# Patient Record
Sex: FEMALE | Race: WHITE | NOT HISPANIC OR LATINO | Employment: UNEMPLOYED | URBAN - METROPOLITAN AREA
[De-identification: names, ages, dates, MRNs, and addresses within clinical notes are randomized per-mention and may not be internally consistent; named-entity substitution may affect disease eponyms.]

---

## 2017-08-25 ENCOUNTER — HOSPITAL ENCOUNTER (EMERGENCY)
Facility: HOSPITAL | Age: 37
Discharge: HOME | End: 2017-08-25
Attending: EMERGENCY MEDICINE
Payer: MEDICAID

## 2017-08-25 VITALS
SYSTOLIC BLOOD PRESSURE: 116 MMHG | HEIGHT: 66 IN | DIASTOLIC BLOOD PRESSURE: 77 MMHG | BODY MASS INDEX: 28.93 KG/M2 | TEMPERATURE: 98 F | OXYGEN SATURATION: 99 % | RESPIRATION RATE: 18 BRPM | WEIGHT: 180 LBS | HEART RATE: 108 BPM

## 2017-08-25 DIAGNOSIS — S01.01XA SCALP LACERATION, INITIAL ENCOUNTER: Primary | ICD-10-CM

## 2017-08-25 DIAGNOSIS — S60.221A CONTUSION OF RIGHT HAND, INITIAL ENCOUNTER: ICD-10-CM

## 2017-08-25 PROCEDURE — 12001 RPR S/N/AX/GEN/TRNK 2.5CM/<: CPT | Mod: ,,, | Performed by: EMERGENCY MEDICINE

## 2017-08-25 PROCEDURE — 12001 RPR S/N/AX/GEN/TRNK 2.5CM/<: CPT

## 2017-08-25 PROCEDURE — 25000003 PHARM REV CODE 250: Performed by: EMERGENCY MEDICINE

## 2017-08-25 PROCEDURE — 99284 EMERGENCY DEPT VISIT MOD MDM: CPT | Mod: 25,,, | Performed by: EMERGENCY MEDICINE

## 2017-08-25 PROCEDURE — 99283 EMERGENCY DEPT VISIT LOW MDM: CPT | Mod: 25

## 2017-08-25 RX ORDER — LIDOCAINE HYDROCHLORIDE AND EPINEPHRINE 10; 10 MG/ML; UG/ML
20 INJECTION, SOLUTION INFILTRATION; PERINEURAL ONCE
Status: COMPLETED | OUTPATIENT
Start: 2017-08-25 | End: 2017-08-25

## 2017-08-25 RX ADMIN — LIDOCAINE HYDROCHLORIDE,EPINEPHRINE BITARTRATE 20 ML: 10; .01 INJECTION, SOLUTION INFILTRATION; PERINEURAL at 03:08
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
## 2018-01-13 ENCOUNTER — IMMUNIZATION (OUTPATIENT)
Dept: URGENT CARE | Facility: CLINIC | Age: 38
End: 2018-01-13
Payer: MEDICAID

## 2018-01-13 VITALS — TEMPERATURE: 98 F

## 2018-01-13 PROCEDURE — 90686 IIV4 VACC NO PRSV 0.5 ML IM: CPT | Mod: SL,S$GLB,, | Performed by: FAMILY MEDICINE

## 2018-01-13 PROCEDURE — 90471 IMMUNIZATION ADMIN: CPT | Mod: S$GLB,VFC,, | Performed by: FAMILY MEDICINE

## 2020-04-28 ENCOUNTER — OFFICE VISIT (OUTPATIENT)
Dept: URGENT CARE | Facility: CLINIC | Age: 40
End: 2020-04-28
Payer: MEDICAID

## 2020-04-28 VITALS
OXYGEN SATURATION: 98 % | TEMPERATURE: 98 F | HEIGHT: 66 IN | SYSTOLIC BLOOD PRESSURE: 106 MMHG | WEIGHT: 180 LBS | HEART RATE: 88 BPM | DIASTOLIC BLOOD PRESSURE: 88 MMHG | BODY MASS INDEX: 28.93 KG/M2

## 2020-04-28 DIAGNOSIS — R43.0 ANOSMIA: ICD-10-CM

## 2020-04-28 DIAGNOSIS — R05.9 COUGH: ICD-10-CM

## 2020-04-28 DIAGNOSIS — Z20.822 SUSPECTED COVID-19 VIRUS INFECTION: Primary | ICD-10-CM

## 2020-04-28 PROCEDURE — U0002 COVID-19 LAB TEST NON-CDC: HCPCS

## 2020-04-28 PROCEDURE — 99213 OFFICE O/P EST LOW 20 MIN: CPT | Mod: S$GLB,,, | Performed by: NURSE PRACTITIONER

## 2020-04-28 PROCEDURE — 99213 PR OFFICE/OUTPT VISIT, EST, LEVL III, 20-29 MIN: ICD-10-PCS | Mod: S$GLB,,, | Performed by: NURSE PRACTITIONER

## 2020-04-28 RX ORDER — PROMETHAZINE HYDROCHLORIDE AND DEXTROMETHORPHAN HYDROBROMIDE 6.25; 15 MG/5ML; MG/5ML
5 SYRUP ORAL NIGHTLY PRN
Qty: 120 ML | Refills: 0 | OUTPATIENT
Start: 2020-04-28 | End: 2020-09-14

## 2020-04-28 NOTE — PATIENT INSTRUCTIONS
Use an antihistamine such as Claritin, Zyrtec or Allegra to dry you out.     Use mucinex (guaifenisin) to break up mucous up to 2400mg/day to loosen any mucous. The mucinex DM pill has a cough suppressant that can be used at night to stop the tickle at the back of your throat.    Use Flonase 1-2 sprays/nostril per day. It is a local acting steroid nasal spray, if you develop a bloody nose, stop using the medication immediately.    May use cough syrup at night. May make drowsy. DO NOT TAKE WITH AMBIEN.     Take tylenol for minor pain. Increase fluid intake. Follow up immediately if symptoms persist or worsen.     Instructions for Patients Suspected COVID-19    Please continue infection control precautions like covering your mouth when coughing, washing hands frequently and minimizing contact with others whenever possible. Current CDC recommendations do not require quarantine if you are feeling OK and haven't had fever in 24 hours. However, we recommend that you stay home while you are awaiting test results, if possible. Consider wearing a mask if you need to go out in public, until result is known.      Instructions for Patients with Confirmed or Suspected COVID-19    If you are awaiting your test result, you will either be called or it will be released to the patient portal.  If you have any questions about your test, please visit [email or web address] or call our COVID-19 information line at [phone].       Stay home and stay away from family members and friends. The CDC says, you can leave home after these three things have happened: 1) You have had no fever for at least 72 hours (that is three full days of no fever without the use of medicine that reduces fevers) 2) AND other symptoms have improved (for example, when your cough or shortness of breath have improved) 3) AND at least 7 days have passed since your symptoms first appeared.   Separate yourself from other people and animals in your  home.   Call ahead before visiting your doctor.   Wear a facemask.   Cover your coughs and sneezes.   Wash your hands often with soap and water; hand  can be used, too.   Avoid sharing personal household items.   Wipe down surfaces used daily.   Monitor your symptoms. Seek prompt medical attention if your illness is worsening (e.g., difficulty breathing).    Before seeking care, call your healthcare provider.   If you have a medical emergency and need to call 911, notify the dispatch personnel that you have, or are being evaluated for COVID-19. If possible, put on a facemask before emergency medical services arrive.        Recommended precautions for household members, intimate partners, and caregivers in a home setting of a patient with symptomatic laboratory-confirmed COVID-19 or a patient under investigation.  Household members, intimate partners, and caregivers in the home setting awaiting tests results have close contact with a person with symptomatic, laboratory-confirmed COVID-19 or a person under investigation. Close contacts should monitor their health; they should call their provider right away if they develop symptoms suggestive of COVID-19 (e.g., fever, cough, shortness of breath).    Close contacts should also follow these recommendations:   Make sure that you understand and can help the patient follow their provider's instructions for medication(s) and care. You should help the patient with basic needs in the home and provide support for getting groceries, prescriptions, and other personal needs.   Monitor the patient's symptoms. If the patient is getting sicker, call his or her healthcare provider and tell them that the patient has laboratory-confirmed COVID-19. If the patient has a medical emergency and you need to call 911, notify the dispatch personnel that the patient has, or is being evaluated for COVID-19.   Household members should stay in another room or be  from  the patient. Household members should use a separate bedroom and bathroom, if available.   Prohibit visitors.   Household members should care for any pets in the home.   Make sure that shared spaces in the home have good air flow, such as by an air conditioner or an opened window, weather permitting.   Perform hand hygiene frequently. Wash your hands often with soap and water for at least 20 seconds or use an alcohol-based hand  (that contains > 60% alcohol) covering all surfaces of your hands and rubbing them together until they feel dry. Soap and water should be used preferentially.   Avoid touching your eyes, nose, and mouth.   The patient should wear a facemask. If the patient is not able to wear a facemask (for example, because it causes trouble breathing), caregivers should wear a mask when they are in the same room as the patient.   Wear a disposable facemask and gloves when you touch or have contact with the patient's blood, stool, or body fluids, such as saliva, sputum, nasal mucus, vomit, urine.  o Throw out disposable facemasks and gloves after using them. Do not reuse.  o When removing personal protective equipment, first remove and dispose of gloves. Then, immediately clean your hands with soap and water or alcohol-based hand . Next, remove and dispose of facemask, and immediately clean your hands again with soap and water or alcohol-based hand .   You should not share dishes, drinking glasses, cups, eating utensils, towels, bedding, or other items with the patient. After the patient uses these items, you should wash them thoroughly (see below Wash laundry thoroughly).   Clean all high-touch surfaces, such as counters, tabletops, doorknobs, bathroom fixtures, toilets, phones, keyboards, tablets, and bedside tables, every day. Also, clean any surfaces that may have blood, stool, or body fluids on them.   Use a household cleaning spray or wipe, according to the  label instructions. Labels contain instructions for safe and effective use of the cleaning product including precautions you should take when applying the product, such as wearing gloves and making sure you have good ventilation during use of the product.   Wash laundry thoroughly.  o Immediately remove and wash clothes or bedding that have blood, stool, or body fluids on them.  o Wear disposable gloves while handling soiled items and keep soiled items away from your body. Clean your hands (with soap and water or an alcohol-based hand ) immediately after removing your gloves.  o Read and follow directions on labels of laundry or clothing items and detergent. In general, using a normal laundry detergent according to washing machine instructions and dry thoroughly using the warmest temperatures recommended on the clothing label.   Place all used disposable gloves, facemasks, and other contaminated items in a lined container before disposing of them with other household waste. Clean your hands (with soap and water or an alcohol-based hand ) immediately after handling these items. Soap and water should be used preferentially if hands are visibly dirty.   Discuss any additional questions with your state or local health department or healthcare provider. Check available hours when contacting your local health department.    For more information see CDC link below.      https://www.cdc.gov/coronavirus/2019-ncov/hcp/guidance-prevent-spread.html#precautions        Sources:  [facility], [place] [facility] and [facility]

## 2020-04-28 NOTE — PROGRESS NOTES
"Subjective:       Patient ID: Miriam Mendez is a 39 y.o. female.    Vitals:  height is 5' 6" (1.676 m) and weight is 81.6 kg (180 lb). Her temperature is 97.5 °F (36.4 °C). Her blood pressure is 106/88 and her pulse is 88. Her oxygen saturation is 98%.     Chief Complaint: URI    URI    This is a new problem. The current episode started 1 to 4 weeks ago. The problem has been gradually worsening. The maximum temperature recorded prior to her arrival was 100.4 - 100.9 F. Associated symptoms include congestion, coughing, diarrhea, headaches and a sore throat. Pertinent negatives include no ear pain, nausea, rash, sinus pain, vomiting or wheezing. She has tried acetaminophen for the symptoms. The treatment provided no relief.       Constitution: Positive for chills, sweating and fever. Negative for fatigue.   HENT: Positive for congestion and sore throat. Negative for ear pain, sinus pain, sinus pressure and voice change.    Neck: Negative for painful lymph nodes.   Eyes: Negative for eye redness.   Respiratory: Positive for cough. Negative for chest tightness, sputum production, bloody sputum, COPD, shortness of breath, stridor, wheezing and asthma.    Gastrointestinal: Positive for diarrhea. Negative for nausea and vomiting.   Musculoskeletal: Positive for muscle ache.   Skin: Negative for rash.   Allergic/Immunologic: Negative for seasonal allergies and asthma.   Neurological: Positive for headaches.   Hematologic/Lymphatic: Negative for swollen lymph nodes.       Objective:      Physical Exam    due to the state of emergency this visit has been done remotely as per [facility] protocol.   Assessment:       1. Suspected Covid-19 Virus Infection    2. Cough    3. Anosmia        Plan:         Suspected Covid-19 Virus Infection  -     COVID-19 Routine Screening    Cough  -     promethazine-dextromethorphan (PROMETHAZINE-DM) 6.25-15 mg/5 mL Syrp; Take 5 mLs by mouth nightly as needed.  Dispense: 120 mL; Refill: " 0    Anosmia      Patient Instructions   Use an antihistamine such as Claritin, Zyrtec or Allegra to dry you out.     Use mucinex (guaifenisin) to break up mucous up to 2400mg/day to loosen any mucous. The mucinex DM pill has a cough suppressant that can be used at night to stop the tickle at the back of your throat.    Use Flonase 1-2 sprays/nostril per day. It is a local acting steroid nasal spray, if you develop a bloody nose, stop using the medication immediately.    May use cough syrup at night. May make drowsy. DO NOT TAKE WITH AMBIEN.     Take tylenol for minor pain. Increase fluid intake. Follow up immediately if symptoms persist or worsen.     Instructions for Patients Suspected COVID-19    Please continue infection control precautions like covering your mouth when coughing, washing hands frequently and minimizing contact with others whenever possible. Current CDC recommendations do not require quarantine if you are feeling OK and haven't had fever in 24 hours. However, we recommend that you stay home while you are awaiting test results, if possible. Consider wearing a mask if you need to go out in public, until result is known.      Instructions for Patients with Confirmed or Suspected COVID-19    If you are awaiting your test result, you will either be called or it will be released to the patient portal.  If you have any questions about your test, please visit [email or web address] or call our COVID-19 information line at [phone].       Stay home and stay away from family members and friends. The CDC says, you can leave home after these three things have happened: 1) You have had no fever for at least 72 hours (that is three full days of no fever without the use of medicine that reduces fevers) 2) AND other symptoms have improved (for example, when your cough or shortness of breath have improved) 3) AND at least 7 days have passed since your symptoms first appeared.   Separate yourself from  other people and animals in your home.   Call ahead before visiting your doctor.   Wear a facemask.   Cover your coughs and sneezes.   Wash your hands often with soap and water; hand  can be used, too.   Avoid sharing personal household items.   Wipe down surfaces used daily.   Monitor your symptoms. Seek prompt medical attention if your illness is worsening (e.g., difficulty breathing).    Before seeking care, call your healthcare provider.   If you have a medical emergency and need to call 911, notify the dispatch personnel that you have, or are being evaluated for COVID-19. If possible, put on a facemask before emergency medical services arrive.        Recommended precautions for household members, intimate partners, and caregivers in a home setting of a patient with symptomatic laboratory-confirmed COVID-19 or a patient under investigation.  Household members, intimate partners, and caregivers in the home setting awaiting tests results have close contact with a person with symptomatic, laboratory-confirmed COVID-19 or a person under investigation. Close contacts should monitor their health; they should call their provider right away if they develop symptoms suggestive of COVID-19 (e.g., fever, cough, shortness of breath).    Close contacts should also follow these recommendations:   Make sure that you understand and can help the patient follow their provider's instructions for medication(s) and care. You should help the patient with basic needs in the home and provide support for getting groceries, prescriptions, and other personal needs.   Monitor the patient's symptoms. If the patient is getting sicker, call his or her healthcare provider and tell them that the patient has laboratory-confirmed COVID-19. If the patient has a medical emergency and you need to call 911, notify the dispatch personnel that the patient has, or is being evaluated for COVID-19.   Household members should stay in  another room or be  from the patient. Household members should use a separate bedroom and bathroom, if available.   Prohibit visitors.   Household members should care for any pets in the home.   Make sure that shared spaces in the home have good air flow, such as by an air conditioner or an opened window, weather permitting.   Perform hand hygiene frequently. Wash your hands often with soap and water for at least 20 seconds or use an alcohol-based hand  (that contains > 60% alcohol) covering all surfaces of your hands and rubbing them together until they feel dry. Soap and water should be used preferentially.   Avoid touching your eyes, nose, and mouth.   The patient should wear a facemask. If the patient is not able to wear a facemask (for example, because it causes trouble breathing), caregivers should wear a mask when they are in the same room as the patient.   Wear a disposable facemask and gloves when you touch or have contact with the patient's blood, stool, or body fluids, such as saliva, sputum, nasal mucus, vomit, urine.  o Throw out disposable facemasks and gloves after using them. Do not reuse.  o When removing personal protective equipment, first remove and dispose of gloves. Then, immediately clean your hands with soap and water or alcohol-based hand . Next, remove and dispose of facemask, and immediately clean your hands again with soap and water or alcohol-based hand .   You should not share dishes, drinking glasses, cups, eating utensils, towels, bedding, or other items with the patient. After the patient uses these items, you should wash them thoroughly (see below Wash laundry thoroughly).   Clean all high-touch surfaces, such as counters, tabletops, doorknobs, bathroom fixtures, toilets, phones, keyboards, tablets, and bedside tables, every day. Also, clean any surfaces that may have blood, stool, or body fluids on them.   Use a household cleaning  spray or wipe, according to the label instructions. Labels contain instructions for safe and effective use of the cleaning product including precautions you should take when applying the product, such as wearing gloves and making sure you have good ventilation during use of the product.   Wash laundry thoroughly.  o Immediately remove and wash clothes or bedding that have blood, stool, or body fluids on them.  o Wear disposable gloves while handling soiled items and keep soiled items away from your body. Clean your hands (with soap and water or an alcohol-based hand ) immediately after removing your gloves.  o Read and follow directions on labels of laundry or clothing items and detergent. In general, using a normal laundry detergent according to washing machine instructions and dry thoroughly using the warmest temperatures recommended on the clothing label.   Place all used disposable gloves, facemasks, and other contaminated items in a lined container before disposing of them with other household waste. Clean your hands (with soap and water or an alcohol-based hand ) immediately after handling these items. Soap and water should be used preferentially if hands are visibly dirty.   Discuss any additional questions with your state or local health department or healthcare provider. Check available hours when contacting your local health department.    For more information see CDC link below.      https://www.cdc.gov/coronavirus/2019-ncov/hcp/guidance-prevent-spread.html#precautions        Sources:  [facility], [place] [facility] and [facility]

## 2020-04-29 ENCOUNTER — TELEPHONE (OUTPATIENT)
Dept: URGENT CARE | Facility: CLINIC | Age: 40
End: 2020-04-29

## 2020-04-29 LAB — SARS-COV-2 RNA RESP QL NAA+PROBE: DETECTED

## 2020-04-29 NOTE — TELEPHONE ENCOUNTER
Called patient and discussed positive result.  States that she was also positive a month ago but got better and then started to feel worse again.  States she is having some mild symptoms.  It is advised that she call her PCP to get an infectious disease referral.  Patient voiced understanding and is in agreement with plan of care.  Also discussed self isolation and quarantine.

## 2020-09-06 ENCOUNTER — OFFICE VISIT (OUTPATIENT)
Dept: URGENT CARE | Facility: CLINIC | Age: 40
End: 2020-09-06
Payer: MEDICAID

## 2020-09-06 VITALS
RESPIRATION RATE: 18 BRPM | BODY MASS INDEX: 28.93 KG/M2 | SYSTOLIC BLOOD PRESSURE: 137 MMHG | TEMPERATURE: 98 F | DIASTOLIC BLOOD PRESSURE: 92 MMHG | WEIGHT: 180 LBS | HEIGHT: 66 IN | OXYGEN SATURATION: 99 % | HEART RATE: 99 BPM

## 2020-09-06 DIAGNOSIS — H92.01 RIGHT EAR PAIN: Primary | ICD-10-CM

## 2020-09-06 PROCEDURE — 99213 PR OFFICE/OUTPT VISIT, EST, LEVL III, 20-29 MIN: ICD-10-PCS | Mod: S$GLB,,, | Performed by: PHYSICIAN ASSISTANT

## 2020-09-06 PROCEDURE — 99213 OFFICE O/P EST LOW 20 MIN: CPT | Mod: S$GLB,,, | Performed by: PHYSICIAN ASSISTANT

## 2020-09-06 RX ORDER — LORAZEPAM 1 MG/1
1 TABLET ORAL
COMMUNITY
Start: 2020-05-01 | End: 2021-10-12 | Stop reason: CLARIF

## 2020-09-06 RX ORDER — OMEPRAZOLE 10 MG/1
10 CAPSULE, DELAYED RELEASE ORAL
COMMUNITY
Start: 2019-05-16 | End: 2021-10-12 | Stop reason: CLARIF

## 2020-09-06 RX ORDER — FLUTICASONE PROPIONATE 50 MCG
1 SPRAY, SUSPENSION (ML) NASAL DAILY
Qty: 18.2 ML | Refills: 0 | Status: SHIPPED | OUTPATIENT
Start: 2020-09-06 | End: 2021-10-12 | Stop reason: CLARIF

## 2020-09-06 NOTE — PATIENT INSTRUCTIONS
PLEASE READ YOUR DISCHARGE INSTRUCTIONS ENTIRELY AS IT CONTAINS IMPORTANT INFORMATION.  - Rest.    - Drink plenty of fluids.    - Tylenol or Ibuprofen as directed as needed for fever/pain.    - If you were prescribed antibiotics, please take them to completion.  - If you are female and on birth control pills - please use additional methods of contraception to prevent pregnancy while on antibiotics and for one cycle after.   - If you were prescribed a narcotic medication or muscle relaxer, do not drive or operate heavy equipment or machinery while taking these medications, as they can cause drowsiness.   - If you smoke, please stop smoking.  -You must understand that you've received an Urgent Care treatment only and that you may be released before all your medical problems are known or treated. You, the patient, will    arrange for follow up care as instructed. Please arrange follow up with your primary medical clinic as soon as possible.   - Follow up with your PCP or specialty clinic as directed in the next 1-2 weeks if not improved or as needed.  You can call [phone] to schedule an appointment with the appropriate provider.    - Please return to Urgent Care or to the Emergency Department if your symptoms worsen.    Patient aware and verbalized understanding.    Earache, No Infection (Adult)  Earaches can happen without an infection. This occurs when air and fluid build up behind the eardrum causing a feeling of fullness and discomfort and reduced hearing. This is called otitis media with effusion (OME) or serous otitis media. It means there is fluid in the middle ear. It is not the same as acute otitis media, which is typically from infection.  OME can happen when you have a cold if congestion blocks the passage that drains the middle ear. This passage is called the eustachian tube. OME may also occur with nasal allergies or after a bacterial middle ear infection.    The pain or discomfort may come and go.  You may hear clicking or popping sounds when you chew or swallow. You may feel that your balance is off. Or you may hear ringing in the ear.  It often takes from several weeks up to 3 months for the fluid to clear on its own. Oral pain relievers and ear drops help if there is pain. Decongestants and antihistamines sometimes help. Antibiotics don't help since there is no infection. Your doctor may prescribe a nasal spray to help reduce swelling in the nose and eustachian tube. This can allow the ear to drain.  If your OME doesn't improve after 3 months, surgery may be used to drain the fluid and insert a small tube in the eardrum to allow continued drainage.  Because the middle ear fluid can become infected, it is important to watch for signs of an ear infection which may develop later. These signs include increased ear pain, fever, or drainage from the ear.  Home care  The following guidelines will help you care for yourself at home:  · You may use over-the-counter medicine as directed to control pain, unless another medicine was prescribed. If you have chronic liver or kidney disease or ever had a stomach ulcer or GI bleeding, talk with your doctor before using these medicines. Aspirin should never be used in anyone under 18 years of age who is ill with a fever. It may cause severe liver damage.  · You may use over-the-counter decongestants such as phenylephrine or pseudoephedrine. But they are not always helpful. Don't use nasal spray decongestants more than 3 days. Longer use can make congestion worse. Prescription nasal sprays from your doctor don't typically have those restrictions.  · Antihistamines may help if you are also having allergy symptoms.  · You may use medicines such as guaifenesin to thin mucus and promote drainage.  Follow-up care  Follow up with your healthcare provider or as advised if you are not feeling better after 3 days.  When to seek medical advice  Call your healthcare provider right away  if any of the following occur:  · Your ear pain gets worse or does not start to improve   · Fever of 100.4°F (38°C) or higher, or as directed by your healthcare provider  · Fluid or blood draining from the ear  · Headache or sinus pain  · Stiff neck  · Unusual drowsiness or confusion  Date Last Reviewed: 10/1/2016  © 0905-9404 [employer]. [address] [place] [address]. All rights reserved. This information is not intended as a substitute for professional medical care. Always follow your healthcare professional's instructions.

## 2020-09-06 NOTE — PROGRESS NOTES
"Subjective:       Patient ID: Miriam Mendez is a 39 y.o. female.    Vitals:  height is 5' 6" (1.676 m) and weight is 81.6 kg (180 lb). Her temporal temperature is 97.8 °F (36.6 °C). Her blood pressure is 137/92 (abnormal) and her pulse is 99. Her respiration is 18 and oxygen saturation is 99%.     Chief Complaint: Otalgia     Ms. Mendez presents for evaluation of right ear pain.  She has had the symptoms x 2 weeks.  She reports more of a pressure than pain.  It is not tender to touch.  She denies any fevers, chills, or drainage from the ear.  She is also having intermittent tinnitus & muffled hearing.  She has tried ibuprofen with some relief.     Otalgia   There is pain in the right ear. This is a new problem. The current episode started 1 to 4 weeks ago (x2 weeks ago ). The problem occurs constantly. The problem has been gradually worsening. There has been no fever. The fever has been present for less than 1 day. The pain is at a severity of 7/10. The pain is moderate. Associated symptoms include hearing loss. Pertinent negatives include no abdominal pain, coughing, diarrhea, ear discharge, headaches, rash, sore throat or vomiting. She has tried NSAIDs for the symptoms. The treatment provided mild relief.       Constitution: Negative for chills, sweating, fatigue and fever.   HENT: Positive for ear pain, tinnitus and hearing loss. Negative for ear discharge, foreign body in ear, congestion and sore throat.    Neck: Negative for painful lymph nodes.   Cardiovascular: Negative for chest trauma, chest pain, leg swelling, palpitations, sob on exertion and passing out.   Eyes: Negative for double vision and blurred vision.   Respiratory: Negative for cough and shortness of breath.    Gastrointestinal: Negative for abdominal pain, nausea, vomiting and diarrhea.   Genitourinary: Negative for dysuria, frequency, urgency and history of kidney stones.   Musculoskeletal: Negative for joint pain, joint swelling, muscle " cramps and muscle ache.   Skin: Negative for color change, pale, rash and bruising.   Allergic/Immunologic: Negative for seasonal allergies.   Neurological: Negative for dizziness, history of vertigo, light-headedness, passing out, facial drooping, speech difficulty, coordination disturbances, loss of balance and headaches.   Hematologic/Lymphatic: Negative for swollen lymph nodes.   Psychiatric/Behavioral: Negative for nervous/anxious, sleep disturbance and depression. The patient is not nervous/anxious.        Objective:      Physical Exam   Constitutional: She is oriented to person, place, and time. She appears well-developed. She is cooperative.  Non-toxic appearance. She does not appear ill. No distress.   HENT:   Head: Normocephalic and atraumatic.   Ears:   Right Ear: Hearing, external ear and ear canal normal. No drainage, swelling or tenderness. Tympanic membrane is bulging. Tympanic membrane is not erythematous. No middle ear effusion.   Left Ear: Hearing, tympanic membrane, external ear and ear canal normal. No drainage, swelling or tenderness. Tympanic membrane is not erythematous and not bulging.  No middle ear effusion.   Nose: Nose normal. No mucosal edema, rhinorrhea or nasal deformity. No epistaxis. Right sinus exhibits no maxillary sinus tenderness and no frontal sinus tenderness. Left sinus exhibits no maxillary sinus tenderness and no frontal sinus tenderness.   Mouth/Throat: Uvula is midline, oropharynx is clear and moist and mucous membranes are normal. No trismus in the jaw. Normal dentition. No uvula swelling. No oropharyngeal exudate, posterior oropharyngeal edema or posterior oropharyngeal erythema.   Eyes: Conjunctivae and lids are normal. No scleral icterus.   Neck: Trachea normal, full passive range of motion without pain and phonation normal. Neck supple. No neck rigidity. No edema and no erythema present.   Cardiovascular: Normal rate, regular rhythm, normal heart sounds and normal  pulses.   Pulmonary/Chest: Effort normal and breath sounds normal. No respiratory distress. She has no decreased breath sounds. She has no rhonchi.   Abdominal: Normal appearance.   Musculoskeletal: Normal range of motion.         General: No deformity.   Neurological: She is alert and oriented to person, place, and time. She exhibits normal muscle tone. Coordination normal.   Skin: Skin is warm, dry, intact, not diaphoretic and not pale. Psychiatric: Her speech is normal and behavior is normal. Judgment and thought content normal.   Nursing note and vitals reviewed.        Assessment:       1. Right ear pain        Plan:         Right ear pain  -     Ambulatory referral/consult to ENT    Other orders  -     fluticasone propionate (FLONASE) 50 mcg/actuation nasal spray; 1 spray (50 mcg total) by Each Nostril route once daily.  Dispense: 18.2 mL; Refill: 0      Patient Instructions   PLEASE READ YOUR DISCHARGE INSTRUCTIONS ENTIRELY AS IT CONTAINS IMPORTANT INFORMATION.  - Rest.    - Drink plenty of fluids.    - Tylenol or Ibuprofen as directed as needed for fever/pain.    - If you were prescribed antibiotics, please take them to completion.  - If you are female and on birth control pills - please use additional methods of contraception to prevent pregnancy while on antibiotics and for one cycle after.   - If you were prescribed a narcotic medication or muscle relaxer, do not drive or operate heavy equipment or machinery while taking these medications, as they can cause drowsiness.   - If you smoke, please stop smoking.  -You must understand that you've received an Urgent Care treatment only and that you may be released before all your medical problems are known or treated. You, the patient, will    arrange for follow up care as instructed. Please arrange follow up with your primary medical clinic as soon as possible.   - Follow up with your PCP or specialty clinic as directed in the next 1-2 weeks if not improved or  as needed.  You can call [phone] to schedule an appointment with the appropriate provider.    - Please return to Urgent Care or to the Emergency Department if your symptoms worsen.    Patient aware and verbalized understanding.    Earache, No Infection (Adult)  Earaches can happen without an infection. This occurs when air and fluid build up behind the eardrum causing a feeling of fullness and discomfort and reduced hearing. This is called otitis media with effusion (OME) or serous otitis media. It means there is fluid in the middle ear. It is not the same as acute otitis media, which is typically from infection.  OME can happen when you have a cold if congestion blocks the passage that drains the middle ear. This passage is called the eustachian tube. OME may also occur with nasal allergies or after a bacterial middle ear infection.    The pain or discomfort may come and go. You may hear clicking or popping sounds when you chew or swallow. You may feel that your balance is off. Or you may hear ringing in the ear.  It often takes from several weeks up to 3 months for the fluid to clear on its own. Oral pain relievers and ear drops help if there is pain. Decongestants and antihistamines sometimes help. Antibiotics don't help since there is no infection. Your doctor may prescribe a nasal spray to help reduce swelling in the nose and eustachian tube. This can allow the ear to drain.  If your OME doesn't improve after 3 months, surgery may be used to drain the fluid and insert a small tube in the eardrum to allow continued drainage.  Because the middle ear fluid can become infected, it is important to watch for signs of an ear infection which may develop later. These signs include increased ear pain, fever, or drainage from the ear.  Home care  The following guidelines will help you care for yourself at home:  · You may use over-the-counter medicine as directed to control pain, unless another medicine was  prescribed. If you have chronic liver or kidney disease or ever had a stomach ulcer or GI bleeding, talk with your doctor before using these medicines. Aspirin should never be used in anyone under 18 years of age who is ill with a fever. It may cause severe liver damage.  · You may use over-the-counter decongestants such as phenylephrine or pseudoephedrine. But they are not always helpful. Don't use nasal spray decongestants more than 3 days. Longer use can make congestion worse. Prescription nasal sprays from your doctor don't typically have those restrictions.  · Antihistamines may help if you are also having allergy symptoms.  · You may use medicines such as guaifenesin to thin mucus and promote drainage.  Follow-up care  Follow up with your healthcare provider or as advised if you are not feeling better after 3 days.  When to seek medical advice  Call your healthcare provider right away if any of the following occur:  · Your ear pain gets worse or does not start to improve   · Fever of 100.4°F (38°C) or higher, or as directed by your healthcare provider  · Fluid or blood draining from the ear  · Headache or sinus pain  · Stiff neck  · Unusual drowsiness or confusion  Date Last Reviewed: 10/1/2016  © 5768-1358 [employer]. [address] [place] [address], [place] [address]. All rights reserved. This information is not intended as a substitute for professional medical care. Always follow your healthcare professional's instructions.

## 2020-09-10 ENCOUNTER — TELEPHONE (OUTPATIENT)
Dept: OTOLARYNGOLOGY | Facility: CLINIC | Age: 40
End: 2020-09-10

## 2020-09-10 NOTE — TELEPHONE ENCOUNTER
Returned patients phone call. Let her know we are not accepting adult medicaid at this time but that she could try main campus and provided her with the phone number to try getting it scheduled due to me not being allowed to schedule it. Patient thanked me for the number and helping her.    ----- Message from Perry Agarwal sent at 9/10/2020  9:37 AM CDT -----  Type:  Needs Medical Advice    Who Called:self  Reason:Has a referral for ENT. Patient has medicaid and wants to make sure you took that. Patient has blockage and can hardly hear   Would the patient rather a call back or a response via [facility]? call  Best Call Back Number: [phone]  Additional Information: none

## 2020-09-14 ENCOUNTER — TELEPHONE (OUTPATIENT)
Dept: OTOLARYNGOLOGY | Facility: CLINIC | Age: 40
End: 2020-09-14

## 2020-09-14 ENCOUNTER — HOSPITAL ENCOUNTER (EMERGENCY)
Facility: HOSPITAL | Age: 40
Discharge: HOME | End: 2020-09-14
Attending: EMERGENCY MEDICINE
Payer: MEDICAID

## 2020-09-14 VITALS
RESPIRATION RATE: 18 BRPM | BODY MASS INDEX: 28.93 KG/M2 | HEIGHT: 66 IN | TEMPERATURE: 99 F | WEIGHT: 180 LBS | HEART RATE: 106 BPM | OXYGEN SATURATION: 99 % | DIASTOLIC BLOOD PRESSURE: 84 MMHG | SYSTOLIC BLOOD PRESSURE: 160 MMHG

## 2020-09-14 DIAGNOSIS — H65.191 ACUTE MUCOID OTITIS MEDIA OF RIGHT EAR: Primary | ICD-10-CM

## 2020-09-14 DIAGNOSIS — H93.11 TINNITUS OF RIGHT EAR: ICD-10-CM

## 2020-09-14 DIAGNOSIS — H92.01 RIGHT EAR PAIN: ICD-10-CM

## 2020-09-14 LAB
B-HCG UR QL: NEGATIVE
CTP QC/QA: YES

## 2020-09-14 PROCEDURE — 99284 EMERGENCY DEPT VISIT MOD MDM: CPT | Mod: 25

## 2020-09-14 PROCEDURE — 63600175 PHARM REV CODE 636 W HCPCS: Performed by: EMERGENCY MEDICINE

## 2020-09-14 PROCEDURE — 25000003 PHARM REV CODE 250: Performed by: EMERGENCY MEDICINE

## 2020-09-14 PROCEDURE — 81025 URINE PREGNANCY TEST: CPT | Performed by: EMERGENCY MEDICINE

## 2020-09-14 RX ORDER — AMOXICILLIN AND CLAVULANATE POTASSIUM 875; 125 MG/1; MG/1
1 TABLET, FILM COATED ORAL
Status: COMPLETED | OUTPATIENT
Start: 2020-09-14 | End: 2020-09-14

## 2020-09-14 RX ORDER — BUTALBITAL, ACETAMINOPHEN AND CAFFEINE 50; 325; 40 MG/1; MG/1; MG/1
1 TABLET ORAL EVERY 4 HOURS PRN
Qty: 30 TABLET | Refills: 0 | Status: SHIPPED | OUTPATIENT
Start: 2020-09-14 | End: 2020-10-14

## 2020-09-14 RX ORDER — DEXAMETHASONE SODIUM PHOSPHATE 4 MG/ML
10 INJECTION, SOLUTION INTRA-ARTICULAR; INTRALESIONAL; INTRAMUSCULAR; INTRAVENOUS; SOFT TISSUE
Status: COMPLETED | OUTPATIENT
Start: 2020-09-14 | End: 2020-09-14

## 2020-09-14 RX ORDER — AMOXICILLIN AND CLAVULANATE POTASSIUM 875; 125 MG/1; MG/1
1 TABLET, FILM COATED ORAL 2 TIMES DAILY
Qty: 14 TABLET | Refills: 0 | OUTPATIENT
Start: 2020-09-14 | End: 2020-09-18

## 2020-09-14 RX ORDER — CETIRIZINE HYDROCHLORIDE 10 MG/1
10 TABLET ORAL DAILY
Qty: 15 TABLET | Refills: 0 | Status: SHIPPED | OUTPATIENT
Start: 2020-09-14 | End: 2021-10-12 | Stop reason: CLARIF

## 2020-09-14 RX ORDER — IBUPROFEN 600 MG/1
600 TABLET ORAL
Status: COMPLETED | OUTPATIENT
Start: 2020-09-14 | End: 2020-09-14

## 2020-09-14 RX ORDER — IBUPROFEN 600 MG/1
600 TABLET ORAL EVERY 6 HOURS PRN
Qty: 20 TABLET | Refills: 0 | Status: SHIPPED | OUTPATIENT
Start: 2020-09-14 | End: 2020-09-18 | Stop reason: ALTCHOICE

## 2020-09-14 RX ORDER — ACETAMINOPHEN 500 MG
1000 TABLET ORAL
Status: COMPLETED | OUTPATIENT
Start: 2020-09-14 | End: 2020-09-14

## 2020-09-14 RX ADMIN — IBUPROFEN 600 MG: 600 TABLET, FILM COATED ORAL at 09:09

## 2020-09-14 RX ADMIN — AMOXICILLIN AND CLAVULANATE POTASSIUM 1 TABLET: 875; 125 TABLET, FILM COATED ORAL at 09:09

## 2020-09-14 RX ADMIN — ACETAMINOPHEN 1000 MG: 500 TABLET ORAL at 09:09

## 2020-09-14 RX ADMIN — DEXAMETHASONE SODIUM PHOSPHATE 10 MG: 4 INJECTION, SOLUTION INTRAMUSCULAR; INTRAVENOUS at 09:09

## 2020-09-14 NOTE — PLAN OF CARE
This patient is current with [facility] and has an ENT appt scheduled for 9/23/2020- 9:30am with Dr. Irina Vasquez [phone].     VICKIE Juarez was able to get patient an earlier appt on 9/17/2020-11:00am.

## 2020-09-14 NOTE — TELEPHONE ENCOUNTER
Patient called asking why she no longer had an appointment and that she was downstairs trying to check in for it. I reiterated our previous conversation about being out of network with her insurance. Patient was adamant that she was given another appointment for today, which I did notice last week on the 10th after our conversation about her insurance. I reached out to the person who scheduled it to inform them that we already spoke to the patient and that she needs to be rescheduled again properly. Employee understood and stated that they would reach back out and cancel appointment and schedule properly. Patient was very irate downstairs today and over the phone with both myself and my leader. Scheduled patient an appointment for Wednesday 9/23 at [facility] for 3:30p which showed available as of today. Not sure if patient will show up for that appointment because she hung up after asked to be placed on hold for a few moments.

## 2020-09-14 NOTE — ED PROVIDER NOTES
Encounter Date: 2020       History     Chief Complaint   Patient presents with    Otalgia     right ear pain x 3 weeks.pt was secheduled to see ent but her apt was cancelled.     This is a 39-year-old female with a past medical history significant for gastric bypass anxiety and attention deficit disorder in the past who presents for evaluation of pain in the right ear and persistent headache which developed as this patient was doing nothing in particular, thereafter that the pain seemed to intensify.  She attempted to obtain an appointment an urgent care they referred her for ENT evaluation however prior to arriving to ENT Clinic was told that her insurance would not be accepted at that clinic which time she became very upset.  During that time she has been taking ibuprofen, she has been using Flonase without any improvement in her symptoms.  She now has persistent ringing in her ears which makes it very very intense.        Review of patient's allergies indicates:   Allergen Reactions    Sulfa (sulfonamide antibiotics)      Other reaction(s): Hives     Past Medical History:   Diagnosis Date    ADD (attention deficit disorder with hyperactivity)     psych eval 3/10    Anxiety     Bronchitis     RAD /bronchitis episodes    Cellulitis     face    Depression     undergoing psychotherapy    Easy bruising 2014    Fibromyalgia     History of ITP 2014    Iron deficiency anemia 2014    Morbid obesity     improved with s/p gastric bypass    Pica 2014    Pseudotumor cerebri syndrome     Thrombocytopenia      Past Surgical History:   Procedure Laterality Date     SECTION      CHOLECYSTECTOMY      GASTRIC BYPASS       Family History   Problem Relation Age of Onset    Breast cancer Mother 57    Hypertension Mother     Cancer Mother 57        breast cancer    Colon cancer Neg Hx     Diabetes Neg Hx     Ovarian cancer Neg Hx     Stroke Neg Hx     Cancer Father     Cancer  Paternal Grandmother         breast    Cancer Paternal Grandfather         kidney     Social History     Tobacco Use    Smoking status: Never Smoker    Smokeless tobacco: Never Used   Substance Use Topics    Alcohol use: Yes    Drug use: No     Review of Systems   Constitutional-no fever no chills  HEENT-no congestion, positive ear pain, no nose bleed, positive sinus pain,  Eyes-no discharge, no itching, no redness, no visual change  Respiratory-no apnea, no chest tightness, no choking, no cough, no shortness of breath, no wheezing  Cardio-no chest pain  GI-no distention, no abdominal pain, no diarrhea, no constipation  Endocrine-no cold intolerance, no heat intolerance  -no difficulty urinating, no dysuria, no flank pain,  MSK-no arthralgias, no joint swelling, no myalgias  Skin-no rashes  Allergy-no environmental allergy  Neurologic-no dizziness, positive headache, no numbness, no seizure  Hematology-no swollen nodes  Behavioral-no confusion, no hallucinations, no nervousness  Physical Exam     Initial Vitals [09/14/20 0826]   BP Pulse Resp Temp SpO2   (!) 160/84 106 18 98.6 °F (37 °C) 99 %      MAP       --         Physical Exam  Constitutional:  Uncomfortable appearing, moderate distress.  Eyes: Conjunctivae normal.  ENT       Head: Normocephalic, atraumatic.       Nose: No congestion.  The right ear demonstrates bulging of the tympanic membrane, a clean external auditory canal, serous fluid on the interior aspect       Mouth/Throat: Mucous membranes are moist.  Hematological/Lymphatic/Immunilogical: No cervical lymphadenopathy.  Cardiovascular: Normal rate, regular rhythm. Normal and symmetric distal pulses.  Respiratory: Normal respiratory effort. Breath sounds are normal.  Gastrointestinal: Soft, nontender.   Musculoskeletal: Normal range of motion in all extremities. No obvious deformities or swelling.  Neurologic: Alert, oriented. Normal speech and language. No gross focal neurologic deficits are  appreciated.  Skin: Skin is warm, dry. No rash noted.  Psychiatric: Mood and affect are normal.   ED Course   Procedures  Labs Reviewed - No data to display       Imaging Results    None          Medical Decision Making:   Initial Assessment:   This is a woman who appears to have serous otitis media.                             Clinical Impression:       ICD-10-CM ICD-9-CM   1. Acute mucoid otitis media of right ear  H65.111 381.02   2. Right ear pain  H92.01 388.70   3. Tinnitus of right ear  H93.11 388.30             ED Disposition Condition    Discharge Stable        ED Prescriptions     Medication Sig Dispense Start Date End Date Auth. Provider    amoxicillin-clavulanate 875-125mg (AUGMENTIN) 875-125 mg per tablet Take 1 tablet by mouth 2 (two) times daily. 14 tablet 9/14/2020  Ganesh Casiano MD    butalbital-acetaminophen-caffeine -40 mg (FIORICET, ESGIC) -40 mg per tablet Take 1 tablet by mouth every 4 (four) hours as needed for Pain. 30 tablet 9/14/2020 10/14/2020 Ganesh Casiano MD    cetirizine (ZYRTEC) 10 MG tablet Take 1 tablet (10 mg total) by mouth once daily. for 15 days 15 tablet 9/14/2020 9/29/2020 Ganesh Casiano MD    ibuprofen (ADVIL,MOTRIN) 600 MG tablet Take 1 tablet (600 mg total) by mouth every 6 (six) hours as needed for Pain. 20 tablet 9/14/2020  Ganesh Casiano MD        Follow-up Information     Follow up With Specialties Details Why Contact Info    Carlo Starr MD Otolaryngology Call today For a follow up visit about today- ear specialist at [facility] [address] [facility] [address]  [phone]      [facility] - Ent Department Otolaryngology Call today For a follow up visit about today [address] [place] [address]  [phone]                                         Ganesh Casiano MD  09/14/20 1092

## 2020-09-14 NOTE — ED TRIAGE NOTES
Pt presents to ED with C/O R ear pain with onset x3 weeks. Pt states she went to  last Sunday and was scheduled to see ENT this am at 0800. Pt states when she went to the appt. She was informed that the appt was canceled due to them not excepting her insurance. pt states she is having difficulty hearing from her L ear. Pt states the pain is 8/10 and denies taking any medication for the pain this am. Pt states she has attempted to use Debrox with no relief.

## 2020-09-14 NOTE — PLAN OF CARE
attempted to reach patient to inform her of the f/u appt. Patient states her PCP is Dr. Vasquez, but patient stated emergency room MD walked into her room and had to disconnect call.    Upon reviewing medical records, patient has a ENT appt made with [facility] on 9/23/20.     notified bedside nurse of appt options patient has.    Jena with patient's EJ PCP Dr. Vasquez 9/17/10-11:00am.    Future Appointments   Date Time Provider Department Center   9/23/2020  3:30 PM Wu Sosa MD [facility] ENT [facility]

## 2020-09-14 NOTE — Clinical Note
"Miiram"Paris Mendez was seen and treated in our emergency department on 9/14/2020.  She may return to work on 09/16/2020.       If you have any questions or concerns, please don't hesitate to call.      Ganesh Casiano MD"

## 2020-09-18 ENCOUNTER — HOSPITAL ENCOUNTER (EMERGENCY)
Facility: HOSPITAL | Age: 40
Discharge: HOME | End: 2020-09-18
Attending: EMERGENCY MEDICINE
Payer: MEDICAID

## 2020-09-18 VITALS
BODY MASS INDEX: 28.93 KG/M2 | HEIGHT: 66 IN | HEART RATE: 72 BPM | DIASTOLIC BLOOD PRESSURE: 78 MMHG | SYSTOLIC BLOOD PRESSURE: 113 MMHG | WEIGHT: 180 LBS | TEMPERATURE: 98 F | OXYGEN SATURATION: 97 % | RESPIRATION RATE: 16 BRPM

## 2020-09-18 DIAGNOSIS — H92.01 RIGHT EAR PAIN: Primary | ICD-10-CM

## 2020-09-18 PROCEDURE — 63600175 PHARM REV CODE 636 W HCPCS: Performed by: PHYSICIAN ASSISTANT

## 2020-09-18 PROCEDURE — 99284 PR EMERGENCY DEPT VISIT,LEVEL IV: ICD-10-PCS | Mod: ,,, | Performed by: PHYSICIAN ASSISTANT

## 2020-09-18 PROCEDURE — 99284 EMERGENCY DEPT VISIT MOD MDM: CPT | Mod: 25

## 2020-09-18 PROCEDURE — 96372 THER/PROPH/DIAG INJ SC/IM: CPT

## 2020-09-18 PROCEDURE — 99284 EMERGENCY DEPT VISIT MOD MDM: CPT | Mod: ,,, | Performed by: PHYSICIAN ASSISTANT

## 2020-09-18 PROCEDURE — 25000003 PHARM REV CODE 250: Performed by: PHYSICIAN ASSISTANT

## 2020-09-18 RX ORDER — KETOROLAC TROMETHAMINE 30 MG/ML
15 INJECTION, SOLUTION INTRAMUSCULAR; INTRAVENOUS
Status: COMPLETED | OUTPATIENT
Start: 2020-09-18 | End: 2020-09-18

## 2020-09-18 RX ORDER — ACETAMINOPHEN 500 MG
1000 TABLET ORAL
Status: COMPLETED | OUTPATIENT
Start: 2020-09-18 | End: 2020-09-18

## 2020-09-18 RX ORDER — PSEUDOEPHEDRINE HCL 120 MG/1
120 TABLET, FILM COATED, EXTENDED RELEASE ORAL
Status: COMPLETED | OUTPATIENT
Start: 2020-09-18 | End: 2020-09-18

## 2020-09-18 RX ORDER — CIPROFLOXACIN 500 MG/1
500 TABLET ORAL 2 TIMES DAILY
Qty: 10 TABLET | Refills: 0 | Status: SHIPPED | OUTPATIENT
Start: 2020-09-18 | End: 2020-09-18 | Stop reason: SDUPTHER

## 2020-09-18 RX ORDER — KETOROLAC TROMETHAMINE 10 MG/1
10 TABLET, FILM COATED ORAL EVERY 6 HOURS PRN
Qty: 20 TABLET | Refills: 0 | Status: SHIPPED | OUTPATIENT
Start: 2020-09-18 | End: 2020-09-18 | Stop reason: SDUPTHER

## 2020-09-18 RX ORDER — PSEUDOEPHEDRINE HCL 120 MG/1
120 TABLET, FILM COATED, EXTENDED RELEASE ORAL 2 TIMES DAILY PRN
Qty: 20 TABLET | Refills: 0 | Status: SHIPPED | OUTPATIENT
Start: 2020-09-18 | End: 2020-09-28

## 2020-09-18 RX ORDER — CIPROFLOXACIN 500 MG/1
500 TABLET ORAL 2 TIMES DAILY
Qty: 6 TABLET | Refills: 0 | Status: SHIPPED | OUTPATIENT
Start: 2020-09-18 | End: 2020-09-18 | Stop reason: SDUPTHER

## 2020-09-18 RX ORDER — KETOROLAC TROMETHAMINE 10 MG/1
10 TABLET, FILM COATED ORAL EVERY 6 HOURS PRN
Qty: 20 TABLET | Refills: 0 | Status: SHIPPED | OUTPATIENT
Start: 2020-09-18 | End: 2020-09-23

## 2020-09-18 RX ORDER — CIPROFLOXACIN 500 MG/1
500 TABLET ORAL 2 TIMES DAILY
Qty: 10 TABLET | Refills: 0 | Status: SHIPPED | OUTPATIENT
Start: 2020-09-18 | End: 2020-09-23

## 2020-09-18 RX ORDER — OXYMETAZOLINE HCL 0.05 %
1 SPRAY, NON-AEROSOL (ML) NASAL
Status: COMPLETED | OUTPATIENT
Start: 2020-09-18 | End: 2020-09-18

## 2020-09-18 RX ORDER — PSEUDOEPHEDRINE HCL 120 MG/1
120 TABLET, FILM COATED, EXTENDED RELEASE ORAL 2 TIMES DAILY PRN
Qty: 20 TABLET | Refills: 0 | Status: SHIPPED | OUTPATIENT
Start: 2020-09-18 | End: 2020-09-18 | Stop reason: SDUPTHER

## 2020-09-18 RX ADMIN — Medication 1 SPRAY: at 05:09

## 2020-09-18 RX ADMIN — KETOROLAC TROMETHAMINE 15 MG: 30 INJECTION, SOLUTION INTRAMUSCULAR at 05:09

## 2020-09-18 RX ADMIN — PSEUDOEPHEDRINE HYDROCHLORIDE 120 MG: 120 TABLET, FILM COATED, EXTENDED RELEASE ORAL at 05:09

## 2020-09-18 RX ADMIN — ACETAMINOPHEN 1000 MG: 500 TABLET ORAL at 05:09

## 2020-09-18 NOTE — ED NOTES
Two patient identifiers have been checked and are correct.      Appearance: Pt awake, alert & oriented to person, place & time. Pt in no acute distress at present time. Pt is clean and well groomed with clothes appropriately fastened. + right side ear pain radiating to right jaw.   Skin: Skin warm, dry & intact. Color consistent with ethnicity. Mucous membranes moist. No breakdown or brusing noted.   Musculoskeletal: Patient moving all extremities well, no obvious swelling or deformities noted.   Respiratory: Respirations spontaneous, even, and non-labored. Visible chest rise noted. Airway is open and patent. No accessory muscle use noted.   Neurologic: Sensation is intact. Speech is clear and appropriate. Eyes open spontaneously, behavior appropriate to situation, follows commands, facial expression symmetrical, bilateral hand grasp equal and even, purposeful motor response noted.  Cardiac: All peripheral pulses present. No Bilateral lower extremity edema. Cap refill is <3 seconds.  Abdomen: Abdomen soft, non-tender to palpation.

## 2020-09-18 NOTE — ED TRIAGE NOTES
"Pt presents to Er via POV w/ family member reporting + increased pain to right ear. Pt states + right sided ear pain x 1 month, seen at UC and ER for similar symptoms. Pt states recently prescribed antibiotics on Monday. Denies drainage from affected ear. + right sided jaw pains and "tenderness" reported. + decreased hearing x 2 weeks to right ear. Denies fever or chills.   "

## 2020-09-18 NOTE — DISCHARGE INSTRUCTIONS
Diagnosis:  Ear pain    I am not sure why your ear pain is so severe.  The swelling in the ear drum appears to have decreased.  I am switching you to a different antibiotic medicine called ciprofloxacin and I am also prescribing Toradol for pain and Sudafed for congestion.  You can also continue to use the Afrin which helps with congestion.    Please go to your scheduled appointment with ENT for follow-up.  If you have new symptoms such as discharge from the ear, severe headache, high fever or any other concerning symptoms please return to the emergency department.

## 2020-09-19 NOTE — ED PROVIDER NOTES
Encounter Date: 2020       History     Chief Complaint   Patient presents with    Otalgia     r ear pain for 3 weeks     39-year-old female with ADD, anxiety, history of ITP presents for severe pain in her right ear for about 2 weeks.  She was seen for the same complaint a few days ago the ED and prescribed Augmentin for otitis media with no improvement of her symptoms.  She has been taking ibuprofen without any significant improvement.  She reports associated right-sided jaw pain, decreased hearing and tinnitus.  She denies fever/chills, sore throat, headache, ear discharge or pain in the contralateral ear.        Review of patient's allergies indicates:   Allergen Reactions    Sulfa (sulfonamide antibiotics)      Other reaction(s): Hives     Past Medical History:   Diagnosis Date    ADD (attention deficit disorder with hyperactivity)     psych eval 3/10    Anxiety     Bronchitis     RAD /bronchitis episodes    Cellulitis     face    Depression     undergoing psychotherapy    Easy bruising 2014    Fibromyalgia     History of ITP 2014    Iron deficiency anemia 2014    Morbid obesity     improved with s/p gastric bypass    Pica 2014    Pseudotumor cerebri syndrome     Thrombocytopenia      Past Surgical History:   Procedure Laterality Date     SECTION      CHOLECYSTECTOMY      GASTRIC BYPASS       Family History   Problem Relation Age of Onset    Breast cancer Mother 57    Hypertension Mother     Cancer Mother 57        breast cancer    Cancer Father     Cancer Paternal Grandmother         breast    Cancer Paternal Grandfather         kidney    Colon cancer Neg Hx     Diabetes Neg Hx     Ovarian cancer Neg Hx     Stroke Neg Hx      Social History     Tobacco Use    Smoking status: Never Smoker    Smokeless tobacco: Never Used   Substance Use Topics    Alcohol use: Yes    Drug use: No     Review of Systems   Constitutional: Negative for fatigue and  fever.   HENT: Positive for congestion, ear pain, hearing loss and tinnitus. Negative for drooling, ear discharge, facial swelling, sinus pressure, sinus pain, sore throat, trouble swallowing and voice change.    Respiratory: Negative for shortness of breath.    Cardiovascular: Negative for chest pain.   Gastrointestinal: Negative for nausea.   Genitourinary: Negative for dysuria.   Musculoskeletal: Negative for back pain and neck pain.   Skin: Negative for rash.   Neurological: Negative for weakness, light-headedness and headaches.   Hematological: Does not bruise/bleed easily.       Physical Exam     Initial Vitals [09/18/20 1630]   BP Pulse Resp Temp SpO2   129/77 87 18 97.5 °F (36.4 °C) 99 %      MAP       --         Physical Exam    Nursing note and vitals reviewed.  Constitutional: She appears well-developed and well-nourished.   HENT:   Head: Normocephalic and atraumatic.   Right Ear: External ear and ear canal normal. No drainage, swelling or tenderness. Tympanic membrane is bulging. Tympanic membrane is not injected, not scarred, not perforated and not erythematous. Tympanic membrane mobility is normal. No middle ear effusion. Decreased hearing is noted.   Left Ear: Hearing, tympanic membrane, external ear and ear canal normal. No drainage, swelling or tenderness.  No middle ear effusion. No decreased hearing is noted.   No jaw tenderness, normal dentition   Eyes: EOM are normal. Pupils are equal, round, and reactive to light.   Neck: Normal range of motion. Neck supple.   Cardiovascular: Normal rate, regular rhythm, normal heart sounds and intact distal pulses. Exam reveals no gallop and no friction rub.    No murmur heard.  Pulmonary/Chest: Breath sounds normal. No respiratory distress. She has no wheezes. She has no rhonchi. She has no rales. She exhibits no tenderness.   Musculoskeletal: Normal range of motion.   Neurological: She is alert and oriented to person, place, and time.   Skin: Skin is warm  and dry.   Psychiatric: She has a normal mood and affect.         ED Course   Procedures  Labs Reviewed - No data to display       Imaging Results    None          Medical Decision Making:   History:   Old Medical Records: I decided to obtain old medical records.  Old Records Summarized: records from another hospital and records from clinic visits.       <> Summary of Records: Patient seen [facility] 09/14/2020 for the same complaint which time she was prescribed Augmentin.  She was scheduled to have a follow-up appointment ENT but there were some confusion due to insurance concerns.  Initial Assessment:   39-year-old female presenting for persistent ear pain despite treatment for otitis media with Augmentin.  Her vitals are normal, she appears uncomfortable with nontoxic.  Her TM is bulging slightly but otherwise there are no obvious of abnormalities on her ear exam.  Differential Diagnosis:   Otitis media  Congestion  Seasonal allergies  No signs of otitis externa  ED Management:  Will give Toradol, Tylenol, Afrin, Sudafed for congestion and discussed with ENT.    Patient reports significant improvement of symptoms after therapy.  I suspect that congestion may be playing a role in her pain.  She is still endorsing irritating tinnitus.  Will switch to Cipro per ENT recommendation and instruct her to follow up in clinic. Stressed the importance of follow-up, strict ED return precautions given.  Patient voiced understanding and is comfortable with discharge.      Other:   I have discussed this case with another health care provider.       <> Summary of the Discussion: I discussed this patient with ENT resident who recommend switching antibiotics to Cipro.                             Clinical Impression:       ICD-10-CM ICD-9-CM   1. Right ear pain  H92.01 388.70                      Disposition:   Disposition: Discharged  Condition: Stable     ED Disposition Condition    Discharge Stable        ED Prescriptions      Medication Sig Dispense Start Date End Date Auth. Provider    ciprofloxacin HCl (CIPRO) 500 MG tablet  (Status: Discontinued) Take 1 tablet (500 mg total) by mouth 2 (two) times daily. for 3 days 6 tablet 9/18/2020 9/18/2020 Devora Carosone-Link, PA-C    pseudoephedrine (SUDAFED 12 HOUR) 120 mg 12 hr tablet  (Status: Discontinued) Take 1 tablet (120 mg total) by mouth 2 (two) times daily as needed for Congestion (ear pain). 20 tablet 9/18/2020 9/18/2020 Devora Carosone-Link, PA-C    ketorolac (TORADOL) 10 mg tablet  (Status: Discontinued) Take 1 tablet (10 mg total) by mouth every 6 (six) hours as needed for Pain. 20 tablet 9/18/2020 9/18/2020 Devora Carosone-Link, PA-C    ciprofloxacin HCl (CIPRO) 500 MG tablet  (Status: Discontinued) Take 1 tablet (500 mg total) by mouth 2 (two) times daily. for 5 days 10 tablet 9/18/2020 9/18/2020 Devora Carosone-Link, PA-C    ciprofloxacin HCl (CIPRO) 500 MG tablet  (Status: Discontinued) Take 1 tablet (500 mg total) by mouth 2 (two) times daily. for 5 days 10 tablet 9/18/2020 9/18/2020 Devora Carosone-Link, PA-C    ketorolac (TORADOL) 10 mg tablet  (Status: Discontinued) Take 1 tablet (10 mg total) by mouth every 6 (six) hours as needed for Pain. 20 tablet 9/18/2020 9/18/2020 Devora Carosone-Link, PA-C    pseudoephedrine (SUDAFED 12 HOUR) 120 mg 12 hr tablet Take 1 tablet (120 mg total) by mouth 2 (two) times daily as needed for Congestion (ear pain). 20 tablet 9/18/2020 9/28/2020 Devora Carosone-Link, PA-C    ciprofloxacin HCl (CIPRO) 500 MG tablet Take 1 tablet (500 mg total) by mouth 2 (two) times daily. for 5 days 10 tablet 9/18/2020 9/23/2020 Devora Carosone-Link, PA-C    ketorolac (TORADOL) 10 mg tablet Take 1 tablet (10 mg total) by mouth every 6 (six) hours as needed for Pain. 20 tablet 9/18/2020 9/23/2020 Devora Simon-TESSIE Daniels        Follow-up Information     Follow up With Specialties Details Why Contact Info    [facility] Schedule an appointment as  soon as possible for a visit in 1 week  [address] [facility] [address]  [phone]    [facility] Emergency Medicine Go to  If symptoms worsen [address] [facility] [address]  [phone]                                       Devora Fragoso PA-C  09/19/20 0131

## 2020-09-23 ENCOUNTER — OFFICE VISIT (OUTPATIENT)
Dept: OTOLARYNGOLOGY | Facility: CLINIC | Age: 40
End: 2020-09-23
Payer: MEDICAID

## 2020-09-23 ENCOUNTER — CLINICAL SUPPORT (OUTPATIENT)
Dept: AUDIOLOGY | Facility: CLINIC | Age: 40
End: 2020-09-23
Payer: MEDICAID

## 2020-09-23 VITALS — HEART RATE: 79 BPM | DIASTOLIC BLOOD PRESSURE: 76 MMHG | SYSTOLIC BLOOD PRESSURE: 113 MMHG

## 2020-09-23 DIAGNOSIS — H93.11 TINNITUS, RIGHT EAR: Primary | ICD-10-CM

## 2020-09-23 DIAGNOSIS — M26.609 TEMPOROMANDIBULAR JOINT DYSFUNCTION: Primary | ICD-10-CM

## 2020-09-23 PROCEDURE — 99211 OFF/OP EST MAY X REQ PHY/QHP: CPT | Mod: PBBFAC | Performed by: AUDIOLOGIST

## 2020-09-23 PROCEDURE — 99213 OFFICE O/P EST LOW 20 MIN: CPT | Mod: PBBFAC,27 | Performed by: OTOLARYNGOLOGY

## 2020-09-23 PROCEDURE — 92567 TYMPANOMETRY: CPT | Mod: PBBFAC | Performed by: AUDIOLOGIST

## 2020-09-23 PROCEDURE — 99205 PR OFFICE/OUTPT VISIT, NEW, LEVL V, 60-74 MIN: ICD-10-PCS | Mod: S$PBB,,, | Performed by: OTOLARYNGOLOGY

## 2020-09-23 PROCEDURE — 99999 PR PBB SHADOW E&M-EST. PATIENT-LVL I: CPT | Mod: PBBFAC,,, | Performed by: AUDIOLOGIST

## 2020-09-23 PROCEDURE — 99999 PR PBB SHADOW E&M-EST. PATIENT-LVL III: ICD-10-PCS | Mod: PBBFAC,,, | Performed by: OTOLARYNGOLOGY

## 2020-09-23 PROCEDURE — 92565 STENGER TEST PURE TONE: CPT | Mod: PBBFAC | Performed by: AUDIOLOGIST

## 2020-09-23 PROCEDURE — 99205 OFFICE O/P NEW HI 60 MIN: CPT | Mod: S$PBB,,, | Performed by: OTOLARYNGOLOGY

## 2020-09-23 PROCEDURE — 99999 PR PBB SHADOW E&M-EST. PATIENT-LVL I: ICD-10-PCS | Mod: PBBFAC,,, | Performed by: AUDIOLOGIST

## 2020-09-23 PROCEDURE — 99999 PR PBB SHADOW E&M-EST. PATIENT-LVL III: CPT | Mod: PBBFAC,,, | Performed by: OTOLARYNGOLOGY

## 2020-09-23 PROCEDURE — 92557 COMPREHENSIVE HEARING TEST: CPT | Mod: PBBFAC | Performed by: AUDIOLOGIST

## 2020-09-23 PROCEDURE — 92587 PR EVOKED AUDITORY TEST,LIMITED: ICD-10-PCS | Mod: 26,S$PBB,, | Performed by: AUDIOLOGIST

## 2020-09-23 RX ORDER — KETOROLAC TROMETHAMINE 10 MG/1
10 TABLET, FILM COATED ORAL EVERY 6 HOURS
Qty: 30 TABLET | Refills: 1 | Status: SHIPPED | OUTPATIENT
Start: 2020-09-23 | End: 2021-10-12 | Stop reason: CLARIF

## 2020-09-23 NOTE — PROGRESS NOTES
Miriam Mendez was seen today in the clinic for an audiologic evaluation.  Patients main complaint was right ear pain with muffled hearing and loud tinnitus of the right ear.  Mrs. Mendez reported she was in the ED and no one could help her resolve the issue.  She also reported she has dizziness that began with the ear pain approximately 4 weeks ago.    Tympanometry revealed Type A in the right ear and Type A in the left ear.  Audiogram results revealed a moderate hearing loss in the right ear and normal hearing sensitivity in the left ear.  Speech reception thresholds were noted at 80 dB masked in the right ear and 5 dB in the left ear.  Speech discrimination scores were unobtainable in the right ear and 100% in the left ear.    Clint Test was negative.    DPOAE's were present at 750-8000 Hz in both ears.    Results are consistent with normal hearing in both ears.  Pure tone thresholds of the right ear do not agree with DPOAE's.  Speech thresholds in the right ear do not agree with pure tone testing.      Recommendations:  1. Otologic evaluation for ear pain and dizziness  2. Audiogram as needed  3. Noise protection when in noise

## 2020-09-23 NOTE — LETTER
September 23, 2020      Davina Pelaez PA-C  [address]  [facility] [address]           [facility]  [address]  [facility] [address]  Phone: [phone]  Fax: [phone]          Patient: Miriam Mendez   MR Number: [id number]   YOB: 1980   Date of Visit: 9/23/2020       Dear Davina Pelaez:    Thank you for referring Miriam Mendez to me for evaluation. Attached you will find relevant portions of my assessment and plan of care.    If you have questions, please do not hesitate to call me. I look forward to following Miriam Mendez along with you.    Sincerely,    Wu Sosa MD    Enclosure  CC:  No Recipients    If you would like to receive this communication electronically, please contact [email or web address] or [phone] to request more information on [employer] access.    For providers and/or their staff who would like to refer a patient to [facility], please contact us through our one-stop-shop provider referral line, [facility], at [phone].    If you feel you have received this communication in error or would no longer like to receive these types of communications, please e-mail [email or web address]

## 2020-09-23 NOTE — PROGRESS NOTES
Subjective:       Patient ID: Miriam Mendez is a 39 y.o. female.    Chief Complaint: R ear pain/ severe    HPI: Pt with severe shooting/ stabbing R ear pain for 1 mo.    Mult ER visits with nl exams.    Recent CT of head neg.    C/O Tinn AD.    Audio today with sig exaggeration of HL. / DPOE's nl AU    Past Medical History: Patient has a past medical history of ADD (attention deficit disorder with hyperactivity), Anxiety, Bronchitis, Cellulitis, Depression, Easy bruising (2014), Fibromyalgia, History of ITP (2014), Iron deficiency anemia (2014), Morbid obesity, Pica (2014), Pseudotumor cerebri syndrome, and Thrombocytopenia.    Past Surgical History: Patient has a past surgical history that includes  section; Cholecystectomy; and Gastric bypass.    Social History: Patient reports that she has never smoked. She has never used smokeless tobacco. She reports current alcohol use. She reports that she does not use drugs.    Family History: family history includes Breast cancer (age of onset: 57) in her mother; Cancer in her father, paternal grandfather, and paternal grandmother; Cancer (age of onset: 57) in her mother; Hypertension in her mother.    Medications:   Current Outpatient Medications   Medication Sig    cetirizine (ZYRTEC) 10 MG tablet Take 1 tablet (10 mg total) by mouth once daily. for 15 days    ciprofloxacin HCl (CIPRO) 500 MG tablet Take 1 tablet (500 mg total) by mouth 2 (two) times daily. for 5 days    dextroamphetamine-amphetamine (ADDERALL XR) 25 MG 24 hr capsule Take 25 mg by mouth 2 (two) times daily.    escitalopram oxalate (LEXAPRO) 20 MG tablet Take 20 mg by mouth once daily.    fluticasone propionate (FLONASE) 50 mcg/actuation nasal spray 1 spray (50 mcg total) by Each Nostril route once daily.    ketorolac (TORADOL) 10 mg tablet Take 1 tablet (10 mg total) by mouth every 6 (six) hours as needed for Pain.    LORazepam (ATIVAN) 1 MG tablet Take 1 mg by  mouth.    omeprazole (PRILOSEC) 10 MG capsule Take 10 mg by mouth.    pseudoephedrine (SUDAFED 12 HOUR) 120 mg 12 hr tablet Take 1 tablet (120 mg total) by mouth 2 (two) times daily as needed for Congestion (ear pain).    zolpidem (AMBIEN) 10 mg Tab Take 5 mg by mouth nightly as needed.    butalbital-acetaminophen-caffeine -40 mg (FIORICET, ESGIC) -40 mg per tablet Take 1 tablet by mouth every 4 (four) hours as needed for Pain.    ketorolac (TORADOL) 10 mg tablet Take 1 tablet (10 mg total) by mouth every 6 (six) hours.    valacyclovir (VALTREX) 500 MG tablet Take 1 tablet (500 mg total) by mouth 3 (three) times daily.    vortioxetine (TRINTELLIX) 10 mg Tab Take 10 mg by mouth.     No current facility-administered medications for this visit.        Allergies: Patient is allergic to sulfa (sulfonamide antibiotics).    Review of Systems   Constitutional: Negative for activity change, appetite change, chills, diaphoresis, fatigue, fever and unexpected weight change.   HENT: Positive for ear pain and tinnitus. Negative for nasal congestion, ear discharge, facial swelling, hearing loss, nosebleeds, postnasal drip, rhinorrhea, sinus pressure/congestion, sneezing, sore throat, trouble swallowing and voice change.    Eyes: Negative for photophobia, pain, discharge, redness and visual disturbance.   Respiratory: Negative for cough, chest tightness, shortness of breath and wheezing.    Cardiovascular: Negative for chest pain and palpitations.   Gastrointestinal: Negative for abdominal pain, constipation, diarrhea and nausea.   Genitourinary: Negative for dysuria and frequency.   Musculoskeletal: Negative for arthralgias, back pain, gait problem, joint swelling, myalgias, neck pain and neck stiffness.   Integumentary:  Negative for color change, pallor and rash.   Neurological: Negative for dizziness, tremors, seizures, syncope, facial asymmetry, speech difficulty, weakness, light-headedness, numbness and  headaches.   Hematological: Negative for adenopathy. Does not bruise/bleed easily.   Psychiatric/Behavioral: Negative for agitation, confusion, decreased concentration, dysphoric mood and sleep disturbance. The patient is not nervous/anxious and is not hyperactive.          Objective:      Physical Exam  Vitals signs and nursing note reviewed.   Constitutional:       General: She is not in acute distress.     Appearance: Normal appearance. She is well-developed. She is not ill-appearing, toxic-appearing or diaphoretic.   HENT:      Head: Normocephalic and atraumatic. Not macrocephalic and not microcephalic. No raccoon eyes, Barnett's sign, abrasion, contusion, right periorbital erythema, left periorbital erythema or laceration. Hair is normal.        Comments:     R TMJ post lig +++ tender.         Right Ear: Ear canal normal. No decreased hearing noted. No laceration, drainage, swelling or tenderness. No middle ear effusion. No foreign body. No mastoid tenderness. No hemotympanum. Tympanic membrane is not injected, scarred, perforated, erythematous, retracted or bulging. Tympanic membrane has normal mobility.      Left Ear: Ear canal normal. No decreased hearing noted. No laceration, drainage, swelling or tenderness.  No middle ear effusion. No foreign body. No mastoid tenderness. No hemotympanum. Tympanic membrane is not injected, scarred, perforated, erythematous, retracted or bulging. Tympanic membrane has normal mobility.      Ears:        Nose: No nasal deformity, septal deviation, laceration, mucosal edema or rhinorrhea.      Right Sinus: No maxillary sinus tenderness.      Left Sinus: No maxillary sinus tenderness.   Eyes:      General: Lids are normal.      Conjunctiva/sclera: Conjunctivae normal.      Pupils: Pupils are equal, round, and reactive to light.   Neck:      Musculoskeletal: Normal range of motion and neck supple. Normal range of motion. No edema, erythema, neck rigidity or muscular tenderness.       Thyroid: No thyroid mass or thyromegaly.      Vascular: No JVD.      Trachea: No tracheal tenderness or tracheal deviation.   Cardiovascular:      Rate and Rhythm: Normal rate and regular rhythm.   Pulmonary:      Effort: Pulmonary effort is normal. No tachypnea, bradypnea, accessory muscle usage or respiratory distress.      Breath sounds: No stridor.   Abdominal:      Palpations: Abdomen is soft.   Musculoskeletal: Normal range of motion.   Lymphadenopathy:      Head:      Right side of head: No submental, submandibular, tonsillar, preauricular or posterior auricular adenopathy.      Left side of head: No submental, submandibular, tonsillar, preauricular or posterior auricular adenopathy.      Cervical: No cervical adenopathy.      Right cervical: No superficial, deep or posterior cervical adenopathy.     Left cervical: No superficial, deep or posterior cervical adenopathy.   Skin:     General: Skin is warm and dry.      Coloration: Skin is not pale.      Findings: No abrasion, bruising, burn, ecchymosis, erythema, laceration, lesion or rash.   Neurological:      Mental Status: She is alert and oriented to person, place, and time.      Cranial Nerves: No cranial nerve deficit.      Sensory: No sensory deficit.      Motor: No tremor, atrophy, abnormal muscle tone or seizure activity.   Psychiatric:         Speech: She is communicative. Speech is not rapid and pressured or slurred.         Behavior: Behavior normal. Behavior is cooperative.         Thought Content: Thought content normal.         Judgment: Judgment normal.                 Assessment:       1. Temporomandibular joint dysfunction / Costen's syndrome       Plan:           Diagnoses and all orders for this visit:    Temporomandibular joint dysfunction    Other orders  -     ketorolac (TORADOL) 10 mg tablet; Take 1 tablet (10 mg total) by mouth every 6 (six) hours.        TMJ regimen with soft diet, NSAID's, Heating pad over joint for 20 minutes tid  for 7-10 days. If no better consider re evaluation for use of muscle relaxants and or referral to Oral Surgery specialist.

## 2021-04-15 ENCOUNTER — PATIENT MESSAGE (OUTPATIENT)
Dept: RESEARCH | Facility: HOSPITAL | Age: 41
End: 2021-04-15

## 2021-05-04 ENCOUNTER — CLINICAL SUPPORT (OUTPATIENT)
Dept: URGENT CARE | Facility: CLINIC | Age: 41
End: 2021-05-04
Payer: MEDICAID

## 2021-05-04 DIAGNOSIS — Z01.812 ENCOUNTER FOR SCREENING LABORATORY TESTING FOR COVID-19 VIRUS IN ASYMPTOMATIC PATIENT: Primary | ICD-10-CM

## 2021-05-04 DIAGNOSIS — Z11.52 ENCOUNTER FOR SCREENING LABORATORY TESTING FOR COVID-19 VIRUS IN ASYMPTOMATIC PATIENT: Primary | ICD-10-CM

## 2021-05-04 LAB
CTP QC/QA: YES
SARS-COV-2 RDRP RESP QL NAA+PROBE: NEGATIVE

## 2021-05-04 PROCEDURE — U0002 COVID-19 LAB TEST NON-CDC: HCPCS | Mod: QW,S$GLB,, | Performed by: NURSE PRACTITIONER

## 2021-05-04 PROCEDURE — U0002: ICD-10-PCS | Mod: QW,S$GLB,, | Performed by: NURSE PRACTITIONER

## 2021-05-26 ENCOUNTER — OFFICE VISIT (OUTPATIENT)
Dept: URGENT CARE | Facility: CLINIC | Age: 41
End: 2021-05-26
Payer: MEDICAID

## 2021-05-26 VITALS
HEIGHT: 66 IN | TEMPERATURE: 98 F | BODY MASS INDEX: 28.93 KG/M2 | DIASTOLIC BLOOD PRESSURE: 75 MMHG | OXYGEN SATURATION: 96 % | WEIGHT: 180 LBS | HEART RATE: 92 BPM | RESPIRATION RATE: 16 BRPM | SYSTOLIC BLOOD PRESSURE: 106 MMHG

## 2021-05-26 DIAGNOSIS — S99.912A LEFT ANKLE INJURY, INITIAL ENCOUNTER: Primary | ICD-10-CM

## 2021-05-26 PROCEDURE — 99214 PR OFFICE/OUTPT VISIT, EST, LEVL IV, 30-39 MIN: ICD-10-PCS | Mod: S$GLB,,, | Performed by: NURSE PRACTITIONER

## 2021-05-26 PROCEDURE — 73610 X-RAY EXAM OF ANKLE: CPT | Mod: FY,LT,S$GLB, | Performed by: RADIOLOGY

## 2021-05-26 PROCEDURE — 99214 OFFICE O/P EST MOD 30 MIN: CPT | Mod: S$GLB,,, | Performed by: NURSE PRACTITIONER

## 2021-05-26 PROCEDURE — 73610 XR ANKLE COMPLETE 3 VIEW LEFT: ICD-10-PCS | Mod: FY,LT,S$GLB, | Performed by: RADIOLOGY

## 2021-05-26 RX ORDER — HYDROCODONE BITARTRATE AND ACETAMINOPHEN 5; 325 MG/1; MG/1
1 TABLET ORAL EVERY 6 HOURS PRN
Qty: 15 TABLET | Refills: 0 | Status: SHIPPED | OUTPATIENT
Start: 2021-05-26 | End: 2021-10-12 | Stop reason: CLARIF

## 2021-05-27 ENCOUNTER — TELEPHONE (OUTPATIENT)
Dept: URGENT CARE | Facility: CLINIC | Age: 41
End: 2021-05-27

## 2021-06-01 ENCOUNTER — TELEPHONE (OUTPATIENT)
Dept: ORTHOPEDICS | Facility: CLINIC | Age: 41
End: 2021-06-01

## 2021-06-03 ENCOUNTER — TELEPHONE (OUTPATIENT)
Dept: URGENT CARE | Facility: CLINIC | Age: 41
End: 2021-06-03

## 2021-07-22 NOTE — TELEPHONE ENCOUNTER
----- Message from Mckayla Lazo sent at 9/14/2020 10:16 AM CDT -----  Pt is calling to be seen early then 9/23 for her rite ear and would like for the nurse to give her a call back      yes

## 2021-10-12 ENCOUNTER — HOSPITAL ENCOUNTER (EMERGENCY)
Facility: HOSPITAL | Age: 41
Discharge: HOME | End: 2021-10-12
Attending: EMERGENCY MEDICINE
Payer: MEDICAID

## 2021-10-12 VITALS
WEIGHT: 180 LBS | SYSTOLIC BLOOD PRESSURE: 113 MMHG | HEART RATE: 78 BPM | HEIGHT: 66 IN | OXYGEN SATURATION: 98 % | DIASTOLIC BLOOD PRESSURE: 69 MMHG | RESPIRATION RATE: 18 BRPM | TEMPERATURE: 98 F | BODY MASS INDEX: 28.93 KG/M2

## 2021-10-12 DIAGNOSIS — R05.9 COUGH: ICD-10-CM

## 2021-10-12 DIAGNOSIS — B34.9 VIRAL SYNDROME: Primary | ICD-10-CM

## 2021-10-12 LAB
B-HCG UR QL: NEGATIVE
CTP QC/QA: YES
POC MOLECULAR INFLUENZA A AGN: NEGATIVE
POC MOLECULAR INFLUENZA B AGN: NEGATIVE
SARS-COV-2 RDRP RESP QL NAA+PROBE: NEGATIVE

## 2021-10-12 PROCEDURE — 81025 URINE PREGNANCY TEST: CPT | Performed by: NURSE PRACTITIONER

## 2021-10-12 PROCEDURE — 63600175 PHARM REV CODE 636 W HCPCS: Performed by: NURSE PRACTITIONER

## 2021-10-12 PROCEDURE — 96372 THER/PROPH/DIAG INJ SC/IM: CPT

## 2021-10-12 PROCEDURE — U0002 COVID-19 LAB TEST NON-CDC: HCPCS | Performed by: NURSE PRACTITIONER

## 2021-10-12 PROCEDURE — 99284 EMERGENCY DEPT VISIT MOD MDM: CPT | Mod: 25

## 2021-10-12 PROCEDURE — 87502 INFLUENZA DNA AMP PROBE: CPT

## 2021-10-12 RX ORDER — FLUOXETINE HYDROCHLORIDE 40 MG/1
40 CAPSULE ORAL NIGHTLY
COMMUNITY
Start: 2021-09-10 | End: 2023-05-16 | Stop reason: SDUPTHER

## 2021-10-12 RX ORDER — CETIRIZINE HYDROCHLORIDE 10 MG/1
10 TABLET ORAL DAILY
Qty: 30 TABLET | Refills: 0 | Status: SHIPPED | OUTPATIENT
Start: 2021-10-12 | End: 2022-02-04

## 2021-10-12 RX ORDER — DEXAMETHASONE SODIUM PHOSPHATE 4 MG/ML
12 INJECTION, SOLUTION INTRA-ARTICULAR; INTRALESIONAL; INTRAMUSCULAR; INTRAVENOUS; SOFT TISSUE
Status: COMPLETED | OUTPATIENT
Start: 2021-10-12 | End: 2021-10-12

## 2021-10-12 RX ORDER — TRAZODONE HYDROCHLORIDE 150 MG/1
200 TABLET ORAL NIGHTLY
COMMUNITY
End: 2022-02-04

## 2021-10-12 RX ORDER — FLUTICASONE PROPIONATE 50 MCG
1 SPRAY, SUSPENSION (ML) NASAL 2 TIMES DAILY PRN
Qty: 16 G | Refills: 0 | Status: SHIPPED | OUTPATIENT
Start: 2021-10-12 | End: 2022-02-04 | Stop reason: ALTCHOICE

## 2021-10-12 RX ORDER — BENZONATATE 100 MG/1
100 CAPSULE ORAL 3 TIMES DAILY PRN
Qty: 20 CAPSULE | Refills: 0 | Status: SHIPPED | OUTPATIENT
Start: 2021-10-12 | End: 2022-02-04

## 2021-10-12 RX ORDER — BUSPIRONE HYDROCHLORIDE 10 MG/1
10 TABLET ORAL 3 TIMES DAILY
COMMUNITY
End: 2022-02-04

## 2021-10-12 RX ADMIN — DEXAMETHASONE SODIUM PHOSPHATE 12 MG: 4 INJECTION INTRA-ARTICULAR; INTRALESIONAL; INTRAMUSCULAR; INTRAVENOUS; SOFT TISSUE at 01:10

## 2022-02-04 ENCOUNTER — HOSPITAL ENCOUNTER (OUTPATIENT)
Facility: HOSPITAL | Age: 42
Discharge: HOME | End: 2022-02-07
Attending: EMERGENCY MEDICINE | Admitting: EMERGENCY MEDICINE
Payer: MEDICAID

## 2022-02-04 DIAGNOSIS — R07.9 CHEST PAIN: ICD-10-CM

## 2022-02-04 DIAGNOSIS — D33.3 CPA (CEREBELLOPONTINE ANGLE) TUMOR: ICD-10-CM

## 2022-02-04 DIAGNOSIS — D33.3 VESTIBULAR SCHWANNOMA: ICD-10-CM

## 2022-02-04 DIAGNOSIS — D69.6 THROMBOCYTOPENIA: ICD-10-CM

## 2022-02-04 DIAGNOSIS — L03.211 FACIAL CELLULITIS: Primary | ICD-10-CM

## 2022-02-04 PROBLEM — N87.9 CERVICAL ATYPIA: Status: ACTIVE | Noted: 2022-02-04

## 2022-02-04 LAB
ANION GAP SERPL CALC-SCNC: 8 MMOL/L (ref 8–16)
BASOPHILS # BLD AUTO: 0.13 K/UL (ref 0–0.2)
BASOPHILS NFR BLD: 1.6 % (ref 0–1.9)
BUN SERPL-MCNC: 7 MG/DL (ref 6–20)
BUN SERPL-MCNC: 7 MG/DL (ref 6–30)
CALCIUM SERPL-MCNC: 9.6 MG/DL (ref 8.7–10.5)
CHLORIDE SERPL-SCNC: 100 MMOL/L (ref 95–110)
CHLORIDE SERPL-SCNC: 100 MMOL/L (ref 95–110)
CO2 SERPL-SCNC: 27 MMOL/L (ref 23–29)
CREAT SERPL-MCNC: 0.7 MG/DL (ref 0.5–1.4)
CREAT SERPL-MCNC: 0.7 MG/DL (ref 0.5–1.4)
CTP QC/QA: YES
DIFFERENTIAL METHOD: ABNORMAL
EOSINOPHIL # BLD AUTO: 0.1 K/UL (ref 0–0.5)
EOSINOPHIL NFR BLD: 1.1 % (ref 0–8)
ERYTHROCYTE [DISTWIDTH] IN BLOOD BY AUTOMATED COUNT: 16.5 % (ref 11.5–14.5)
EST. GFR  (AFRICAN AMERICAN): >60 ML/MIN/1.73 M^2
EST. GFR  (NON AFRICAN AMERICAN): >60 ML/MIN/1.73 M^2
GLUCOSE SERPL-MCNC: 79 MG/DL (ref 70–110)
GLUCOSE SERPL-MCNC: 82 MG/DL (ref 70–110)
HCT VFR BLD AUTO: 36.2 % (ref 37–48.5)
HCT VFR BLD CALC: 36 %PCV (ref 36–54)
HGB BLD-MCNC: 10.9 G/DL (ref 12–16)
IMM GRANULOCYTES # BLD AUTO: 0.03 K/UL (ref 0–0.04)
IMM GRANULOCYTES NFR BLD AUTO: 0.4 % (ref 0–0.5)
LYMPHOCYTES # BLD AUTO: 1.6 K/UL (ref 1–4.8)
LYMPHOCYTES NFR BLD: 19.6 % (ref 18–48)
MCH RBC QN AUTO: 24.7 PG (ref 27–31)
MCHC RBC AUTO-ENTMCNC: 30.1 G/DL (ref 32–36)
MCV RBC AUTO: 82 FL (ref 82–98)
MONOCYTES # BLD AUTO: 0.9 K/UL (ref 0.3–1)
MONOCYTES NFR BLD: 10.6 % (ref 4–15)
NEUTROPHILS # BLD AUTO: 5.3 K/UL (ref 1.8–7.7)
NEUTROPHILS NFR BLD: 66.7 % (ref 38–73)
NRBC BLD-RTO: 0 /100 WBC
PLATELET # BLD AUTO: 103 K/UL (ref 150–450)
PLATELET BLD QL SMEAR: ABNORMAL
PMV BLD AUTO: ABNORMAL FL (ref 9.2–12.9)
POC IONIZED CALCIUM: 1.18 MMOL/L (ref 1.06–1.42)
POC TCO2 (MEASURED): 29 MMOL/L (ref 23–29)
POCT GLUCOSE: 98 MG/DL (ref 70–110)
POTASSIUM BLD-SCNC: 4.4 MMOL/L (ref 3.5–5.1)
POTASSIUM SERPL-SCNC: 4.4 MMOL/L (ref 3.5–5.1)
RBC # BLD AUTO: 4.41 M/UL (ref 4–5.4)
SAMPLE: NORMAL
SARS-COV-2 RDRP RESP QL NAA+PROBE: NEGATIVE
SODIUM BLD-SCNC: 137 MMOL/L (ref 136–145)
SODIUM SERPL-SCNC: 135 MMOL/L (ref 136–145)
WBC # BLD AUTO: 7.99 K/UL (ref 3.9–12.7)

## 2022-02-04 PROCEDURE — 96361 HYDRATE IV INFUSION ADD-ON: CPT

## 2022-02-04 PROCEDURE — 99220 PR INITIAL OBSERVATION CARE,LEVL III: ICD-10-PCS | Mod: ,,, | Performed by: HOSPITALIST

## 2022-02-04 PROCEDURE — 63600175 PHARM REV CODE 636 W HCPCS: Performed by: EMERGENCY MEDICINE

## 2022-02-04 PROCEDURE — 80047 BASIC METABLC PNL IONIZED CA: CPT

## 2022-02-04 PROCEDURE — 96365 THER/PROPH/DIAG IV INF INIT: CPT

## 2022-02-04 PROCEDURE — 25500020 PHARM REV CODE 255: Performed by: EMERGENCY MEDICINE

## 2022-02-04 PROCEDURE — 99285 PR EMERGENCY DEPT VISIT,LEVEL V: ICD-10-PCS | Mod: CS,,, | Performed by: EMERGENCY MEDICINE

## 2022-02-04 PROCEDURE — U0002 COVID-19 LAB TEST NON-CDC: HCPCS | Performed by: EMERGENCY MEDICINE

## 2022-02-04 PROCEDURE — 25000003 PHARM REV CODE 250: Performed by: EMERGENCY MEDICINE

## 2022-02-04 PROCEDURE — 25000003 PHARM REV CODE 250: Performed by: HOSPITALIST

## 2022-02-04 PROCEDURE — 96366 THER/PROPH/DIAG IV INF ADDON: CPT

## 2022-02-04 PROCEDURE — 80048 BASIC METABOLIC PNL TOTAL CA: CPT | Mod: 59 | Performed by: EMERGENCY MEDICINE

## 2022-02-04 PROCEDURE — 99285 EMERGENCY DEPT VISIT HI MDM: CPT | Mod: 25

## 2022-02-04 PROCEDURE — 85025 COMPLETE CBC W/AUTO DIFF WBC: CPT | Performed by: EMERGENCY MEDICINE

## 2022-02-04 PROCEDURE — 99285 EMERGENCY DEPT VISIT HI MDM: CPT | Mod: CS,,, | Performed by: EMERGENCY MEDICINE

## 2022-02-04 PROCEDURE — 63600175 PHARM REV CODE 636 W HCPCS: Performed by: HOSPITALIST

## 2022-02-04 PROCEDURE — G0378 HOSPITAL OBSERVATION PER HR: HCPCS

## 2022-02-04 PROCEDURE — 99220 PR INITIAL OBSERVATION CARE,LEVL III: CPT | Mod: ,,, | Performed by: HOSPITALIST

## 2022-02-04 RX ORDER — ZOLPIDEM TARTRATE 5 MG/1
10 TABLET ORAL NIGHTLY PRN
Status: DISCONTINUED | OUTPATIENT
Start: 2022-02-04 | End: 2022-02-07 | Stop reason: HOSPADM

## 2022-02-04 RX ORDER — CLINDAMYCIN HYDROCHLORIDE 150 MG/1
150 CAPSULE ORAL EVERY 6 HOURS
Status: ON HOLD | COMMUNITY
Start: 2022-02-02 | End: 2022-02-07 | Stop reason: HOSPADM

## 2022-02-04 RX ORDER — SODIUM CHLORIDE 0.9 % (FLUSH) 0.9 %
10 SYRINGE (ML) INJECTION EVERY 6 HOURS PRN
Status: DISCONTINUED | OUTPATIENT
Start: 2022-02-04 | End: 2022-02-07 | Stop reason: HOSPADM

## 2022-02-04 RX ORDER — DIPHENHYDRAMINE HCL 25 MG
25 CAPSULE ORAL EVERY 6 HOURS PRN
Status: DISCONTINUED | OUTPATIENT
Start: 2022-02-04 | End: 2022-02-07 | Stop reason: HOSPADM

## 2022-02-04 RX ORDER — PANTOPRAZOLE SODIUM 40 MG/1
40 TABLET, DELAYED RELEASE ORAL
Status: DISCONTINUED | OUTPATIENT
Start: 2022-02-04 | End: 2022-02-07 | Stop reason: HOSPADM

## 2022-02-04 RX ORDER — MUPIROCIN 20 MG/G
1 OINTMENT TOPICAL 3 TIMES DAILY
COMMUNITY
Start: 2022-02-03 | End: 2022-04-18

## 2022-02-04 RX ORDER — ENOXAPARIN SODIUM 100 MG/ML
40 INJECTION SUBCUTANEOUS EVERY 24 HOURS
Status: DISCONTINUED | OUTPATIENT
Start: 2022-02-04 | End: 2022-02-05

## 2022-02-04 RX ORDER — MUPIROCIN 20 MG/G
1 OINTMENT TOPICAL 3 TIMES DAILY
Status: DISCONTINUED | OUTPATIENT
Start: 2022-02-04 | End: 2022-02-07 | Stop reason: HOSPADM

## 2022-02-04 RX ORDER — AMOXICILLIN AND CLAVULANATE POTASSIUM 875; 125 MG/1; MG/1
1 TABLET, FILM COATED ORAL 2 TIMES DAILY
Status: ON HOLD | COMMUNITY
Start: 2022-02-03 | End: 2022-02-07 | Stop reason: SDUPTHER

## 2022-02-04 RX ORDER — CIPROFLOXACIN 250 MG/1
250 TABLET, FILM COATED ORAL 2 TIMES DAILY
Status: ON HOLD | COMMUNITY
Start: 2022-02-02 | End: 2022-02-07 | Stop reason: HOSPADM

## 2022-02-04 RX ORDER — ZOLPIDEM TARTRATE 10 MG/1
10 TABLET ORAL NIGHTLY
COMMUNITY
Start: 2022-01-24 | End: 2023-06-07 | Stop reason: DRUGHIGH

## 2022-02-04 RX ORDER — FLUOXETINE HYDROCHLORIDE 20 MG/1
40 CAPSULE ORAL NIGHTLY
Status: DISCONTINUED | OUTPATIENT
Start: 2022-02-04 | End: 2022-02-07 | Stop reason: HOSPADM

## 2022-02-04 RX ORDER — DEXTROAMPHETAMINE SACCHARATE, AMPHETAMINE ASPARTATE, DEXTROAMPHETAMINE SULFATE AND AMPHETAMINE SULFATE 7.5; 7.5; 7.5; 7.5 MG/1; MG/1; MG/1; MG/1
TABLET ORAL
COMMUNITY
Start: 2022-01-24 | End: 2023-04-11

## 2022-02-04 RX ORDER — PREDNISONE 10 MG/1
40 TABLET ORAL DAILY
Status: ON HOLD | COMMUNITY
Start: 2022-02-03 | End: 2022-02-07 | Stop reason: HOSPADM

## 2022-02-04 RX ORDER — PHENOL/SODIUM PHENOLATE
AEROSOL, SPRAY (ML) MUCOUS MEMBRANE 2 TIMES DAILY
COMMUNITY
End: 2022-07-19

## 2022-02-04 RX ORDER — POLYETHYLENE GLYCOL 3350 17 G/17G
17 POWDER, FOR SOLUTION ORAL 2 TIMES DAILY PRN
Status: DISCONTINUED | OUTPATIENT
Start: 2022-02-04 | End: 2022-02-07 | Stop reason: HOSPADM

## 2022-02-04 RX ORDER — CLONAZEPAM 0.5 MG/1
1 TABLET ORAL 2 TIMES DAILY PRN
Status: DISCONTINUED | OUTPATIENT
Start: 2022-02-04 | End: 2022-02-07 | Stop reason: HOSPADM

## 2022-02-04 RX ORDER — CLONAZEPAM 1 MG/1
1 TABLET ORAL 2 TIMES DAILY PRN
COMMUNITY
Start: 2022-01-24 | End: 2023-05-16 | Stop reason: SDUPTHER

## 2022-02-04 RX ORDER — AMOXICILLIN 250 MG
1 CAPSULE ORAL DAILY PRN
Status: DISCONTINUED | OUTPATIENT
Start: 2022-02-04 | End: 2022-02-07 | Stop reason: HOSPADM

## 2022-02-04 RX ORDER — ONDANSETRON 2 MG/ML
4 INJECTION INTRAMUSCULAR; INTRAVENOUS EVERY 8 HOURS PRN
Status: DISCONTINUED | OUTPATIENT
Start: 2022-02-04 | End: 2022-02-07 | Stop reason: HOSPADM

## 2022-02-04 RX ORDER — ACETAMINOPHEN 325 MG/1
650 TABLET ORAL EVERY 4 HOURS PRN
Status: DISCONTINUED | OUTPATIENT
Start: 2022-02-04 | End: 2022-02-07 | Stop reason: HOSPADM

## 2022-02-04 RX ADMIN — IOHEXOL 75 ML: 350 INJECTION, SOLUTION INTRAVENOUS at 02:02

## 2022-02-04 RX ADMIN — CEFTRIAXONE 1 G: 1 INJECTION, POWDER, FOR SOLUTION INTRAMUSCULAR; INTRAVENOUS at 10:02

## 2022-02-04 RX ADMIN — ZOLPIDEM TARTRATE 10 MG: 5 TABLET ORAL at 10:02

## 2022-02-04 RX ADMIN — VANCOMYCIN HYDROCHLORIDE 125 MG: KIT at 10:02

## 2022-02-04 RX ADMIN — FLUOXETINE 40 MG: 20 CAPSULE ORAL at 10:02

## 2022-02-04 RX ADMIN — VANCOMYCIN HYDROCHLORIDE 1750 MG: 10 INJECTION, POWDER, LYOPHILIZED, FOR SOLUTION INTRAVENOUS at 02:02

## 2022-02-04 RX ADMIN — PANTOPRAZOLE SODIUM 40 MG: 40 TABLET, DELAYED RELEASE ORAL at 07:02

## 2022-02-04 RX ADMIN — MUPIROCIN 1 G: 20 OINTMENT TOPICAL at 10:02

## 2022-02-04 NOTE — ED NOTES
Patient identifiers verified and correct for Miriam Mendez  LOC: The patient is awake, alert and aware of environment with an appropriate affect, the patient is oriented x 3 and speaking appropriately.   APPEARANCE: Patient appears comfortable and in no acute distress, patient is clean and well groomed.  SKIN: The skin is warm and dry, color consistent with ethnicity, patient has normal skin turgor and moist mucus membranes, pt has abscess to right facial cheek.  RESPIRATORY: Airway is open and patent, respirations are spontaneous, patient has a normal effort and rate, no accessory muscle use noted, pt placed on continuous pulse ox with O2 sats noted at 97% on room air.  CARDIAC: Pt placed on cardiac monitor. Patient has a normal rate and regular rhythm, no edema noted, capillary refill < 3 seconds.   GASTRO: Soft and non tender to palpation, no distention noted, normoactive bowel sounds present in all four quadrants. Pt states bowel movements have been regular.  : Pt denies any pain or frequency with urination.  NEURO: Pt opens eyes spontaneously, behavior appropriate to situation, follows commands, facial expression symmetrical, bilateral hand grasp equal and even, purposeful motor response noted, normal sensation in all extremities when touched with a finger.

## 2022-02-04 NOTE — MEDICAL/APP STUDENT
History     Chief Complaint   Patient presents with    Abscess     Right cheek abscess     This is a 41 year old female presenting with a 2inch facial abscess with purulent discharge and surrounding erythema to the right cheek. It started as a small pimple 5 days ago and has progressively worsened. She was started on clindamycin and completed 6 doses. She was then given amoxicillin and prednisone, which she has not started. She reports periorbital swelling to the right eye that appeared yesterday. No N/V/D. She denies fever, chills and sweating. No chest pain or SOB. She is allergic to sulfa drugs: reaction of hives. She had a similar episode several years ago that required IV antibiotics.     The history is provided by the patient.       Past Medical History:   Diagnosis Date    ADD (attention deficit disorder with hyperactivity)     psych eval 3/10    Anxiety     Bronchitis     RAD /bronchitis episodes    Cellulitis     face    Depression     undergoing psychotherapy    Easy bruising 2014    Fibromyalgia     History of ITP 2014    Iron deficiency anemia 2014    Morbid obesity     improved with s/p gastric bypass    Pica 2014    Pseudotumor cerebri syndrome     Thrombocytopenia        Past Surgical History:   Procedure Laterality Date     SECTION      CHOLECYSTECTOMY      GASTRIC BYPASS         Family History   Problem Relation Age of Onset    Breast cancer Mother 57    Hypertension Mother     Cancer Mother 57        breast cancer    Cancer Father     Cancer Paternal Grandmother         breast    Cancer Paternal Grandfather         kidney    Colon cancer Neg Hx     Diabetes Neg Hx     Ovarian cancer Neg Hx     Stroke Neg Hx        Social History     Tobacco Use    Smoking status: Never Smoker    Smokeless tobacco: Never Used   Substance Use Topics    Alcohol use: Yes    Drug use: No       Review of Systems   Constitutional: Negative for chills, fatigue  "and fever.   HENT: Positive for facial swelling (right periorbital). Negative for sinus pressure.    Eyes: Negative for pain.   Respiratory: Negative for cough and shortness of breath.    Cardiovascular: Negative for chest pain.   Gastrointestinal: Negative for nausea and vomiting.   Neurological: Negative for headaches.       Physical Exam   BP (!) 117/56 (BP Location: Right arm, Patient Position: Sitting)   Pulse 83   Temp 98.5 °F (36.9 °C) (Oral)   Resp 18   Ht 5' 6" (1.676 m)   Wt 81.6 kg (180 lb)   SpO2 100%   BMI 29.05 kg/m²     Physical Exam    Constitutional: Vital signs are normal. She appears well-developed and well-nourished.  Non-toxic appearance. She does not have a sickly appearance. She does not appear ill.   HENT:   Head: Head is with right periorbital erythema.       Eyes: EOM are normal. Pupils are equal, round, and reactive to light.   Neck:   Normal range of motion.  Cardiovascular: Normal rate, regular rhythm and normal heart sounds.   Pulmonary/Chest: Breath sounds normal.   Musculoskeletal:      Cervical back: Normal range of motion.     Neurological: She is alert and oriented to person, place, and time.   Psychiatric: She has a normal mood and affect. Thought content normal.         ED Course         "

## 2022-02-04 NOTE — ED NOTES
I-STAT Chem-8+ Results:   Value Reference Range   Sodium 137 136-145 mmol/L   Potassium  4.4 3.5-5.1 mmol/L   Chloride 100  mmol/L   Ionized Calcium 1.18 1.06-1.42 mmol/L   CO2 (measured) 29 23-29 mmol/L   Glucose 82  mg/dL   BUN 7 6-30 mg/dL   Creatinine 0.7 0.5-1.4 mg/dL   Hematocrit 36 36-54%

## 2022-02-04 NOTE — ED PROVIDER NOTES
Encounter Date: 2022       History     Chief Complaint   Patient presents with    Abscess     Right cheek abscess     HPI     This is a 41-year-old woman with a history of MRSA infection, who presents with right-sided facial pain and swelling after picking at a pimple, and has been diagnosed with an abscess by an outside dermatologist, was initially placed on clindamycin but she had progressive symptoms, had an ultrasound as an outpatient which was notable for a phlegmon, and she was switched to amoxicillin and steroids, which she has not yet started.  She is having increased drainage from the site and surrounding erythema and induration.  She denies fevers or chills.  She has had a similar facial infection before that required IV antibiotics many years ago.  She gets hives from sulfa drugs.  Review of patient's allergies indicates:   Allergen Reactions    Sulfa (sulfonamide antibiotics)      Other reaction(s): Hives     Past Medical History:   Diagnosis Date    ADD (attention deficit disorder with hyperactivity)     psych eval 3/10    Anxiety     Bronchitis     RAD /bronchitis episodes    Cellulitis     face    Depression     undergoing psychotherapy    Easy bruising 2014    Fibromyalgia     History of ITP 2014    Iron deficiency anemia 2014    Morbid obesity     improved with s/p gastric bypass    Pica 2014    Pseudotumor cerebri syndrome     Thrombocytopenia      Past Surgical History:   Procedure Laterality Date     SECTION      CHOLECYSTECTOMY      GASTRIC BYPASS       Family History   Problem Relation Age of Onset    Breast cancer Mother 57    Hypertension Mother     Cancer Mother 57        breast cancer    Cancer Father     Cancer Paternal Grandmother         breast    Cancer Paternal Grandfather         kidney    Colon cancer Neg Hx     Diabetes Neg Hx     Ovarian cancer Neg Hx     Stroke Neg Hx      Social History     Tobacco Use    Smoking  status: Never Smoker    Smokeless tobacco: Never Used   Substance Use Topics    Alcohol use: Yes    Drug use: No     Review of Systems   Constitutional: Negative for chills, diaphoresis, fatigue and fever.   HENT: Positive for facial swelling. Negative for congestion, rhinorrhea and sore throat.    Eyes: Negative for visual disturbance.   Respiratory: Negative for cough, chest tightness and shortness of breath.    Cardiovascular: Negative for chest pain.   Gastrointestinal: Negative for abdominal pain, blood in stool, constipation, diarrhea and vomiting.   Genitourinary: Negative for dysuria, hematuria and urgency.   Musculoskeletal: Negative for back pain.   Skin: Negative for rash.   Neurological: Negative for seizures and syncope.   Hematological: Does not bruise/bleed easily.   Psychiatric/Behavioral: Negative for agitation and hallucinations.       Physical Exam     Initial Vitals   BP Pulse Resp Temp SpO2   02/04/22 1130 02/04/22 1130 02/04/22 1130 02/04/22 1134 02/04/22 1130   (!) 117/56 83 18 98.5 °F (36.9 °C) 100 %      MAP       --                Physical Exam  Gen: AxOx4, NAD, appears stated age, well appearing  Eye: R inferior periorbital edema, R upper cheek erythema and induration, with a central area of purulent drainage, corresponding with reported area of I&D done this week, EOMI, no scleral icterus, no periorbital edema or ecchymosis  Head: Normocephalic, atraumatic, no lesions, scalp grossly normal  ENT: neck supple, no stridor, no masses, no abnormal phonation or drooling  CVS: warm and well perfused, cap refill <2 seconds, no extremity pallor  PULM: normal work of breathing, no stridor, equal chest rise, no peripheral cyanosis  ABD: scaphoid, nondistended  Ext: no rash, no deformities, moving all joints with normal ROM  Neuro: WALKER, gait intact, face grossly symmetric  Psych: well groomed, mood and affect congruent, makes good eye contact, cooperative    ED Course   Procedures  Labs Reviewed    CBC W/ AUTO DIFFERENTIAL - Abnormal; Notable for the following components:       Result Value    Hemoglobin 10.9 (*)     Hematocrit 36.2 (*)     MCH 24.7 (*)     MCHC 30.1 (*)     RDW 16.5 (*)     Platelets 103 (*)     Platelet Estimate Decreased (*)     All other components within normal limits   BASIC METABOLIC PANEL - Abnormal; Notable for the following components:    Sodium 135 (*)     All other components within normal limits   SARS-COV-2 RDRP GENE   ISTAT PROCEDURE   ISTAT CHEM8          Imaging Results           CT Maxillofacial With Contrast (Final result)  Result time 02/04/22 14:42:36    Final result by Phillip Bello DO (02/04/22 14:42:36)                 Impression:      Induration swelling right periorbital pre septal soft tissues concerning for facial and preseptal cellulitis with regional reactive lymphadenopathy. No intraconal or postseptal inflammatory change. No well-delineated fluid collection to suggest abscess.    Incidental enhancing intracranial lesion right cerebellar pontine angle extending into and widening the right IAC concerning for vestibular schwannoma clinical correlation and follow-up MRI brain with and without contrast with IAC protocol recommended if not clinically known.    This report was flagged in Epic as abnormal.    Electronically signed by resident: Tk Franks MD  Date:    02/04/2022  Time:    14:07    Electronically signed by: Phillip Bello DO  Date:    02/04/2022  Time:    14:42             Narrative:    EXAMINATION:  CT MAXILLOFACIAL WITH CONTRAST    CLINICAL HISTORY:  Maxillary/facial abscess;    TECHNIQUE:  Low dose axial images, sagittal and coronal reformations were obtained through the face.  Contrast was not administered.    COMPARISON:  CT max face 04/09/2015    FINDINGS:  subcutaneous soft tissue thickening with ill-defined diffuse soft tissue fat stranding along the right pre-maxillary and pre-zygomatic soft tissues.  Process extends to the lateral right  pre orbital soft tissues as well as superiorly to the superficial right temporal fossa.  No intraconal or postseptal inflammatory change.  This is nonspecific and may represent inflammatory/infectious process.    No discrete foreign body or appreciable focal fluid collection to suggest abscess. Numerous prominent bilateral right greater than left upper cervical, sub mandibular, and intraparotid lymph nodes, likely reactive. For example right submandibular lymph node measuring 1.4 cm in long axis right-sided probable intraparotid lymph node measuring 1.0 cm in short axis (axial series 2, images 63 and 135 respectively.)    No evidence for acute fracture maxillofacial bones.  Remote fracture left lamina papyracea with clearing of previous left ethmoid sinus and left maxillary antral opacities.. No evidence for acute fracture or dislocation of the mandible.  The orbits are otherwise within normal limits. Paranasal sinuses and mastoid air cells are clear.    There is partial visualization of a focal lesion in the right cerebellar pontine angle cistern with widening of the right IAC most compatible with vestibular schwannoma though limited by partial inclusion and CT technique further evaluation with dedicated MRI with IAC protocol if patient compatible this measures approximately 1.4 x 1.8 cm in AP by TV dimensions.                                 Medications   vancomycin 1.75 g in 5 % dextrose 500 mL IVPB (1,750 mg Intravenous New Bag 2/4/22 1431)   iohexoL (OMNIPAQUE 350) injection 75 mL (75 mLs Intravenous Given 2/4/22 1405)     Medical Decision Making:   History:   Old Medical Records: I decided to obtain old medical records.  Old Records Summarized: records from clinic visits.  Initial Assessment:   This is a 41-year-old woman who presents with right-sided facial cellulitis versus abscess that is not improving despite oral clindamycin and I and D by an outside provider.  I am concerned about recurrent abscess  versus deeper space infection, so we will obtain a CT scan with contrast to further evaluate.  She is allergic to sulfa, and has had known history of MRSA, so we will give her a dose of vancomycin here given that she failed clinda.  Clinical Tests:   Lab Tests: Ordered and Reviewed  Radiological Study: Reviewed and Ordered  ED Management:  Labs reassuring.  CT scan by my independent interpretation demonstrates facial cellulitis but no obvious drainable collection.  Her CT scan incidentally shows findings concerning for a vestibular schwannoma, and we have notified her of this and the need to follow-up with neurology.  There is no CT evidence of orbital cellulitis.  Plan for observation in the hospital for continued IV vancomycin.                      Clinical Impression:   Final diagnoses:  [L03.211] Facial cellulitis (Primary)          ED Disposition Condition    Observation               Davina Anderson MD  02/04/22 [id number]

## 2022-02-05 PROBLEM — D69.6 THROMBOCYTOPENIA: Status: ACTIVE | Noted: 2022-02-05

## 2022-02-05 LAB
ANION GAP SERPL CALC-SCNC: 7 MMOL/L (ref 8–16)
ANISOCYTOSIS BLD QL SMEAR: SLIGHT
BASOPHILS # BLD AUTO: 0.07 K/UL (ref 0–0.2)
BASOPHILS NFR BLD: 0.8 % (ref 0–1.9)
BUN SERPL-MCNC: 10 MG/DL (ref 6–20)
CALCIUM SERPL-MCNC: 8.5 MG/DL (ref 8.7–10.5)
CHLORIDE SERPL-SCNC: 102 MMOL/L (ref 95–110)
CO2 SERPL-SCNC: 25 MMOL/L (ref 23–29)
CREAT SERPL-MCNC: 0.8 MG/DL (ref 0.5–1.4)
DIFFERENTIAL METHOD: ABNORMAL
EOSINOPHIL # BLD AUTO: 0.1 K/UL (ref 0–0.5)
EOSINOPHIL NFR BLD: 0.9 % (ref 0–8)
ERYTHROCYTE [DISTWIDTH] IN BLOOD BY AUTOMATED COUNT: 16.4 % (ref 11.5–14.5)
EST. GFR  (AFRICAN AMERICAN): >60 ML/MIN/1.73 M^2
EST. GFR  (NON AFRICAN AMERICAN): >60 ML/MIN/1.73 M^2
GIANT PLATELETS BLD QL SMEAR: PRESENT
GLUCOSE SERPL-MCNC: 87 MG/DL (ref 70–110)
HCT VFR BLD AUTO: 30.7 % (ref 37–48.5)
HGB BLD-MCNC: 9.5 G/DL (ref 12–16)
HYPOCHROMIA BLD QL SMEAR: ABNORMAL
IMM GRANULOCYTES # BLD AUTO: 0.03 K/UL (ref 0–0.04)
IMM GRANULOCYTES NFR BLD AUTO: 0.3 % (ref 0–0.5)
LYMPHOCYTES # BLD AUTO: 1.8 K/UL (ref 1–4.8)
LYMPHOCYTES NFR BLD: 19.5 % (ref 18–48)
MAGNESIUM SERPL-MCNC: 1.9 MG/DL (ref 1.6–2.6)
MCH RBC QN AUTO: 25 PG (ref 27–31)
MCHC RBC AUTO-ENTMCNC: 30.9 G/DL (ref 32–36)
MCV RBC AUTO: 81 FL (ref 82–98)
MONOCYTES # BLD AUTO: 0.7 K/UL (ref 0.3–1)
MONOCYTES NFR BLD: 8.1 % (ref 4–15)
NEUTROPHILS # BLD AUTO: 6.4 K/UL (ref 1.8–7.7)
NEUTROPHILS NFR BLD: 70.4 % (ref 38–73)
NRBC BLD-RTO: 0 /100 WBC
PLATELET # BLD AUTO: 66 K/UL (ref 150–450)
PLATELET BLD QL SMEAR: ABNORMAL
PMV BLD AUTO: ABNORMAL FL (ref 9.2–12.9)
POTASSIUM SERPL-SCNC: 3.8 MMOL/L (ref 3.5–5.1)
RBC # BLD AUTO: 3.8 M/UL (ref 4–5.4)
SODIUM SERPL-SCNC: 134 MMOL/L (ref 136–145)
WBC # BLD AUTO: 9.12 K/UL (ref 3.9–12.7)

## 2022-02-05 PROCEDURE — 96366 THER/PROPH/DIAG IV INF ADDON: CPT

## 2022-02-05 PROCEDURE — 63600175 PHARM REV CODE 636 W HCPCS: Performed by: HOSPITALIST

## 2022-02-05 PROCEDURE — G0378 HOSPITAL OBSERVATION PER HR: HCPCS

## 2022-02-05 PROCEDURE — 85025 COMPLETE CBC W/AUTO DIFF WBC: CPT | Performed by: HOSPITALIST

## 2022-02-05 PROCEDURE — A9585 GADOBUTROL INJECTION: HCPCS | Performed by: HOSPITALIST

## 2022-02-05 PROCEDURE — 80048 BASIC METABOLIC PNL TOTAL CA: CPT | Performed by: HOSPITALIST

## 2022-02-05 PROCEDURE — 25000003 PHARM REV CODE 250: Performed by: INTERNAL MEDICINE

## 2022-02-05 PROCEDURE — 99226 PR SUBSEQUENT OBSERVATION CARE,LEVEL III: ICD-10-PCS | Mod: ,,, | Performed by: HOSPITALIST

## 2022-02-05 PROCEDURE — 99226 PR SUBSEQUENT OBSERVATION CARE,LEVEL III: CPT | Mod: ,,, | Performed by: HOSPITALIST

## 2022-02-05 PROCEDURE — 96361 HYDRATE IV INFUSION ADD-ON: CPT

## 2022-02-05 PROCEDURE — 36415 COLL VENOUS BLD VENIPUNCTURE: CPT | Performed by: HOSPITALIST

## 2022-02-05 PROCEDURE — 25000003 PHARM REV CODE 250: Performed by: HOSPITALIST

## 2022-02-05 PROCEDURE — 83735 ASSAY OF MAGNESIUM: CPT | Performed by: HOSPITALIST

## 2022-02-05 PROCEDURE — 25500020 PHARM REV CODE 255: Performed by: HOSPITALIST

## 2022-02-05 RX ORDER — GADOBUTROL 604.72 MG/ML
9 INJECTION INTRAVENOUS
Status: COMPLETED | OUTPATIENT
Start: 2022-02-05 | End: 2022-02-05

## 2022-02-05 RX ORDER — IBUPROFEN 400 MG/1
400 TABLET ORAL EVERY 6 HOURS PRN
Status: DISCONTINUED | OUTPATIENT
Start: 2022-02-05 | End: 2022-02-05

## 2022-02-05 RX ADMIN — HYPROMELLOSE 2910 1 DROP: 5 SOLUTION OPHTHALMIC at 09:02

## 2022-02-05 RX ADMIN — MUPIROCIN 1 G: 20 OINTMENT TOPICAL at 05:02

## 2022-02-05 RX ADMIN — PANTOPRAZOLE SODIUM 40 MG: 40 TABLET, DELAYED RELEASE ORAL at 05:02

## 2022-02-05 RX ADMIN — ZOLPIDEM TARTRATE 10 MG: 5 TABLET ORAL at 09:02

## 2022-02-05 RX ADMIN — PANTOPRAZOLE SODIUM 40 MG: 40 TABLET, DELAYED RELEASE ORAL at 06:02

## 2022-02-05 RX ADMIN — SODIUM CHLORIDE, SODIUM LACTATE, POTASSIUM CHLORIDE, AND CALCIUM CHLORIDE 1000 ML: .6; .31; .03; .02 INJECTION, SOLUTION INTRAVENOUS at 12:02

## 2022-02-05 RX ADMIN — ACETAMINOPHEN 650 MG: 325 TABLET ORAL at 01:02

## 2022-02-05 RX ADMIN — Medication 1 CAPSULE: at 08:02

## 2022-02-05 RX ADMIN — MUPIROCIN 1 G: 20 OINTMENT TOPICAL at 08:02

## 2022-02-05 RX ADMIN — VANCOMYCIN HYDROCHLORIDE 1500 MG: 1.5 INJECTION, POWDER, LYOPHILIZED, FOR SOLUTION INTRAVENOUS at 06:02

## 2022-02-05 RX ADMIN — VANCOMYCIN HYDROCHLORIDE 125 MG: KIT at 09:02

## 2022-02-05 RX ADMIN — CEFTRIAXONE 1 G: 1 INJECTION, POWDER, FOR SOLUTION INTRAMUSCULAR; INTRAVENOUS at 09:02

## 2022-02-05 RX ADMIN — ACETAMINOPHEN 650 MG: 325 TABLET ORAL at 04:02

## 2022-02-05 RX ADMIN — FLUOXETINE 40 MG: 20 CAPSULE ORAL at 09:02

## 2022-02-05 RX ADMIN — VANCOMYCIN HYDROCHLORIDE 1500 MG: 1.5 INJECTION, POWDER, LYOPHILIZED, FOR SOLUTION INTRAVENOUS at 05:02

## 2022-02-05 RX ADMIN — MUPIROCIN 1 G: 20 OINTMENT TOPICAL at 09:02

## 2022-02-05 RX ADMIN — DIPHENHYDRAMINE HYDROCHLORIDE 25 MG: 25 CAPSULE ORAL at 12:02

## 2022-02-05 RX ADMIN — HYPROMELLOSE 2910 1 DROP: 5 SOLUTION OPHTHALMIC at 05:02

## 2022-02-05 RX ADMIN — GADOBUTROL 9 ML: 604.72 INJECTION INTRAVENOUS at 10:02

## 2022-02-05 NOTE — SIGNIFICANT EVENT
Mrs. Mendez noted increased swelling around her right eye in the eyelid.  Persistent tenderness over her right cheek bone.  The swelling makes it difficult to see, but when her eye is opened enough, she notes no alteration in her right eye vision.  She did note itching around her eye with the swelling.  Oral Benadryl was ordered as needed for the itching.  I spoke with Ophthalmology.  Will continue current care with antibiotics for now.

## 2022-02-05 NOTE — PROGRESS NOTES
"[facility] [facility]  Progress Note    Patient Name: Miriam Mendez  MRN: [id number]  Patient Class: OP- Observation   Admission Date: 2/4/2022  Length of Stay: 0 days  Attending Physician: Frank Gotti MD  Primary Care Provider: Julianne Lopez MD        Subjective:     Principal Problem:Facial cellulitis    Interval History:   Seen today    Overnight some complaints of facial itching, feeling some increased periorbital swelling.  Ophthalmology consulted, has completed dilated exam for patient.     Patient sleeping, arousable, reports feeling "out of it"  She does feel facial swelling is improved somewhat, still has tenderness over the pustular area on her check,  On exam still some tenderness at her temples, but less induration of her soft tissues    Her PLT count is down to 66,  Spoke with on-call hematology,  recs to give 4 days pulse dexamethasone if PLT fall <50.  Discussed with patient.     MRI completed today    Review of Systems   Constitutional: Positive for chills.   HENT: Positive for facial swelling and tinnitus. Negative for ear discharge and hearing loss.    Respiratory: Negative for shortness of breath.    Cardiovascular: Negative for chest pain and leg swelling.   Gastrointestinal: Negative for nausea and vomiting.   Genitourinary: Negative for dysuria.   Musculoskeletal: Negative for back pain.   Neurological: Positive for numbness (Facial). Negative for headaches.   Psychiatric/Behavioral: Negative for confusion.     Objective:     Vital Signs (Most Recent):  Temp: 97.1 °F (36.2 °C) (02/05/22 1139)  Pulse: 79 (02/05/22 1139)  Resp: 18 (02/05/22 1139)  BP: (!) 113/54 (02/05/22 1139)  SpO2: 95 % (02/05/22 1139) Vital Signs (24h Range):  Temp:  [97.1 °F (36.2 °C)-98.3 °F (36.8 °C)] 97.1 °F (36.2 °C)  Pulse:  [60-79] 79  Resp:  [18] 18  SpO2:  [95 %-97 %] 95 %  BP: (100-113)/(50-62) 113/54   Intake/[place]ake:  This Shift:  No intake/output data " recorded.    Net I/O past 24h:     Intake/Output Summary (Last 24 hours) at 2/5/2022 1313  Last data filed at 2/4/2022 1638  Gross per 24 hour   Intake 500 ml   Output --   Net 500 ml         Weight: 81.6 kg (180 lb)  Body mass index is 29.05 kg/m².  Physical Exam  Vitals and nursing note reviewed.   Constitutional:       General: She is not in acute distress.     Appearance: Normal appearance. She is not ill-appearing or toxic-appearing.   HENT:      Head:        Comments: Facial swelling, induration, erythema in partially confluent area over the right side of her face, tenderness present over site of incision drainage tenderness over the right temple, inferior aspect right eyelid soft tissue swelling appears decreased.   Overall soft tissue compartments of face softer to palpation/less indurated  Eyes:      General: No scleral icterus.     Extraocular Movements: Extraocular movements intact.      Conjunctiva/sclera: Conjunctivae normal.      Pupils: Pupils are equal, round, and reactive to light.   Cardiovascular:      Rate and Rhythm: Normal rate and regular rhythm.      Pulses: Normal pulses.   Pulmonary:      Effort: Pulmonary effort is normal. No respiratory distress.      Breath sounds: Normal breath sounds. No wheezing.   Abdominal:      General: Bowel sounds are normal.      Palpations: Abdomen is soft.   Musculoskeletal:      Right lower leg: No edema.      Left lower leg: No edema.   Skin:     General: Skin is warm and dry.   Neurological:      Mental Status: She is alert and oriented to person, place, and time.           Significant Labs:   All pertinent labs within the past 24 hours have been reviewed.  CBC:   Recent Labs   Lab 02/04/22  1304 02/04/22  1308 02/05/22  0232   WBC 7.99  --  9.12   HGB 10.9*  --  9.5*   HCT 36.2* 36 30.7*   *  --  66*     CMP:   Recent Labs   Lab 02/04/22  1304 02/05/22  0232   * 134*   K 4.4 3.8    102   CO2 27 25   GLU 79 87   BUN 7 10   CREATININE 0.7  0.8   CALCIUM 9.6 8.5*   ANIONGAP 8 7*   EGFRNONAA >60.0 >60.0       Significant Imaging: I have reviewed all pertinent imaging results/findings within the past 24 hours.    MRI IAC/TEMPORAL BONES W W/O CONTRAST     CLINICAL HISTORY:  Right sided CPA lesions/enhancing mass on CT scan;     TECHNIQUE:  Sagittal T1, axial T2, axial flair, axial diffusion and gradient imaging of the whole brain without contrast. Additional thin section imaging of the IACs was performed using coronal T1, axial 3-D spoiled gradient, without contrast. Subsequently axial T1 postcontrast imaging of the whole brain and 3D sagittal MP rage postcontrast imaging of the whole brain were performed with axial and coronal thin section T1 postcontrast fat sat imaging through the IAC's. Nine ml of  Gadavist contrast was injected intravenously     COMPARISON:  CT maxillofacial bone 2 for 22     FINDINGS:  MRI brain: Brain parenchyma normal in contour.  There are few punctate foci of T2 FLAIR signal hyperintensities in the frontal subcortical white matter which are nonspecific and of doubtful clinical significance.  The ventricles are normal in size without hydrocephalus.  There is no midline shift or mass effect.  No restricted diffusion to suggest acute or recent infarction.  No abnormal parenchymal susceptibility to suggest parenchymal hemorrhage.     IACs: Enhancing lesion in the right cerebellar pontine angle cistern extending into the right IAC this corresponds to lesion seen on CT, this measures 1.2 x 1.6 x 1.3 cm in AP by TV by CC dimensions respectively.  Please note intracanalicular component extends ventrally.  There is a thin focus of dural enhancement along the margin of the right petrous ridge underlying this region extending to the porous acusticus with configuration suggestive for meningioma with schwannoma felt less likely but not excluded.     Unremarkable imaging left IAC.     Partial inclusion induration and heterogeneous signal with  enhancement within the right facial soft tissues extending to the right periorbital preseptal region in light of history concerning for facial cellulitis with inflammatory/infectious process underlying lesion felt less likely but not excluded.  There is few punctate areas of signal void with small hypodense track which may be related to recent ovulation clinical correlation advised.  Partially the image right prominent periparotid and parotid probable lymph nodes which may be reactive.     This report was flagged in Epic as abnormal.     Impression:     Mri Iac:     Enhancing lesion right cerebellar pontine angle cistern extending to the IAC.  Corresponds to lesion seen on CT.  Please note there is a thin associated dural tail along the petrous clival ridge with configuration most suggestive for meningioma with schwannoma felt less likely.     Partially visualized induration and edema signal abnormality within the right facial soft tissues with ill-defined enhancement corresponds to abnormality seen on CT which may be active inflammatory/infectious process with underlying neoplasm not excluded.  There is punctate questionable hypointense signal track along the margin of this which may represent recent manipulation clinical correlation advised.     MRI brain: Otherwise unremarkable MRI brain as detailed above specifically without evidence for acute infarction or hydrocephalus.     .        Electronically signed by: Phillip Bello DO  Date:                                            02/05/2022    MEDICATIONS  Scheduled Meds:   artificial tears  1 drop Right Eye TID    cefTRIAXone (ROCEPHIN) IVPB  1 g Intravenous Q24H    FLUoxetine  40 mg Oral Nightly    Lactobacillus rhamnosus GG  1 capsule Oral Daily    mupirocin  1 g Topical (Top) TID    pantoprazole  40 mg Oral BID AC    vancomycin (VANCOCIN) IVPB  1,500 mg Intravenous Q12H    vancomycin  125 mg Oral Daily                             Continuous  Infusions:  PRN Meds:.acetaminophen, clonazePAM, diphenhydrAMINE, ondansetron, polyethylene glycol, senna-docusate 8.6-50 mg, sodium chloride 0.9%, zolpidem        Assessment/Plan:     Active Hospital Problems    Diagnosis  POA    *Facial cellulitis [L03.211]  Yes     Priority: 1 - High    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     Overview/Hospital Course:     40 y/o WF hx of Morbid Obesity s/p Gastric Bypass (2009) complicated with ITP & C.diff infection x1, Major Anxiety d/o, ADHD, MRSA infection x 1 (2009) who presents with complaints of right facial pain 7 swelling, admitted for concern for facial cellulitis.      Facial Cellulitis   -continue IV vancomycin and ceftriaxone for now  -seen by ophthalmology today, recs for erythromycin PRN at this time. Discussed with pt she prefers to defer ocular ointment at this time.   -scheduled artificial tears.        CPA angle tumor/lesion  -incidental intracranial enhancing lesions seen on CT scan, concern for possible vestibular schwannoma.  MRI completed with IAC protocol.   Per radiology read appears more meningioma vs schwannoma, but pt does have some chronic right facial/vestibular symptoms. Separate from current facial cellulitis.   -Outpatient Referral to ENT/otologist and/or neurosurgery, will discuss with on-call services prior to discharge to coordinate care.     Hx of ITP_Thrombocytopenia  -1 prior bout of ITP in 2009  -since admission PLT count is downtrending and was lower than her baseline to begin with. Spoke  with on-call hematology and if PLT ct <50 - start 4 days Pulse Dexamethasone 40mg daily.   -Discontinued Lovenox for DVT ppx at this time due to risk of further thrombocytopenia     Acute cystitis  -the beginning of the week she was prescribed ciprofloxacin for symptoms cystitis  -current ceftriaxone will provide adequate empiric coverage for patient  -Currently denies active symptoms.      H/o of C.difficile infection   -occurred x1 at time of her gastric bypass  -she reports not being on IV or broad spectrum abx since this date.   -Discussed with on-call pharmacy, night of admission - pt gettign 125mg Oral vanc daily as prophylaxis agent.      H/o of Gastric Bypass   -reports having gastric ulcer, ?anastamotic ulcer  -was taking Omeprazole on TID dosing basis at home, exact dose unnwknown - continue Protonix 40mg PO BID as substitute     Generalized anxiety d/o   -continue patient's chronic fluoxetine  -contioinue PRN clonazepam  -continue prn zolpidem     ADHD  -uses adderall daily on BID basis      Code Status: Full Code  VTE Risk Mitigation (From admission, onward)         Ordered     IP VTE HIGH RISK PATIENT  Once         02/04/22 2239     Place sequential compression device  Until discontinued         02/04/22 2239              Diet:   Dietary Orders (From admission, onward)     Start     Ordered    02/04/22 1603  Diet Adult Regular (IDDSI Level 7)  Diet effective now         02/04/22 1602                High Risk Conditions:Patient has a condition that poses threat to life and bodily function: facial cellulitis   Patient is currently on drug therapy requiring intensive monitoring for toxicity: Vancomycin  Discharge Planning   JACKELINE:      Code Status: Full Code   Is the patient medically ready for discharge?:     Reason for patient still in hospital (select all that apply): Patient trending condition, Laboratory test, Treatment and Consult recommendations         osiel Gotti  M.D.  Attending [facility] Medicine Dept.  Pager: [phone]  [facility]  [id number]

## 2022-02-05 NOTE — H&P
[facility]  Emergency Medicine  History & Physical      Patient Name: Miriam Mendez  MRN: [id number]  Admission Date: 2/4/2022  Attending Physician: Frank Gotti MD  Primary Care Provider: Julianne Lopez MD         Patient information was obtained from patient, past medical records and ER records.     Subjective:     Principal Problem:Facial cellulitis    Chief Complaint:   Chief Complaint   Patient presents with    Abscess     Right cheek abscess        HPI:   40 y/o WF hx of Morbid Obesity s/p Gastric Bypass (2009) complicated with ITP & C.diff infection x1, Major Anxiety d/o, ADHD, MRSA infection x 1 (2009) who presents with complaints of right facial pain 7 swelling, admitted for concern for facial cellulitis.     She states that on Sunday she developed nodular swelling in the right cheek that progressed to other areas of her face, on Wednesday she saw her primary care physician and was prescribed oral clindamycin, and ultrasound in the office was done and she was told there was no fluid collection or abscess.  She saw her dermatologist on Thursday a pre planned appointment and at that time dermatologist assessed her and performed an I and D, no wound odor culture sent patient reports that there was copious purulence drainage expressed.  And the dermatologist had prescribed her Augmentin and prednisone 40 mg for 4 days.  She had not started the Augmentin or prednisone had been taking the clindamycin and today symptoms not significantly improved, she continues to note drainage from the site of the I and D, the swelling at the inferior aspect of her eye developed on Thursday.  -she notes some chills but denies any visual changes, she has never had this before.    She reports having COVID at the end of December and noticing right-sided facial numbness at that time, she has also had a history of right-sided tinnitus and right-sided hearing changes, has seen ENT as an outpatient.  Her  maxillofacial CT has incidental finding of right cerebellar pontine angle lesion/tumor based on radiology report concern for possible vestibular schwannoma.     Past Medical History:   Diagnosis Date    ADD (attention deficit disorder with hyperactivity)     psych eval 3/10    Anxiety     Bronchitis     RAD /bronchitis episodes    Cellulitis     face    Depression     undergoing psychotherapy    Easy bruising 2014    Fibromyalgia     History of ITP 2014    Iron deficiency anemia 2014    Morbid obesity     improved with s/p gastric bypass    Pica 2014    Pseudotumor cerebri syndrome     Thrombocytopenia        Past Surgical History:   Procedure Laterality Date     SECTION      CHOLECYSTECTOMY      GASTRIC BYPASS         Review of patient's allergies indicates:   Allergen Reactions    Sulfa (sulfonamide antibiotics)      Other reaction(s): Hives       No current facility-administered medications on file prior to encounter.     Current Outpatient Medications on File Prior to Encounter   Medication Sig    ciprofloxacin HCl (CIPRO) 250 MG tablet Take 250 mg by mouth 2 (two) times a day.    clonazePAM (KLONOPIN) 1 MG tablet Take 1 mg by mouth 2 (two) times daily as needed for Anxiety.    dextroamphetamine-amphetamine 30 mg Tab 1 tablet po in the morning and 1/2 tab in the afternoon    FLUoxetine 40 MG capsule Take 40 mg by mouth nightly.    mupirocin (BACTROBAN) 2 % ointment Apply 1 g topically 3 (three) times daily.    omeprazole 20 mg TbEC Take by mouth 2 (two) times a day.    zolpidem (AMBIEN) 10 mg Tab Take 10 mg by mouth nightly as needed.    amoxicillin-clavulanate 875-125mg (AUGMENTIN) 875-125 mg per tablet Take 1 tablet by mouth 2 (two) times daily.    clindamycin (CLEOCIN) 150 MG capsule Take 150 mg by mouth every 6 (six) hours.    predniSONE (DELTASONE) 10 MG tablet Take 40 mg by mouth once daily.    [DISCONTINUED] benzonatate (TESSALON) 100 MG capsule Take  1 capsule (100 mg total) by mouth 3 (three) times daily as needed for Cough.    [DISCONTINUED] busPIRone (BUSPAR) 10 MG tablet Take 10 mg by mouth 3 (three) times daily.    [DISCONTINUED] cetirizine (ZYRTEC) 10 MG tablet Take 1 tablet (10 mg total) by mouth once daily.    [DISCONTINUED] fluticasone propionate (FLONASE) 50 mcg/actuation nasal spray INHALE 1 spray (50 mcg total) into Each Nostril  2 (two) times daily as needed for Rhinitis or Allergies.    [DISCONTINUED] NUVARING 0.12-0.015 mg/24 hr vaginal ring PLACE 1 RING VAGINALLY EVERY 28 DAYS    [DISCONTINUED] traZODone (DESYREL) 150 MG tablet Take 200 mg by mouth every evening.     Family History     Problem Relation (Age of Onset)    Breast cancer Mother (57)    Cancer Mother (57), Father, Paternal Grandmother, Paternal Grandfather    Hypertension Mother        Tobacco Use    Smoking status: Never Smoker    Smokeless tobacco: Never Used   Substance and Sexual Activity    Alcohol use: Yes    Drug use: No    Sexual activity: Yes     Partners: Male     Birth control/protection: None     Review of Systems   Constitutional: Positive for chills.   HENT: Positive for facial swelling and tinnitus. Negative for ear discharge and hearing loss.    Respiratory: Negative for shortness of breath.    Cardiovascular: Negative for chest pain and leg swelling.   Gastrointestinal: Negative for nausea and vomiting.   Genitourinary: Negative for dysuria.   Musculoskeletal: Negative for back pain.   Neurological: Positive for numbness (Facial). Negative for headaches.   Psychiatric/Behavioral: Negative for confusion.     Objective:     Vital Signs (Most Recent):  Temp: 98.5 °F (36.9 °C) (02/04/22 1134)  Pulse: 83 (02/04/22 1130)  Resp: 18 (02/04/22 1130)  BP: (!) 117/56 (02/04/22 1130)  SpO2: 100 % (02/04/22 1130) Vital Signs (24h Range):  Temp:  [98.5 °F (36.9 °C)] 98.5 °F (36.9 °C)  Pulse:  [83] 83  Resp:  [18] 18  SpO2:  [100 %] 100 %  BP: (117)/(56) 117/56     Weight:  81.6 kg (180 lb)  Body mass index is 29.05 kg/m².    Physical Exam  Constitutional:       General: She is not in acute distress.     Appearance: Normal appearance. She is not ill-appearing or toxic-appearing.   HENT:      Head:        Comments: Facial swelling, induration, erythema in partially confluent area over the right side of her face, tenderness present over site of incision drainage with some sero purulence drainage, tenderness over the right temple, inferior aspect right eyelid soft tissue swelling without induration  Eyes:      General: No scleral icterus.     Extraocular Movements: Extraocular movements intact.      Conjunctiva/sclera: Conjunctivae normal.      Pupils: Pupils are equal, round, and reactive to light.   Cardiovascular:      Rate and Rhythm: Normal rate and regular rhythm.      Pulses: Normal pulses.   Pulmonary:      Effort: Pulmonary effort is normal. No respiratory distress.      Breath sounds: Normal breath sounds. No wheezing.   Abdominal:      General: Bowel sounds are normal.      Palpations: Abdomen is soft.   Musculoskeletal:      Right lower leg: No edema.      Left lower leg: No edema.   Skin:     General: Skin is warm and dry.   Neurological:      Mental Status: She is alert and oriented to person, place, and time.           CRANIAL NERVES     CN III, IV, VI   Pupils are equal, round, and reactive to light.      Significant Labs:   All pertinent labs within the past 24 hours have been reviewed.  CBC:   Recent Labs   Lab 02/04/22  1304 02/04/22  1308   WBC 7.99  --    HGB 10.9*  --    HCT 36.2* 36   *  --      CMP:   Recent Labs   Lab 02/04/22  1304   *   K 4.4      CO2 27   GLU 79   BUN 7   CREATININE 0.7   CALCIUM 9.6   ANIONGAP 8   EGFRNONAA >60.0     Cardiac Markers: No results for input(s): CKMB, MYOGLOBIN, BNP, TROPISTAT in the last 48 hours.  Lactic Acid: No results for input(s): LACTATE in the last 48 hours.  Urine Studies: No results for input(s):  COLORU, APPEARANCEUA, PHUR, SPECGRAV, PROTEINUA, GLUCUA, KETONESU, BILIRUBINUA, OCCULTUA, NITRITE, UROBILINOGEN, LEUKOCYTESUR, RBCUA, WBCUA, BACTERIA, SQUAMEPITHEL, HYALINECASTS in the last 48 hours.    Invalid input(s): HARDEEP    Significant Imaging: I have reviewed all pertinent imaging results/findings within the past 24 hours.    CT MAXILLOFACIAL WITH CONTRAST     CLINICAL HISTORY:  Maxillary/facial abscess;     TECHNIQUE:  Low dose axial images, sagittal and coronal reformations were obtained through the face.  Contrast was not administered.     COMPARISON:  CT max face 04/09/2015     FINDINGS:  subcutaneous soft tissue thickening with ill-defined diffuse soft tissue fat stranding along the right pre-maxillary and pre-zygomatic soft tissues.  Process extends to the lateral right pre orbital soft tissues as well as superiorly to the superficial right temporal fossa.  No intraconal or postseptal inflammatory change.  This is nonspecific and may represent inflammatory/infectious process.     No discrete foreign body or appreciable focal fluid collection to suggest abscess. Numerous prominent bilateral right greater than left upper cervical, sub mandibular, and intraparotid lymph nodes, likely reactive. For example right submandibular lymph node measuring 1.4 cm in long axis right-sided probable intraparotid lymph node measuring 1.0 cm in short axis (axial series 2, images 63 and 135 respectively.)     No evidence for acute fracture maxillofacial bones.  Remote fracture left lamina papyracea with clearing of previous left ethmoid sinus and left maxillary antral opacities.. No evidence for acute fracture or dislocation of the mandible.  The orbits are otherwise within normal limits. Paranasal sinuses and mastoid air cells are clear.     There is partial visualization of a focal lesion in the right cerebellar pontine angle cistern with widening of the right IAC most compatible with vestibular schwannoma though  limited by partial inclusion and CT technique further evaluation with dedicated MRI with IAC protocol if patient compatible this measures approximately 1.4 x 1.8 cm in AP by TV dimensions.     Impression:     Induration swelling right periorbital pre septal soft tissues concerning for facial and preseptal cellulitis with regional reactive lymphadenopathy. No intraconal or postseptal inflammatory change. No well-delineated fluid collection to suggest abscess.     Incidental enhancing intracranial lesion right cerebellar pontine angle extending into and widening the right IAC concerning for vestibular schwannoma clinical correlation and follow-up MRI brain with and without contrast with IAC protocol recommended if not clinically known.     This report was flagged in Epic as abnormal.     Electronically signed by resident: Tk Franks MD  Date:                                            02/04/2022  Time:                                           14:07     Electronically signed by: Phillip Bello DO  Date:                                            02/04/2022  Time:                                           14:42    Assessment/Plan:     42 y/o WF hx of Morbid Obesity s/p Gastric Bypass (2009) complicated with ITP & C.diff infection x1, Major Anxiety d/o, ADHD, MRSA infection x 1 (2009) who presents with complaints of right facial pain 7 swelling, admitted for concern for facial cellulitis.     Facial Cellulitis   -nodulo-cystic lesion that progressed to more confluent facial cellulitis in last week, s/p I&D in clinic by dermatologist, pt reporting chills, she has no SIRS criteria on presentation to ED.    -not improving with outpatient oral antibiotics and status post I and D by dermatologist on February 3rd.  -status post 1 dose IV vancomycin in the emergency room, continue IV vancomycin at this time.  Add IV ceftriaxone Q 24 hours    CPA angle tumor/lesion  -incidental intracranial enhancing lesions seen on CT  scan, concern for possible vestibular schwannoma  -she gives history subacute to chronic right-sided tinnitus, no Mme facial sensation and hearing abnormalities, had previously seen outpatient ENT.  Unclear if she has had CNS imaging in the the recent year  MRI with and without contrast with IAC protocol was recommended by Radiology    Acute cystitis  -the beginning of the week she was prescribed ciprofloxacin for symptoms cystitis  -current ceftriaxone will provide adequate empiric coverage for patient    H/o of C.difficile infection   -occurred x1 at time of her gastric bypass  -she reports not being on IV or broad spectrum abx since this date.   _Start oral vancomycin PO prophylaxis for patient.     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

## 2022-02-05 NOTE — PROGRESS NOTES
Pharmacokinetic Initial Assessment & Plan: IV Vancomycin      IV Vancomycin 1750 mg x 1 given in the ED @ 1431 on 02/04.  Continue Vancomycin 1500 mg every 12 hours.  Draw a Vancomycin trough 30 mins prior to the 4 th dose on 02/06 @ 0530.   Desired empiric serum trough concentration is 10 to 20 mcg/mL    Pharmacy will continue to follow and monitor vancomycin.    [id number] with any questions regarding this assessment.     Thank you for the consult,   Cameron Paz       Patient brief summary:  Miriam Mendez is a 41 y.o. female initiated on antimicrobial therapy with IV Vancomycin for treatment of suspected Facial Cellulitis     Drug Allergies:   Review of patient's allergies indicates:   Allergen Reactions    Sulfa (sulfonamide antibiotics)      Other reaction(s): Hives       Actual Body Weight:   81.6 kg    Renal Function:   Estimated Creatinine Clearance: 111.6 mL/min (based on SCr of 0.7 mg/dL).,     CBC (last 72 hours):  Recent Labs   Lab Result Units 02/04/22  1304   WBC K/uL 7.99   Hemoglobin g/dL 10.9*   Hematocrit % 36.2*   Platelets K/uL 103*   Gran % % 66.7   Lymph % % 19.6   Mono % % 10.6   Eosinophil % % 1.1   Basophil % % 1.6   Differential Method  Automated       Metabolic Panel (last 72 hours):  Recent Labs   Lab Result Units 02/04/22  1304   Sodium mmol/L 135*   Potassium mmol/L 4.4   Chloride mmol/L 100   CO2 mmol/L 27   Glucose mg/dL 79   BUN mg/dL 7   Creatinine mg/dL 0.7       Drug levels (last 3 results):  No results for input(s): VANCOMYCINRA, VANCOMYCINPE, VANCOMYCINTR in the last 72 hours.    Microbiologic Results:  Microbiology Results (last 7 days)     ** No results found for the last 168 hours. **

## 2022-02-05 NOTE — CONSULTS
Consultation Report  Ophthalmology Service    Date: 2022    Chief complaint/Reason for Consult: eyelid swelling     History of Present Illness: Miriam Mendez is a 41 y.o. female who is admitted with facial cellulitis, CT showing eye component is preseptal. Denies vision changes, but does not some increased swelling around eye.    POcularHx: wears glasses/contacts    Current eye gtts: none      PMHx:  has a past medical history of ADD (attention deficit disorder with hyperactivity), Anxiety, Bronchitis, Cellulitis, Depression, Easy bruising (2014), Fibromyalgia, History of ITP (2014), Iron deficiency anemia (2014), Morbid obesity, Pica (2014), Pseudotumor cerebri syndrome, and Thrombocytopenia.     PSurgHx:  has a past surgical history that includes  section; Cholecystectomy; and Gastric bypass.     Home Medications:   Prior to Admission medications    Medication Sig Start Date End Date Taking? Authorizing Provider   ciprofloxacin HCl (CIPRO) 250 MG tablet Take 250 mg by mouth 2 (two) times a day. 22  Yes Historical Provider   clonazePAM (KLONOPIN) 1 MG tablet Take 1 mg by mouth 2 (two) times daily as needed for Anxiety. 22  Yes Historical Provider   dextroamphetamine-amphetamine 30 mg Tab 1 tablet po in the morning and 1/2 tab in the afternoon 22  Yes Historical Provider   FLUoxetine 40 MG capsule Take 40 mg by mouth nightly. 9/10/21  Yes Historical Provider   mupirocin (BACTROBAN) 2 % ointment Apply 1 g topically 3 (three) times daily. 2/3/22  Yes Historical Provider   omeprazole 20 mg TbEC Take by mouth 2 (two) times a day.   Yes Historical Provider   zolpidem (AMBIEN) 10 mg Tab Take 10 mg by mouth nightly as needed. 22  Yes Historical Provider   amoxicillin-clavulanate 875-125mg (AUGMENTIN) 875-125 mg per tablet Take 1 tablet by mouth 2 (two) times daily. 2/3/22   Historical Provider   clindamycin (CLEOCIN) 150 MG capsule Take 150 mg by mouth every 6 (six)  hours. 2/2/22 2/9/22  Historical Provider   predniSONE (DELTASONE) 10 MG tablet Take 40 mg by mouth once daily. 2/3/22   Historical Provider   NUVARING 0.12-0.015 mg/24 hr vaginal ring PLACE 1 RING VAGINALLY EVERY 28 DAYS 5/4/13 11/22/13  Kellen Jones MD        Medications this encounter:    cefTRIAXone (ROCEPHIN) IVPB  1 g Intravenous Q24H    enoxaparin  40 mg Subcutaneous Daily    FLUoxetine  40 mg Oral Nightly    Lactobacillus rhamnosus GG  1 capsule Oral Daily    mupirocin  1 g Topical (Top) TID    pantoprazole  40 mg Oral BID AC    vancomycin (VANCOCIN) IVPB  1,500 mg Intravenous Q12H    vancomycin  125 mg Oral Daily       Allergies: is allergic to sulfa (sulfonamide antibiotics).     Social:  reports that she has never smoked. She has never used smokeless tobacco. She reports current alcohol use. She reports that she does not use drugs.     Family Hx: No family history of glaucoma, macular degeneration, or blindness. family history includes Breast cancer (age of onset: 57) in her mother; Cancer in her father, paternal grandfather, and paternal grandmother; Cancer (age of onset: 57) in her mother; Hypertension in her mother.     ROS: Negative x 10 except for complaints as described in HPI; negative for fever, chills, weight loss, nausea, vomiting, diarrhea, shortness of breath, nasal discharge, cough, abdominal pain, dyspnea, difficulty moving arms and legs, confusion, dysuria, palpitations, or chest pain     Ocular examination/Dilated fundus examination:  Base Eye Exam     Visual Acuity (Snellen - Linear)       Right Left    Dist sc 20/30 20/30          Tonometry (Applanation, 9:37 AM)       Right Left    Pressure 14 16          Pupils       Dark Light Shape React APD    Right 4 2 Round Brisk None    Left 4 2 Round Brisk None          Visual Fields       Right Left     Full Full          Extraocular Movement       Right Left     Full, Ortho Full, Ortho            Slit Lamp and Fundus Exam      External Exam       Right Left    External mild periorbital edema Normal          Slit Lamp Exam       Right Left    Lids/Lashes minimal lid edema Normal    Conjunctiva/Sclera White and quiet White and quiet    Cornea Clear Clear    Anterior Chamber appears deep appears deep    Iris Round and reactive Round and reactive    Lens Clear Clear    Vitreous Normal Normal          Fundus Exam       Right Left    Disc Normal Normal    C/D Ratio 0.2 0.2    Macula Normal Normal    Vessels Normal Normal    Periphery Normal Normal                Assessment/Plan:   1. Preseptal Cellulitis, Right  - VA good, IOP wnl, EOMI, no APD  - anterior exam with mild periorbital swelling  - posterior exam benign    Recs  - agree with broad spec abx  - artificial tears/erythromycin ointment PRN  - ophthalmology signing off, please call with any questions/concerns    Vance Alberts MD  PGY2, Ophthalmology Resident  02/05/2022  9:38 AM

## 2022-02-06 LAB
ANION GAP SERPL CALC-SCNC: 6 MMOL/L (ref 8–16)
BASOPHILS # BLD AUTO: 0.11 K/UL (ref 0–0.2)
BASOPHILS NFR BLD: 1.3 % (ref 0–1.9)
BUN SERPL-MCNC: 9 MG/DL (ref 6–20)
CALCIUM SERPL-MCNC: 8.7 MG/DL (ref 8.7–10.5)
CHLORIDE SERPL-SCNC: 101 MMOL/L (ref 95–110)
CO2 SERPL-SCNC: 27 MMOL/L (ref 23–29)
CREAT SERPL-MCNC: 0.7 MG/DL (ref 0.5–1.4)
DIFFERENTIAL METHOD: ABNORMAL
EOSINOPHIL # BLD AUTO: 0.1 K/UL (ref 0–0.5)
EOSINOPHIL NFR BLD: 1.3 % (ref 0–8)
ERYTHROCYTE [DISTWIDTH] IN BLOOD BY AUTOMATED COUNT: 16.7 % (ref 11.5–14.5)
EST. GFR  (AFRICAN AMERICAN): >60 ML/MIN/1.73 M^2
EST. GFR  (NON AFRICAN AMERICAN): >60 ML/MIN/1.73 M^2
GLUCOSE SERPL-MCNC: 89 MG/DL (ref 70–110)
HCT VFR BLD AUTO: 33 % (ref 37–48.5)
HGB BLD-MCNC: 10 G/DL (ref 12–16)
IMM GRANULOCYTES # BLD AUTO: 0.02 K/UL (ref 0–0.04)
IMM GRANULOCYTES NFR BLD AUTO: 0.2 % (ref 0–0.5)
LYMPHOCYTES # BLD AUTO: 2.2 K/UL (ref 1–4.8)
LYMPHOCYTES NFR BLD: 26.1 % (ref 18–48)
MAGNESIUM SERPL-MCNC: 2.1 MG/DL (ref 1.6–2.6)
MCH RBC QN AUTO: 24.9 PG (ref 27–31)
MCHC RBC AUTO-ENTMCNC: 30.3 G/DL (ref 32–36)
MCV RBC AUTO: 82 FL (ref 82–98)
MONOCYTES # BLD AUTO: 0.6 K/UL (ref 0.3–1)
MONOCYTES NFR BLD: 6.8 % (ref 4–15)
NEUTROPHILS # BLD AUTO: 5.4 K/UL (ref 1.8–7.7)
NEUTROPHILS NFR BLD: 64.3 % (ref 38–73)
NRBC BLD-RTO: 0 /100 WBC
PLATELET # BLD AUTO: 80 K/UL (ref 150–450)
PMV BLD AUTO: ABNORMAL FL (ref 9.2–12.9)
POTASSIUM SERPL-SCNC: 4.1 MMOL/L (ref 3.5–5.1)
RBC # BLD AUTO: 4.02 M/UL (ref 4–5.4)
SODIUM SERPL-SCNC: 134 MMOL/L (ref 136–145)
VANCOMYCIN TROUGH SERPL-MCNC: 14.5 UG/ML (ref 10–22)
WBC # BLD AUTO: 8.47 K/UL (ref 3.9–12.7)

## 2022-02-06 PROCEDURE — 63600175 PHARM REV CODE 636 W HCPCS: Performed by: HOSPITALIST

## 2022-02-06 PROCEDURE — 96366 THER/PROPH/DIAG IV INF ADDON: CPT

## 2022-02-06 PROCEDURE — 94761 N-INVAS EAR/PLS OXIMETRY MLT: CPT

## 2022-02-06 PROCEDURE — 99226 PR SUBSEQUENT OBSERVATION CARE,LEVEL III: CPT | Mod: ,,, | Performed by: HOSPITALIST

## 2022-02-06 PROCEDURE — 96367 TX/PROPH/DG ADDL SEQ IV INF: CPT

## 2022-02-06 PROCEDURE — 25000003 PHARM REV CODE 250: Performed by: HOSPITALIST

## 2022-02-06 PROCEDURE — 85025 COMPLETE CBC W/AUTO DIFF WBC: CPT | Performed by: HOSPITALIST

## 2022-02-06 PROCEDURE — 36415 COLL VENOUS BLD VENIPUNCTURE: CPT | Performed by: HOSPITALIST

## 2022-02-06 PROCEDURE — 80202 ASSAY OF VANCOMYCIN: CPT | Performed by: HOSPITALIST

## 2022-02-06 PROCEDURE — 99900035 HC TECH TIME PER 15 MIN (STAT)

## 2022-02-06 PROCEDURE — 96365 THER/PROPH/DIAG IV INF INIT: CPT | Mod: 59

## 2022-02-06 PROCEDURE — 99226 PR SUBSEQUENT OBSERVATION CARE,LEVEL III: ICD-10-PCS | Mod: ,,, | Performed by: HOSPITALIST

## 2022-02-06 PROCEDURE — G0378 HOSPITAL OBSERVATION PER HR: HCPCS

## 2022-02-06 PROCEDURE — 83735 ASSAY OF MAGNESIUM: CPT | Performed by: HOSPITALIST

## 2022-02-06 PROCEDURE — 80048 BASIC METABOLIC PNL TOTAL CA: CPT | Performed by: HOSPITALIST

## 2022-02-06 RX ORDER — IBUPROFEN 400 MG/1
400 TABLET ORAL EVERY 6 HOURS PRN
Status: DISCONTINUED | OUTPATIENT
Start: 2022-02-06 | End: 2022-02-07 | Stop reason: HOSPADM

## 2022-02-06 RX ADMIN — PANTOPRAZOLE SODIUM 40 MG: 40 TABLET, DELAYED RELEASE ORAL at 06:02

## 2022-02-06 RX ADMIN — PANTOPRAZOLE SODIUM 40 MG: 40 TABLET, DELAYED RELEASE ORAL at 04:02

## 2022-02-06 RX ADMIN — MUPIROCIN 1 G: 20 OINTMENT TOPICAL at 08:02

## 2022-02-06 RX ADMIN — CEFTRIAXONE 1 G: 1 INJECTION, POWDER, FOR SOLUTION INTRAMUSCULAR; INTRAVENOUS at 08:02

## 2022-02-06 RX ADMIN — HYPROMELLOSE 2910 1 DROP: 5 SOLUTION OPHTHALMIC at 04:02

## 2022-02-06 RX ADMIN — ACETAMINOPHEN 650 MG: 325 TABLET ORAL at 05:02

## 2022-02-06 RX ADMIN — Medication 1 CAPSULE: at 08:02

## 2022-02-06 RX ADMIN — IBUPROFEN 400 MG: 400 TABLET, FILM COATED ORAL at 10:02

## 2022-02-06 RX ADMIN — FLUOXETINE 40 MG: 20 CAPSULE ORAL at 08:02

## 2022-02-06 RX ADMIN — VANCOMYCIN HYDROCHLORIDE 1500 MG: 1.5 INJECTION, POWDER, LYOPHILIZED, FOR SOLUTION INTRAVENOUS at 06:02

## 2022-02-06 RX ADMIN — ACETAMINOPHEN 650 MG: 325 TABLET ORAL at 12:02

## 2022-02-06 RX ADMIN — VANCOMYCIN HYDROCHLORIDE 125 MG: KIT at 08:02

## 2022-02-06 RX ADMIN — ZOLPIDEM TARTRATE 10 MG: 5 TABLET ORAL at 10:02

## 2022-02-06 RX ADMIN — MUPIROCIN 1 G: 20 OINTMENT TOPICAL at 04:02

## 2022-02-06 RX ADMIN — IBUPROFEN 400 MG: 400 TABLET, FILM COATED ORAL at 04:02

## 2022-02-06 RX ADMIN — HYPROMELLOSE 2910 1 DROP: 5 SOLUTION OPHTHALMIC at 08:02

## 2022-02-06 RX ADMIN — VANCOMYCIN HYDROCHLORIDE 1500 MG: 1.5 INJECTION, POWDER, LYOPHILIZED, FOR SOLUTION INTRAVENOUS at 05:02

## 2022-02-06 NOTE — PROGRESS NOTES
Pharmacokinetic Assessment Follow Up: IV Vancomycin    Vancomycin serum concentration assessment(s):    The trough level (11-hr) resulted in 14.5, was drawn correctly and can be used to guide therapy at this time. The measurement is within the desired definitive target range of 10 to 15 mcg/mL.      Vancomycin Regimen Plan:    Continue regimen to Vancomycin 1500 mg IV every 12 hours with next serum trough concentration measured at 1730 prior to 4th dose on 2/7    Drug levels (last 3 results):  Recent Labs   Lab Result Units 02/06/22  0636   Vancomycin-Trough ug/mL 14.5       Pharmacy will continue to follow and monitor vancomycin.    Please contact pharmacy at extension [phone] for questions regarding this assessment.    Thank you for the consult,   Jemal Barcenas       Patient brief summary:  Miriam Mendez is a 41 y.o. female initiated on antimicrobial therapy with IV Vancomycin for treatment of skin & soft tissue infection    The patient's current regimen is 1500mg IV every 12 hours.     Drug Allergies:   Review of patient's allergies indicates:   Allergen Reactions    Sulfa (sulfonamide antibiotics)      Other reaction(s): Hives       Actual Body Weight:   81.6kg    Renal Function:   Estimated Creatinine Clearance: 111.6 mL/min (based on SCr of 0.7 mg/dL).,     Dialysis Method (if applicable):  N/A    CBC (last 72 hours):  Recent Labs   Lab Result Units 02/04/22  1304 02/05/22  0232   WBC K/uL 7.99 9.12   Hemoglobin g/dL 10.9* 9.5*   Hematocrit % 36.2* 30.7*   Platelets K/uL 103* 66*   Gran % % 66.7 70.4   Lymph % % 19.6 19.5   Mono % % 10.6 8.1   Eosinophil % % 1.1 0.9   Basophil % % 1.6 0.8   Differential Method  Automated Automated       Metabolic Panel (last 72 hours):  Recent Labs   Lab Result Units 02/04/22  1304 02/05/22  0232 02/06/22  0636   Sodium mmol/L 135* 134* 134*   Potassium mmol/L 4.4 3.8 4.1   Chloride mmol/L 100 102 101   CO2 mmol/L 27 25 27   Glucose mg/dL 79 87 89   BUN mg/dL 7 10 9    Creatinine mg/dL 0.7 0.8 0.7   Magnesium mg/dL  --  1.9 2.1       Vancomycin Administrations:  vancomycin given in the last 96 hours                   vancomycin 1.5 g in dextrose 5 % 250 mL IVPB (ready to mix) (mg) 1,500 mg New Bag 02/06/22 0640     1,500 mg New Bag 02/05/22 1755     1,500 mg New Bag  0637    vancomycin 125 mg/5 mL oral solution 125 mg (mg) 125 mg Given 02/05/22 2124     125 mg Given 02/04/22 2245    vancomycin 1.75 g in 5 % dextrose 500 mL IVPB (mg) 1,750 mg New Bag 02/04/22 1431                Microbiologic Results:  Microbiology Results (last 7 days)     ** No results found for the last 168 hours. **

## 2022-02-06 NOTE — PLAN OF CARE
Pt AOX4. C/o intermittent pain to right cheek, medicated with Prn tylenol and ibuprofen. Dressing to right cheek changed X2. No acute distress or complains reported throughout shift. Safety precautions in place.  Problem: Infection  Goal: Absence of Infection Signs and Symptoms  Outcome: Ongoing, Progressing     Problem: Pain Acute  Goal: Acceptable Pain Control and Functional Ability  Outcome: Ongoing, Progressing

## 2022-02-06 NOTE — PLAN OF CARE
Problem: Pain Acute  Goal: Acceptable Pain Control and Functional Ability  Outcome: Ongoing, Progressing        Problem: Infection  Goal: Absence of Infection Signs and Symptoms  Outcome: Ongoing, Progressing

## 2022-02-06 NOTE — PLAN OF CARE
Care provided to patient as per POC and as charted.  No acute events overnight.   Problem: Adult Inpatient Plan of Care  Goal: Plan of Care Review  Outcome: Ongoing, Progressing     Problem: Infection  Goal: Absence of Infection Signs and Symptoms  Outcome: Ongoing, Progressing     Problem: Pain Acute  Goal: Acceptable Pain Control and Functional Ability  Outcome: Ongoing, Progressing

## 2022-02-07 ENCOUNTER — TELEPHONE (OUTPATIENT)
Dept: OTOLARYNGOLOGY | Facility: CLINIC | Age: 42
End: 2022-02-07
Payer: MEDICAID

## 2022-02-07 VITALS
HEART RATE: 64 BPM | WEIGHT: 181 LBS | DIASTOLIC BLOOD PRESSURE: 56 MMHG | BODY MASS INDEX: 29.09 KG/M2 | HEIGHT: 66 IN | TEMPERATURE: 98 F | OXYGEN SATURATION: 97 % | RESPIRATION RATE: 18 BRPM | SYSTOLIC BLOOD PRESSURE: 99 MMHG

## 2022-02-07 LAB
ANION GAP SERPL CALC-SCNC: 9 MMOL/L (ref 8–16)
BASOPHILS # BLD AUTO: 0.09 K/UL (ref 0–0.2)
BASOPHILS NFR BLD: 1 % (ref 0–1.9)
BUN SERPL-MCNC: 7 MG/DL (ref 6–20)
CALCIUM SERPL-MCNC: 8.1 MG/DL (ref 8.7–10.5)
CHLORIDE SERPL-SCNC: 103 MMOL/L (ref 95–110)
CO2 SERPL-SCNC: 24 MMOL/L (ref 23–29)
CREAT SERPL-MCNC: 0.7 MG/DL (ref 0.5–1.4)
DIFFERENTIAL METHOD: ABNORMAL
EOSINOPHIL # BLD AUTO: 0.2 K/UL (ref 0–0.5)
EOSINOPHIL NFR BLD: 1.7 % (ref 0–8)
ERYTHROCYTE [DISTWIDTH] IN BLOOD BY AUTOMATED COUNT: 16.7 % (ref 11.5–14.5)
EST. GFR  (AFRICAN AMERICAN): >60 ML/MIN/1.73 M^2
EST. GFR  (NON AFRICAN AMERICAN): >60 ML/MIN/1.73 M^2
GLUCOSE SERPL-MCNC: 93 MG/DL (ref 70–110)
HCT VFR BLD AUTO: 31.2 % (ref 37–48.5)
HGB BLD-MCNC: 9.3 G/DL (ref 12–16)
IMM GRANULOCYTES # BLD AUTO: 0.03 K/UL (ref 0–0.04)
IMM GRANULOCYTES NFR BLD AUTO: 0.3 % (ref 0–0.5)
LYMPHOCYTES # BLD AUTO: 2.3 K/UL (ref 1–4.8)
LYMPHOCYTES NFR BLD: 26.4 % (ref 18–48)
MAGNESIUM SERPL-MCNC: 2 MG/DL (ref 1.6–2.6)
MCH RBC QN AUTO: 24.6 PG (ref 27–31)
MCHC RBC AUTO-ENTMCNC: 29.8 G/DL (ref 32–36)
MCV RBC AUTO: 83 FL (ref 82–98)
MONOCYTES # BLD AUTO: 0.6 K/UL (ref 0.3–1)
MONOCYTES NFR BLD: 6.6 % (ref 4–15)
NEUTROPHILS # BLD AUTO: 5.5 K/UL (ref 1.8–7.7)
NEUTROPHILS NFR BLD: 64 % (ref 38–73)
NRBC BLD-RTO: 0 /100 WBC
PLATELET # BLD AUTO: 87 K/UL (ref 150–450)
PMV BLD AUTO: ABNORMAL FL (ref 9.2–12.9)
POTASSIUM SERPL-SCNC: 3.4 MMOL/L (ref 3.5–5.1)
RBC # BLD AUTO: 3.78 M/UL (ref 4–5.4)
SODIUM SERPL-SCNC: 136 MMOL/L (ref 136–145)
WBC # BLD AUTO: 8.61 K/UL (ref 3.9–12.7)

## 2022-02-07 PROCEDURE — 90686 IIV4 VACC NO PRSV 0.5 ML IM: CPT | Performed by: HOSPITALIST

## 2022-02-07 PROCEDURE — 90471 IMMUNIZATION ADMIN: CPT | Performed by: HOSPITALIST

## 2022-02-07 PROCEDURE — 63600175 PHARM REV CODE 636 W HCPCS: Performed by: HOSPITALIST

## 2022-02-07 PROCEDURE — 85025 COMPLETE CBC W/AUTO DIFF WBC: CPT | Performed by: HOSPITALIST

## 2022-02-07 PROCEDURE — 36415 COLL VENOUS BLD VENIPUNCTURE: CPT | Performed by: HOSPITALIST

## 2022-02-07 PROCEDURE — 96366 THER/PROPH/DIAG IV INF ADDON: CPT

## 2022-02-07 PROCEDURE — 99225 PR SUBSEQUENT OBSERVATION CARE,LEVEL II: ICD-10-PCS | Mod: ,,, | Performed by: HOSPITALIST

## 2022-02-07 PROCEDURE — 80048 BASIC METABOLIC PNL TOTAL CA: CPT | Performed by: HOSPITALIST

## 2022-02-07 PROCEDURE — G0378 HOSPITAL OBSERVATION PER HR: HCPCS

## 2022-02-07 PROCEDURE — 83735 ASSAY OF MAGNESIUM: CPT | Performed by: HOSPITALIST

## 2022-02-07 PROCEDURE — 99225 PR SUBSEQUENT OBSERVATION CARE,LEVEL II: CPT | Mod: ,,, | Performed by: HOSPITALIST

## 2022-02-07 PROCEDURE — 25000003 PHARM REV CODE 250: Performed by: HOSPITALIST

## 2022-02-07 RX ORDER — AMOXICILLIN AND CLAVULANATE POTASSIUM 875; 125 MG/1; MG/1
1 TABLET, FILM COATED ORAL 2 TIMES DAILY
Qty: 12 TABLET | Refills: 0 | Status: SHIPPED | OUTPATIENT
Start: 2022-02-07 | End: 2022-02-13

## 2022-02-07 RX ORDER — POTASSIUM CHLORIDE 750 MG/1
30 CAPSULE, EXTENDED RELEASE ORAL ONCE
Status: COMPLETED | OUTPATIENT
Start: 2022-02-07 | End: 2022-02-07

## 2022-02-07 RX ORDER — DOXYCYCLINE HYCLATE 100 MG
100 TABLET ORAL 2 TIMES DAILY
Qty: 12 TABLET | Refills: 0 | Status: SHIPPED | OUTPATIENT
Start: 2022-02-07 | End: 2022-02-13

## 2022-02-07 RX ADMIN — HYPROMELLOSE 2910 1 DROP: 5 SOLUTION OPHTHALMIC at 08:02

## 2022-02-07 RX ADMIN — PANTOPRAZOLE SODIUM 40 MG: 40 TABLET, DELAYED RELEASE ORAL at 06:02

## 2022-02-07 RX ADMIN — POTASSIUM CHLORIDE 30 MEQ: 10 CAPSULE, COATED, EXTENDED RELEASE ORAL at 12:02

## 2022-02-07 RX ADMIN — MUPIROCIN 1 G: 20 OINTMENT TOPICAL at 08:02

## 2022-02-07 RX ADMIN — IBUPROFEN 400 MG: 400 TABLET, FILM COATED ORAL at 06:02

## 2022-02-07 RX ADMIN — INFLUENZA VIRUS VACCINE 0.5 ML: 15; 15; 15; 15 SUSPENSION INTRAMUSCULAR at 01:02

## 2022-02-07 RX ADMIN — VANCOMYCIN HYDROCHLORIDE 1500 MG: 1.5 INJECTION, POWDER, LYOPHILIZED, FOR SOLUTION INTRAVENOUS at 06:02

## 2022-02-07 RX ADMIN — Medication 1 CAPSULE: at 08:02

## 2022-02-07 NOTE — NURSING
Pt's right cheek noted to have a skin tear above incision site. Per pt, skin tear was caused by band-aid she brought from home and placed over incision after application of mupirocin ointment this morning.

## 2022-02-07 NOTE — PROGRESS NOTES
Eric Harley - Telemetry [facility] ([address] Medicine  Progress Note    Patient Name: Miriam Mendez  MRN: [id number]  Patient Class: OP- Observation   Admission Date: 2/4/2022  Length of Stay: 0 days  Attending Physician: Frank Gotti MD  Primary Care Provider: Julianne Lopez MD        Subjective:     Principal Problem:Facial cellulitis    Interval History:   Seen today    MUCH improvement in her facial swelling and erythema, she still notes some tenderness over the right temple.  Using acetaminophen and will add ibuprofen to alternate as pain medications.     Facial compartments much softer, still wearing gauze bandage over site of I&D    Her PLT level is stable @ 80 today     Likely plan discharge on Monday.     Review of Systems   Constitutional: Positive for chills.   HENT: Positive for facial swelling and tinnitus. Negative for ear discharge and hearing loss.    Respiratory: Negative for shortness of breath.    Cardiovascular: Negative for chest pain and leg swelling.   Gastrointestinal: Negative for nausea and vomiting.   Genitourinary: Negative for dysuria.   Musculoskeletal: Negative for back pain.   Neurological: Positive for numbness (Facial). Negative for headaches.   Psychiatric/Behavioral: Negative for confusion.     Objective:     Vital Signs (Most Recent):  Temp: 97.3 °F (36.3 °C) (02/06/22 1542)  Pulse: 62 (02/06/22 1542)  Resp: 18 (02/06/22 1542)  BP: 116/69 (02/06/22 1542)  SpO2: 97 % (02/06/22 1542) Vital Signs (24h Range):  Temp:  [97.2 °F (36.2 °C)-98.8 °F (37.1 °C)] 97.3 °F (36.3 °C)  Pulse:  [60-88] 62  Resp:  [18-24] 18  SpO2:  [94 %-97 %] 97 %  BP: ()/(55-69) 116/69   Intake/Outake:  This Shift:  No intake/output data recorded.    Net I/O past 24h:   No intake or output data in the 24 hours ending 02/06/22 1913      Weight: 81.6 kg (180 lb)  Body mass index is 29.05 kg/m².  Physical Exam  Vitals and nursing note reviewed.   Constitutional:       General: She is not in  acute distress.     Appearance: Normal appearance. She is not ill-appearing or toxic-appearing.   HENT:      Head:        Comments: Facial swelling/erythema and induration are much improved, all swelling in periorbital region is resolved.   Eyes:      General: No scleral icterus.     Extraocular Movements: Extraocular movements intact.      Conjunctiva/sclera: Conjunctivae normal.      Pupils: Pupils are equal, round, and reactive to light.   Cardiovascular:      Rate and Rhythm: Normal rate and regular rhythm.      Pulses: Normal pulses.   Pulmonary:      Effort: Pulmonary effort is normal. No respiratory distress.      Breath sounds: Normal breath sounds. No wheezing.   Abdominal:      General: Bowel sounds are normal.      Palpations: Abdomen is soft.   Musculoskeletal:      Right lower leg: No edema.      Left lower leg: No edema.   Skin:     General: Skin is warm and dry.   Neurological:      Mental Status: She is alert and oriented to person, place, and time.           Significant Labs:   All pertinent labs within the past 24 hours have been reviewed.  CBC:   Recent Labs   Lab 02/05/22  0232 02/06/22  0937   WBC 9.12 8.47   HGB 9.5* 10.0*   HCT 30.7* 33.0*   PLT 66* 80*     CMP:   Recent Labs   Lab 02/05/22  0232 02/06/22  0636   * 134*   K 3.8 4.1    101   CO2 25 27   GLU 87 89   BUN 10 9   CREATININE 0.8 0.7   CALCIUM 8.5* 8.7   ANIONGAP 7* 6*   EGFRNONAA >60.0 >60.0       Significant Imaging: I have reviewed all pertinent imaging results/findings within the past 24 hours.    MRI IAC/TEMPORAL BONES W W/O CONTRAST     CLINICAL HISTORY:  Right sided CPA lesions/enhancing mass on CT scan;     TECHNIQUE:  Sagittal T1, axial T2, axial flair, axial diffusion and gradient imaging of the whole brain without contrast. Additional thin section imaging of the IACs was performed using coronal T1, axial 3-D spoiled gradient, without contrast. Subsequently axial T1 postcontrast imaging of the whole brain and 3D  sagittal MP rage postcontrast imaging of the whole brain were performed with axial and coronal thin section T1 postcontrast fat sat imaging through the IAC's. Nine ml of  Gadavist contrast was injected intravenously     COMPARISON:  CT maxillofacial bone 2 for 22     FINDINGS:  MRI brain: Brain parenchyma normal in contour.  There are few punctate foci of T2 FLAIR signal hyperintensities in the frontal subcortical white matter which are nonspecific and of doubtful clinical significance.  The ventricles are normal in size without hydrocephalus.  There is no midline shift or mass effect.  No restricted diffusion to suggest acute or recent infarction.  No abnormal parenchymal susceptibility to suggest parenchymal hemorrhage.     IACs: Enhancing lesion in the right cerebellar pontine angle cistern extending into the right IAC this corresponds to lesion seen on CT, this measures 1.2 x 1.6 x 1.3 cm in AP by TV by CC dimensions respectively.  Please note intracanalicular component extends ventrally.  There is a thin focus of dural enhancement along the margin of the right petrous ridge underlying this region extending to the porous acusticus with configuration suggestive for meningioma with schwannoma felt less likely but not excluded.     Unremarkable imaging left IAC.     Partial inclusion induration and heterogeneous signal with enhancement within the right facial soft tissues extending to the right periorbital preseptal region in light of history concerning for facial cellulitis with inflammatory/infectious process underlying lesion felt less likely but not excluded.  There is few punctate areas of signal void with small hypodense track which may be related to recent ovulation clinical correlation advised.  Partially the image right prominent periparotid and parotid probable lymph nodes which may be reactive.     This report was flagged in Epic as abnormal.     Impression:     Mri Iac:     Enhancing lesion right  cerebellar pontine angle cistern extending to the IAC.  Corresponds to lesion seen on CT.  Please note there is a thin associated dural tail along the petrous clival ridge with configuration most suggestive for meningioma with schwannoma felt less likely.     Partially visualized induration and edema signal abnormality within the right facial soft tissues with ill-defined enhancement corresponds to abnormality seen on CT which may be active inflammatory/infectious process with underlying neoplasm not excluded.  There is punctate questionable hypointense signal track along the margin of this which may represent recent manipulation clinical correlation advised.     MRI brain: Otherwise unremarkable MRI brain as detailed above specifically without evidence for acute infarction or hydrocephalus.     .        Electronically signed by: Phillip Bello,   Date:                                            02/05/2022    MEDICATIONS  Scheduled Meds:   artificial tears  1 drop Right Eye TID    cefTRIAXone (ROCEPHIN) IVPB  1 g Intravenous Q24H    FLUoxetine  40 mg Oral Nightly    Lactobacillus rhamnosus GG  1 capsule Oral Daily    mupirocin  1 g Topical (Top) TID    pantoprazole  40 mg Oral BID AC    vancomycin (VANCOCIN) IVPB  1,500 mg Intravenous Q12H    vancomycin  125 mg Oral Daily                             Continuous Infusions:  PRN Meds:.acetaminophen, clonazePAM, diphenhydrAMINE, ibuprofen, ondansetron, polyethylene glycol, senna-docusate 8.6-50 mg, sodium chloride 0.9%, zolpidem        Assessment/Plan:     Active Hospital Problems    Diagnosis  POA    *Facial cellulitis [L03.211]  Yes     Priority: 1 - High    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     Overview/Hospital Course:     40 y/o WF hx of Morbid Obesity s/p Gastric Bypass (2009) complicated with ITP & C.diff infection x1, Major Anxiety d/o, ADHD, MRSA infection x 1 (2009) who presents with complaints of right facial pain 7 swelling, admitted for concern for facial cellulitis.      Facial Cellulitis   -continue IV vancomycin and ceftriaxone for now  -seen by ophthalmology today, recs for erythromycin PRN at this time. Discussed with pt she prefers to defer ocular ointment at this time.   -scheduled artificial tears.        CPA angle tumor/lesion  -incidental intracranial enhancing lesions seen on CT scan, concern for possible vestibular schwannoma.  MRI completed with IAC protocol.   Per radiology read appears more meningioma vs schwannoma, but pt does have some chronic right facial/vestibular symptoms. Separate from current facial cellulitis.   -Outpatient Referral to ENT/otologist and/or neurosurgery, will discuss with on-call services prior to discharge to coordinate care.    >spoke to on-call ENT they recommend ambulatory referral to ENT Dr. Carlo Starr & NSurg  Dr. Liban Rizo.   Also if Dr. Ervin Starks @ [facility] if patient is unable to get into [facility]  clinics re: medicaid insurance compatibility.   Will make the internal referrals.     Hx of ITP_Thrombocytopenia  -1 prior bout of ITP in 2009  -since admission PLT count is downtrending and was lower than her baseline to begin with. Spoke with on-call hematology and if PLT ct <50 - start 4 days Pulse Dexamethasone 40mg daily.   -Discontinued Lovenox for DVT ppx at this time due to risk of further thrombocytopenia       Acute cystitis  -the beginning of the week she was prescribed ciprofloxacin for symptoms  cystitis  -current ceftriaxone will provide adequate empiric coverage for patient  -Currently denies active symptoms.      H/o of C.difficile infection   -occurred x1 at time of her gastric bypass  -she reports not being on IV or broad spectrum abx since this date.   -Discussed with on-call pharmacy, night of admission - pt gettign 125mg Oral vanc daily as prophylaxis agent.      H/o of Gastric Bypass   -reports having gastric ulcer, ?anastamotic ulcer  -was taking Omeprazole on TID dosing basis at home, exact dose unnwknown - continue Protonix 40mg PO BID as substitute     Generalized anxiety d/o   -continue patient's chronic fluoxetine  -contioinue PRN clonazepam  -continue prn zolpidem     ADHD  -uses adderall daily on BID basis      Code Status: Full Code  VTE Risk Mitigation (From admission, onward)         Ordered     IP VTE HIGH RISK PATIENT  Once         02/04/22 2239     Place sequential compression device  Until discontinued         02/04/22 2239              Diet:   Dietary Orders (From admission, onward)     Start     Ordered    02/04/22 1603  Diet Adult Regular (IDDSI Level 7)  Diet effective now         02/04/22 1602                High Risk Conditions:Patient has a condition that poses threat to life and bodily function: facial cellulitis   Patient is currently on drug therapy requiring intensive monitoring for toxicity: Vancomycin  Discharge Planning   JACKELINE: 2/7/2022     Code Status: Full Code   Is the patient medically ready for discharge?: No    Reason for patient still in hospital (select all that apply): Patient trending condition, Laboratory test, Treatment and Consult recommendations         osiel Gotti M.D.  Attending Physician  [facility] Medicine Dept.  Pager: [phone]  [facility] [id number]

## 2022-02-07 NOTE — TELEPHONE ENCOUNTER
"LM and call back number for patient to schedule with  from referral.        ----- Message from Majo Trejo sent at 2/7/2022  9:57 AM CST -----  Regarding: Appointment  "Type:  Patient Call Back    Who Called: Margo    What is the reqeust in detail:Pt requesting call back to schedule an appointment. Pt has an referral. Please advise    Can the clinic reply by [facility]? no    Best Call Back Number:[phone]      Additional Information:Appt. For audiogram none for doctor. Please advise              "

## 2022-02-07 NOTE — PLAN OF CARE
Care provided to patient as per POC and as charted.  No acute events overnight.   Problem: Adult Inpatient Plan of Care  Goal: Plan of Care Review  Outcome: Ongoing, Progressing  Goal: Patient-Specific Goal (Individualized)  Outcome: Ongoing, Progressing  Goal: Absence of Hospital-Acquired Illness or Injury  Outcome: Ongoing, Progressing  Goal: Optimal Comfort and Wellbeing  Outcome: Ongoing, Progressing  Goal: Readiness for Transition of Care  Outcome: Ongoing, Progressing     Problem: Infection  Goal: Absence of Infection Signs and Symptoms  Outcome: Ongoing, Progressing     Problem: Pain Acute  Goal: Acceptable Pain Control and Functional Ability  Outcome: Ongoing, Progressing

## 2022-02-07 NOTE — DISCHARGE INSTRUCTIONS
Facial cellulitis - please completed the prescribed oral antibiotic course     Brain lesion - your imaging studies had an incidental(unexpected) finding on the Right cerebellopontine angle concerning for a possible Schwannoma vs a Meningioma.  These conditions would be managed as an outpatient by ENT and/or Neurosurgery doctors in combination.  I have referred you to the [facility] specialist who deal with these conditions.     I have also included contact information for a [facility] ENT doctor who manages this, if following up in our system is difficult due to Medicaid Insurance.      For your History of ITP - your platelet levels have decreased some, but not to a level that would warrant starting any medical therapy (steroids) Recommend when you follow up with your PCP and have surveillance Blood work to monitor (CBC-complete blood count)    I have made a referral to [facility] in [place], this clinica accepts medicaid patients, I do not know the wait time for this clinic,  In the interim I would recommend you follow up with your established PCP until  You can make this transition.

## 2022-02-07 NOTE — NURSING
Pt being discharged home in stable condition. Medications delivered to bedside by outpatient pharmacy. AVS packet given to pt. Discharged instructions reviewed with pt,. Pt demonstrated understanding using the teach back technique. Pt awaiting ride home at this time.

## 2022-02-07 NOTE — PLAN OF CARE
Eric Harley - Telemetry Stepdown ([address])  Initial Discharge Assessment       Primary Care Provider: Julianne Lopez MD    Admission Diagnosis: Vestibular schwannoma [D33.3]  Facial cellulitis [L03.211]    Admission Date: 2/4/2022  Expected Discharge Date: 2/7/2022         Payor: MEDICAID / Plan: [facility] ([place] MEDICAID) / Product Type: Managed Medicaid /     Extended Emergency Contact Information  Primary Emergency Contact: blanca martin  Mobile Phone: [phone]  Relation: Father   needed? No    Discharge Plan A: (P) Home  Discharge Plan B: (P) Home      [id number] - [place], [place] - [address]  [address]  [facility] [address]  Phone: [phone] Fax: [phone]    [facility] #[id number] - [place] - [address]  [address] [facility] [address] [facility] [address]  [address] [place] [address]  Phone: [phone] Fax: [phone]               SW completed Discharge Planning Assessment with patient via bedside. Discharge planning booklet given to patient/family and whiteboard updated with JACKELINE and phone #. All questions answered.    Patient reported that she will have transportation upon discharge.     Patient reported that prior to hospitalization she was independent with her ADL's. Patient reported that she is not on dialysis and she does not go to a Coumadin clinic.    Patient reported that she would like to have an [facility] PCP.        Janeth Lombardi LMSW  [facility]  Ext. [phone]

## 2022-02-08 NOTE — PLAN OF CARE
Eric Harley - Telemetry Stepdown ([facility])  Discharge Final Note    Primary Care Provider: Julianne Lopez MD    Expected Discharge Date: 2/7/2022    Patient discharged to home via personal transportation.     Patient's bedside nurse and patient notified of the above.      Final Discharge Note (most recent)       Final Note - 02/08/22 0908          Final Note    Assessment Type Final Discharge Note (P)      Anticipated Discharge Disposition Home or Self Care (P)         Post-Acute Status    Post-Acute Authorization Other (P)      Other Status No Post-Acute Service Needs (P)                      Important Message from Medicare             Contact Info       Ervin Starks MD   Specialty: Otolaryngology    [address] [facility] [address]   Phone: [phone]       Next Steps: Follow up    Instructions: This is an ENT doctor at [facility] who also manages Schwannomas, if you are unable to f/u in [facility] system would contact clinic for an appointment.     [facility]   Specialty: Otolaryngology    [address] [facility] [address]   Phone: [phone]       Next Steps: Follow up    Instructions: ENT Nurse will give you a call to schedule appointment, if you do not hear from someone in 48 hours call the number listed.    Ervin Starks MD   Specialty: Otolaryngology    [address] [facility] [address]   Phone: [phone]       Next Steps: Follow up            Future Appointments   Date Time Provider Department Center   2/9/2022  9:30 AM Pascual Goncalves PA-C [facility]   2/21/2022 10:00 AM Gerda Smith PA-C [facility] NEUROS8 [facility]   6/1/2022 10:20 AM Ras Maharaj MD [facility] NEURO [place]       SW scheduled post-discharge follow-up appointment and information added to AVS.     Janeth Lombardi LMSW  [facility]  Ext. [phone]

## 2022-02-13 NOTE — DISCHARGE SUMMARY
Eric Harley - Telemetry [facility] ([place] [id number])  [facility] Medicine  Discharge Summary      Patient Name: Miriam Mendez  MRN: [id number]  Admission Date: 2/4/2022  Hospital Length of Stay: 0 days  Discharge Date and Time: 2/7/2022  2:30 PM  Attending Physician: No att. providers found Frank Gotti MD  Discharging Provider: Frank Gotti MD  Primary Care Provider: Julianne Lopez MD        HPI: 42 y/o WF hx of Morbid Obesity s/p Gastric Bypass (2009) complicated with ITP & C.diff infection x1, Major Anxiety d/o, ADHD, MRSA infection x 1 (2009) who presents with complaints of right facial pain 7 swelling, admitted for concern for facial cellulitis.      She states that on Sunday she developed nodular swelling in the right cheek that progressed to other areas of her face, on Wednesday she saw her primary care physician and was prescribed oral clindamycin, and ultrasound in the office was done and she was told there was no fluid collection or abscess.  She saw her dermatologist on Thursday a pre planned appointment and at that time dermatologist assessed her and performed an I and D, no wound odor culture sent patient reports that there was copious purulence drainage expressed.  And the dermatologist had prescribed her Augmentin and prednisone 40 mg for 4 days.  She had not started the Augmentin or prednisone had been taking the clindamycin and today symptoms not significantly improved, she continues to note drainage from the site of the I and D, the swelling at the inferior aspect of her eye developed on Thursday.  -she notes some chills but denies any visual changes, she has never had this before.     She reports having COVID at the end of December and noticing right-sided facial numbness at that time, she has also had a history of right-sided tinnitus and right-sided hearing changes, has seen ENT as an outpatient.  Her maxillofacial CT has incidental finding of right cerebellar pontine angle lesion/tumor based  on radiology report concern for possible vestibular schwannoma.     * No surgery found *      Hospital Course:   40 y/o WF hx of Morbid Obesity s/p Gastric Bypass (2009) complicated with ITP & C.diff infection x1, Major Anxiety d/o, ADHD, MRSA infection x 1 (2009) who presents with complaints of right facial pain 7 swelling, admitted for concern for facial cellulitis.      Facial Cellulitis   -She was treated with IV Vancomycin and Ceftriaxone during her inpatient course.  CT Maxillofacial showed no residual abscess or fluid collection.   -She was seen by Ophthalmology x 1 after she had increased periorbital swelling on HD 2. recs for erythromycin PRN at this time. Discussed with pt she prefers to defer ocular ointment at this time.   -scheduled artificial tears.   -She had improvement with near complete resolution of her facial swelling by the time of discharge, she will transition to Oral Antibiotics to finish treatment course.         CPA angle tumor/lesion  -incidental intracranial enhancing lesions seen on CT scan, concern for possible vestibular schwannoma.  MRI completed with IAC protocol.   Per radiology read appears more meningioma vs schwannoma, but pt does have some chronic right facial/vestibular symptoms. Separate from current facial cellulitis.   -Outpatient Referral to ENT/otologist and/or neurosurgery, will discuss with on-call services prior to discharge to coordinate care.               >spoke to on-call ENT they recommend ambulatory referral to ENT Dr. Carlo Starr & NSurg         Dr. Liban Rizo.   Also if Dr. Ervin Starks @ [facility] if patient is unable to get into [facility]    clinics re: medicaid insurance compatibility.   Will make the internal referrals.      Hx of ITP_Thrombocytopenia  -1 prior bout of ITP in 2009  -since admission PLT count is downtrending and was lower than her baseline to begin with. Spoke with on-call hematology and if PLT ct <50 - start 4 days Pulse Dexamethasone 40mg  daily.   -Discontinued Lovenox for DVT ppx at this time due to risk of further thrombocytopenia  -Her Platelet count remained >50 for the duration of hospital stay - no use of pulse dexamethasone was needed.         Acute cystitis  -the beginning of the week she was prescribed ciprofloxacin for symptoms cystitis  -current ceftriaxone will provide adequate empiric coverage for patient  -Currently denies active symptoms.      H/o of C.difficile infection   -occurred x1 at time of her gastric bypass  -she reports not being on IV or broad spectrum abx since this date.   -Discussed with on-call pharmacy, night of admission - pt gettign 125mg Oral vanc daily as prophylaxis agent.      H/o of Gastric Bypass   -reports having gastric ulcer, ?anastamotic ulcer  -was taking Omeprazole on TID dosing basis at home, exact dose unnwknown - continue Protonix 40mg PO BID as substitute     Generalized anxiety d/o   -continue patient's chronic fluoxetine  -contioinue PRN clonazepam  -continue prn zolpidem     ADHD  -uses adderall daily on BID basis         Seen/examined prior to discharge  Physical Exam  Vitals and nursing note reviewed.   Constitutional:       General: She is not in acute distress.     Appearance: Normal appearance. She is not ill-appearing or toxic-appearing.   HENT:      Head:        Comments: Facial swelling/erythema and induration are much improved, all swelling in periorbital region is resolved.   Eyes:      General: No scleral icterus.     Extraocular Movements: Extraocular movements intact.      Conjunctiva/sclera: Conjunctivae normal.      Pupils: Pupils are equal, round, and reactive to light.   Cardiovascular:      Rate and Rhythm: Normal rate and regular rhythm.      Pulses: Normal pulses.   Pulmonary:      Effort: Pulmonary effort is normal. No respiratory distress.      Breath sounds: Normal breath sounds. No wheezing.   Abdominal:      General: Bowel sounds are normal.      Palpations: Abdomen is soft.    Musculoskeletal:      Right lower leg: No edema.      Left lower leg: No edema.   Skin:     General: Skin is warm and dry.   Neurological:      Mental Status: She is alert and oriented to person, place, and time.       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    Specialty: Otolaryngology  Why: This is an ENT doctor at [facility] who also manages [facility], if you are unable to f[facility]u in [facility] would contact clinic for an appointment.   Contact information:  [address] [place] [address] [phone]             [facility] ENT.    Specialty: Otolaryngology  Why: ENT Nurse will give you a call to schedule appointment, if you do not hear from someone in 48 hours call the number listed.  Contact information:  [address] [facility] [address]  [phone]           Ervin Starks MD .    Specialty: Otolaryngology  Contact information:  [address] [facility] [address] [phone]                       Patient Instructions:      Ambulatory referral/consult to Neurology   Standing Status: Future   Referral  Priority: Routine Referral Type: Consultation   Referral Reason: Specialty Services Required   Requested Specialty: Neurology   Number of Visits Requested: 1     Ambulatory referral/consult to [facility]   Standing Status: Future   Referral Priority: Routine Referral Type: Consultation   Referral Reason: Specialty Services Required   Requested Specialty: Family Medicine     Ambulatory referral/consult to ENT   Standing Status: Future   Referral Priority: Routine Referral Type: Consultation   Referral Reason: Specialty Services Required   Referred to Provider: GERALDINE TREVINO Requested Specialty: Otolaryngology     Ambulatory referral/consult to Neurosurgery   Standing Status: Future   Referral Priority: Routine Referral Type: Consultation   Referral Reason: Specialty Services Required   Referred to Provider: JEAN PAUL LANG Requested Specialty: Neurosurgery     Ambulatory referral/consult to ENT   Standing Status: Future   Referral Priority: Urgent Referral Type: Consultation   Referral Reason: Specialty Services Required   Referred to Provider: MONALISA MORAN Requested Specialty: Otolaryngology   Number of Visits Requested: 1     Diet Adult Regular     Notify your health care provider if you experience any of the following:  temperature >100.4     Notify your health care provider if you experience any of the following:  persistent nausea and vomiting or diarrhea     Notify your health care provider if you experience any of the following:  severe uncontrolled pain     Notify your health care provider if you experience any of the following:  difficulty breathing or increased cough     Notify your health care provider if you experience any of the following:  severe persistent headache     Notify your health care provider if you experience any of the following:  persistent dizziness, light-headedness, or visual disturbances     Notify your health care provider if you experience any of the following:  increased  confusion or weakness     Activity as tolerated     Medications:  Reconciled Home Medications:      Medication List      START taking these medications    artificial tears 0.5 % ophthalmic solution  Commonly known as: ISOPTO TEARS  Place 1 drop into the right eye 3 (three) times daily. for 7 days     doxycycline 100 MG tablet  Commonly known as: VIBRA-TABS  Take 1 tablet (100 mg total) by mouth 2 (two) times daily. for 6 days     Lactobacillus rhamnosus GG 10 billion cell capsule  Commonly known as: CULTURELLE  Take 1 capsule by mouth once daily. for 6 days        CONTINUE taking these medications    amoxicillin-clavulanate 875-125mg 875-125 mg per tablet  Commonly known as: AUGMENTIN  Take 1 tablet by mouth 2 (two) times daily. for 6 days     clonazePAM 1 MG tablet  Commonly known as: KlonoPIN  Take 1 mg by mouth 2 (two) times daily as needed for Anxiety.     dextroamphetamine-amphetamine 30 mg Tab  1 tablet po in the morning and 1/2 tab in the afternoon     FLUoxetine 40 MG capsule  Take 40 mg by mouth nightly.     mupirocin 2 % ointment  Commonly known as: BACTROBAN  Apply 1 g topically 3 (three) times daily.     omeprazole 20 mg Tbec  Take by mouth 2 (two) times a day.     zolpidem 10 mg Tab  Commonly known as: AMBIEN  Take 10 mg by mouth nightly as needed.        STOP taking these medications    ciprofloxacin HCl 250 MG tablet  Commonly known as: CIPRO     clindamycin 150 MG capsule  Commonly known as: CLEOCIN     predniSONE 10 MG tablet  Commonly known as: DELTASONE            Significant Diagnostic Studies:       CT MAXILLOFACIAL WITH CONTRAST     CLINICAL HISTORY:  Maxillary/facial abscess;     TECHNIQUE:  Low dose axial images, sagittal and coronal reformations were obtained through the face.  Contrast was not administered.     COMPARISON:  CT max face 04/09/2015     FINDINGS:  subcutaneous soft tissue thickening with ill-defined diffuse soft tissue fat stranding along the right pre-maxillary and  pre-zygomatic soft tissues.  Process extends to the lateral right pre orbital soft tissues as well as superiorly to the superficial right temporal fossa.  No intraconal or postseptal inflammatory change.  This is nonspecific and may represent inflammatory/infectious process.     No discrete foreign body or appreciable focal fluid collection to suggest abscess. Numerous prominent bilateral right greater than left upper cervical, sub mandibular, and intraparotid lymph nodes, likely reactive. For example right submandibular lymph node measuring 1.4 cm in long axis right-sided probable intraparotid lymph node measuring 1.0 cm in short axis (axial series 2, images 63 and 135 respectively.)     No evidence for acute fracture maxillofacial bones.  Remote fracture left lamina papyracea with clearing of previous left ethmoid sinus and left maxillary antral opacities.. No evidence for acute fracture or dislocation of the mandible.  The orbits are otherwise within normal limits. Paranasal sinuses and mastoid air cells are clear.     There is partial visualization of a focal lesion in the right cerebellar pontine angle cistern with widening of the right IAC most compatible with vestibular schwannoma though limited by partial inclusion and CT technique further evaluation with dedicated MRI with IAC protocol if patient compatible this measures approximately 1.4 x 1.8 cm in AP by TV dimensions.     Impression:     Induration swelling right periorbital pre septal soft tissues concerning for facial and preseptal cellulitis with regional reactive lymphadenopathy. No intraconal or postseptal inflammatory change. No well-delineated fluid collection to suggest abscess.     Incidental enhancing intracranial lesion right cerebellar pontine angle extending into and widening the right IAC concerning for vestibular schwannoma clinical correlation and follow-up MRI brain with and without contrast with IAC protocol recommended if not  clinically known.     This report was flagged in Epic as abnormal.     Electronically signed by resident: Tk Franks MD  Date:                                            02/04/2022  Time:                                           14:07     Electronically signed by: Phillip Bello DO  Date:                                            02/04/2022  Time:                                           14:42    MRI IAC/TEMPORAL BONES W W/O CONTRAST     CLINICAL HISTORY:  Right sided CPA lesions/enhancing mass on CT scan;     TECHNIQUE:  Sagittal T1, axial T2, axial flair, axial diffusion and gradient imaging of the whole brain without contrast. Additional thin section imaging of the IACs was performed using coronal T1, axial 3-D spoiled gradient, without contrast. Subsequently axial T1 postcontrast imaging of the whole brain and 3D sagittal MP rage postcontrast imaging of the whole brain were performed with axial and coronal thin section T1 postcontrast fat sat imaging through the IAC's. Nine ml of  Gadavist contrast was injected intravenously     COMPARISON:  CT maxillofacial bone 2 for 22     FINDINGS:  MRI brain: Brain parenchyma normal in contour.  There are few punctate foci of T2 FLAIR signal hyperintensities in the frontal subcortical white matter which are nonspecific and of doubtful clinical significance.  The ventricles are normal in size without hydrocephalus.  There is no midline shift or mass effect.  No restricted diffusion to suggest acute or recent infarction.  No abnormal parenchymal susceptibility to suggest parenchymal hemorrhage.     IACs: Enhancing lesion in the right cerebellar pontine angle cistern extending into the right IAC this corresponds to lesion seen on CT, this measures 1.2 x 1.6 x 1.3 cm in AP by TV by CC dimensions respectively.  Please note intracanalicular component extends ventrally.  There is a thin focus of dural enhancement along the margin of the right petrous ridge underlying this  region extending to the porous acusticus with configuration suggestive for meningioma with schwannoma felt less likely but not excluded.     Unremarkable imaging left IAC.     Partial inclusion induration and heterogeneous signal with enhancement within the right facial soft tissues extending to the right periorbital preseptal region in light of history concerning for facial cellulitis with inflammatory/infectious process underlying lesion felt less likely but not excluded.  There is few punctate areas of signal void with small hypodense track which may be related to recent ovulation clinical correlation advised.  Partially the image right prominent periparotid and parotid probable lymph nodes which may be reactive.     This report was flagged in Epic as abnormal.     Impression:     Mri Iac:     Enhancing lesion right cerebellar pontine angle cistern extending to the IAC.  Corresponds to lesion seen on CT.  Please note there is a thin associated dural tail along the petrous clival ridge with configuration most suggestive for meningioma with schwannoma felt less likely.     Partially visualized induration and edema signal abnormality within the right facial soft tissues with ill-defined enhancement corresponds to abnormality seen on CT which may be active inflammatory/infectious process with underlying neoplasm not excluded.  There is punctate questionable hypointense signal track along the margin of this which may represent recent manipulation clinical correlation advised.     MRI brain: Otherwise unremarkable MRI brain as detailed above specifically without evidence for acute infarction or hydrocephalus.     .   Results for WILL SORIANO (MRN [id number]) as of 2/13/2022 15:22   Ref. Range 2/5/2022 02:32 2/6/2022 06:36 2/7/2022 05:42   Sodium Latest Ref Range: 136 - 145 mmol/L 134 (L) 134 (L) 136   Potassium Latest Ref Range: 3.5 - 5.1 mmol/L 3.8 4.1 3.4 (L)   Chloride Latest Ref Range: 95 - 110 mmol/L 102 101 103    CO2 Latest Ref Range: 23 - 29 mmol/L 25 27 24   Anion Gap Latest Ref Range: 8 - 16 mmol/L 7 (L) 6 (L) 9   BUN Latest Ref Range: 6 - 20 mg/dL 10 9 7   Creatinine Latest Ref Range: 0.5 - 1.4 mg/dL 0.8 0.7 0.7   eGFR if non African American Latest Ref Range: >60 mL/min/1.73 m^2 >60.0 >60.0 >60.0   eGFR if African American Latest Ref Range: >60 mL/min/1.73 m^2 >60.0 >60.0 >60.0   Glucose Latest Ref Range: 70 - 110 mg/dL 87 89 93   Calcium Latest Ref Range: 8.7 - 10.5 mg/dL 8.5 (L) 8.7 8.1 (L)   Magnesium Latest Ref Range: 1.6 - 2.6 mg/dL 1.9 2.1 2.0   Vancomycin-Trough Latest Ref Range: 10.0 - 22.0 ug/mL  14.5      Results for WILL SORIANO (MRN [id number]) as of 2/13/2022 15:22   Ref. Range 2/4/2022 13:04 2/5/2022 02:32 2/6/2022 09:37 2/7/2022 05:42   WBC Latest Ref Range: 3.90 - 12.70 K/uL 7.99 9.12 8.47 8.61   RBC Latest Ref Range: 4.00 - 5.40 M/uL 4.41 3.80 (L) 4.02 3.78 (L)   Hemoglobin Latest Ref Range: 12.0 - 16.0 g/dL 10.9 (L) 9.5 (L) 10.0 (L) 9.3 (L)   Hematocrit Latest Ref Range: 37.0 - 48.5 % 36.2 (L) 30.7 (L) 33.0 (L) 31.2 (L)   MCV Latest Ref Range: 82 - 98 fL 82 81 (L) 82 83   MCH Latest Ref Range: 27.0 - 31.0 pg 24.7 (L) 25.0 (L) 24.9 (L) 24.6 (L)   MCHC Latest Ref Range: 32.0 - 36.0 g/dL 30.1 (L) 30.9 (L) 30.3 (L) 29.8 (L)   RDW Latest Ref Range: 11.5 - 14.5 % 16.5 (H) 16.4 (H) 16.7 (H) 16.7 (H)   Platelets Latest Ref Range: 150 - 450 K/uL 103 (L) 66 (L) 80 (L) 87 (L)   MPV Latest Ref Range: 9.2 - 12.9 fL SEE COMMENT SEE COMMENT SEE COMMENT SEE COMMENT   Platelet Estimate Unknown Decreased (A) Decreased (A)     Gran % Latest Ref Range: 38.0 - 73.0 % 66.7 70.4 64.3 64.0   Lymph % Latest Ref Range: 18.0 - 48.0 % 19.6 19.5 26.1 26.4   Mono % Latest Ref Range: 4.0 - 15.0 % 10.6 8.1 6.8 6.6   Eosinophil % Latest Ref Range: 0.0 - 8.0 % 1.1 0.9 1.3 1.7   Basophil % Latest Ref Range: 0.0 - 1.9 % 1.6 0.8 1.3 1.0   Immature Granulocytes Latest Ref Range: 0.0 - 0.5 % 0.4 0.3 0.2 0.3   Gran # (ANC) Latest Ref  Range: 1.8 - 7.7 K/uL 5.3 6.4 5.4 5.5   Lymph # Latest Ref Range: 1.0 - 4.8 K/uL 1.6 1.8 2.2 2.3   Mono # Latest Ref Range: 0.3 - 1.0 K/uL 0.9 0.7 0.6 0.6   Eos # Latest Ref Range: 0.0 - 0.5 K/uL 0.1 0.1 0.1 0.2   Baso # Latest Ref Range: 0.00 - 0.20 K/uL 0.13 0.07 0.11 0.09   Immature Grans (Abs) Latest Ref Range: 0.00 - 0.04 K/uL 0.03 0.03 0.02 0.03   nRBC Latest Ref Range: 0 /100 WBC 0 0 0 0   Differential Method Unknown Automated Automated Automated Automated   Aniso Unknown  Slight     Hypo Unknown  Occasional         Pending Diagnostic Studies:     None        Indwelling Lines/Drains at time of discharge:   Lines/Drains/Airways     None                 Time spent on the discharge of patient: 35 minutes         Frank Gotti MD  Department of Hospital Medicine  [facility] - Telemetry Stepdown ([place]-7)

## 2022-02-16 ENCOUNTER — TELEPHONE (OUTPATIENT)
Dept: NEUROSURGERY | Facility: CLINIC | Age: 42
End: 2022-02-16
Payer: MEDICAID

## 2022-02-16 ENCOUNTER — PATIENT MESSAGE (OUTPATIENT)
Dept: RESEARCH | Facility: HOSPITAL | Age: 42
End: 2022-02-16
Payer: MEDICAID

## 2022-02-16 NOTE — TELEPHONE ENCOUNTER
LM for pt, needs to be scheduled with Dr. Rizo, asked to call back to schedule. Offered 2/22 and 2/23.

## 2022-02-17 ENCOUNTER — TELEPHONE (OUTPATIENT)
Dept: NEUROSURGERY | Facility: CLINIC | Age: 42
End: 2022-02-17
Payer: MEDICAID

## 2022-02-21 ENCOUNTER — TELEPHONE (OUTPATIENT)
Dept: NEUROSURGERY | Facility: CLINIC | Age: 42
End: 2022-02-21
Payer: MEDICAID

## 2022-02-21 ENCOUNTER — OFFICE VISIT (OUTPATIENT)
Dept: NEUROSURGERY | Facility: CLINIC | Age: 42
End: 2022-02-21
Payer: MEDICAID

## 2022-02-21 VITALS
SYSTOLIC BLOOD PRESSURE: 124 MMHG | OXYGEN SATURATION: 100 % | DIASTOLIC BLOOD PRESSURE: 87 MMHG | HEART RATE: 78 BPM | TEMPERATURE: 98 F | BODY MASS INDEX: 28.93 KG/M2 | WEIGHT: 180 LBS | HEIGHT: 66 IN

## 2022-02-21 DIAGNOSIS — D33.3 ACOUSTIC NEUROMA: Primary | ICD-10-CM

## 2022-02-21 DIAGNOSIS — D33.3 CPA (CEREBELLOPONTINE ANGLE) TUMOR: ICD-10-CM

## 2022-02-21 PROCEDURE — 99204 OFFICE O/P NEW MOD 45 MIN: CPT | Mod: S$PBB,,, | Performed by: PHYSICIAN ASSISTANT

## 2022-02-21 PROCEDURE — 99204 PR OFFICE/OUTPT VISIT, NEW, LEVL IV, 45-59 MIN: ICD-10-PCS | Mod: S$PBB,,, | Performed by: PHYSICIAN ASSISTANT

## 2022-02-21 PROCEDURE — 99999 PR PBB SHADOW E&M-EST. PATIENT-LVL III: CPT | Mod: PBBFAC,,, | Performed by: PHYSICIAN ASSISTANT

## 2022-02-21 PROCEDURE — 3074F SYST BP LT 130 MM HG: CPT | Mod: CPTII,,, | Performed by: PHYSICIAN ASSISTANT

## 2022-02-21 PROCEDURE — 99999 PR PBB SHADOW E&M-EST. PATIENT-LVL III: ICD-10-PCS | Mod: PBBFAC,,, | Performed by: PHYSICIAN ASSISTANT

## 2022-02-21 PROCEDURE — 3079F PR MOST RECENT DIASTOLIC BLOOD PRESSURE 80-89 MM HG: ICD-10-PCS | Mod: CPTII,,, | Performed by: PHYSICIAN ASSISTANT

## 2022-02-21 PROCEDURE — 99213 OFFICE O/P EST LOW 20 MIN: CPT | Mod: PBBFAC | Performed by: PHYSICIAN ASSISTANT

## 2022-02-21 PROCEDURE — 3008F PR BODY MASS INDEX (BMI) DOCUMENTED: ICD-10-PCS | Mod: CPTII,,, | Performed by: PHYSICIAN ASSISTANT

## 2022-02-21 PROCEDURE — 3079F DIAST BP 80-89 MM HG: CPT | Mod: CPTII,,, | Performed by: PHYSICIAN ASSISTANT

## 2022-02-21 PROCEDURE — 3008F BODY MASS INDEX DOCD: CPT | Mod: CPTII,,, | Performed by: PHYSICIAN ASSISTANT

## 2022-02-21 PROCEDURE — 3074F PR MOST RECENT SYSTOLIC BLOOD PRESSURE < 130 MM HG: ICD-10-PCS | Mod: CPTII,,, | Performed by: PHYSICIAN ASSISTANT

## 2022-02-21 RX ORDER — MECLIZINE HCL 12.5 MG 12.5 MG/1
12.5 TABLET ORAL EVERY 8 HOURS PRN
Qty: 42 TABLET | Refills: 0 | Status: SHIPPED | OUTPATIENT
Start: 2022-02-21 | End: 2022-05-13

## 2022-02-21 NOTE — TELEPHONE ENCOUNTER
ROBERT pt, appt scheduled with Dr. Rizo 2/22/22. Pt happy with date and time and did not have any further questions at this time.     ----- Message from Gerda Smith PA-C sent at 2/21/2022 12:16 PM CST -----  Regarding: cerebellar pontine angle lesion surgical discussion      Ms. Mendez was seen in clinic today- can you have her follow up in clinic with Dr. Rizo when he is next available for surgical discussion? She is aware of the plan    Thank you  Gerda

## 2022-02-22 ENCOUNTER — OFFICE VISIT (OUTPATIENT)
Dept: NEUROSURGERY | Facility: CLINIC | Age: 42
End: 2022-02-22
Payer: MEDICAID

## 2022-02-22 VITALS
SYSTOLIC BLOOD PRESSURE: 119 MMHG | HEART RATE: 86 BPM | WEIGHT: 190.06 LBS | HEIGHT: 66 IN | DIASTOLIC BLOOD PRESSURE: 78 MMHG | BODY MASS INDEX: 30.54 KG/M2

## 2022-02-22 DIAGNOSIS — D33.3 UNILATERAL VESTIBULAR SCHWANNOMA: Primary | ICD-10-CM

## 2022-02-22 PROCEDURE — 3074F SYST BP LT 130 MM HG: CPT | Mod: CPTII,,, | Performed by: NEUROLOGICAL SURGERY

## 2022-02-22 PROCEDURE — 3074F PR MOST RECENT SYSTOLIC BLOOD PRESSURE < 130 MM HG: ICD-10-PCS | Mod: CPTII,,, | Performed by: NEUROLOGICAL SURGERY

## 2022-02-22 PROCEDURE — 1159F MED LIST DOCD IN RCRD: CPT | Mod: CPTII,,, | Performed by: NEUROLOGICAL SURGERY

## 2022-02-22 PROCEDURE — 99999 PR PBB SHADOW E&M-EST. PATIENT-LVL III: CPT | Mod: PBBFAC,,, | Performed by: NEUROLOGICAL SURGERY

## 2022-02-22 PROCEDURE — 99215 OFFICE O/P EST HI 40 MIN: CPT | Mod: S$PBB,,, | Performed by: NEUROLOGICAL SURGERY

## 2022-02-22 PROCEDURE — 3078F PR MOST RECENT DIASTOLIC BLOOD PRESSURE < 80 MM HG: ICD-10-PCS | Mod: CPTII,,, | Performed by: NEUROLOGICAL SURGERY

## 2022-02-22 PROCEDURE — 3008F PR BODY MASS INDEX (BMI) DOCUMENTED: ICD-10-PCS | Mod: CPTII,,, | Performed by: NEUROLOGICAL SURGERY

## 2022-02-22 PROCEDURE — 1160F RVW MEDS BY RX/DR IN RCRD: CPT | Mod: CPTII,,, | Performed by: NEUROLOGICAL SURGERY

## 2022-02-22 PROCEDURE — 1160F PR REVIEW ALL MEDS BY PRESCRIBER/CLIN PHARMACIST DOCUMENTED: ICD-10-PCS | Mod: CPTII,,, | Performed by: NEUROLOGICAL SURGERY

## 2022-02-22 PROCEDURE — 3008F BODY MASS INDEX DOCD: CPT | Mod: CPTII,,, | Performed by: NEUROLOGICAL SURGERY

## 2022-02-22 PROCEDURE — 99213 OFFICE O/P EST LOW 20 MIN: CPT | Mod: PBBFAC | Performed by: NEUROLOGICAL SURGERY

## 2022-02-22 PROCEDURE — 99215 PR OFFICE/OUTPT VISIT, EST, LEVL V, 40-54 MIN: ICD-10-PCS | Mod: S$PBB,,, | Performed by: NEUROLOGICAL SURGERY

## 2022-02-22 PROCEDURE — 3078F DIAST BP <80 MM HG: CPT | Mod: CPTII,,, | Performed by: NEUROLOGICAL SURGERY

## 2022-02-22 PROCEDURE — 99999 PR PBB SHADOW E&M-EST. PATIENT-LVL III: ICD-10-PCS | Mod: PBBFAC,,, | Performed by: NEUROLOGICAL SURGERY

## 2022-02-22 PROCEDURE — 1159F PR MEDICATION LIST DOCUMENTED IN MEDICAL RECORD: ICD-10-PCS | Mod: CPTII,,, | Performed by: NEUROLOGICAL SURGERY

## 2022-02-24 NOTE — PROGRESS NOTES
CHIEF COMPLAINT:  Right vestibular schwannoma    HPI:  Miriam Mendez is a 41 y.o.  female with below PMH, who is referred to me by PA for evaluation of right vestibular schwannoma.  Recent MRI revealed CPA mass.  She had been c/o imbalance, clumsiness, R face numbness/tingling, decreased right hearing, and tinnitus since 2020.  She was seen by ENT but no imaging was obtained.  Tinnitus is progressively getting louder and difficult to ignore.   Also has numbness on inside of mouth and decreased taste on R side of tongue.  She uses cell phone on L and has R eye twitching and itching.      [facility] was [facility] and [facility] both Drs Shey and Raudel, who recommended surgery.  An audiogram revealed substantial hearing loss on the right.  She usually gets her care at [facility] but is conflicted about where to be treated.    Review of patient's allergies indicates:   Allergen Reactions    Sulfa (sulfonamide antibiotics)      Other reaction(s): Hives       Past Medical History:   Diagnosis Date    ADD (attention deficit disorder with hyperactivity)     psych eval 3/10    Anxiety     Bronchitis     RAD /bronchitis episodes    Cellulitis     face    Depression     undergoing psychotherapy    Easy bruising 2014    Fibromyalgia     History of ITP 2014    Iron deficiency anemia 2014    Morbid obesity     improved with s/p gastric bypass    Pica 2014    Pseudotumor cerebri syndrome     Thrombocytopenia      Past Surgical History:   Procedure Laterality Date     SECTION      CHOLECYSTECTOMY      GASTRIC BYPASS       Family History   Problem Relation Age of Onset    Breast cancer Mother 57    Hypertension Mother     Cancer Mother 57        breast cancer    Cancer Father     Cancer Paternal Grandmother         breast    Cancer Paternal Grandfather         kidney    Colon cancer Neg Hx     Diabetes Neg Hx     Ovarian cancer Neg Hx     Stroke Neg Hx       Social History     Tobacco Use    Smoking status: Never Smoker    Smokeless tobacco: Never Used   Substance Use Topics    Alcohol use: Yes    Drug use: No        Review of Systems   Constitutional: Negative.    HENT: Positive for hearing loss and tinnitus. Negative for congestion, ear discharge, ear pain, nosebleeds and sinus pain.    Eyes: Negative.    Respiratory: Negative.    Cardiovascular: Negative for chest pain, palpitations, claudication and leg swelling.   Gastrointestinal: Negative for abdominal pain, blood in stool, constipation, diarrhea, melena and vomiting.   Genitourinary: Negative for flank pain, frequency and urgency.   Musculoskeletal: Negative for falls.   Skin: Negative.    Neurological: Positive for tingling and sensory change. Negative for dizziness, tremors, speech change, focal weakness, seizures, loss of consciousness, weakness and headaches.   Endo/Heme/Allergies: Does not bruise/bleed easily.   Psychiatric/Behavioral: Negative.        OBJECTIVE:   Vital Signs:  Pulse: 86 (02/22/22 1441)  BP: 119/78 (02/22/22 1441)    Physical Exam:  Constitutional: Patient sitting comfortably in chair. Appears well developed and well nourished.  Skin: Exposed areas are intact without abnormal markings, rashes or other lesions.  HEENT: Normocephalic. Normal conjunctivae.  Cardiovascular: Normal rate and regular rhythm.  Respiratory: Chest wall rises and falls symmetrically, without signs of respiratory distress.  Abdomen: Soft and non-tender.  Extremities: Warm and without edema. Calves supple, non-tender.  Psych/Behavior: Normal affect.    Neurological:    Mental status: Alert and oriented. Conversational and appropriate.       Cranial Nerves: VFF to confrontation. PERRL. EOMI without nystagmus. Facial STLT normal and symmetric. Strong, symmetric muscles of mastication. Facial strength full and symmetric. Hearing equal bilaterally to finger rub. Palate and uvula rise and fall normally in midline.  Shoulder shrug 5/5 strength. Tongue midline.     Motor:    Upper:  Deltoids Triceps Biceps WE WF     R 5/5 5/5 5/5 5/5 5/5 5/5    L 5/5 5/5 5/5 5/5 5/5 5/5      Lower:  HF KE KF DF PF EHL    R 5/5 5/5 5/5 5/5 5/5 5/5    L 5/5 5/5 5/5 5/5 5/5 5/5     Sensory: Intact sensation to light touch in all extremities. Romberg negative.    Reflexes:          DTR: 2+ symmetrically throughout.     Bustillo's: Negative.     Babinski's: Negative.     Clonus: Negative.    Cerebellar: Finger-to-nose and rapid alternating movements normal.     Gait stable    Diagnostic Results:  All imaging was independently reviewed by me.    MRI IAC, dated 2/5/22:  1. Right CPA mass (1.2 x 1.6 cm)  2. No hydro    ASSESSMENT/PLAN:     Problem List Items Addressed This Visit    None     Visit Diagnoses     Unilateral vestibular schwannoma    -  Primary    Relevant Orders    Ambulatory referral/consult to ENT        Right vestibular schwannoma which accounts for patient's constellation of sxs.  We discussed options for treatment which include observation, surgery, and gamma knife radio surgery.  I explained that given her multitude of symptoms I would likely recommend surgical resection via a translabyrinthine approach with my neurootology colleague.  Radiation is an acceptable treatment and has been shown to acheive approximately 80% control rate however there is a chance that this does not help her symptoms.  I explained that the main obstacle of surgery is to resect the mass while preserving facial nerve function and although there are hearing sparing approaches like retrosigmoid, I shared my doubts that hearing could be saved. She is not sure wthere she wants to treatment through [facility] or [facility].    - referral Dr Starr (ENT)  - patient will contact clinic with decision as to whether she wants treatment here or at [facility]    The patient understands and agrees with the plan of care. All questions were answered.    Time spent on this encounter:  60 minutes. This includes face-to-face time and non-face to face time preparing to see the patient (eg, review of tests), obtaining and/or reviewing separately obtained history, documenting clinical information in the electronic or other health record, independently interpreting results and communicating results to the patient/family/caregiver, or care coordinator.          .

## 2022-03-04 ENCOUNTER — HOSPITAL ENCOUNTER (EMERGENCY)
Facility: HOSPITAL | Age: 42
Discharge: HOME | End: 2022-03-04
Attending: EMERGENCY MEDICINE
Payer: MEDICAID

## 2022-03-04 ENCOUNTER — OFFICE VISIT (OUTPATIENT)
Dept: OTOLARYNGOLOGY | Facility: CLINIC | Age: 42
End: 2022-03-04
Payer: MEDICAID

## 2022-03-04 ENCOUNTER — TELEPHONE (OUTPATIENT)
Dept: OTOLARYNGOLOGY | Facility: CLINIC | Age: 42
End: 2022-03-04

## 2022-03-04 ENCOUNTER — CLINICAL SUPPORT (OUTPATIENT)
Dept: AUDIOLOGY | Facility: CLINIC | Age: 42
End: 2022-03-04
Payer: MEDICAID

## 2022-03-04 VITALS
SYSTOLIC BLOOD PRESSURE: 103 MMHG | DIASTOLIC BLOOD PRESSURE: 58 MMHG | BODY MASS INDEX: 28.93 KG/M2 | TEMPERATURE: 98 F | WEIGHT: 180 LBS | HEART RATE: 94 BPM | RESPIRATION RATE: 18 BRPM | OXYGEN SATURATION: 100 % | HEIGHT: 66 IN

## 2022-03-04 VITALS — WEIGHT: 180 LBS | BODY MASS INDEX: 29.05 KG/M2

## 2022-03-04 DIAGNOSIS — R51.9 RIGHT FACIAL PAIN: Primary | ICD-10-CM

## 2022-03-04 DIAGNOSIS — H90.3 ASYMMETRICAL SENSORINEURAL HEARING LOSS: Primary | ICD-10-CM

## 2022-03-04 DIAGNOSIS — D33.3 UNILATERAL VESTIBULAR SCHWANNOMA: Primary | ICD-10-CM

## 2022-03-04 DIAGNOSIS — R42 DIZZINESS: ICD-10-CM

## 2022-03-04 DIAGNOSIS — D36.10 SCHWANNOMA: ICD-10-CM

## 2022-03-04 DIAGNOSIS — Z01.818 PRE-OP TESTING: ICD-10-CM

## 2022-03-04 DIAGNOSIS — H90.41 SENSORINEURAL HEARING LOSS (SNHL) OF RIGHT EAR WITH UNRESTRICTED HEARING OF LEFT EAR: ICD-10-CM

## 2022-03-04 LAB
ALBUMIN SERPL BCP-MCNC: 3.8 G/DL (ref 3.5–5.2)
ALP SERPL-CCNC: 102 U/L (ref 55–135)
ALT SERPL W/O P-5'-P-CCNC: 11 U/L (ref 10–44)
ANION GAP SERPL CALC-SCNC: 13 MMOL/L (ref 8–16)
AST SERPL-CCNC: 17 U/L (ref 10–40)
BASOPHILS # BLD AUTO: 0.1 K/UL (ref 0–0.2)
BASOPHILS NFR BLD: 2.3 % (ref 0–1.9)
BILIRUB SERPL-MCNC: 0.4 MG/DL (ref 0.1–1)
BUN SERPL-MCNC: 4 MG/DL (ref 6–30)
BUN SERPL-MCNC: 5 MG/DL (ref 6–20)
CALCIUM SERPL-MCNC: 9.2 MG/DL (ref 8.7–10.5)
CHLORIDE SERPL-SCNC: 104 MMOL/L (ref 95–110)
CHLORIDE SERPL-SCNC: 106 MMOL/L (ref 95–110)
CO2 SERPL-SCNC: 20 MMOL/L (ref 23–29)
CREAT SERPL-MCNC: 0.7 MG/DL (ref 0.5–1.4)
CREAT SERPL-MCNC: 0.7 MG/DL (ref 0.5–1.4)
CTP QC/QA: YES
DIFFERENTIAL METHOD: ABNORMAL
EOSINOPHIL # BLD AUTO: 0.1 K/UL (ref 0–0.5)
EOSINOPHIL NFR BLD: 1.6 % (ref 0–8)
ERYTHROCYTE [DISTWIDTH] IN BLOOD BY AUTOMATED COUNT: 16.5 % (ref 11.5–14.5)
EST. GFR  (AFRICAN AMERICAN): >60 ML/MIN/1.73 M^2
EST. GFR  (NON AFRICAN AMERICAN): >60 ML/MIN/1.73 M^2
GLUCOSE SERPL-MCNC: 95 MG/DL (ref 70–110)
GLUCOSE SERPL-MCNC: 99 MG/DL (ref 70–110)
HCT VFR BLD AUTO: 36.2 % (ref 37–48.5)
HCT VFR BLD CALC: 36 %PCV (ref 36–54)
HGB BLD-MCNC: 10.8 G/DL (ref 12–16)
IMM GRANULOCYTES # BLD AUTO: 0.01 K/UL (ref 0–0.04)
IMM GRANULOCYTES NFR BLD AUTO: 0.2 % (ref 0–0.5)
LYMPHOCYTES # BLD AUTO: 1.6 K/UL (ref 1–4.8)
LYMPHOCYTES NFR BLD: 37.2 % (ref 18–48)
MCH RBC QN AUTO: 24.4 PG (ref 27–31)
MCHC RBC AUTO-ENTMCNC: 29.8 G/DL (ref 32–36)
MCV RBC AUTO: 82 FL (ref 82–98)
MONOCYTES # BLD AUTO: 0.4 K/UL (ref 0.3–1)
MONOCYTES NFR BLD: 9.1 % (ref 4–15)
NEUTROPHILS # BLD AUTO: 2.1 K/UL (ref 1.8–7.7)
NEUTROPHILS NFR BLD: 49.6 % (ref 38–73)
NRBC BLD-RTO: 0 /100 WBC
PLATELET # BLD AUTO: 115 K/UL (ref 150–450)
PMV BLD AUTO: ABNORMAL FL (ref 9.2–12.9)
POC IONIZED CALCIUM: 1.05 MMOL/L (ref 1.06–1.42)
POC TCO2 (MEASURED): 21 MMOL/L (ref 23–29)
POTASSIUM BLD-SCNC: 3.6 MMOL/L (ref 3.5–5.1)
POTASSIUM SERPL-SCNC: 3.7 MMOL/L (ref 3.5–5.1)
PROT SERPL-MCNC: 7.8 G/DL (ref 6–8.4)
RBC # BLD AUTO: 4.42 M/UL (ref 4–5.4)
SAMPLE: ABNORMAL
SARS-COV-2 RDRP RESP QL NAA+PROBE: NEGATIVE
SODIUM BLD-SCNC: 141 MMOL/L (ref 136–145)
SODIUM SERPL-SCNC: 137 MMOL/L (ref 136–145)
WBC # BLD AUTO: 4.3 K/UL (ref 3.9–12.7)

## 2022-03-04 PROCEDURE — 93005 ELECTROCARDIOGRAM TRACING: CPT

## 2022-03-04 PROCEDURE — 96375 TX/PRO/DX INJ NEW DRUG ADDON: CPT

## 2022-03-04 PROCEDURE — U0002 COVID-19 LAB TEST NON-CDC: HCPCS | Performed by: EMERGENCY MEDICINE

## 2022-03-04 PROCEDURE — 3008F PR BODY MASS INDEX (BMI) DOCUMENTED: ICD-10-PCS | Mod: CPTII,,, | Performed by: OTOLARYNGOLOGY

## 2022-03-04 PROCEDURE — 93010 ELECTROCARDIOGRAM REPORT: CPT | Mod: ,,, | Performed by: INTERNAL MEDICINE

## 2022-03-04 PROCEDURE — 99215 PR OFFICE/OUTPT VISIT, EST, LEVL V, 40-54 MIN: ICD-10-PCS | Mod: S$PBB,,, | Performed by: OTOLARYNGOLOGY

## 2022-03-04 PROCEDURE — 96366 THER/PROPH/DIAG IV INF ADDON: CPT

## 2022-03-04 PROCEDURE — 82330 ASSAY OF CALCIUM: CPT

## 2022-03-04 PROCEDURE — 86803 HEPATITIS C AB TEST: CPT | Performed by: EMERGENCY MEDICINE

## 2022-03-04 PROCEDURE — 99284 EMERGENCY DEPT VISIT MOD MDM: CPT | Mod: CS,,, | Performed by: EMERGENCY MEDICINE

## 2022-03-04 PROCEDURE — 92567 TYMPANOMETRY: CPT | Mod: 52,PBBFAC | Performed by: AUDIOLOGIST

## 2022-03-04 PROCEDURE — 3008F BODY MASS INDEX DOCD: CPT | Mod: CPTII,,, | Performed by: OTOLARYNGOLOGY

## 2022-03-04 PROCEDURE — 99284 EMERGENCY DEPT VISIT MOD MDM: CPT | Mod: 25,27

## 2022-03-04 PROCEDURE — 80053 COMPREHEN METABOLIC PANEL: CPT | Performed by: EMERGENCY MEDICINE

## 2022-03-04 PROCEDURE — 99999 PR PBB SHADOW E&M-EST. PATIENT-LVL III: CPT | Mod: PBBFAC,,, | Performed by: OTOLARYNGOLOGY

## 2022-03-04 PROCEDURE — 93010 EKG 12-LEAD: ICD-10-PCS | Mod: ,,, | Performed by: INTERNAL MEDICINE

## 2022-03-04 PROCEDURE — 85025 COMPLETE CBC W/AUTO DIFF WBC: CPT | Performed by: EMERGENCY MEDICINE

## 2022-03-04 PROCEDURE — 99215 OFFICE O/P EST HI 40 MIN: CPT | Mod: S$PBB,,, | Performed by: OTOLARYNGOLOGY

## 2022-03-04 PROCEDURE — 87389 HIV-1 AG W/HIV-1&-2 AB AG IA: CPT | Performed by: EMERGENCY MEDICINE

## 2022-03-04 PROCEDURE — 99999 PR PBB SHADOW E&M-EST. PATIENT-LVL III: ICD-10-PCS | Mod: PBBFAC,,, | Performed by: OTOLARYNGOLOGY

## 2022-03-04 PROCEDURE — 63600175 PHARM REV CODE 636 W HCPCS: Performed by: EMERGENCY MEDICINE

## 2022-03-04 PROCEDURE — 96365 THER/PROPH/DIAG IV INF INIT: CPT

## 2022-03-04 PROCEDURE — 99284 PR EMERGENCY DEPT VISIT,LEVEL IV: ICD-10-PCS | Mod: CS,,, | Performed by: EMERGENCY MEDICINE

## 2022-03-04 PROCEDURE — 92557 COMPREHENSIVE HEARING TEST: CPT | Mod: 52,PBBFAC | Performed by: AUDIOLOGIST

## 2022-03-04 PROCEDURE — 99213 OFFICE O/P EST LOW 20 MIN: CPT | Mod: PBBFAC | Performed by: OTOLARYNGOLOGY

## 2022-03-04 PROCEDURE — 25000003 PHARM REV CODE 250: Performed by: EMERGENCY MEDICINE

## 2022-03-04 PROCEDURE — 80047 BASIC METABLC PNL IONIZED CA: CPT

## 2022-03-04 RX ORDER — MAGNESIUM SULFATE HEPTAHYDRATE 40 MG/ML
2 INJECTION, SOLUTION INTRAVENOUS
Status: COMPLETED | OUTPATIENT
Start: 2022-03-04 | End: 2022-03-04

## 2022-03-04 RX ORDER — DROPERIDOL 2.5 MG/ML
1.25 INJECTION, SOLUTION INTRAMUSCULAR; INTRAVENOUS ONCE
Status: COMPLETED | OUTPATIENT
Start: 2022-03-04 | End: 2022-03-04

## 2022-03-04 RX ORDER — METOCLOPRAMIDE HYDROCHLORIDE 5 MG/ML
10 INJECTION INTRAMUSCULAR; INTRAVENOUS
Status: COMPLETED | OUTPATIENT
Start: 2022-03-04 | End: 2022-03-04

## 2022-03-04 RX ORDER — ACETAMINOPHEN 500 MG
1000 TABLET ORAL
Status: COMPLETED | OUTPATIENT
Start: 2022-03-04 | End: 2022-03-04

## 2022-03-04 RX ORDER — METOCLOPRAMIDE 10 MG/1
10 TABLET ORAL EVERY 6 HOURS PRN
Qty: 30 TABLET | Refills: 0 | Status: SHIPPED | OUTPATIENT
Start: 2022-03-04 | End: 2022-03-14

## 2022-03-04 RX ADMIN — METOCLOPRAMIDE 10 MG: 5 INJECTION, SOLUTION INTRAMUSCULAR; INTRAVENOUS at 03:03

## 2022-03-04 RX ADMIN — DROPERIDOL 1.25 MG: 2.5 INJECTION, SOLUTION INTRAMUSCULAR; INTRAVENOUS at 01:03

## 2022-03-04 RX ADMIN — ACETAMINOPHEN 1000 MG: 500 TABLET ORAL at 03:03

## 2022-03-04 RX ADMIN — MAGNESIUM SULFATE IN WATER 2 G: 40 INJECTION, SOLUTION INTRAVENOUS at 04:03

## 2022-03-04 NOTE — ED NOTES
I-STAT Chem-8+ Results:   Value Reference Range   Sodium 141 136-145 mmol/L   Potassium  3.6 3.5-5.1 mmol/L   Chloride 106  mmol/L   Ionized Calcium 1.05 1.06-1.42 mmol/L   CO2 (measured) 21 23-29 mmol/L   Glucose 99  mg/dL   BUN 4 6-30 mg/dL   Creatinine 0.7 0.5-1.4 mg/dL   Hematocrit 36 36-54%

## 2022-03-04 NOTE — ED NOTES
Pt is more alert now. MD at bedside. Pt states she took a clonazepam recently for anxiety and it always makes her very sleepy. Pt reports right facial pain and pain to right occipital head.

## 2022-03-04 NOTE — DISCHARGE INSTRUCTIONS
If you find that the reglan (metoclopramide) makes you feel jittery, take a benadryl and see if it helps.

## 2022-03-04 NOTE — PROGRESS NOTES
Otology/Neurotology History and Physical     CC: Dizzy/Vestibular Schwannoma    HPI:  Miriam Mendez is a 41 y.o. female who was referred to me by Dr. Liban Rizo in consultation for newly discovered vestibular schwannoma.    The patient reports a multi-year history of dizziness and right-sided hearing loss. She states that over the past few weeks, she has had progressive dizziness. Describes the dizziness as constantly feeling unsteady on her feet. She states that the hearing loss and tinnitus have become worse as well. No pain or drainage from the ear. She does describe headaches, and describes them as over the right hemicranium, originating in the right posterior neck.      Past Medical History  She has a past medical history of ADD (attention deficit disorder with hyperactivity), Anxiety, Bronchitis, Cellulitis, Depression, Easy bruising, Fibromyalgia, History of ITP, Iron deficiency anemia, Morbid obesity, Pica, Pseudotumor cerebri syndrome, and Thrombocytopenia.    Past Surgical History  She has a past surgical history that includes  section; Cholecystectomy; and Gastric bypass.    Family History  Her family history includes Breast cancer (age of onset: 57) in her mother; Cancer in her father, paternal grandfather, and paternal grandmother; Cancer (age of onset: 57) in her mother; Hypertension in her mother.    Social History  She reports that she has never smoked. She has never used smokeless tobacco. She reports current alcohol use. She reports that she does not use drugs.    Allergies  She is allergic to sulfa (sulfonamide antibiotics).    Medications  She has a current medication list which includes the following prescription(s): clonazepam, dextroamphetamine-amphetamine, fluoxetine, meclizine, mupirocin, omeprazole, zolpidem, and [DISCONTINUED] nuvaring.    Review of Systems       Objective:     Wt 81.6 kg (180 lb)   LMP 2022   BMI 29.05 kg/m²    Physical Exam  Constitutional: Well  appearing/communicating. Voice clear. NAD.  Eyes: EOM I Bilaterally  Head/Face: Normocephalic.  House Brackmann I Bilaterally.  Right Ear: External Auditory Canal WNL,TM w/o masses/lesions/perforations.  Auricle WNL.  Left Ear: External Auditory Canal WNL,TM w/o masses/lesions/perforations. Auricle WNL.  Nose: No gross nasal septal deviation. Inferior Turbinates 2+ bilaterally. No septal perforation. No masses/lesions. External nasal skin without masses/lesions.  Oral Cavity: Gingiva/lips WNL. Oral Tongue mobile. Hard Palate WNL.   Oropharynx: Tonsillar fossa/pharyngeal wall without lesions. Posterior oropharynx WNL.  Soft palate without masses. Midline uvula.   Neck/Lymphatic: No LAD I-VI bilaterally.  No thyromegaly.  No masses noted on exam.  Neuro/Psychiatric: AOx3.  Flat affect.   Respiratory: Normal respiratory effort, no stridor/stertor, no retractions noted.      Procedure    None        Data Reviewed    MRI 2/4/22 -  Mri Iac:     Enhancing lesion right cerebellar pontine angle cistern extending to the IAC.  Corresponds to lesion seen on CT.  Please note there is a thin associated dural tail along the petrous clival ridge with configuration most suggestive for meningioma with schwannoma felt less likely.     Partially visualized induration and edema signal abnormality within the right facial soft tissues with ill-defined enhancement corresponds to abnormality seen on CT which may be active inflammatory/infectious process with underlying neoplasm not excluded.  There is punctate questionable hypointense signal track along the margin of this which may represent recent manipulation clinical correlation advised.     MRI brain: Otherwise unremarkable MRI brain as detailed above specifically without evidence for acute infarction or hydrocephalus.       Assessment:     1. Unilateral vestibular schwannoma         Long discussion had with patient concerning the normal treatment algorithm for these tumors. Discussed  options including surgery, observation, and radiation. Patient most interested in pursuing surgical resection. Discussed most commonly used approaches, including translab and retrosig, given her already unserviceable hearing, would favor translab approach. Described risks of surgery which include worsened hearing, facial paralysis, continued dizziness, bleeding, CSF leak, stroke, and death. She was given the opportunity to ask questions and all were answered to her apparent satisfaction.    Plan:   -- Referral to Vestibular Rehab  -- Meclizine as needed  -- Will work on coordinating a date with Dr. Rizo

## 2022-03-04 NOTE — PROVIDER PROGRESS NOTES - EMERGENCY DEPT.
Encounter Date: 3/4/2022    ED Physician Progress Notes             I assumed care of this patient at change of shift from Dr. Aguero. Briefly, this is a 41 y.o. female who has a known R sided schwannoma here with ongoing pain and intermittent dizziness, no falls. Was seen by NSG and ENT this week, surgery is planned. No new neuro deficits.     Plan for pain control and close reassessment.    On reassessment, she is feeling a lot better and would like to go home after a migraine cocktail. She has a ride and will call them. Plan for follow up this week with NSG.     Workup notable for:     Labs Reviewed   CBC W/ AUTO DIFFERENTIAL - Abnormal; Notable for the following components:       Result Value    Hemoglobin 10.8 (*)     Hematocrit 36.2 (*)     MCH 24.4 (*)     MCHC 29.8 (*)     RDW 16.5 (*)     Platelets 115 (*)     Basophil % 2.3 (*)     All other components within normal limits   COMPREHENSIVE METABOLIC PANEL - Abnormal; Notable for the following components:    CO2 20 (*)     BUN 5 (*)     All other components within normal limits   ISTAT PROCEDURE - Abnormal; Notable for the following components:    POC BUN 4 (*)     POC TCO2 (MEASURED) 21 (*)     POC Ionized Calcium 1.05 (*)     All other components within normal limits   HIV 1 / 2 ANTIBODY   HEPATITIS C ANTIBODY   SARS-COV-2 RDRP GENE    Narrative:     This test utilizes isothermal nucleic acid amplification   technology to detect the SARS-CoV-2 RdRp nucleic acid segment.   The analytical sensitivity (limit of detection) is 125 genome   equivalents/mL.   A POSITIVE result implies infection with the SARS-CoV-2 virus;   the patient is presumed to be contagious.     A NEGATIVE result means that SARS-CoV-2 nucleic acids are not   present above the limit of detection. A NEGATIVE result should be   treated as presumptive. It does not rule out the possibility of   COVID-19 and should not be the sole basis for treatment decisions.   If COVID-19 is strongly suspected  based on clinical and exposure   history, re-testing using an alternate molecular assay should be   considered.   This test is only for use under the Food and Drug   Administration s Emergency Use Authorization (EUA).   Commercial kits are provided by [employer].   Performance characteristics of the EUA have been independently   verified by [facility] Department of   Pathology and Laboratory Medicine.   _________________________________________________________________   The authorized Fact Sheet for Healthcare Providers and the authorized Fact   Sheet for Patients of the ID NOW COVID-19 are available on the FDA   website:     https://www.fda.gov/media/287634/download  https://www.fda.gov/media/208730/download             No orders to display       Medications   droperidoL injection 1.25 mg (1.25 mg Intravenous Given 3/4/22 1353)   metoclopramide HCl injection 10 mg (10 mg Intravenous Given 3/4/22 1544)   magnesium sulfate 2g in water 50mL IVPB (premix) (0 g Intravenous Stopped 3/4/22 1751)   acetaminophen tablet 1,000 mg (1,000 mg Oral Given 3/4/22 1543)         Final diagnoses:  [R42] Dizziness  [R51.9] Right facial pain (Primary)  [D36.10] Schwannoma

## 2022-03-04 NOTE — PROGRESS NOTES
Miriam Mendez was seen today for a hearing evaluation. Miriam has a hx of a R ear acoustic neuroma. Today Miriam reported significant dizziness and kept falling asleep during testing. She had to be awoken and re-instructed several times.     Pure tone audiometry revealed severe-profound HL for the right ear  Speech Reception Threshold (SRT) and Pure Tone Average (PTA) are in Fair agreement for the right ear. Indicating good test/re-test reliability   CNT speech discrimination for the right ear  Tympanometry revealed A for the right ear     Recommendations:  1. Otologic Evaluation  2. Repeat audiogram as needed  3. Hearing Protection  4. Bi-CROS consult

## 2022-03-04 NOTE — ED TRIAGE NOTES
"Miriam Mendez, a 41 y.o. female presents to the ED w/ complaint of body aches. Pt states "My whole body hurts." Pt is lethargic and poor historian. Denies chest pain/SOB. Denies n/v/d. Reports dizziness for a year from brain tumor.    Triage note:  Chief Complaint   Patient presents with    Facial Pain     Facial pain and dizziness for year from brain tumor, just seen in neuro surg clinic, sent eval Unilateral vestibular schwannoma +1 more    Dx     Review of patient's allergies indicates:   Allergen Reactions    Sulfa (sulfonamide antibiotics)      Other reaction(s): Hives     Past Medical History:   Diagnosis Date    ADD (attention deficit disorder with hyperactivity)     psych eval 3/10    Anxiety     Bronchitis     RAD /bronchitis episodes    Cellulitis     face    Depression     undergoing psychotherapy    Easy bruising 6/23/2014    Fibromyalgia     History of ITP 6/23/2014    Iron deficiency anemia 6/23/2014    Morbid obesity     improved with s/p gastric bypass    Pica 6/23/2014    Pseudotumor cerebri syndrome     Thrombocytopenia      Patient identifiers verified and correct for Miriam Mendez    LOC: The patient is awake, lethargic. The patient is AOX4 and speaking appropriately.   APPEARANCE: No acute distress noted.   HEENT: WDL, PERRLA  PSYCHOSOCIAL: Patient is calm and cooperative. Denies SI/HI.  SKIN: The skin is warm, dry, color consistent with ethnicity. No breakdown or brusing visible.  RESPIRATORY: Airway is open and patent. Bilateral chest rise and fall. Respiratory rate even and unlabored.  No accessory muscle use noted.  CARDIAC: Patient has a normal rate and rhythm. No complaints of chest pain.  ABDOMEN/GI: Soft, non tender. No distention noted. Denies n/v/d.   URINARY:  Voids independently without difficulty. No complaints of frequency, urgency, burning, or blood in urine.   NEUROLOGIC: Eyes open spontaneously. Speech clear.  Able to follow commands, demonstrating ability " to actively and appropriately communicate within context of current conversation. Symmetrical facial muscles. Movement is purposeful. Denies dizziness/lightheadedness.  MUSCULOSKELETAL: No obvious deformities noted. Full ROM in all extremities. Reports body aches.  PERIPHERAL VASCULAR: Cap refill <3 secs bilaterally. No peripheral edema noted. Denies numbness and tingling in extremities.

## 2022-03-04 NOTE — ED PROVIDER NOTES
"Encounter Date: 3/4/2022       History     Chief Complaint   Patient presents with    Facial Pain     Facial pain and dizziness for year from brain tumor, just seen in neuro surg clinic, sent eval Unilateral vestibular schwannoma +1 more    Dx     HPI   41 y.o. female who has a known R sided schwannoma here with ongoing "years" pain and intermittent dizziness. No voice changes/speech impediment.  Despite persistent and ongoing tinnitus, no and dizziness, no reported falls.  She has seen multiple neurosurgeons an ENT in the last several weeks and is scheduled for surgical intervention in the a, a month.  Despite ongoing pain, she denies any numbness to face or facial muscle deficits.  No fever chills, recent illness or other complaints reported.       Review of patient's allergies indicates:   Allergen Reactions    Sulfa (sulfonamide antibiotics)      Other reaction(s): Hives     Past Medical History:   Diagnosis Date    ADD (attention deficit disorder with hyperactivity)     psych eval 3/10    Anxiety     Bronchitis     RAD /bronchitis episodes    Cellulitis     face    Depression     undergoing psychotherapy    Easy bruising 2014    Fibromyalgia     History of ITP 2014    Iron deficiency anemia 2014    Morbid obesity     improved with s/p gastric bypass    Pica 2014    Pseudotumor cerebri syndrome     Thrombocytopenia      Past Surgical History:   Procedure Laterality Date     SECTION      CHOLECYSTECTOMY      GASTRIC BYPASS       Family History   Problem Relation Age of Onset    Breast cancer Mother 57    Hypertension Mother     Cancer Mother 57        breast cancer    Cancer Father     Cancer Paternal Grandmother         breast    Cancer Paternal Grandfather         kidney    Colon cancer Neg Hx     Diabetes Neg Hx     Ovarian cancer Neg Hx     Stroke Neg Hx      Social History     Tobacco Use    Smoking status: Never Smoker    Smokeless tobacco: Never " "Used   Substance Use Topics    Alcohol use: Yes    Drug use: No     Review of Systems  CONST: No fever, chills, weight change, or fatigue.  HEENT: + RIGHT Facial & Temporal pain/headache, but no blurry vision/change in vision, sore throat, eye pain, otorrhea, rhinorrhea, tooth pain, swelling, or voice changes.  NECK: No pain, masses, trauma, or redness.  HEART: No pain, palpitations, or diaphoresis.  LUNG: No SOB, cough, orthopnea, FRANK or other complaints.  ABDOMEN: No pain, nausea, vomiting, diarrhea, constipation, or flank pain.  : No discharge, dysuria, lesions, rashes, masses, sores.  EXTREMITIES: FROM with No swelling, redness, injuries/trauma, lesions, sores, weakness, numbness, or tingling.  NEURO: + "old R-facial numbness"; No dizziness, weakness, fatigue, tremors, headache, change in vision or disturbances of balance or coordination.  SKIN: No lesions, rashes, trauma or other complaints.    Physical Exam     Initial Vitals [03/04/22 1136]   BP Pulse Resp Temp SpO2   118/75 70 18 97.6 °F (36.4 °C) 100 %      MAP       --         Physical Exam  Constitutional: Well appearing; speaking full sentences with no deficits. Voice is clear with no muffled voice or stridulous noises.  On stretcher somnolent but arousable to voice (she reports taken benzodiazepines after leaving ENT clinic and prior to ED evaluation secondary to pain).  Eyes:  Anicteric and atraumatic; pupils equally round and reactive to light; EOM intact Bilaterally  Head/Face: Normocephalic and atraumatic calvarium.  Right Ear: External Auditory Canal WNL,TM w/o masses/lesions/perforations.  Auricle WNL.  Left Ear: External Auditory Canal WNL,TM w/o masses/lesions/perforations. Auricle WNL.  Nose: No gross nasal septal deviation. Inferior Turbinates 2+ bilaterally. No septal perforation. No masses/lesions. External nasal skin without masses/lesions.  Oral Cavity: Gingiva/lips WNL. Oral Tongue mobile. Hard Palate WNL.   Oropharynx: Tonsillar " fossa/pharyngeal wall without lesions. Posterior oropharynx WNL.  Soft palate without masses. Midline uvula.   Neck/Lymphatic: No LAD I-VI bilaterally.  No thyromegaly.  No masses noted on exam.  HEART: Regular rate and rhythm, no M/G/T.  LUNGS: No Tachypnea, No Retractions, and CTA B/L with no W/R/R.  ABDOMEN: +BS, Soft, ND, NTTP. No rigidity. No guarding. NEG Lal's, Rovsing's, or McBurney's point tenderness.  EXTREMITIES: FROM. Strength 5/5. Symmetrical Sensorium and with no deficits. Soft Comparments.  SKIN: Warm, Dry, No Skin Tears or Rashes.  VASCULAR: 2+ pulses Prox/Dist & Symmetrical with No delay.      ED Course   Procedures  Labs Reviewed   HIV 1 / 2 ANTIBODY   HEPATITIS C ANTIBODY   CBC W/ AUTO DIFFERENTIAL   COMPREHENSIVE METABOLIC PANEL   SARS-COV-2 RDRP GENE   ISTAT CHEM8          Imaging Results    None          Medications   droperidoL injection 1.25 mg (has no administration in time range)     Medical Decision Making:   History:   Old Medical Records: I decided to obtain old medical records.  Initial Assessment:   Afebrile, atraumatic, hemodynamically stable neurovascularly intact female presents with signs symptoms of acute on chronic versus gradually deteriorating pain to face.  Will provide analgesia and monitor while in the emergency department as she has been quite somnolent since ED arrival, likely secondary and attributed to new intake of benzodiazepine.  No acute focal deficit warranting emergent neuro/neuro surgical evaluation/intervention at this time.                        Clinical Impression:   Final diagnoses:  [R42] Dizziness                 Rivas Aguero MD  03/20/22 9711

## 2022-03-09 LAB
HCV AB SERPL QL IA: NEGATIVE
HIV 1+2 AB+HIV1 P24 AG SERPL QL IA: NEGATIVE

## 2022-04-03 ENCOUNTER — HOSPITAL ENCOUNTER (EMERGENCY)
Facility: HOSPITAL | Age: 42
Discharge: HOME | End: 2022-04-04
Attending: EMERGENCY MEDICINE
Payer: MEDICAID

## 2022-04-03 DIAGNOSIS — I95.1 ORTHOSTATIC HYPOTENSION: ICD-10-CM

## 2022-04-03 DIAGNOSIS — R55 SYNCOPE: ICD-10-CM

## 2022-04-03 DIAGNOSIS — S09.90XA INJURY OF HEAD, INITIAL ENCOUNTER: Primary | ICD-10-CM

## 2022-04-03 DIAGNOSIS — S01.81XA FACIAL LACERATION, INITIAL ENCOUNTER: ICD-10-CM

## 2022-04-03 PROCEDURE — 12051 PR INTERMED WOUND REPAIR FACE/EAR/EYELID/NOSE/LIP/MUC MEBR, 2.5CM OR LESS: ICD-10-PCS | Mod: ,,, | Performed by: EMERGENCY MEDICINE

## 2022-04-03 PROCEDURE — 99285 EMERGENCY DEPT VISIT HI MDM: CPT | Mod: 25

## 2022-04-03 PROCEDURE — 99284 PR EMERGENCY DEPT VISIT,LEVEL IV: ICD-10-PCS | Mod: 25,,, | Performed by: EMERGENCY MEDICINE

## 2022-04-03 PROCEDURE — 82962 GLUCOSE BLOOD TEST: CPT | Mod: 59

## 2022-04-03 PROCEDURE — 12051 INTMD RPR FACE/MM 2.5 CM/<: CPT | Mod: ,,, | Performed by: EMERGENCY MEDICINE

## 2022-04-03 PROCEDURE — 99284 EMERGENCY DEPT VISIT MOD MDM: CPT | Mod: 25,,, | Performed by: EMERGENCY MEDICINE

## 2022-04-04 VITALS
RESPIRATION RATE: 20 BRPM | SYSTOLIC BLOOD PRESSURE: 113 MMHG | OXYGEN SATURATION: 97 % | HEART RATE: 67 BPM | TEMPERATURE: 98 F | DIASTOLIC BLOOD PRESSURE: 71 MMHG

## 2022-04-04 LAB
ALBUMIN SERPL BCP-MCNC: 2.9 G/DL (ref 3.5–5.2)
ALP SERPL-CCNC: 65 U/L (ref 55–135)
ALT SERPL W/O P-5'-P-CCNC: 10 U/L (ref 10–44)
AMPHET+METHAMPHET UR QL: NEGATIVE
ANION GAP SERPL CALC-SCNC: 8 MMOL/L (ref 8–16)
AST SERPL-CCNC: 17 U/L (ref 10–40)
B-HCG UR QL: NEGATIVE
BACTERIA #/AREA URNS AUTO: ABNORMAL /HPF
BARBITURATES UR QL SCN>200 NG/ML: NEGATIVE
BASOPHILS # BLD AUTO: 0.06 K/UL (ref 0–0.2)
BASOPHILS NFR BLD: 0.5 % (ref 0–1.9)
BENZODIAZ UR QL SCN>200 NG/ML: NEGATIVE
BILIRUB SERPL-MCNC: 0.3 MG/DL (ref 0.1–1)
BILIRUB UR QL STRIP: NEGATIVE
BUN SERPL-MCNC: 10 MG/DL (ref 6–20)
BZE UR QL SCN: NEGATIVE
CALCIUM SERPL-MCNC: 8 MG/DL (ref 8.7–10.5)
CANNABINOIDS UR QL SCN: NEGATIVE
CHLORIDE SERPL-SCNC: 104 MMOL/L (ref 95–110)
CLARITY UR REFRACT.AUTO: CLEAR
CO2 SERPL-SCNC: 24 MMOL/L (ref 23–29)
COLOR UR AUTO: ABNORMAL
CREAT SERPL-MCNC: 0.8 MG/DL (ref 0.5–1.4)
CREAT UR-MCNC: 23 MG/DL (ref 15–325)
CTP QC/QA: YES
DIFFERENTIAL METHOD: ABNORMAL
EOSINOPHIL # BLD AUTO: 0 K/UL (ref 0–0.5)
EOSINOPHIL NFR BLD: 0.4 % (ref 0–8)
ERYTHROCYTE [DISTWIDTH] IN BLOOD BY AUTOMATED COUNT: 15.3 % (ref 11.5–14.5)
EST. GFR  (AFRICAN AMERICAN): >60 ML/MIN/1.73 M^2
EST. GFR  (NON AFRICAN AMERICAN): >60 ML/MIN/1.73 M^2
ETHANOL SERPL-MCNC: 10 MG/DL
GLUCOSE SERPL-MCNC: 73 MG/DL (ref 70–110)
GLUCOSE UR QL STRIP: NEGATIVE
HCT VFR BLD AUTO: 31.5 % (ref 37–48.5)
HGB BLD-MCNC: 9.6 G/DL (ref 12–16)
HGB UR QL STRIP: NEGATIVE
IMM GRANULOCYTES # BLD AUTO: 0.03 K/UL (ref 0–0.04)
IMM GRANULOCYTES NFR BLD AUTO: 0.3 % (ref 0–0.5)
KETONES UR QL STRIP: NEGATIVE
LACTATE SERPL-SCNC: 0.8 MMOL/L (ref 0.5–2.2)
LACTATE SERPL-SCNC: 2.5 MMOL/L (ref 0.5–2.2)
LEUKOCYTE ESTERASE UR QL STRIP: ABNORMAL
LYMPHOCYTES # BLD AUTO: 1.2 K/UL (ref 1–4.8)
LYMPHOCYTES NFR BLD: 10.3 % (ref 18–48)
MCH RBC QN AUTO: 24.6 PG (ref 27–31)
MCHC RBC AUTO-ENTMCNC: 30.5 G/DL (ref 32–36)
MCV RBC AUTO: 81 FL (ref 82–98)
METHADONE UR QL SCN>300 NG/ML: NEGATIVE
MICROSCOPIC COMMENT: ABNORMAL
MONOCYTES # BLD AUTO: 0.8 K/UL (ref 0.3–1)
MONOCYTES NFR BLD: 6.8 % (ref 4–15)
NEUTROPHILS # BLD AUTO: 9.1 K/UL (ref 1.8–7.7)
NEUTROPHILS NFR BLD: 81.7 % (ref 38–73)
NITRITE UR QL STRIP: NEGATIVE
NRBC BLD-RTO: 0 /100 WBC
OPIATES UR QL SCN: NEGATIVE
PCP UR QL SCN>25 NG/ML: NEGATIVE
PH UR STRIP: 7 [PH] (ref 5–8)
PLATELET # BLD AUTO: 124 K/UL (ref 150–450)
PMV BLD AUTO: 13.5 FL (ref 9.2–12.9)
POCT GLUCOSE: 74 MG/DL (ref 70–110)
POTASSIUM SERPL-SCNC: 3.5 MMOL/L (ref 3.5–5.1)
PROT SERPL-MCNC: 6.2 G/DL (ref 6–8.4)
PROT UR QL STRIP: NEGATIVE
RBC # BLD AUTO: 3.91 M/UL (ref 4–5.4)
RBC #/AREA URNS AUTO: 1 /HPF (ref 0–4)
SODIUM SERPL-SCNC: 136 MMOL/L (ref 136–145)
SP GR UR STRIP: 1 (ref 1–1.03)
SQUAMOUS #/AREA URNS AUTO: 1 /HPF
TOXICOLOGY INFORMATION: NORMAL
TROPONIN I SERPL DL<=0.01 NG/ML-MCNC: <0.006 NG/ML (ref 0–0.03)
URN SPEC COLLECT METH UR: ABNORMAL
WBC # BLD AUTO: 11.16 K/UL (ref 3.9–12.7)
WBC #/AREA URNS AUTO: 7 /HPF (ref 0–5)

## 2022-04-04 PROCEDURE — 83605 ASSAY OF LACTIC ACID: CPT | Mod: 91 | Performed by: EMERGENCY MEDICINE

## 2022-04-04 PROCEDURE — 83605 ASSAY OF LACTIC ACID: CPT | Performed by: STUDENT IN AN ORGANIZED HEALTH CARE EDUCATION/TRAINING PROGRAM

## 2022-04-04 PROCEDURE — 81001 URINALYSIS AUTO W/SCOPE: CPT | Mod: 59 | Performed by: STUDENT IN AN ORGANIZED HEALTH CARE EDUCATION/TRAINING PROGRAM

## 2022-04-04 PROCEDURE — 96361 HYDRATE IV INFUSION ADD-ON: CPT | Mod: 59

## 2022-04-04 PROCEDURE — 25000003 PHARM REV CODE 250: Performed by: STUDENT IN AN ORGANIZED HEALTH CARE EDUCATION/TRAINING PROGRAM

## 2022-04-04 PROCEDURE — 93005 ELECTROCARDIOGRAM TRACING: CPT | Mod: 59

## 2022-04-04 PROCEDURE — 96360 HYDRATION IV INFUSION INIT: CPT

## 2022-04-04 PROCEDURE — 84484 ASSAY OF TROPONIN QUANT: CPT | Performed by: STUDENT IN AN ORGANIZED HEALTH CARE EDUCATION/TRAINING PROGRAM

## 2022-04-04 PROCEDURE — 80307 DRUG TEST PRSMV CHEM ANLYZR: CPT | Performed by: STUDENT IN AN ORGANIZED HEALTH CARE EDUCATION/TRAINING PROGRAM

## 2022-04-04 PROCEDURE — 63600175 PHARM REV CODE 636 W HCPCS: Performed by: STUDENT IN AN ORGANIZED HEALTH CARE EDUCATION/TRAINING PROGRAM

## 2022-04-04 PROCEDURE — 12011 RPR F/E/E/N/L/M 2.5 CM/<: CPT

## 2022-04-04 PROCEDURE — 93010 ELECTROCARDIOGRAM REPORT: CPT | Mod: ,,, | Performed by: INTERNAL MEDICINE

## 2022-04-04 PROCEDURE — 80053 COMPREHEN METABOLIC PANEL: CPT | Performed by: STUDENT IN AN ORGANIZED HEALTH CARE EDUCATION/TRAINING PROGRAM

## 2022-04-04 PROCEDURE — 82077 ASSAY SPEC XCP UR&BREATH IA: CPT | Performed by: STUDENT IN AN ORGANIZED HEALTH CARE EDUCATION/TRAINING PROGRAM

## 2022-04-04 PROCEDURE — 25000003 PHARM REV CODE 250: Performed by: EMERGENCY MEDICINE

## 2022-04-04 PROCEDURE — 85025 COMPLETE CBC W/AUTO DIFF WBC: CPT | Performed by: STUDENT IN AN ORGANIZED HEALTH CARE EDUCATION/TRAINING PROGRAM

## 2022-04-04 PROCEDURE — 93010 EKG 12-LEAD: ICD-10-PCS | Mod: ,,, | Performed by: INTERNAL MEDICINE

## 2022-04-04 PROCEDURE — 81025 URINE PREGNANCY TEST: CPT | Performed by: EMERGENCY MEDICINE

## 2022-04-04 RX ORDER — ACETAMINOPHEN 500 MG
1000 TABLET ORAL ONCE
Status: COMPLETED | OUTPATIENT
Start: 2022-04-04 | End: 2022-04-04

## 2022-04-04 RX ORDER — LIDOCAINE HYDROCHLORIDE 10 MG/ML
10 INJECTION INFILTRATION; PERINEURAL ONCE
Status: DISCONTINUED | OUTPATIENT
Start: 2022-04-04 | End: 2022-04-04 | Stop reason: HOSPADM

## 2022-04-04 RX ORDER — LIDOCAINE HYDROCHLORIDE 10 MG/ML
10 INJECTION INFILTRATION; PERINEURAL ONCE
Status: DISCONTINUED | OUTPATIENT
Start: 2022-04-04 | End: 2022-04-04

## 2022-04-04 RX ADMIN — SODIUM CHLORIDE 1000 ML: 0.9 INJECTION, SOLUTION INTRAVENOUS at 03:04

## 2022-04-04 RX ADMIN — ACETAMINOPHEN 1000 MG: 500 TABLET ORAL at 12:04

## 2022-04-04 RX ADMIN — SODIUM CHLORIDE, SODIUM LACTATE, POTASSIUM CHLORIDE, AND CALCIUM CHLORIDE 1000 ML: .6; .31; .03; .02 INJECTION, SOLUTION INTRAVENOUS at 12:04

## 2022-04-04 NOTE — ED NOTES
Pt ambulated to bathroom.  Pt complained of dizziness during ambulation and RN noted she had an unsteady gait.  RN assisted pt with ambulating until pt was back in bed.

## 2022-04-04 NOTE — ED PROVIDER NOTES
Encounter Date: 4/3/2022       History     Chief Complaint   Patient presents with    Fall     Pt arrives via EMS c/o laceration to left eye after taking ambien, drinking alcohol and falling. Pt orthostatic per EMS.     Patient is 40 yo F with pmh CPA tumor, insomnia, general anxiety, benzo use who is presenting after a fall. She states that she got light headed while trying to get ice and hit her head. She has had problems with low blood pressure in the past when standing. She was taking meclizine for dizziness from ENT but stopped after she felt the medications were not working. Her BP by EMS was 70/30 which improved to 88/50 after 500 ml bolus en route. She endorses drinking 2 beers and taking her normal zolpidem. She takes benzoziazepines for anxiety and took one this morning.  Unknown last tetanus shot.     The history is provided by the patient and medical records. No  was used.     Review of patient's allergies indicates:   Allergen Reactions    Sulfa (sulfonamide antibiotics)      Other reaction(s): Hives     Past Medical History:   Diagnosis Date    ADD (attention deficit disorder with hyperactivity)     psych eval 3/10    Anxiety     Bronchitis     RAD /bronchitis episodes    Cellulitis     face    Depression     undergoing psychotherapy    Easy bruising 2014    Fibromyalgia     History of ITP 2014    Iron deficiency anemia 2014    Morbid obesity     improved with s/p gastric bypass    Pica 2014    Pseudotumor cerebri syndrome     Thrombocytopenia      Past Surgical History:   Procedure Laterality Date     SECTION      CHOLECYSTECTOMY      GASTRIC BYPASS       Family History   Problem Relation Age of Onset    Breast cancer Mother 57    Hypertension Mother     Cancer Mother 57        breast cancer    Cancer Father     Cancer Paternal Grandmother         breast    Cancer Paternal Grandfather         kidney    Colon cancer Neg Hx      Diabetes Neg Hx     Ovarian cancer Neg Hx     Stroke Neg Hx      Social History     Tobacco Use    Smoking status: Never Smoker    Smokeless tobacco: Never Used   Substance Use Topics    Alcohol use: Yes    Drug use: No     Review of Systems   Constitutional: Negative for chills and fever.   HENT: Positive for hearing loss (chronic ). Negative for congestion and sore throat.    Eyes: Negative for visual disturbance.   Respiratory: Negative for chest tightness and shortness of breath.    Cardiovascular: Negative for chest pain and palpitations.   Gastrointestinal: Negative for abdominal distention, abdominal pain, constipation, diarrhea, nausea and vomiting.   Musculoskeletal: Negative for back pain.   Skin: Positive for rash and wound.   Neurological: Positive for dizziness, syncope, light-headedness and headaches. Negative for weakness.   Hematological: Does not bruise/bleed easily.       Physical Exam     Initial Vitals [04/03/22 2349]   BP Pulse Resp Temp SpO2   (!) 88/62 90 (!) 22 98.1 °F (36.7 °C) 96 %      MAP       --         Physical Exam    Nursing note and vitals reviewed.  Constitutional: She appears well-developed and well-nourished. She is not diaphoretic. No distress.   GCS 15. Hypotensive with MAP >65   HENT:   2 cm laceration lateral to the left orbit   Eyes: No scleral icterus.   Neck: No JVD present.   Cardiovascular: Normal rate, regular rhythm, normal heart sounds and intact distal pulses.   Pulmonary/Chest: Breath sounds normal. No respiratory distress. She exhibits no tenderness.   Abdominal: Abdomen is soft. Bowel sounds are normal.   Musculoskeletal:         General: No tenderness or edema.     Neurological: She is alert and oriented to person, place, and time. A cranial nerve deficit (right sided hearing loss, right sided facial sensory loss - these findings are chronic and unchanged, related to her tumor ) is present. GCS score is 15. GCS eye subscore is 4. GCS verbal subscore is 5.  GCS motor subscore is 6.   Skin: Skin is warm and dry. Capillary refill takes less than 2 seconds. There is pallor.   Psychiatric: She has a normal mood and affect. Thought content normal.         ED Course   Lac Repair    Date/Time: 4/4/2022 1:36 AM  Performed by: Brendan Kwon MD  Authorized by: Jose Lange MD     Consent:     Consent obtained:  Verbal    Consent given by:  Patient    Risks, benefits, and alternatives were discussed: yes      Risks discussed:  Poor cosmetic result and poor wound healing    Alternatives discussed:  No treatment  Universal protocol:     Patient identity confirmed:  Verbally with patient  Anesthesia:     Anesthesia method:  Local infiltration    Local anesthetic:  Lidocaine 1% w/o epi  Laceration details:     Location: lateral orbital rim.    Length (cm):  2  Pre-procedure details:     Preparation:  Patient was prepped and draped in usual sterile fashion  Exploration:     Imaging obtained comment:  CT  Treatment:     Area cleansed with:  Saline    Amount of cleaning:  Standard    Irrigation solution:  Sterile saline    Debridement:  None  Skin repair:     Repair method:  Sutures    Suture size:  5-0    Suture material:  Fast-absorbing gut    Suture technique:  Simple interrupted    Number of sutures:  4  Approximation:     Approximation:  Close  Repair type:     Repair type:  Intermediate  Post-procedure details:     Dressing:  Adhesive bandage    Procedure completion:  Tolerated well, no immediate complications      Labs Reviewed   CBC W/ AUTO DIFFERENTIAL - Abnormal; Notable for the following components:       Result Value    RBC 3.91 (*)     Hemoglobin 9.6 (*)     Hematocrit 31.5 (*)     MCV 81 (*)     MCH 24.6 (*)     MCHC 30.5 (*)     RDW 15.3 (*)     Platelets 124 (*)     MPV 13.5 (*)     Gran # (ANC) 9.1 (*)     Gran % 81.7 (*)     Lymph % 10.3 (*)     All other components within normal limits   COMPREHENSIVE METABOLIC PANEL - Abnormal; Notable for the following  components:    Calcium 8.0 (*)     Albumin 2.9 (*)     All other components within normal limits   URINALYSIS, REFLEX TO URINE CULTURE - Abnormal; Notable for the following components:    Specific Gravity, UA 1.000 (*)     Leukocytes, UA 1+ (*)     All other components within normal limits    Narrative:     Specimen Source->Urine   ALCOHOL,MEDICAL (ETHANOL) - Abnormal; Notable for the following components:    Alcohol, Serum 10 (*)     All other components within normal limits   LACTIC ACID, PLASMA - Abnormal; Notable for the following components:    Lactate (Lactic Acid) 2.5 (*)     All other components within normal limits   URINALYSIS MICROSCOPIC - Abnormal; Notable for the following components:    WBC, UA 7 (*)     All other components within normal limits    Narrative:     Specimen Source->Urine   DRUG SCREEN PANEL, URINE EMERGENCY    Narrative:     Specimen Source->Urine   TROPONIN I   LACTIC ACID, PLASMA   POCT URINE PREGNANCY   POCT GLUCOSE   POCT GLUCOSE MONITORING CONTINUOUS     EKG Readings: (Independently Interpreted)   Initial Reading: No STEMI. Rhythm: Sinus Arrhythmia. Heart Rate: 90. Ectopy: No Ectopy. Conduction: Normal. Clinical Impression: Normal Sinus Rhythm       Imaging Results          CT Head Without Contrast (Final result)  Result time 04/04/22 03:31:28    Final result by Deric Ortiz MD (04/04/22 03:31:28)                 Impression:      1. No CT evidence of acute intracranial pathology.  Further evaluation and follow-up as clinically warranted.  2. Mild soft tissue swelling and subcutaneous air in the left periorbital soft tissues.  3. No CT evidence of acute fracture or traumatic malalignment of the cervical spine.    Electronically signed by resident: Sky Choi  Date:    04/04/2022  Time:    02:26    Electronically signed by: Deric Ortiz MD  Date:    04/04/2022  Time:    03:31             Narrative:    EXAMINATION:  CT HEAD WITHOUT CONTRAST; CT CERVICAL SPINE WITHOUT  CONTRAST    CLINICAL HISTORY:  Facial trauma, blunt;; Neck trauma, intoxicated or obtunded (Age >= 16y);    TECHNIQUE:  Low dose axial CT images of the head and cervical spine obtained without the use of intravenous contrast.  Axial, sagittal and coronal reconstructions were performed.    COMPARISON:  MRI IAC temporal bones 02/05/2022    CT head 04/09/2015    CT cervical spine 12/27/2014    FINDINGS:  CT HEAD:    Ventricles stable in size without evidence of hydrocephalus.    The brain parenchyma appears within unchanged.  Known right cerebellar pontine angle lesion is not well visualized on this noncontrast exam.  No new parenchymal mass, hemorrhage, edema or major vascular distribution infarct.    No extra-axial blood or fluid collections.    No acute displaced calvarial fracture.  There is a remote deformity of the left lamina papyracea.  There is mild soft tissue induration within the left periorbital soft tissues with a few punctate foci of subcutaneous emphysema which may relate to reported laceration.  Mastoid air cells and paranasal sinuses are essentially clear.    CT CERVICAL SPINE:    Alignment: Normal.    Vertebrae: No fracture. No lytic or blastic lesion.    Discs: Normal height.    C1-2: Dens is intact.  Pre-dens space is maintained.    Skull base and craniocervical junction: Normal.    Degenerative findings: No significant neural foraminal narrowing or spinal canal stenosis at any level.    Paraspinal muscles & soft tissues: Unremarkable.    Additional findings: Lung apices clear. No cervical lymphadenopathy.                               CT CERVICAL SPINE WITHOUT CONTRAST (Final result)  Result time 04/04/22 03:31:28    Final result by Deric Ortiz MD (04/04/22 03:31:28)                 Impression:      1. No CT evidence of acute intracranial pathology.  Further evaluation and follow-up as clinically warranted.  2. Mild soft tissue swelling and subcutaneous air in the left periorbital soft  tissues.  3. No CT evidence of acute fracture or traumatic malalignment of the cervical spine.    Electronically signed by resident: Sky Choi  Date:    04/04/2022  Time:    02:26    Electronically signed by: Deric Ortiz MD  Date:    04/04/2022  Time:    03:31             Narrative:    EXAMINATION:  CT HEAD WITHOUT CONTRAST; CT CERVICAL SPINE WITHOUT CONTRAST    CLINICAL HISTORY:  Facial trauma, blunt;; Neck trauma, intoxicated or obtunded (Age >= 16y);    TECHNIQUE:  Low dose axial CT images of the head and cervical spine obtained without the use of intravenous contrast.  Axial, sagittal and coronal reconstructions were performed.    COMPARISON:  MRI IAC temporal bones 02/05/2022    CT head 04/09/2015    CT cervical spine 12/27/2014    FINDINGS:  CT HEAD:    Ventricles stable in size without evidence of hydrocephalus.    The brain parenchyma appears within unchanged.  Known right cerebellar pontine angle lesion is not well visualized on this noncontrast exam.  No new parenchymal mass, hemorrhage, edema or major vascular distribution infarct.    No extra-axial blood or fluid collections.    No acute displaced calvarial fracture.  There is a remote deformity of the left lamina papyracea.  There is mild soft tissue induration within the left periorbital soft tissues with a few punctate foci of subcutaneous emphysema which may relate to reported laceration.  Mastoid air cells and paranasal sinuses are essentially clear.    CT CERVICAL SPINE:    Alignment: Normal.    Vertebrae: No fracture. No lytic or blastic lesion.    Discs: Normal height.    C1-2: Dens is intact.  Pre-dens space is maintained.    Skull base and craniocervical junction: Normal.    Degenerative findings: No significant neural foraminal narrowing or spinal canal stenosis at any level.    Paraspinal muscles & soft tissues: Unremarkable.    Additional findings: Lung apices clear. No cervical lymphadenopathy.                                  Medications   Tdap (BOOSTRIX) vaccine injection 0.5 mL (has no administration in time range)   LIDOcaine HCL 10 mg/ml (1%) injection 10 mL (has no administration in time range)   lactated ringers bolus 1,000 mL (0 mLs Intravenous Stopped 4/4/22 6128)   acetaminophen tablet 1,000 mg (1,000 mg Oral Given 4/4/22 8433)   sodium chloride 0.9% bolus 1,000 mL (0 mLs Intravenous Stopped 4/4/22 3882)     Medical Decision Making:   History:   Old Medical Records: I decided to obtain old medical records.  Initial Assessment:   Patient is nondistressed and mentating well. BP are soft without tachycardia. She has a laceration on the left orbit which is not currently bleeding. She does not appear to be in pain. There are no signs or symptoms of new neurological deficit.  Differential Diagnosis:   Including, but not limited to:  Mechanical fall, polypharmacy, orthostatic hypotension, cardiogenic syncope, seizure, closed head injury  Independently Interpreted Test(s):   I have ordered and independently interpreted EKG Reading(s) - see prior notes  Clinical Tests:   Lab Tests: Ordered and Reviewed  Radiological Study: Ordered and Reviewed  Medical Tests: Ordered and Reviewed  ED Management:  Patient's laceration was cleaned and sutured in 2 layers and the overlying area had wound adhesive applied.       APC / Resident Notes:   1:43 AM Patient to CT following Lac repair with 4 sutures       Attending Attestation:   Physician Attestation Statement for Resident:  As the supervising MD   Physician Attestation Statement: I have personally seen and examined this patient.   I agree with the above history. -: 41-year-old female presenting after an episode of loss of consciousness.  Arrives by EMS.  Endorses taking Ambien, drinking alcohol, feeling lightheaded while walking to get ice and passed out     As the supervising MD I agree with the above PE.   -: ABCs intact, GCS 15  2 cm laceration to left face  Moves all extremities equally  No  C/T/L spinal or paraspinal TTP  No chest wall or abdominal TTP  No extremity deformity    As the supervising MD I agree with the above treatment, course, plan, and disposition.   -: EKG sinus arrhythmia, no evidence of ischemia.  Hypotensive on arrival, received additional IV fluids.  Lactate downtrending.  Doubt infectious pathology, no leukocytosis, no fever, no infectious symptoms.  No traumatic findings on advanced imaging.  Tetanus updated.  Laceration repaired, we discussed scar medication.  Advised to expect a scar, but can use sunscreen and minimize sun exposure.   Patient informed that sutures should dissolve, if she still feels them in 5 days, she was instructed to follow-up for removal.  Orthostatics on repeat exam negative inpatient ambulated to the restroom.  Low risk Haitian syncope score.  Favor dehydration, hypovolemia as etiology.  Advised hydration at home, caution with changing positions to prevent orthostatic hypotension.  Advised close PCP follow-up.  All questions answered prior to discharge.  Patient understands and agrees with the plan.  I was personally present during the entire procedure.  I have reviewed and agree with the residents interpretation of the following: lab data, x-rays and EKG.  I have reviewed the following: old records at this facility.                ED Course as of 04/04/22 0602   Sun Apr 03, 2022   2356 BP(!): 88/62 [AB]   Mon Apr 04, 2022   0422 Hemoglobin(!): 9.6  Anemia, most recent 10.8 [AB]   0422 WBC: 11.16  No leukocytosis  [AB]      ED Course User Index  [AB] Jose Lange MD             Clinical Impression:   Final diagnoses:  [R55] Syncope  [S09.90XA] Injury of head, initial encounter (Primary)  [S01.81XA] Facial laceration, initial encounter  [I95.1] Orthostatic hypotension          ED Disposition Condition    Discharge Stable        ED Prescriptions     None        Follow-up Information     Follow up With Specialties Details Why Contact Info Additional  Information    [facility] Emergency Medicine  As needed, If symptoms worsen [address]  [facility] [address] [phone]     [facility] Internal Medicine In 1 week  [address]  [facility] [address] [phone] [facility] Please park in surface lot and check in at central registration desk           Brendan Kwon MD  Resident  04/04/22 0139         Brendan Kwon MD  Resident  04/04/22 0146       Jose Lange MD  04/04/22 0602

## 2022-04-04 NOTE — ED NOTES
Pt ambulated to the bathroom and back with no assistance. Pt did say she had some dizziness but it is the same amount of dizziness she has at baseline.

## 2022-04-04 NOTE — ED TRIAGE NOTES
Miriam Mendez, a 41 y.o. female presents to the ED w/ complaint of fall with laceration to the left face.  Pt was at home and had taken her Ambien for sleep and had drank a beer earlier that night.  Pt became dizzy and fell and hit her head.  Pt says she lost consciousness but is unsure if she lost consciousness before or after falling.      Triage note:  Chief Complaint   Patient presents with    Fall     Pt arrives via EMS c/o laceration to left eye after taking ambien, drinking alcohol and falling. Pt orthostatic per EMS.     Review of patient's allergies indicates:   Allergen Reactions    Sulfa (sulfonamide antibiotics)      Other reaction(s): Hives     Past Medical History:   Diagnosis Date    ADD (attention deficit disorder with hyperactivity)     psych eval 3/10    Anxiety     Bronchitis     RAD /bronchitis episodes    Cellulitis     face    Depression     undergoing psychotherapy    Easy bruising 6/23/2014    Fibromyalgia     History of ITP 6/23/2014    Iron deficiency anemia 6/23/2014    Morbid obesity     improved with s/p gastric bypass    Pica 6/23/2014    Pseudotumor cerebri syndrome     Thrombocytopenia

## 2022-04-11 ENCOUNTER — TELEPHONE (OUTPATIENT)
Dept: NEUROSURGERY | Facility: CLINIC | Age: 42
End: 2022-04-11
Payer: MEDICAID

## 2022-04-11 NOTE — TELEPHONE ENCOUNTER
LM for pt, explained that Dr. Rizo and Dr. Starr would like to reschedule pt's surgery to 5/23/22 if she is available at this time. Dr. Rizo would like to see pt in office before surgery, I offered 5/10 and 5/17. Asked pt to call back to confirm surgery and schedule appt with Dr. Rizo.     ----- Message from Liban Rizo, DO sent at 4/11/2022 12:40 PM CDT -----  Can you check with her and see if 5/23 would work to reschedule her surgery?  If it does, sets get her back into clinic ([facility] or [facility]) before then to go through surgery.  I also sent a message to Dr Fonseca to get her preop'd.    Thanks  Brad

## 2022-04-12 ENCOUNTER — TELEPHONE (OUTPATIENT)
Dept: PREADMISSION TESTING | Facility: HOSPITAL | Age: 42
End: 2022-04-12
Payer: MEDICAID

## 2022-04-12 NOTE — TELEPHONE ENCOUNTER
----- Message from Shilpa Sinha LPN sent at 4/11/2022  2:26 PM CDT -----    ----- Message -----  From: Cary Fonseca MD  Sent: 4/11/2022   2:21 PM CDT  To: Liban Rizo DO, Marian HUDSON Staff    Sure   Thank you for involving me   Glad to see her and help   Team, please schedule with me     ----- Message -----  From: Liban Rizo DO  Sent: 4/8/2022   2:58 PM CDT  To: Cary Fonseca MD, #    Can we get this patient in to see you for clearance?  We are rescheduling her acoustic neuroma surgery for next month.  She has h/o gastric bypass anemia, orthostatic hypotension, and substance use.    Thank you  Abe

## 2022-04-13 ENCOUNTER — TELEPHONE (OUTPATIENT)
Dept: PREADMISSION TESTING | Facility: HOSPITAL | Age: 42
End: 2022-04-13
Payer: MEDICAID

## 2022-04-13 NOTE — TELEPHONE ENCOUNTER
----- Message from Valerie Mejia RN sent at 4/13/2022  1:31 PM CDT -----  Needs POC, cannot fit in on 4/14

## 2022-04-18 ENCOUNTER — LAB VISIT (OUTPATIENT)
Dept: LAB | Facility: HOSPITAL | Age: 42
End: 2022-04-18
Attending: HOSPITALIST
Payer: MEDICAID

## 2022-04-18 ENCOUNTER — OFFICE VISIT (OUTPATIENT)
Dept: INTERNAL MEDICINE | Facility: CLINIC | Age: 42
End: 2022-04-18
Payer: MEDICAID

## 2022-04-18 VITALS
HEART RATE: 79 BPM | BODY MASS INDEX: 30.24 KG/M2 | TEMPERATURE: 98 F | OXYGEN SATURATION: 99 % | WEIGHT: 181.5 LBS | HEIGHT: 65 IN | DIASTOLIC BLOOD PRESSURE: 78 MMHG | SYSTOLIC BLOOD PRESSURE: 125 MMHG

## 2022-04-18 DIAGNOSIS — R60.9 EDEMA, UNSPECIFIED TYPE: ICD-10-CM

## 2022-04-18 DIAGNOSIS — Z86.14 HISTORY OF MRSA INFECTION: ICD-10-CM

## 2022-04-18 DIAGNOSIS — I95.9 HYPOTENSION, UNSPECIFIED HYPOTENSION TYPE: ICD-10-CM

## 2022-04-18 DIAGNOSIS — M79.7 FIBROMYALGIA: ICD-10-CM

## 2022-04-18 DIAGNOSIS — Z98.84 HISTORY OF GASTRIC BYPASS: ICD-10-CM

## 2022-04-18 DIAGNOSIS — N92.1 MENORRHAGIA WITH IRREGULAR CYCLE: ICD-10-CM

## 2022-04-18 DIAGNOSIS — F90.9 ATTENTION DEFICIT HYPERACTIVITY DISORDER (ADHD), UNSPECIFIED ADHD TYPE: ICD-10-CM

## 2022-04-18 DIAGNOSIS — D50.9 IRON DEFICIENCY ANEMIA, UNSPECIFIED IRON DEFICIENCY ANEMIA TYPE: ICD-10-CM

## 2022-04-18 DIAGNOSIS — F50.89 PICA: ICD-10-CM

## 2022-04-18 DIAGNOSIS — F41.1 GENERALIZED ANXIETY DISORDER: Chronic | ICD-10-CM

## 2022-04-18 DIAGNOSIS — Z01.818 PREOP EXAMINATION: Primary | ICD-10-CM

## 2022-04-18 DIAGNOSIS — N87.9 CERVICAL ATYPIA: ICD-10-CM

## 2022-04-18 DIAGNOSIS — D69.6 THROMBOCYTOPENIA: ICD-10-CM

## 2022-04-18 DIAGNOSIS — F10.10 ALCOHOL ABUSE: ICD-10-CM

## 2022-04-18 DIAGNOSIS — Z86.19 H/O CLOSTRIDIUM DIFFICILE INFECTION: ICD-10-CM

## 2022-04-18 DIAGNOSIS — R23.3 EASY BRUISING: ICD-10-CM

## 2022-04-18 DIAGNOSIS — Z86.16 HISTORY OF COVID-19: ICD-10-CM

## 2022-04-18 PROBLEM — L03.211 FACIAL CELLULITIS: Status: RESOLVED | Noted: 2022-02-04 | Resolved: 2022-04-18

## 2022-04-18 LAB
FERRITIN SERPL-MCNC: 13 NG/ML (ref 20–300)
INR PPP: 1 (ref 0.8–1.2)
PROTHROMBIN TIME: 10 SEC (ref 9–12.5)

## 2022-04-18 PROCEDURE — 1160F RVW MEDS BY RX/DR IN RCRD: CPT | Mod: CPTII,,, | Performed by: HOSPITALIST

## 2022-04-18 PROCEDURE — 3074F PR MOST RECENT SYSTOLIC BLOOD PRESSURE < 130 MM HG: ICD-10-PCS | Mod: CPTII,,, | Performed by: HOSPITALIST

## 2022-04-18 PROCEDURE — 99213 OFFICE O/P EST LOW 20 MIN: CPT | Mod: PBBFAC | Performed by: HOSPITALIST

## 2022-04-18 PROCEDURE — 3008F PR BODY MASS INDEX (BMI) DOCUMENTED: ICD-10-PCS | Mod: CPTII,,, | Performed by: HOSPITALIST

## 2022-04-18 PROCEDURE — 99214 PR OFFICE/OUTPT VISIT, EST, LEVL IV, 30-39 MIN: ICD-10-PCS | Mod: S$PBB,,, | Performed by: HOSPITALIST

## 2022-04-18 PROCEDURE — 99214 OFFICE O/P EST MOD 30 MIN: CPT | Mod: S$PBB,,, | Performed by: HOSPITALIST

## 2022-04-18 PROCEDURE — 1160F PR REVIEW ALL MEDS BY PRESCRIBER/CLIN PHARMACIST DOCUMENTED: ICD-10-PCS | Mod: CPTII,,, | Performed by: HOSPITALIST

## 2022-04-18 PROCEDURE — 99999 PR PBB SHADOW E&M-EST. PATIENT-LVL III: ICD-10-PCS | Mod: PBBFAC,,, | Performed by: HOSPITALIST

## 2022-04-18 PROCEDURE — 1159F MED LIST DOCD IN RCRD: CPT | Mod: CPTII,,, | Performed by: HOSPITALIST

## 2022-04-18 PROCEDURE — 3008F BODY MASS INDEX DOCD: CPT | Mod: CPTII,,, | Performed by: HOSPITALIST

## 2022-04-18 PROCEDURE — 3074F SYST BP LT 130 MM HG: CPT | Mod: CPTII,,, | Performed by: HOSPITALIST

## 2022-04-18 PROCEDURE — 3078F PR MOST RECENT DIASTOLIC BLOOD PRESSURE < 80 MM HG: ICD-10-PCS | Mod: CPTII,,, | Performed by: HOSPITALIST

## 2022-04-18 PROCEDURE — 3078F DIAST BP <80 MM HG: CPT | Mod: CPTII,,, | Performed by: HOSPITALIST

## 2022-04-18 PROCEDURE — 99999 PR PBB SHADOW E&M-EST. PATIENT-LVL III: CPT | Mod: PBBFAC,,, | Performed by: HOSPITALIST

## 2022-04-18 PROCEDURE — 82728 ASSAY OF FERRITIN: CPT | Performed by: HOSPITALIST

## 2022-04-18 PROCEDURE — 85610 PROTHROMBIN TIME: CPT | Performed by: HOSPITALIST

## 2022-04-18 PROCEDURE — 36415 COLL VENOUS BLD VENIPUNCTURE: CPT | Performed by: HOSPITALIST

## 2022-04-18 PROCEDURE — 1159F PR MEDICATION LIST DOCUMENTED IN MEDICAL RECORD: ICD-10-PCS | Mod: CPTII,,, | Performed by: HOSPITALIST

## 2022-04-18 RX ORDER — ACETAMINOPHEN 500 MG
500 TABLET ORAL DAILY PRN
COMMUNITY
End: 2023-04-11

## 2022-04-18 RX ORDER — MUPIROCIN 20 MG/G
OINTMENT TOPICAL 2 TIMES DAILY
Qty: 22 G | Refills: 0 | Status: SHIPPED | OUTPATIENT
Start: 2022-05-21 | End: 2022-05-26

## 2022-04-18 RX ORDER — IBUPROFEN 200 MG
800 TABLET ORAL DAILY PRN
COMMUNITY
End: 2023-04-11
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
## 2022-04-18 NOTE — ASSESSMENT & PLAN NOTE
Had alcohol excess ( 3-4 times a week , 8-9 beers each time ) with passing of her mother about 4 years ago , lost her brother   Did alcohol until 1 year ago     Current alcohol use   4 times a month( Once a week ) - 4 beers each time    Suggested alcohol reduction     Alcohol related problems    Not known to have liver ( Fatty liver , hepatitis, Cirrhosis )   No seizures  No with drawl  No gout   Not known to have heart problems     No suggestion of  hepatic decompensation        No history  of cirrhosis of liver or suggestions of Liver  decompensation   No Jaundice , dark urine , pale stool     Most recent LFT- N Transaminases     Has low PLT count likely from alcohol

## 2022-04-18 NOTE — ASSESSMENT & PLAN NOTE
Long standing   Has ITP   Had Oncology evaluation in the past   No recent problem   Required Prednisone for low PLT count for bariatric surgery ( About 64 )  in 2009   Check CBC off Alcohol may 2 nd 2022

## 2022-04-18 NOTE — ASSESSMENT & PLAN NOTE
> 10 Years ago   Based on chart review / Patient has a reported history of Staph infection   In an attempt to prevent Surgical site infection , with the up coming surgery , suggest the following      Previous MRSA infection -     Intra nasal Bactroban for 5 days ( 2 days pre op and 3  days post op )   IV vancomycin ( If no allergy / Intolerance to Vancomycin ) for surgeries that require systemic antibiotic to prevent surgical site infections      Generic ointment or generic cream (30gram tube) - place a drop on a q tip , insert into to very tip of the nose only , then press on the nose gently to distribute.

## 2022-04-18 NOTE — ASSESSMENT & PLAN NOTE
Had gastric bypass in 2009 for weight reduction   Cannot tolerate Oral Iron   Got IV Iron twice , last was 3 years ago   Most recent Hb-9.6- April 2022   Check Ferritin   She is supposed to take B12 , One a day   1 week Pre op hold for Multivitamin suggested    -  INR Normal   Ferritin low   Benitez refer to Hematology for consideration of IV Iron due to intolerance to oral Iron

## 2022-04-18 NOTE — ASSESSMENT & PLAN NOTE
Doing fairly well   I suggest monitoring the sodium as SIADH from Prozac  use and hypersecretion of ADH associated with surgery can reduce sodium in the perioperative period

## 2022-04-18 NOTE — ASSESSMENT & PLAN NOTE
Doing good   Care suggested with NSAiD use     GERD   Does not sound Cardiac   Tips to control reflux discussed     GERD-  I suggest continuation of the Proton pump inhibitor in the perioperative period . I suggest aspiration precautions    Contributors     - Alcohol use   - NSAID use     Lost about 150 # over 1 year   Currently BMI 30.20     Weight related conditions     Known to have     Acid reflux     Not troubled with / Not known to have     HTN  Type 2 Diabetes   Hyperlipidemia   Sleep apnea      Fatty liver       Encouraged weight loss

## 2022-04-18 NOTE — HPI
History of present illness- I had the pleasure of meeting this pleasant 41 y.o. lady in the pre op clinic prior to her elective  Neuro   surgery. The patient is new to me .   Offered to have family on the phone during the consultation process   Called father and had him on the     I have obtained the history by speaking to the patient and by reviewing the electronic health records.    Events leading up to surgery / History of presenting illness -    Unilateral vestibular schwannoma    Has Rt sided facial numbness   Rt ear Tinnitus   Rt ear hearing problem   Has balance problems   Started Sept 2020    As per chart   Diagnosed vestibular scwhannoma.  Plan  for  surgery     She has been troubled with severe  Rt sided headache  pain for 3-4 months   Has numbness of the Tongue and can only feel the taste on the Left side   Has numbness on the Rt sided upper and lower lips .   Pain  decreases with resting.  Ibuprofen 1 week pre op hold suggested      Relevant health conditions of significance for the perioperative period/ History of presenting illness -    Subjectively describes health as good     Health conditions of significance for the perioperative period      - Anemia  - Alcohol use   - Gastric bypass    - Low platelet count   - Low BP  - Anxiety   - Acid reflux     Lives with 2 children ( 18 Y daughter , 14 year son )  Father lives in [place]   She is    Father can help   Lost her brother to Leukemia about 1.5 years ago    Not known to have heart disease , Diabetes Mellitus, Lung disease

## 2022-04-18 NOTE — OUTPATIENT SUBJECTIVE & OBJECTIVE
"Outpatient Subjective & Objective     Chief complaint-Preoperative evaluation, Perioperative Medical management, complication reduction plan     Active cardiac conditions- none    Revised cardiac risk index predictors- none    Functional capacity -Examples of physical activity , walks 2-3 times a week,  house work and can take 1 flight of stairs----- She can undertake all the above activities without  chest pain,chest tightness, Shortness of breath  making her exercise tolerance more, than 4 Mets.       Review of Systems   Constitutional: Negative for chills and fever.            Weight loss- intentionally     HENT:        STOPBANG score  0/ 8           Eyes:        No new visual changes   Respiratory:        1 week -allergies    Cough with green  phlegm   Lightening up      No Hemoptysis   Cardiovascular:        As noted   Gastrointestinal:        No overt GI/urinary  blood losses  Bowel movements- Regular  Not pregnant per her    Endocrine:        Prednisone use > 20 mg daily for 3 weeks- none   Genitourinary: Negative for dysuria.        No urinary hesitancy    Musculoskeletal:        Fibromyalgia    Skin: Negative for rash.   Neurological:        No unilateral weakness   Hematological:        Current use of Anticoagulants  None    Psychiatric/Behavioral:        Anxiety - Controlled   Care with Klonopin use suggested   No SI/HI   No vascular stenting           No anesthesia, bleeding, cardiac problems ponv  with previous surgeries/procedures.  Medications and Allergies reviewed in epic.   Father - CAD- age of onset 67 Y    FH- No anesthesia,bleeding / venous thrombosis ,problems  in family     Physical Exam  Blood pressure 125/78, pulse 79, temperature 97.7 °F (36.5 °C), height 5' 5" (1.651 m), weight 82.3 kg (181 lb 8 oz), SpO2 99 %.    .lrre    I offered a gown and the presence of a chaperone during physical examination   She was comfortable to proceed with the exam without the the presence of a chaperone "     Examined with female chaperone     Physical Exam  Constitutional- Vitals - Body mass index is 30.2 kg/m².,   Vitals:    04/18/22 0955   BP: 125/78   Pulse: 79   Temp: 97.7 °F (36.5 °C)     General appearance-Conscious,Coherent  Eyes- No conjunctival icterus,pupils  round  and reactive to light   ENT-Oral cavity- moist  , Hearing grossly normal   Neck- No thyromegaly ,Trachea -central, No jugular venous distension,   No Carotid Bruit   Cardiovascular -Heart Sounds- Normal  and  no murmur   , No gallop rhythm   Respiratory - Normal Respiratory Effort, Normal breath sounds,  Crepitationsrt base ,  no wheeze  and  no forced expiratory wheeze    Peripheral pitting pedal edema-- mild, no calf pain   Gastrointestinal -Soft abdomen, No palpable masses, Non Tender,Liver,Spleen not palpable. No-- free fluid and shifting dullness  Musculoskeletal- No finger Clubbing. Strength grossly normal   Lymphatic-No Palpable cervical, axillary,Inguinal lymphadenopathy   Psychiatric - normal effect,Orientation  Rt Dorsalis pedis pulses-palpable    Lt Dorsalis pedis pulses- palpable   Rt Posterior tibial pulses -palpable   Left posterior tibial pulses -palpable   Miscellaneous -  no asterixis,  no dupuytren's contracture,  no renal bruit and  Tattoo  Investigations  Lab and Imaging have been reviewed in epic.    Review of Medicine tests    EKG- I had independently reviewed the EKG from--4/4/2022  It was reported to be showing     Normal sinus rhythm with sinus arrhythmia   Normal ECG   When compared with ECG of 04-MAR-2022 14:06,   No significant change was found     Review of clinical lab tests:  Lab Results   Component Value Date    CREATININE 0.8 04/04/2022    HGB 9.6 (L) 04/04/2022     (L) 04/04/2022           Review of old records- Was done and information gathered regards to events leading to surgery and health conditions of significance in the perioperative period.    Outpatient Subjective & Objective

## 2022-05-10 ENCOUNTER — TELEPHONE (OUTPATIENT)
Dept: HEMATOLOGY/ONCOLOGY | Facility: CLINIC | Age: 42
End: 2022-05-10
Payer: MEDICAID

## 2022-05-12 ENCOUNTER — TELEPHONE (OUTPATIENT)
Dept: OTOLARYNGOLOGY | Facility: CLINIC | Age: 42
End: 2022-05-12
Payer: MEDICAID

## 2022-05-12 NOTE — TELEPHONE ENCOUNTER
Spoke with pt to cancel surgery. Pt says she had the procedure done elsewhere on 4/28 and wanted to cancel surgery along with post op appointment and future neurology appointment. Surgery and appointments cancelled.

## 2022-05-13 ENCOUNTER — OFFICE VISIT (OUTPATIENT)
Dept: HEMATOLOGY/ONCOLOGY | Facility: CLINIC | Age: 42
End: 2022-05-13
Payer: MEDICAID

## 2022-05-13 ENCOUNTER — TELEPHONE (OUTPATIENT)
Dept: OBSTETRICS AND GYNECOLOGY | Facility: CLINIC | Age: 42
End: 2022-05-13

## 2022-05-13 DIAGNOSIS — D69.6 THROMBOCYTOPENIA: ICD-10-CM

## 2022-05-13 DIAGNOSIS — D50.9 IRON DEFICIENCY ANEMIA, UNSPECIFIED IRON DEFICIENCY ANEMIA TYPE: Primary | ICD-10-CM

## 2022-05-13 PROCEDURE — 99204 PR OFFICE/OUTPT VISIT, NEW, LEVL IV, 45-59 MIN: ICD-10-PCS | Mod: 95,,, | Performed by: INTERNAL MEDICINE

## 2022-05-13 PROCEDURE — 1159F PR MEDICATION LIST DOCUMENTED IN MEDICAL RECORD: ICD-10-PCS | Mod: CPTII,95,, | Performed by: INTERNAL MEDICINE

## 2022-05-13 PROCEDURE — 1159F MED LIST DOCD IN RCRD: CPT | Mod: CPTII,95,, | Performed by: INTERNAL MEDICINE

## 2022-05-13 PROCEDURE — 1160F RVW MEDS BY RX/DR IN RCRD: CPT | Mod: CPTII,95,, | Performed by: INTERNAL MEDICINE

## 2022-05-13 PROCEDURE — 1160F PR REVIEW ALL MEDS BY PRESCRIBER/CLIN PHARMACIST DOCUMENTED: ICD-10-PCS | Mod: CPTII,95,, | Performed by: INTERNAL MEDICINE

## 2022-05-13 PROCEDURE — 99204 OFFICE O/P NEW MOD 45 MIN: CPT | Mod: 95,,, | Performed by: INTERNAL MEDICINE

## 2022-05-13 RX ORDER — SODIUM CHLORIDE 0.9 % (FLUSH) 0.9 %
10 SYRINGE (ML) INJECTION
Status: CANCELLED | OUTPATIENT
Start: 2022-08-18

## 2022-05-13 RX ORDER — HEPARIN 100 UNIT/ML
500 SYRINGE INTRAVENOUS
Status: CANCELLED | OUTPATIENT
Start: 2022-05-20

## 2022-05-13 RX ORDER — SODIUM CHLORIDE 0.9 % (FLUSH) 0.9 %
10 SYRINGE (ML) INJECTION
Status: CANCELLED | OUTPATIENT
Start: 2022-07-14

## 2022-05-13 RX ORDER — SODIUM CHLORIDE 0.9 % (FLUSH) 0.9 %
10 SYRINGE (ML) INJECTION
Status: CANCELLED | OUTPATIENT
Start: 2022-06-09

## 2022-05-13 RX ORDER — SODIUM CHLORIDE 0.9 % (FLUSH) 0.9 %
10 SYRINGE (ML) INJECTION
Status: CANCELLED | OUTPATIENT
Start: 2022-08-04

## 2022-05-13 RX ORDER — HEPARIN 100 UNIT/ML
500 SYRINGE INTRAVENOUS
Status: CANCELLED | OUTPATIENT
Start: 2022-06-09

## 2022-05-13 RX ORDER — HEPARIN 100 UNIT/ML
500 SYRINGE INTRAVENOUS
Status: CANCELLED | OUTPATIENT
Start: 2022-08-25

## 2022-05-13 RX ORDER — SODIUM CHLORIDE 0.9 % (FLUSH) 0.9 %
10 SYRINGE (ML) INJECTION
Status: CANCELLED | OUTPATIENT
Start: 2022-06-30

## 2022-05-13 RX ORDER — HEPARIN 100 UNIT/ML
500 SYRINGE INTRAVENOUS
Status: CANCELLED | OUTPATIENT
Start: 2022-08-04

## 2022-05-13 RX ORDER — HEPARIN 100 UNIT/ML
500 SYRINGE INTRAVENOUS
Status: CANCELLED | OUTPATIENT
Start: 2022-08-18

## 2022-05-13 RX ORDER — SODIUM CHLORIDE 0.9 % (FLUSH) 0.9 %
10 SYRINGE (ML) INJECTION
Status: CANCELLED | OUTPATIENT
Start: 2022-06-02

## 2022-05-13 RX ORDER — SODIUM CHLORIDE 0.9 % (FLUSH) 0.9 %
10 SYRINGE (ML) INJECTION
Status: CANCELLED | OUTPATIENT
Start: 2022-08-25

## 2022-05-13 RX ORDER — HEPARIN 100 UNIT/ML
500 SYRINGE INTRAVENOUS
Status: CANCELLED | OUTPATIENT
Start: 2022-06-02

## 2022-05-13 RX ORDER — HEPARIN 100 UNIT/ML
500 SYRINGE INTRAVENOUS
Status: CANCELLED | OUTPATIENT
Start: 2022-06-30

## 2022-05-13 RX ORDER — SODIUM CHLORIDE 0.9 % (FLUSH) 0.9 %
10 SYRINGE (ML) INJECTION
Status: CANCELLED | OUTPATIENT
Start: 2022-05-20

## 2022-05-13 RX ORDER — HEPARIN 100 UNIT/ML
500 SYRINGE INTRAVENOUS
Status: CANCELLED | OUTPATIENT
Start: 2022-07-14

## 2022-05-13 NOTE — TELEPHONE ENCOUNTER
"----- Message from Ewa Ford MA sent at 5/13/2022 11:47 AM CDT -----    ----- Message -----  From: Majo Trejo  Sent: 5/12/2022   2:44 PM CDT  To: [facility] Clinical Staff    "Type:  Patient Call Back    Who Called:WILL SORIANO [[id number]]    What is the reqeust in detail:Pt requesting call back to reschedule appointment missed on 5/11. Please advise    Can the clinic reply by [facility]? no    Best Call Back Number:[phone]      Additional Information:              "

## 2022-05-13 NOTE — Clinical Note
Please seek authorization for iron sucrose plan. Once approved, please schedule 8 weekly infusions. Please schedule follow-up in benign heme clinic 1 month after completing all iron infusions. Please schedule labs (CBC, CMP, Iron/TIBc, ferritin, retic) at time of return visit.
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
## 2022-05-19 ENCOUNTER — CLINICAL SUPPORT (OUTPATIENT)
Dept: REHABILITATION | Facility: HOSPITAL | Age: 42
End: 2022-05-19
Payer: MEDICAID

## 2022-05-19 DIAGNOSIS — R47.1 DYSARTHRIA: ICD-10-CM

## 2022-05-19 DIAGNOSIS — R26.89 IMPAIRMENT OF BALANCE: ICD-10-CM

## 2022-05-19 DIAGNOSIS — R42 DIZZINESS AND GIDDINESS: ICD-10-CM

## 2022-05-19 PROCEDURE — 92522 EVALUATE SPEECH PRODUCTION: CPT | Mod: PO | Performed by: SPEECH-LANGUAGE PATHOLOGIST

## 2022-05-19 PROCEDURE — 97162 PT EVAL MOD COMPLEX 30 MIN: CPT | Mod: PO

## 2022-05-19 NOTE — PROGRESS NOTES
Please see initial evaluation in plan of care.    ROULA Horvath, CCC-SLP  Speech Language Pathologist   5/19/2022

## 2022-05-19 NOTE — PLAN OF CARE
[facility]  Speech Therapy Evaluation -Neurological Rehabilitation    Date: 5/19/2022     Name: Miriam Mendez   MRN: [id number]    Therapy Diagnosis:   Encounter Diagnosis   Name Primary?   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   Evaluation of Speech Sound Production  33     Precautions: Standard and Fall  Subjective   Date of Onset: surgery for a R acoustic occured early May 2022  History of Current Condition:   Patient reports that she had a vestibular schwannoma removed which caused a facial droop and dysarthric speech.  Prior to surgery, reported no sensation on right side but her right sided facial muscles were functional. Following surgery, patient endorses opposite; states that her sensation is back but she is unable to achieve good range of motion on her right side.  Endorses that she can't hear out of her right earar. Discharged from [facility] last Friday, was receiving skiled speech therapy services with speech-language pathologist, Ms. Joe Junior. Reports that one of her providers mistakenly identified her right sided weakness as Renville Palsy; endorses multiple providers have told her she does not have Ren[place] Palsy and clinician unable to find any supporting documentation for this diagnosis.   Past Medical History: Miriam Mendez  has a past medical history of ADD (attention deficit disorder with hyperactivity), Anxiety, Bronchitis, Cellulitis, Depression, Easy bruising (6/23/2014), Fibromyalgia, History of ITP (6/23/2014), Iron  "deficiency anemia (2014), Morbid obesity, Pica (2014), Pseudotumor cerebri syndrome, and Thrombocytopenia.  Miriam Mendez  has a past surgical history that includes Gastric bypass ();  section (); and Cholecystectomy ().  Medical Hx and Allergies: Miriam has a current medication list which includes the following prescription(s): acetaminophen, clonazepam, dextroamphetamine-amphetamine, fluoxetine, ibuprofen, [START ON 2022] mupirocin, omeprazole, zolpidem, and [DISCONTINUED] nuvaring.   Review of patient's allergies indicates:   Allergen Reactions    Sulfa (sulfonamide antibiotics)      Other reaction(s): Hives   Imaging: CT 22:"Impression:  1. No CT evidence of acute intracranial pathology.  Further evaluation and follow-up as clinically warranted.  2. Mild soft tissue swelling and subcutaneous air in the left periorbital soft tissues.  3. No CT evidence of acute fracture or traumatic malalignment of the cervical spine."  Prior Therapy:  Physical therapy/ speech therapy / occupational therapy - [facility] rehabilitation   Social History:  Patient lives with her aunt.   Prior Level of Function: No dysarthria prior to surgery.   Current Level of Function: Moderate dysarthria.   Pain: 8/10  Pain Location / Description: right eye due to droop  Nutrition:  The International Dysphagia Diet Standardisation Initiative level 0; The International Dysphagia Diet Standardisation Initiative level 7   Patient's Therapy Goals: Improve speech clarity and facial function   Objective   Formal Assessment:    Motor Speech/Fluency/Voice: Pt's motor speech, fluency, and voice were informally assessed. OME performed within Cranial Nerve Assessment detailed below. Motor speech skills were assessed and were not functional for daily communication with a variety of communication partners (i.e. Family, friends, medical personnel).   Cranial Nerve Examination  Cranial Nerve 5: " Trigeminal Nerve  Motor Jaw Posture at rest: Open (patient manually closed mouth at rest with her hands)  Mandible Elevation/Depression: WFL  Mandible lateralization: Unable to lateralize right  Abnormal movement: absent Interpretation: right sided impairment    Sensory Forehead: WFL  Cheek: WFL  Jaw: WFL  Facial Pain: Pain Scale: 8/10, Pain located: skin around eye  Interpretation:intaact bilaterally      Cranial Nerve 7: Facial Nerve  Motor Facial Symmetry: eye droop, lip corner droop-right and asymmetry  Wrinkle Forehead: Deviation-right side  Close eyes tightly: Deviation-right side  Labial Protrusion: Droop to right  Labial Retraction: Deviation-right side  Buccal Strength with Labial Seal: Reduced  Abnormal movement: absent Interpretation: right sided impairment    Sensory Formal testing not completed. Pt denied any changes in taste      Cranial Nerves IX and X: Glossopharyngeal and Vagus Nerves  Motor Palatal Symmetry (Rest): lower on right arch  Palatal Symmetry (Movement): Deviation of uvula to right  Cough: Perceptually strong  Voice: Clear  Resonance: Normal  Abnormal movement: absent Interpretation: right sided impairment      Cranial Nerve XII: Hypoglossal Nerve  Motor Tongue at rest: WNL  Lingual Protrusion: Deviation right  Lingual Protrusion against Resistance: Weakness  Lingual Lateralization: Decreased ROM  Abnormal movement: absent Interpretation: right sided impairment      Other information:   Volitional Swallow: Able to palpate laryngeal rise   Mucosal Quality: No abnormal findings   Secretion Management: drooling on right side when forgets to close lips    Dentition: Good condition for speech and mastication    Respiration / Phonation:   # of reps/ 5s Norms/ # reps per second Maximum Phon. Time (ah)   P^ 12  5.0-7.1 2.4  Trial 1: 13   T^ 31  4.8-7.1 6.2  Trial 2: 9    K^ 30  4.4-7.4 6     P^T^K^ 9  3.6-7.5 1.8  Av             Norm (F 10-26, M 13-34.6)   Sequential motion rates were  below typical normative values      Phonation Oral Reading Conversation   Stimulus Sustained ah:    Singing up scale:    Counting 1 to highest # on one breath: Single sentences  History and Conversation   Quality clear clear clear   Duration  11 seconds    N/a   Loudness  WFL WFL WFL   Steadiness WFL WFL WFL     Overall Speech Intelligibility: fair     Speech Intelligibility:   The [place] Dysarthria Assessment-2nd Edition was administered to Miriam  to assess her motor speech skills. This test assesses the patient's reflexes, respiration, lips, palate, laryngeal function, tongue, and overall intelligibility. Results are described in the following tables:    Reflexes Respiration Lips Palate Laryngeal Tongue Intelligibility   Normal for Age  x        Mild Abnormality x   x x     Abnormality obvious but can perform task with reasonable approximation      x x   Some production of task poor in quality, unable to sustain, or extremely labored   x       Unable to undertake task/ movement/ sound            Description: Patient presents with moderate dysarthria characterized by impaired right sided oral-motor movement, imprecise consonants, and reduced stress.    Awareness / Strategy Use:   Pt demonstrates  adequate awareness of motor speech impairment. Patient is able to use strategies to improve intelligibility however additional training is warranted. Strategies discussed included:   · Speak Slowly / Pacing: Try to slow your rate of speech when talking while keeping the same intonation or sing-song quality that is natural to your voice.   · Overarticulation: Over-say all of the sounds in your words.  · Speak Loudly: make sure to project your voice so that others can hear you.  · Deep Breath: Make sure to use deep breathing to support your speech. If you do not have enough breath support, it is difficult to over-articulate, speak loudly, or speak slowly.   · Open Your Mouth: Make sure to open your mouth widely while  speaking.     Impact of Motor Speech Impairment on Functioning:   - negative impact on general tasks and demands, interpersonal interactions, community interactions, and quality of life.   Safety Risks: Patient with decreased speech intelligibility, negatively impacting her ability to effectively and efficiently explain an emergency situation to emergency operators via phone or in person    Language Skills:   ? Auditory Comprehension: comprehension of complex directions and conversation was WFL.  No concerns reported or observed; WFL for the purpose of assessment and conversation.  ? Verbal Expression: Patient presents with fluent speech though mild word retrieval difficulty noted as evidenced by slowed, halting speech at times.   ? Reading Comprehension: Did not formally assess; WFL for the purpose of assessment.    ? Written Expression: Did not assess. No concerns reported or observed.        Cognition: No concerns reported or observed. Pt and alert and cooperative throughout evaluation, was oriented x 4 independently. Pt did not require cues to attend to evaluation tasks throughout session. Pt demonstrated adequate topic maintenance and reasoning skills throughout evaluation. Patient endorses premorbid ADHD however this has not been exacerbated following surgery. Cognitive-linguistic skills WFL for the purpose of assessment and conversation.      Swallowing: Patient denied dysphagia.     Hearing / Vision:  No concerns reported or observed regarding patient's hearing acuity or visual acuity.       Treatment   Total Treatment Time Separate from Evaluation: not applicable   no treatment performed secondary to time to complete evaluation.    Education: Plan of Care, role of SLP in care, motor speech strategies, course of medical disease affect on therapy diagnosis , scheduling/ cancellation policy and insurance limitations / visit limit  were discussed with patient. Patient expressed understanding.    Home Program:  to be determined   Assessment     Miriam presents to [facility] status post medical diagnosis of LTBI (latent tuberculosis infection) and Neoplasm of unspecified behavior of brain.  Demonstrates impairments including limitations as described in the problem list. Patient presents with moderate dysarthria characterized by impaired right sided oral-motor movement, imprecise consonants, and reduced stress. Mild word retrieval difficulty noted as evidenced by slowed, halting speech at times. Positive prognostic factors include patient motivation and family support. Negative prognostic factors include complex medical history. No barriers to therapy identified. Patient will benefit from skilled, outpatient neurological rehabilitation speech therapy.    Rehab Potential: good  Patient's spiritual, cultural, and educational needs considered and patient agreeable to plan of care and goals.    Short Term Goals (4 weeks):   1.Patient will rate her speech while reading as at least a 2 on a 3 point scale ( 1 = same as before beginning therapy, 2 =better but not best, 3 =best possible outcome) on  8 /10 trials.   2. Patient  will differentiate between her speech and error-free speech by giving specific examples of differences on  8 /10 trials.   3. Patient will use motor speech strategies and answer questions in conversation with a self-rating of at least 2 on a 3 point scale ( 1 = same as before beginning therapy, 2 =better but not best, 3 =best possible outcome) on  8 /10 trials.   4. Patient will complete 50-75 straw sips against resistance with high viscosity bolus (I.e., pudding) given minimum cues to improve overall lingual/labial strength/ range of motion.  5. Patient will complete semantic feature analysis (SFA) chart when given an object/verb with 90% acc given min cues to improve word finding abilities   6. Patient will complete word finding tasks with semantic relationships (I.e. Similarities and  differences, synonyms, antonyms, semantic category) with 90% acc independently to improve word finding.     Long Term Goals (6 weeks):   1. She  will develop functional and intelligible speech and utilize compensatory strategies through the use of adequate labial and lingual function, increased articulatory precision and speech prosody.  2. She  will develop functional cognitive-linguistic based skills and utilize compensatory strategies to communicate wants and needs effectively to different conversational partners, maintain safety, and participate socially in functional living environment.        Plan     Plan of Care Certification Period: 5/19/2022 to 7/1/22    Recommended Treatment Plan:  Patient will participate in the [facility] rehabilitation program for speech therapy 2 times per week to address her Communication and Motor Speech deficits, to educate patient and their family, and to participate in a home exercise program.    Other Recommendations: none at this time    Therapist's Name:   ROULA Horvath, CCC-SLP  Speech Language Pathologist   5/19/2022     I CERTIFY THE NEED FOR THESE SERVICES FURNISHED UNDER THIS PLAN OF TREATMENT AND WHILE UNDER MY CARE    Physician Name: _______________________________    Physician Signature: ____________________________

## 2022-05-19 NOTE — PLAN OF CARE
"[facility]  Physical Therapy Neurological Rehabilitation Initial Evaluation    Name: Miriam MILLS [facility] Number: [id number]    Therapy Diagnosis:   Encounter Diagnoses   Name Primary?    Impairment of balance     Dizziness and giddiness      Physician: Macario Mccallum,*    Physician Orders: PT Eval and Treat   Medical Diagnosis from Referral: Z22.7 (ICD-10-CM) - LTBI (latent tuberculosis infection) D49.6 (ICD-10-CM) - Neoplasm of unspecified behavior of brain   Evaluation Date: 5/19/2022  Insurance Authorization Period: 05/17/2022 - 05/17/2023   Plan of Care Expiration: 07/19/2022   Visit # / Visits authorized: 01/ 01 pending additional auth    Time In: 1300  Time Out: 1345  Total Billable Time: 45 minutes    Precautions: Standard and Fall    Subjective   Date of onset: 3 weeks ago - Surgery to remove acoustic neuroma   History of current condition - Miriam reports: dizziness and imbalance. She also has R facial droop and reports an "itching" sensation of R frontalis region as well as double vision.    Triggers: Movement    Description of symptoms:  Swaying, circular motion - like on a boat   Duration of symptoms: Constant    Visual changes: Double vision in R eye   Hearing Changes (hearing loss or tinnitus): Loss of hearing on the Right. "white noise" tinnitus   Currently taking Meclizine: No      Pts goals: "Get back to normal. Improve mobility"     Systems screening  Cardiovascular indicators:  None   Is patient currently taking medication for stated indicators:   Neurological:  None    Medical History:   Past Medical History:   Diagnosis Date    ADD (attention deficit disorder with hyperactivity)     psych eval 3/10    Anxiety     Bronchitis     RAD /bronchitis episodes    Cellulitis     face    Depression     undergoing psychotherapy    Easy bruising 6/23/2014    Fibromyalgia     History of ITP 6/23/2014    Iron deficiency anemia 6/23/2014    Morbid obesity     " "improved with s/p gastric bypass    Pica 2014    Pseudotumor cerebri syndrome     Thrombocytopenia        Surgical History:   Miriam Mendez  has a past surgical history that includes Gastric bypass ();  section (); and Cholecystectomy ().    Medications:   Miriam has a current medication list which includes the following prescription(s): acetaminophen, clonazepam, dextroamphetamine-amphetamine, fluoxetine, ibuprofen, [START ON 2022] mupirocin, omeprazole, zolpidem, and [DISCONTINUED] nuvaring.    Allergies:   Review of patient's allergies indicates:   Allergen Reactions    Sulfa (sulfonamide antibiotics)      Other reaction(s): Hives        Imaging: See EMR     Prior Therapy: Inpatient rehab x 2 weeks  Social History: Lives with family   Falls: None   DME: RW since surgery    Home Environment: [place]   Exercise Routine / History: Home exercises and walks  Family Present at time of Eval: None   Occupation: Disabled  Prior Level of Function: Independent with ADLs, functional mobility, and recreational activities  Current Level of Function: Independent with ADLs, functional mobility, and recreational activities    Pain: Only head pain when performing home exercises, resolving     Objective   Outcome Measure:   Dizziness Handicap Inventory: 68 - Severe    SYSTEMS SCREEN    - Follows commands: 100% of time   - Speech: dysarthric    Mental status: normal mood, behavior, speech, dress, motor activity, and thought processes  Appearance: Casually dressed  Behavior:  calm and cooperative  Attention Span and Concentration:  Normal    Posture Alignment: WFL     Sensation: Reports an "itching" sensation in region of R frontalis muscle. Reports otherwise normal sensation         UPPER EXTREMITY--AROM/PROM  (R) UE: WFLs  (L) UE: WFLs         RANGE OF MOTION--LOWER EXTREMITIES  (R) LE Hip: normal   Knee: normal   Ankle: normal    (L) LE: Hip: normal   Knee: normal   Ankle: normal    Strength: " manual muscle test grades below   Upper Extremity Strength: DNT  Lower Extremity Strength: DNT     ROM:   CERVICAL SPINE  Flexion: WFL   Extension: WFL, effortful   L side bend: WFL, pain  R side bend: WFL, pain  L rotation: WFL, effortful   R rotation: WFL, effortful    Are concurrent symptoms present with any of these movements: As noted above     Modified VAS (Vertebral Artery Screen), in sitting (rotation, then extension):  R: Negative  L: Negative    VESTIBULAR EXAMINATION    Oculomotor Screen in room light (fixation present):   Ocular ROM: WNL    Tracking/Smooth Pursuits: Intact  Saccades: Intact  Convergence: 39 cm   Spontaneous Nystagmus: Absent   Gaze Holding Nystagmus: Absent     Slow VOR Screen: Impaired, correctives saccades noted  VOR Cancellation: DNT  Head Thrust Test: Positive Loss of gaze to the R    Oculomotor Screen with fixation suppressed (Infrared goggles):  Spontaneous Nystagmus: Present: mild L beating observed, but difficult to fully assess due to frequent blinking   Gaze Holding Nystagmus: Present: mild L beating observed, but difficult to fully assess due to frequent blinking    Head Shaking Nystagmus: Unable to assess due to excessive blinking     Dynamic Visual Acuity: DNT    POSITIONAL CANAL TESTING: Did not test    Functional Gait Assessment:   1. Gait on level surface =  2   (3) Normal: less than 5.5 sec, no A.D., no imbalance, normal gait pattern, deviates< 6in   (2) Mild impairment: 7-5.6 sec, uses A.D., mild gait deviations, or deviates 6-10 in   (1) Moderate impairment: > 7 sec, slow speed, imbalance, deviates 10-15 in.   (0) Severe impairment: needs assist, deviates >15 in, reach/touch wall  2. Change in Gait Speed = 2   (3) Normal: smooth change w/o loss of balance or gait deviation, deviates < 6 in, significant difference between speeds   (2) Mild impairment: changes speed, but demonstrates mild gait deviations, deviates 6-10 in, OR no deviations but unable to significantly  speed, OR uses A.D.   (1) Moderate impairment: minor changes to speed, OR changes speed w/ significant deviations, deviates 10-15 in, OR  Changes speed , but loses balance & recovers   (0) Severe impairment: cannot change speed, deviates >15 in, or loses balance & needs assist  3. Gait with horizontal head turns  = 2   (3) Normal: no change in gait, deviates <6 in   (2) Mild impairment: slight change in speed, deviates 6-10 in, OR uses A.D.   (1) Moderate impairment: moderate change in speed, deviates 10-15 in   (0) Severe impairment: severe disruption of gait, deviates >15in  4. Gait with vertical head turns = 2   (3) Normal: no change in gait, deviates <6 in   (2) Mild impairment: slight change in speed, deviates 6-10 in OR uses A.D.   (1) Moderate impairment: moderate change in speed, deviates 10-15 in   (0) Severe impairment: severe disruption of gait, deviates >15 in  5. Gait with pivot turns = 1   (3) Normal: performs safely in 3 sec, no LOB   (2) Mild impairment: performs in >3 sec & no LOB, OR turns safely & requires several steps to regain LOB   (1) Moderate impairment: turns slow, OR requires several small steps for balance following turn & stop   (0) Severe impairment: cannot turn safely, needs assist  6. Step over obstacle = 2   (3) Normal: steps over 2 stacked boxes w/o change in speed or LOB   (2) Mild impairment: able to step over 1 box w/o change in speed or LOB   (1) Moderate impairment: steps over 1 box but must slow down, may require VC   (0) Severe impairment: cannot perform w/o assist  7. Gait with Narrow CANDICE = 0   (3) Normal: 10 steps no staggering   (2) Mild impairment: 7-9 steps   (1) Moderate impairment: 4-7 steps   (0) Severe impairment: < 4 steps or cannot perform w/o assist  8. Gait with eyes closed = 1   (3) Normal: < 7 sec, no A.D., no LOB, normal gait pattern, deviates <6 in   (2) Mild impairment: 7.1-9 sec, mild gait deviations, deviates 6-10 in   (1) Moderate impairment: > 9 sec,  abnormal pattern, LOB, deviates 10-15 in   (0) Severe impairment: cannot perform w/o assist, LOB, deviates >15in  9. Ambulating Backwards = 2   (3) Normal: no A.D., no LOB, normal gait pattern, deviates <6in   (2) Mild impairment: uses A.D., slower speed, mild gait deviations, deviates 6-10 in   (1) Moderate impairment: slow speed, abnormal gait pattern, LOB, deviates 10-15 in   (0) Severe impairment: severe gait deviations or LOB, deviates >15in  10. Steps = 2   (3) Normal: alternating feet, no rail   (2) Mild Impairment: alternating feet, uses rail   (1) Moderate impairment: step-to, uses rail   (0) Severe impairment: cannot perform safely    Score 16/30     Score:   <22/30 fall risk   <20/30 fall risk in older adults   <18/30 fall risk in Parkinsons        Postural control: MCTSIB: Evaluation 05/19/2022 (Average of 3 trials)   1. Eyes Open/feet together/Firm: 30 seconds   2. Eyes Closed/feet together/Firm:  30 seconds   3. Eyes Open/feet together/Foam: 28 seconds   4. Eyes Closed/feet together/Foam: 4 seconds   TOTAL 92/120      Evaluation 05/19/2022   Single Limb Stance R LE DNT   Single Limb Stance L LE DNT      Evaluation 05/19/2022   Timed Up and Go DNT   Self Selected Walking Speed 0.77 m/sec (6m/7.8s)   Fast Walking Speed DNT       Gait Assessment:  - AD used: RW or none  - Assistance: SBA  - Distance: Household and community    GAIT DEVIATIONS:  Miriam displays the following deviations with ambulation: no marked gait deviations    CMS Impairment/Limitation/Restriction for FOTO Balance Survey    Therapist reviewed FOTO scores for Miriam Mendez on 5/19/2022.   FOTO documents entered into [employer] - see Media section.    Limitation Score: 47%  Category: Mobility       TREATMENT   No treatment provided today. All time spent on evaluation.     Home Exercises and Patient Education Provided    Education provided: Examination findings, POC, scheduling, goals of therapy    Written Home Exercises Provided: No. To  be established at initial follow up session.     Assessment   Miriam is a 41 y.o. female referred to outpatient Physical Therapy with a medical diagnosis of Z22.7 (ICD-10-CM) - LTBI (latent tuberculosis infection) D49.6 (ICD-10-CM) - Neoplasm of unspecified behavior of brain. Patient presents with a chief complaint of dizziness and imbalance. She also presents with R facial drop following the surgery and reports double vision. Patient was screened for impairments of oculomotor function, gaze stability, and balance. With oculomotor screen in room light, she exhibited a severely impaired convergence point at 39 cm and was observable uncomfortable due to inability to blink her R eye. Her recent issues with double vision is likely attributable to her severe convergence insufficiency. Her Head Thrust test was positive for a corrective saccade with R head rotation, as expected, indicating R vestibular hypofunction. Oculomotor screen when visual fixation suppressed was difficult to assess due to excessive blinking and eye shutting; however, mild L beating nystagmus was noted, as expected, indicating R hypofunction. She was significantly challenged with static balance and dynamic balance testing. Her Functional Gait Assessment score of 16/30 indicates that she is at an elevated risk for falls. Since the procedure, .Miriam has been using a Rolling Walker for safe household and community ambulation; she was independent with functional mobility prior. Today's evaluation of Mriiam is consistent with R vestibular hypofunction following resection of R vestibular schwannoma. She is an appropriate for vestibular evaluation to address stated impairments and return patient to prior level of function.     Pt prognosis is Good.   Pt will benefit from skilled outpatient Physical Therapy to address the deficits stated above and in the chart below, provide pt/family education, and to maximize pt's level of independence.     Plan of care  discussed with patient: Yes   Pt's spiritual, cultural and educational needs considered and patient is agreeable to the plan of care and goals as stated below:     Anticipated Barriers for therapy: co-morbidities    Medical Necessity is demonstrated by the following  History  Co-morbidities and personal factors that may impact the plan of care Co-morbidities: (per EMR)  ADD, depression, fibromyalgia    Personal Factors:   no deficits     high   Examination  Body Structures and Functions, activity limitations and participation restrictions that may impact the plan of care Body Regions:   head    Body Systems:    balance  vestibular function    Participation Restrictions:   Household and community mobility    Activity limitations:   Learning and applying knowledge  no deficits    General Tasks and Commands  no deficits    Communication  no deficits    Mobility  walking    Self care  no deficits    Domestic Life  no deficits    Interactions/Relationships  no deficits    Life Areas  no deficits    Community and Social Life  no deficits         high   Clinical Presentation evolving clinical presentation with changing clinical characteristics moderate   Decision Making/ Complexity Score: moderate     Goals:  Short term goals (STG): 4 weeks  Long Term Goals (LTG), 8 weeks.   Pt agrees to goals set. Eval date: 2022     Status   LTG: Patient will be independent with HEP emphasizing gaze stability and motion tolerance.  Education required Ongoing   STG: Patient will exhibit improved Dizziness Handicap Inventory to 50 indicating decreased self-perceived disability related to dizziness.  LT  68 - severe  Ongoing   LTG: Patient will exhibit </= 2 line loss with Dynamic Visual Acuity testing, indicating improved gaze stabilization To be assessed Ongoing   STG: Patient will exhibit improved MCTSIB score to 100/120 indicating improved static postural balance.   LT/120 92/120 Ongoing   STG: Patient will exhibit  improved Functional Gait Assessment score to >/= 20 /30 indicating improved dynamic balance for increased safety ambulatory tasks  LT Ongoing       Plan   Plan of care Certification: 2022 to 2022.    Outpatient Physical Therapy 2 times weekly for 8 weeks to include the following interventions: Neuromuscular Re-ed, Patient Education, Self Care, Therapeutic Activities and Therapeutic Exercise.     Rissa Willis, PT

## 2022-05-19 NOTE — PATIENT INSTRUCTIONS
"Motor Speech Strategies:  Speak Slowly / Pacing: Try to slow your rate of speech when talking while keeping the same intonation or sing-song quality that is natural to your voice.   Overarticulation: Over-say all of the sounds in your words.  Speak Loudly: make sure to project your voice so that others can hear you.  Deep Breath: Make sure to use deep breathing to support your speech. If you do not have enough breath support, it is difficult to over-articulate, speak loudly, or speak slowly.   Open Your Mouth: Make sure to open your mouth widely while speaking.       Dysarthria    What is dysarthria?    Dysarthria is a motor speech disorder. It results from impaired movement of the muscles used for speech production, including the lips, tongue, vocal folds, and/or diaphragm. The type and severity of dysarthria depend on which area of the nervous system is affected.    What are some signs or symptoms of dysarthria?    A person with dysarthria may demonstrate the following speech characteristics:    "Slurred," "choppy," or "mumbled" speech that may be difficult to understand  Slow rate of speech  Rapid rate of speech with a "mumbling" quality  Limited tongue, lip, and jaw movement  Abnormal pitch and rhythm when speaking  Changes in voice quality, such as hoarse or breathy voice or speech that sounds "nasal" or "stuffy"    What causes dysarthria?    Dysarthria is caused by damage to the brain. This may occur at birth, as in cerebral palsy or muscular dystrophy, or may occur later in life due to one of many different conditions that involve the nervous system, including    stroke,  brain injury,  tumors,  Parkinson's disease,  Dilma Gehrig's disease/amyotrophic lateral sclerosis (ALS),  Damon's disease,  multiple sclerosis.  How common is dysarthria?    There are no known data about the incidence of dysarthria in the general population, because of the broad variety of possible causes.    What are the types of " dysarthria?    Find an explanation and definitions of the many types of dysarthria online at [employer] on the Web Series.    How is dysarthria diagnosed?    A speech-language pathologist (SLP) can evaluate a person with speech difficulties and determine the nature and severity of the problem. The SLP will look at movement of the lips, tongue, and face, as well as breath support for speech and voice quality. The assessment will also include an examination of speech production in a variety of contexts.    What treatment is available for people with dysarthria?    Treatment depends on the cause, type, and severity of the symptoms. An SLP works with the individual to improve communication abilities. Some possible goals of treatment include:    Slowing the rate of speech  Improving the breath support so the person can speak more loudly  Strengthening muscles  Increasing tongue and lip movement  Improving speech sound production so that speech is more clear  Teaching caregivers, family members, and teachers strategies to better communicate with the person with dysarthria  In severe cases, learning to use alternative means of communication (e.g., simple gestures, alphabet boards, or electronic or computer-based equipment)  How effective are speech-language pathology treatments for dysarthria?    [employer] produced a treatment efficacy summary on dysarthria [PDF] that describes evidence about how well treatment works. This summary is useful not only to individuals with dysarthria and their caregivers but also to insurance companies considering payment for much needed services for dysarthria.    What can I do to communicate better with a person with dysarthria?    It is important for both the person with dysarthria and the people he or she communicates with to work together to improve interactions. Here are some tips for both speaker and listener.    Tips for the Person With Dysarthria    Introduce your topic with a single  word or short phrase before beginning to speak in more complete sentences.  Check with the listeners to make sure that they understand you.  Speak slowly and loudly and pause frequently.  Try to limit conversations when you feel tired--when your speech will be harder to understand.  If you become frustrated, try to use other methods, such as pointing or gesturing, to get your message across or take a rest and try again later.  Children may need additional help to remember to use these strategies.    Tips for the Listener    Reduce distractions and background noise.  Pay attention to the speaker.  Watch the person as he or she talks.  Let the speaker know when you have difficulty understanding him or her.  Repeat only the part of the message that you understood so that the speaker does not have to repeat the entire message.  If you still don't understand the message, ask yes/no questions or have the speaker write his or her message to you.    What other organizations have information on dysarthria and services for people with dysarthria?    This list is not exhaustive and inclusion does not imply endorsement of the organization or content of the Web site by ANN [employer].    [facility] ohtbgn-ml-xdglft services access numbers  Speech Communication Assistance by [facility], [employer].      Reference: Dysarthria. [employer], ANN MARIE, [email or web address] [email or web address]/public/speech/disorders/dysarthria/.

## 2022-05-25 ENCOUNTER — CLINICAL SUPPORT (OUTPATIENT)
Dept: REHABILITATION | Facility: HOSPITAL | Age: 42
End: 2022-05-25
Payer: MEDICAID

## 2022-05-25 DIAGNOSIS — R47.1 DYSARTHRIA: Primary | ICD-10-CM

## 2022-05-25 PROCEDURE — 92507 TX SP LANG VOICE COMM INDIV: CPT | Mod: PO

## 2022-05-25 NOTE — PROGRESS NOTES
[facility]  Speech Therapy Treatment Note- Neurological Rehabilitation  Date: 5/25/2022     Name: Miriam Mendez   MRN: [id number]   Therapy Diagnosis:   Encounter Diagnosis   Name Primary?    Dysarthria Yes   Physician: Macario Mccallum,*  Physician Orders: [id number] - AMB REFERRAL/CONSULT TO SPEECH THERAPY  Medical Diagnosis:   Z22.7 (ICD-10-CM) - LTBI (latent tuberculosis infection)   D49.6 (ICD-10-CM) - Neoplasm of unspecified behavior of brain        Visit #/ Visits Authorized: 1/ 20  Date of Evaluation:  5/19/2022   Insurance Authorization Period: 5/17/22-12/31/22  Plan of Care Expiration Date:    7/1/2022  Extended Plan of Care:  n/a   Progress Note: 6/19/22 Visits Cancelled: 0  Visits No Show: 0    Time In:  1315  Time Out:  1400  Total Billable Time: 45 minutes     Precautions: Standard and Fall  Subjective:   Patient reports: that she needs a straw to drink.   She was compliant to home exercise program.   Response to previous treatment: positive   Pain Scale:  0/10 on a Visual Analog Scale currently.   Pain Location: n/a  Objective:         UNTIMED  Procedure Min.   Speech- Language- Voice Therapy  45        Total Timed Units: 0  Total Untimed Units: 1  Charges Billed/Number of units: 1    Short Term Goals: (4 weeks) Current Progress:   1.Patient will rate her speech while reading as at least a 2 on a 3 point scale ( 1 = same as before beginning therapy, 2 =better but not best, 3 =best possible outcome) on  8 /10 trials.    Progressing/ Not Met 5/25/2022   Rated 2/3 when in conversation and during speech tasks.   Reviewed SLAP- Slow, Loud, Articulate, Pause   2. Patient  will differentiate between her speech and error-free speech by giving specific examples of differences on  8 /10 trials.     Progressing/ Not Met 5/25/2022   Patient stated that her speech is not at baseline.        3. Patient will use motor speech strategies and answer questions in conversation with a self-rating of  at least 2 on a 3 point scale ( 1 = same as before beginning therapy, 2 =better but not best, 3 =best possible outcome) on  8 /10 trials.    Progressing/ Not Met 5/25/2022   Rated 2/3    4. Patient will complete 50-75 straw sips against resistance with high viscosity bolus (I.e., pudding) given minimum cues to improve overall lingual/labial strength/ range of motion.    Progressing/ Not Met 5/25/2022   Using applesauce - 75 x with some closure on right side but she had to turn her upper right lip inward to make seal.   5. Patient will complete semantic feature analysis (SFA) chart when given an object/verb with 90% acc given min cues to improve word finding abilities    Progressing/ Not Met 5/25/2022   Not formally addressed      6. Patient will complete word finding tasks with semantic relationships (I.e. Similarities and differences, synonyms, antonyms, semantic category) with 90% acc independently to improve word finding    Progressing/  5/25/2022   Similarities- 100% acc; Differences - 100% acc  Opposites - 100% acc    Met x 1          Patient Education/Response:   Educated patient on motor speech strategies and word finding strategies. She verbalized understanding.     Home program established: Patient instructed to continue prior program  Exercises were reviewed and Miriam was able to demonstrate them prior to the end of the session.  Miriam demonstrated good  understanding of the education provided.     See Electronic Medical Record under Patient Instructions for exercises provided throughout therapy.  Assessment:   Miriam is progressing well towards her goals. Speech was intelligible most of the time. Increased lip seal during the straw exercise.  Current goals remain appropriate. Goals to be updated as necessary.     Patient prognosis is Good. Patient will continue to benefit from skilled outpatient speech and language therapy to address the deficits listed in the problem list on initial evaluation, provide  patient/family education and to maximize patient's level of independence in the home and community environment.   Medical necessity is demonstrated by the following IMPAIRMENTS:  Decreased speech intelligibility results in decreased efficiency communicating medical and social wants and needs in the case of emergency.  Barriers to Therapy: none  Patient's spiritual, cultural and educational needs considered and patient agreeable to plan of care and goals.  Plan:   Continue Plan of Care with focus on increasing speech intelligibility and word finding.     ROULA Rodriguez, CCC-SLP, CBIS   5/25/2022

## 2022-06-01 ENCOUNTER — INFUSION (OUTPATIENT)
Dept: INFUSION THERAPY | Facility: HOSPITAL | Age: 42
End: 2022-06-01
Payer: MEDICAID

## 2022-06-01 VITALS
SYSTOLIC BLOOD PRESSURE: 120 MMHG | BODY MASS INDEX: 30.49 KG/M2 | TEMPERATURE: 98 F | OXYGEN SATURATION: 99 % | RESPIRATION RATE: 18 BRPM | WEIGHT: 183 LBS | HEART RATE: 65 BPM | DIASTOLIC BLOOD PRESSURE: 70 MMHG | HEIGHT: 65 IN

## 2022-06-01 DIAGNOSIS — D50.8 OTHER IRON DEFICIENCY ANEMIA: Primary | ICD-10-CM

## 2022-06-01 PROCEDURE — 96374 THER/PROPH/DIAG INJ IV PUSH: CPT

## 2022-06-01 PROCEDURE — 63600175 PHARM REV CODE 636 W HCPCS: Performed by: INTERNAL MEDICINE

## 2022-06-01 PROCEDURE — 25000003 PHARM REV CODE 250: Performed by: INTERNAL MEDICINE

## 2022-06-01 RX ORDER — HEPARIN 100 UNIT/ML
500 SYRINGE INTRAVENOUS
Status: DISCONTINUED | OUTPATIENT
Start: 2022-06-01 | End: 2022-06-01 | Stop reason: HOSPADM

## 2022-06-01 RX ORDER — SODIUM CHLORIDE 0.9 % (FLUSH) 0.9 %
10 SYRINGE (ML) INJECTION
Status: DISCONTINUED | OUTPATIENT
Start: 2022-06-01 | End: 2022-06-01 | Stop reason: HOSPADM

## 2022-06-01 RX ADMIN — SODIUM CHLORIDE: 0.9 INJECTION, SOLUTION INTRAVENOUS at 04:06

## 2022-06-01 RX ADMIN — IRON SUCROSE 200 MG: 20 INJECTION, SOLUTION INTRAVENOUS at 04:06

## 2022-06-01 NOTE — PLAN OF CARE
Pt arrived AAOx3, VSS, pt tolerated venofer IVP and was observed for 30 minutes without S/S of reaction or sensitivity. Pt discharged in stable ambulatory condition.

## 2022-06-02 ENCOUNTER — DOCUMENTATION ONLY (OUTPATIENT)
Dept: REHABILITATION | Facility: HOSPITAL | Age: 42
End: 2022-06-02
Payer: MEDICAID

## 2022-06-02 DIAGNOSIS — R47.1 DYSARTHRIA: Primary | ICD-10-CM

## 2022-06-02 NOTE — PROGRESS NOTES
No Show Note/Documentation    Patient: Miriam Mendez  Date of Session: 6/2/2022  Diagnosis:   1. Dysarthria       MRN: [id number]    Miriam Mendez did not attend {herscheduled therapy appointment today. She did not call to cancel nor reschedule. Next appointment is scheduled for 6/7/22 and will follow up with patient at that time. No charges have been posted today.     Cancel: 0  No show: 1    ELA Pearson, CCC-SLP, CBIS  Speech-Language Pathologist    6/2/2022

## 2022-06-02 NOTE — PROGRESS NOTES
PT/PTA met face to face to discuss pt's treatment plan and progress towards established goals.  Continue with current PT POC with focus on head movement.  Patient will be seen by physical therapist at least every sixth treatment or 30 days, whichever occurs first.    Elizabeth Elias, PTA  06/02/2022

## 2022-06-06 NOTE — PROGRESS NOTES
Physical Therapy Daily Treatment Note     Name: Miriam MILLS [facility] Number: [id number]    Therapy Diagnosis:   Encounter Diagnoses   Name Primary?   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 She also reports continued dizziness when turning head and well as general imbalance.   HEP established this date.   Response to previous treatment: No adverse effects reported   Functional change: Ongoing     Pain: 5/10  Location:   At surgical site - base of skull    Objective     No objective measurement taken this date.     Treatment     Miriam participated in neuromuscular re-education activities to improve: Balance, Gaze stabilization, and Motion Tolerance for 23 minutes. The following activities were included:    1 x 100 feet, Ambulation around neuro  - horizontal head turns  1 x 100 feet, Ambulation around neuro  - vertical head turns    3 x 30s, VORx1 - horizontal  3 x 30s, VORx1 - vertical    Miriam received the following manual therapy techniques: Soft tissue Mobilization were applied to the surgical site for 8 minutes, including:    Cross-friction and STM of tissue surrounding the scar      Home Exercises Provided and Patient Education Provided     Education provided:   - HEP, POC, Proper technique  with therapeutic interventions, Benefits/purpose of today's therapeutic interventions    Written Home Exercises Provided: yes.  Exercises were reviewed and Miriam was able to demonstrate them prior to the end of the session.  Miriam demonstrated good  understanding of the education provided.     See EMR under Patient Instructions for exercises provided 6/3/2022.    Assessment   Miriam tolerated today's session fairly well. She reported increase in symptoms with ambulation motion tolerance, requiring seated rest breaks between laps. Initiated VORx1 for gaze stability training; she demonstrated good initial speed. Patient was educated that symptom provocation is expected with vestibular rehab, but informed that symptoms should remain in the mild-moderate range to avoid excessive exacerbation. PT to continue per established POC, emphasizing balance training and motion tolerance training.    Miriam Is progressing well towards her goals.   Pt prognosis is Good.     Pt will continue to benefit from skilled outpatient physical therapy to address the deficits listed in the problem list box on initial evaluation, provide pt/family education and to maximize pt's level of independence in the home and community environment.     Pt's spiritual, cultural and educational needs considered and pt agreeable to plan of care and goals.     Anticipated barriers to physical therapy: co-morbidities     Goals:     Short term goals (STG): 4 weeks  Long Term Goals (LTG), 8 weeks.   Pt agrees to goals set. Eval date: 2022       Status   LTG: Patient will be independent with HEP emphasizing gaze stability and motion tolerance.  Education required Ongoing   STG: Patient will exhibit improved Dizziness Handicap Inventory to 50 indicating decreased self-perceived disability related to dizziness.  LT  68 - severe  Ongoing   LTG: Patient will exhibit </= 2 line loss with Dynamic Visual Acuity testing, indicating improved gaze  stabilization To be assessed Ongoing   STG: Patient will exhibit improved MCTSIB score to 100/120 indicating improved static postural balance.   LT/120 92/120 Ongoing   STG: Patient will exhibit improved Functional Gait Assessment score to >/= 20 /30 indicating improved dynamic balance for increased safety ambulatory tasks  LT/30 16 Ongoing        Plan     Continue VORx1, ambulation motion tolerance. Initiate static balance training.     Rissa Willis, PT

## 2022-06-07 ENCOUNTER — CLINICAL SUPPORT (OUTPATIENT)
Dept: REHABILITATION | Facility: HOSPITAL | Age: 42
End: 2022-06-07
Payer: MEDICAID

## 2022-06-07 DIAGNOSIS — R47.1 DYSARTHRIA: Primary | ICD-10-CM

## 2022-06-07 DIAGNOSIS — R26.89 IMPAIRMENT OF BALANCE: Primary | ICD-10-CM

## 2022-06-07 DIAGNOSIS — R42 DIZZINESS AND GIDDINESS: ICD-10-CM

## 2022-06-07 PROCEDURE — 97140 MANUAL THERAPY 1/> REGIONS: CPT | Mod: PO,59

## 2022-06-07 PROCEDURE — 92507 TX SP LANG VOICE COMM INDIV: CPT | Mod: PO | Performed by: SPEECH-LANGUAGE PATHOLOGIST

## 2022-06-07 PROCEDURE — 97112 NEUROMUSCULAR REEDUCATION: CPT | Mod: PO,59

## 2022-06-07 NOTE — PATIENT INSTRUCTIONS
"                  ALLISON Rizvi (2001). The source for oral-facial exercises: Updated & expanded. [place], [place]: [facility].              Oral Motor Exercises  Face:   Raise eyebrows  Wrinkle nose     Lips   Open and close mouth  Move corners of mouth from side to side  Smile with lips together   Suck in lips and release with a smack  Keep teeth together and say "ba, be, hendricks" "pa, pee, poo", and "ma, me, moo"    Tongue  Move tongue side to side pressing against tongue depressor on side of tongue  Move tongue up and down to touch chin and nose  Move tongue to touch the top, bottom, and corner of lips  Move tongue in a [place] touching all of the upper lip, corners and lower lips.  Raise the tip of the tongue to touch upper lip, then behind upper teeth pushing against roof of mouth like you are scraping peanut butter off the roof of your mouth  With mouth open, suck the tongue to the roof of the mouth  Repeat "puh-tuh-kuh" or "buttercup" as quickly as possible  Repeat "ajcob- jacob- jacob" "la- la- la" "ramos lady, ramos lady"    Palate  Fill cheeks with air  Fill each cheek separately with air  Fill cheeks with air and press finger against one cheek without allowing air to escape  Sustain "s" as long as possible       Complete each exercise 20x each, 2x daily.    "

## 2022-06-07 NOTE — PROGRESS NOTES
[facility]  Speech Therapy Treatment Note- Neurological Rehabilitation  Date: 6/7/2022     Name: Miriam Mendez   MRN: [id number]   Therapy Diagnosis:   Encounter Diagnosis   Name Primary?   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 She tried to cancel her appointment but didn't do so correctly.  Feels like she's having difficulty swallowing - will cough if she isn't paying close attention and reports that she often has oral residue on her right side that upsets her.   She was compliant to home exercise program.   Response to previous treatment: positive   Pain Scale:  0/10 on a Visual Analog Scale currently.   Pain Location: n/a    Objective:         UNTIMED  Procedure Min.   Speech- Language- Voice Therapy 41         Total Timed Units: 0  Total Untimed Units: 1  Charges Billed/Number of units: 1    Short Term Goals: (4 weeks) Current Progress:   1.Patient will rate her speech while reading as at least a 2 on a 3 point scale ( 1 = same as before beginning therapy, 2 =better but not best, 3 =best possible outcome) on  8 /10 trials.    Progressing/ Not Met 6/7/2022   2/3 on all trials    2. Patient  will differentiate between her speech and error-free speech by giving specific  "examples of differences on  8 /10 trials.     Progressing/ Not Met 6/7/2022   "better" but still "mumbled"    3. Patient will use motor speech strategies and answer questions in conversation with a self-rating of at least 2 on a 3 point scale ( 1 = same as before beginning therapy, 2 =better but not best, 3 =best possible outcome) on  8 /10 trials.    Progressing/ Not Met 6/7/2022   2/3 rating  "better" but still "mumbled"    4. Patient will complete 50-75 straw sips against resistance with high viscosity bolus (I.e., pudding) given minimum cues to improve overall lingual/labial strength/ range of motion.    Progressing/ Not Met 6/7/2022   Not formally addressed     5. Patient will complete semantic feature analysis (SFA) chart when given an object/verb with 90% acc given min cues to improve word finding abilities    Progressing/ Not Met 6/7/2022   Not formally addressed     6. Patient will complete word finding tasks with semantic relationships (I.e. Similarities and differences, synonyms, antonyms, semantic category) with 90% acc independently to improve word finding    Progressing/  6/7/2022   Not formally addressed      Met x 1    7. Patient will demonstrate ability to independently complete buccal and labial stretches to reduce tension in right sided buccal region in attempt to improve right sided range of motion   Progressing/ Not Met 6/7/2022   Patient completed all stretches outlined below given initial model and minimum cues  She progressed to independent completion as task continued                      Patient Education/Response:   Educated patient on motor speech strategies and buccal/labial stretches.  She verbalized understanding.     Home program established: Patient instructed to continue prior program  Exercises were reviewed and Miriam was able to demonstrate them prior to the end of the session.  Miriam demonstrated good  understanding of the education provided.     See Electronic Medical Record " under Patient Instructions for exercises provided throughout therapy.  Assessment:   Miriam is progressing well towards her goals. Patient's speech judged to be intelligible most of the time but consisting of imprecise articulation.  Patient with good response to buccal/labial stretches and reports new sensations to these areas following tactile stimulation/stretches. Patient reports oral dysphagia in addition to increasing occurrences of coughing with thin liquids - recommend Modified Barium Swallow Study to objectively assess her  swallow, rule out silent aspiration, and determine the safest possible diet. Current goals remain appropriate. Goals to be updated as necessary.      Patient prognosis is Good. Patient will continue to benefit from skilled outpatient speech and language therapy to address the deficits listed in the problem list on initial evaluation, provide patient/family education and to maximize patient's level of independence in the home and community environment.   Medical necessity is demonstrated by the following IMPAIRMENTS:  Decreased speech intelligibility results in decreased efficiency communicating medical and social wants and needs in the case of emergency.  Barriers to Therapy: none  Patient's spiritual, cultural and educational needs considered and patient agreeable to plan of care and goals.  Plan:   Continue Plan of Care with focus on increasing speech intelligibility and word finding.     Other recommendations: modified barium swallow study     ROULA Horvath, CCC-SLP  Speech Language Pathologist   6/7/2022

## 2022-06-08 ENCOUNTER — INFUSION (OUTPATIENT)
Dept: INFUSION THERAPY | Facility: HOSPITAL | Age: 42
End: 2022-06-08
Payer: MEDICAID

## 2022-06-08 VITALS
TEMPERATURE: 98 F | OXYGEN SATURATION: 99 % | SYSTOLIC BLOOD PRESSURE: 116 MMHG | RESPIRATION RATE: 18 BRPM | DIASTOLIC BLOOD PRESSURE: 70 MMHG | HEART RATE: 70 BPM

## 2022-06-08 DIAGNOSIS — D50.8 OTHER IRON DEFICIENCY ANEMIA: Primary | ICD-10-CM

## 2022-06-08 PROCEDURE — 96374 THER/PROPH/DIAG INJ IV PUSH: CPT

## 2022-06-08 PROCEDURE — 63600175 PHARM REV CODE 636 W HCPCS: Performed by: INTERNAL MEDICINE

## 2022-06-08 PROCEDURE — 25000003 PHARM REV CODE 250: Performed by: INTERNAL MEDICINE

## 2022-06-08 RX ORDER — HEPARIN 100 UNIT/ML
500 SYRINGE INTRAVENOUS
Status: DISCONTINUED | OUTPATIENT
Start: 2022-06-08 | End: 2022-06-08 | Stop reason: HOSPADM

## 2022-06-08 RX ORDER — SODIUM CHLORIDE 0.9 % (FLUSH) 0.9 %
10 SYRINGE (ML) INJECTION
Status: DISCONTINUED | OUTPATIENT
Start: 2022-06-08 | End: 2022-06-08 | Stop reason: HOSPADM

## 2022-06-08 RX ADMIN — SODIUM CHLORIDE: 9 INJECTION, SOLUTION INTRAVENOUS at 04:06

## 2022-06-08 RX ADMIN — IRON SUCROSE 200 MG: 20 INJECTION, SOLUTION INTRAVENOUS at 04:06

## 2022-06-08 NOTE — PLAN OF CARE
Pt admitted for Venofer IVP, treatment #2.Pt was a difficult IV start. Pt tolerated treatment well. Observed for 30 min post infusion. Pt stated that with last treatment, she felt lethargic and had generalized arthralgia, pt will note if she has similar reaction this pm. Pt discharged @ 16:40

## 2022-06-09 ENCOUNTER — CLINICAL SUPPORT (OUTPATIENT)
Dept: REHABILITATION | Facility: HOSPITAL | Age: 42
End: 2022-06-09
Payer: MEDICAID

## 2022-06-09 DIAGNOSIS — R47.1 DYSARTHRIA: Primary | ICD-10-CM

## 2022-06-09 DIAGNOSIS — R26.89 IMPAIRMENT OF BALANCE: Primary | ICD-10-CM

## 2022-06-09 DIAGNOSIS — R42 DIZZINESS AND GIDDINESS: ICD-10-CM

## 2022-06-09 PROCEDURE — 92507 TX SP LANG VOICE COMM INDIV: CPT | Mod: PO

## 2022-06-09 PROCEDURE — 97110 THERAPEUTIC EXERCISES: CPT | Mod: PO,59

## 2022-06-09 NOTE — PROGRESS NOTES
"  [facility]  Speech Therapy Treatment Note- Neurological Rehabilitation  Date: 6/9/2022     Name: Miriam Mendez   MRN: [id number]   Therapy Diagnosis:   Encounter Diagnosis   Name Primary?   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   She was compliant to home exercise program.   Response to previous treatment: positive   Pain Scale:  0/10 on a Visual Analog Scale currently. Very dizzy today  Pain Location: n/a    Objective:         UNTIMED  Procedure Min.   Speech- Language- Voice Therapy 40          Total Timed Units: 0  Total Untimed Units: 1  Charges Billed/Number of units: 1    Short Term Goals: (4 weeks) Current Progress:   1.Patient will rate her speech while reading as at least a 2 on a 3 point scale ( 1 = same as before beginning therapy, 2 =better but not best, 3 =best possible outcome) on  8 /10 trials.    Progressing/ 6/9/2022   2.5/3  Met x 1   2. Patient  will differentiate between her speech and error-free speech by giving specific examples of differences on  8 /10 trials.     Progressing/ t 6/9/2022   "better" but still "mumbled"   Met x 1   3. Patient will use motor speech strategies and answer questions in conversation with a self-rating of at least 2 on a 3 point scale ( 1 = same as before beginning therapy, 2 " "=better but not best, 3 =best possible outcome) on  8 /10 trials.    Progressing/  6/9/2022   2.5/3 rating  "better" using strategies     Met x 1   4. Patient will complete 50-75 straw sips against resistance with high viscosity bolus (I.e., pudding) given minimum cues to improve overall lingual/labial strength/ range of motion.    Progressing/ 6/9/2022   Using pudding - Completed about 50 with patient indicating that it was harder to suck the pudding and maintain a adequate lip seal.     Met x 1   5. Patient will complete semantic feature analysis (SFA) chart when given an object/verb with 90% acc given min cues to improve word finding abilities    Goal discontinue 6/9/2022   Goal no longer appropriate.      6. Patient will complete word finding tasks with semantic relationships (I.e. Similarities and differences, synonyms, antonyms, semantic category) with 90% acc independently to improve word finding    Goal discontinue 6/9/2022   Patient reported no word finding difficulty.          7. Patient will demonstrate ability to complete buccal stretches.    Progressing/ 6/9/2022   Patient completed all stretches outlined below at home without difficulty.    Icing performed on right side facial muscles for cold stimulation.    Met x 1                     Patient Education/Response:   Educated patient on motor speech strategies and buccal stretches.  She verbalized understanding.     Home program established: Patient instructed to continue prior program  Exercises were reviewed and Miriam was able to demonstrate them prior to the end of the session.  Miriam demonstrated good  understanding of the education provided.     See Electronic Medical Record under Patient Instructions for exercises provided throughout therapy.  Assessment:   Miriam is progressing well towards her goals. Speech was intelligible most of the time. Increased lip seal during the straw exercise but increased difficulty sucking pudding. Buccal stretches " and facial icing completed. Patient reported increased sensation and tingling feeling above her right eye. No word finding difficulty and goal met.  Current goals remain appropriate. Goals to be updated as necessary.     Patient prognosis is Good. Patient will continue to benefit from skilled outpatient speech and language therapy to address the deficits listed in the problem list on initial evaluation, provide patient/family education and to maximize patient's level of independence in the home and community environment.   Medical necessity is demonstrated by the following IMPAIRMENTS:  Decreased speech intelligibility results in decreased efficiency communicating medical and social wants and needs in the case of emergency.  Barriers to Therapy: none  Patient's spiritual, cultural and educational needs considered and patient agreeable to plan of care and goals.  Plan:   Continue Plan of Care with focus on increasing speech intelligibility.     TONG Pearson., CCC-SLP, IS  Speech-Language Pathologist  6/9/2022

## 2022-06-09 NOTE — PROGRESS NOTES
Physical Therapy Daily Treatment Note     Name: Miriam MILLS [facility] Number: [id number]    Therapy Diagnosis:   Encounter Diagnoses   Name Primary?   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   HEP established this date.   Response to previous treatment: No adverse effects reported   Functional change: Ongoing     Pain: 5/10  Location:   At surgical site - base of skull    Dizziness: 7/10    Objective     No objective measurement taken this date.     Treatment     Miriam received therapeutic exercises to develop flexibility for 40 minutes including:    3 x 30s, Upper Trap Stretch   X 10 reps, Cervical L<>R rotation    1 x 100 feet, Ambulation in hallway  - horizontal head turns  1 x 100 feet, Ambulation in hallway  - horizontal head turns - backwards  1 x 100 feet, Ambulation in hallway  - vertical head turns  1 x 100 feet, Ambulation in hallway  - vertical head turns - backwards    2 x 60s, VORx1 - horizontal  2 x 60s, VORx1 - vertical    2 x 30s, VORx2 - horizontal  2 x 30s, VORx2 - vertical     Airex: Contact guard assistance - standby by assist   X 30s, Horizontal head turns - eyes open  X 30s, Vertical head turns - eyes open  X 30s, Eyes closed    X 5 minutes, Cross-friction and STM of tissue surrounding the scar      Home Exercises Provided and Patient  Prior auth needed for [employer] -   She was off for 6 months but now restarting -   Was good til 12/16  She's getting tb tests today Education Provided     Education provided:   - HEP, POC, Proper technique with therapeutic interventions, Benefits/purpose of today's therapeutic interventions    Written Home Exercises Provided: yes.  Exercises were reviewed and Miriam was able to demonstrate them prior to the end of the session.  Miriam demonstrated good  understanding of the education provided.     See EMR under Patient Instructions for exercises provided 6/3/2022.    Assessment   Miriam tolerated today's session well. She demonstrates improved coordination with head turns during forward ambulation, but was challenged with progression to backward ambulation. Gaze stabilization training was progressed to a full minute of VORx1 as well as the addition of VORx2. These were tolerated without significant issues; though she did have some eye irritation. She was appropriately challenged with today's static balance interventions; to be continued and progress as indicated.  PT to continue per established POC, emphasizing balance training and motion tolerance training.    Miriam Is progressing well towards her goals.   Pt prognosis is Good.     Pt will continue to benefit from skilled outpatient physical therapy to address the deficits listed in the problem list box on initial evaluation, provide pt/family education and to maximize pt's level of independence in the home and community environment.     Pt's spiritual, cultural and educational needs considered and pt agreeable to plan of care and goals.     Anticipated barriers to physical therapy: co-morbidities     Goals:     Short term goals (STG): 4 weeks  Long Term Goals (LTG), 8 weeks.   Pt agrees to goals set. Eval date: 05/19/2022       Status   LTG: Patient will be independent with HEP emphasizing gaze stability and motion tolerance.  Education required Ongoing   STG: Patient will exhibit improved Dizziness Handicap Inventory to 50 indicating decreased self-perceived disability related to  dizziness.  LT  68 - severe  Ongoing   LTG: Patient will exhibit </= 2 line loss with Dynamic Visual Acuity testing, indicating improved gaze stabilization To be assessed Ongoing   STG: Patient will exhibit improved MCTSIB score to 100/120 indicating improved static postural balance.   LT/120 92/120 Ongoing   STG: Patient will exhibit improved Functional Gait Assessment score to >/= 20 /30 indicating improved dynamic balance for increased safety ambulatory tasks  LT30 16/30 Ongoing        Plan     Continue VORx1, ambulation motion tolerance. Initiate static balance training.     Rissa Willis, PT

## 2022-06-13 NOTE — PROGRESS NOTES
Physical Therapy Daily Treatment Note     Name: Miriam MILLS [facility] Number: [id number]    Therapy Diagnosis:   Encounter Diagnoses   Name Primary?   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 She reports motion sickness with riding as a passenger in a car. She also endorses general dizziness with activity.   She was compliant with home exercise program.    Response to previous treatment: No adverse effects reported   Functional change: Ongoing     Pain: 5/10  Location:   At surgical site - base of skull    Objective     No objective measurement taken this date.     Treatment     Miriam received therapeutic exercises to develop flexibility for 39 minutes includin x 30s, R Upper Trap Stretch   2 x 30s, R Levator Scap stretch  X 10 reps, Cervical Flexion <> extension with 5 sec holds  X 10 reps, Cervical L<>R rotation with 5 sec holds    2 x 60s, VORx2 - horizontal  2 x 60s, VORx2 - vertical     Standby by assist:  1 x 100 feet, Ambulation in hallway  - horizontal head turns  1 x 100 feet, Ambulation in hallway  - horizontal head turns - backwards, Contact guard assistance   1 x 100 feet, Ambulation in hallway  - vertical head turns  1 x 100 feet, Ambulation in hallway  - vertical head turns - backwards, Contact guard  assistance   2 x 100 feet, Ambulation in hallway - each diagonal plane head turns    1 x 60s, Swissball bounces, eyes fixated on target  2 x 30s, Swissball bounces, eyes fixated on target    X 5 reps Sit <> stand with eyes open then x 5 reps with eyes closed, 2 sets (2 minute rest break between sets due to symptoms)    Cessation ceased 6 minutes early to avoid over-exacerbation of symptoms    Home Exercises Provided and Patient Education Provided     Education provided:   - HEP, POC, Proper technique with therapeutic interventions, Benefits/purpose of today's therapeutic interventions    Written Home Exercises Provided: yes.  Exercises were reviewed and Miriam was able to demonstrate them prior to the end of the session.  Miriam demonstrated good  understanding of the education provided.     See EMR under Patient Instructions for exercises provided 6/3/2022.    Assessment   Miriam tolerated today's session fairly well. Included increased habituation today. Habituation emphasized movement in the saccular plane to somewhat simulate riding in a car; rest breaks required due to symptom provocation. She remains challenged with back ambulation with head turns, requiring Contact guard assistance - standby by assist. Continued cervical stretching to improve cervical soft tissue mobility following surgery. PT to continue per established POC, emphasizing balance training and motion tolerance training.     Miriam Is progressing well towards her goals.   Pt prognosis is Good.     Pt will continue to benefit from skilled outpatient physical therapy to address the deficits listed in the problem list box on initial evaluation, provide pt/family education and to maximize pt's level of independence in the home and community environment.     Pt's spiritual, cultural and educational needs considered and pt agreeable to plan of care and goals.     Anticipated barriers to physical therapy: co-morbidities     Goals:     Short term goals  (STG): 4 weeks  Long Term Goals (LTG), 8 weeks.   Pt agrees to goals set. Eval date: 2022       Status   LTG: Patient will be independent with HEP emphasizing gaze stability and motion tolerance.  Education required Ongoing   STG: Patient will exhibit improved Dizziness Handicap Inventory to 50 indicating decreased self-perceived disability related to dizziness.  LT  68 - severe  Ongoing   LTG: Patient will exhibit </= 2 line loss with Dynamic Visual Acuity testing, indicating improved gaze stabilization To be assessed Ongoing   STG: Patient will exhibit improved MCTSIB score to 100/120 indicating improved static postural balance.   LT/120 92/120 Ongoing   STG: Patient will exhibit improved Functional Gait Assessment score to >/= 20 /30 indicating improved dynamic balance for increased safety ambulatory tasks  LT/30 16 Ongoing        Plan     Progress VORx2 and continue motion tolerance training and balance training. Continue cervical stretching as indicated.    Rissa Willis, PT

## 2022-06-14 ENCOUNTER — CLINICAL SUPPORT (OUTPATIENT)
Dept: REHABILITATION | Facility: HOSPITAL | Age: 42
End: 2022-06-14
Payer: MEDICAID

## 2022-06-14 DIAGNOSIS — R42 DIZZINESS AND GIDDINESS: ICD-10-CM

## 2022-06-14 DIAGNOSIS — R47.1 DYSARTHRIA: Primary | ICD-10-CM

## 2022-06-14 DIAGNOSIS — R26.89 IMPAIRMENT OF BALANCE: Primary | ICD-10-CM

## 2022-06-14 PROCEDURE — 97110 THERAPEUTIC EXERCISES: CPT | Mod: PO,59

## 2022-06-14 PROCEDURE — 92507 TX SP LANG VOICE COMM INDIV: CPT | Mod: PO

## 2022-06-14 NOTE — PROGRESS NOTES
"  [facility]  Speech Therapy Treatment Note- Neurological Rehabilitation  Date: 6/14/2022     Name: Miriam Mendez   MRN: [id number]   Therapy Diagnosis:   Encounter Diagnosis   Name Primary?   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 Reports she feels her speech sounds "clumsy". Reports she does facial exercises around 10 times a day.   She was compliant to home exercise program.   Response to previous treatment: positive   Pain Scale:  0/10 on a Visual Analog Scale currently.   Pain Location: n/a    Objective:         UNTIMED  Procedure Min.   Speech- Language- Voice Therapy 40          Total Timed Units: 0  Total Untimed Units: 1  Charges Billed/Number of units: 1    Short Term Goals: (4 weeks) Current Progress:   1.Patient will rate her speech while reading as at least a 2 on a 3 point scale ( 1 = same as before beginning therapy, 2 =better but not best, 3 =best possible outcome) on  8 /10 trials.    Progressing/ 6/14/2022   Not formally addressed      Met x 1   2. Patient  will differentiate between her speech and error-free speech by giving specific examples of differences on  8 /10 trials.     Progressing/ t 6/14/2022   Reports better when she slows speech down to get better lip seal. Accurate differentiation when in conversation, " "self corrected x3    Met x 2   3. Patient will use motor speech strategies and answer questions in conversation with a self-rating of at least 2 on a 3 point scale ( 1 = same as before beginning therapy, 2 =better but not best, 3 =best possible outcome) on  8 /10 trials.    Progressing/  6/14/2022   2.5/3 rating  "better" using strategies     Met x 2   4. Patient will complete 50-75 straw sips against resistance with high viscosity bolus (I.e., pudding) given minimum cues to improve overall lingual/labial strength/ range of motion.    Progressing/ 6/14/2022   Using pudding - Completed about 50 with patient indicated she would use her teeth to help create seal around straw. Encouraged to alternate between teeth and no teeth, and to begin practicing at home.     Met x 3   5. Patient will complete semantic feature analysis (SFA) chart when given an object/verb with 90% acc given min cues to improve word finding abilities    Goal discontinue 6/9/2022   Goal no longer appropriate.      6. Patient will complete word finding tasks with semantic relationships (I.e. Similarities and differences, synonyms, antonyms, semantic category) with 90% acc independently to improve word finding    Goal discontinue 6/9/2022   Patient reported no word finding difficulty.          7. Patient will demonstrate ability to complete buccal stretches.    Progressing/ 6/14/2022   Patient completed all stretches outlined below at home without difficulty. Additionally recommended adding exercises in mirror: lip seal, lip seal with bubble, pucker, pucker side to side, sig smile to pucker, emotion facial expressions.     Icing performed on right side facial muscles for cold stimulation.    Met x 1                     Patient Education/Response:   Educated patient on motor speech strategies and buccal stretches. Additionally recommended adding exercises in mirror: lip seal, lip seal with bubble, pucker, pucker side to side, sig smile to pucker, " emotion facial expressions.    She verbalized understanding.     Home program established: Patient instructed to continue prior program  Exercises were reviewed and Miriam was able to demonstrate them prior to the end of the session.  Miriam demonstrated good  understanding of the education provided.     See Electronic Medical Record under Patient Instructions for exercises provided throughout therapy.  Assessment:   Miriam is progressing well towards her goals. Speech was intelligible throughout entire session. Increased lip seal during the straw exercise, reported sucking through straw is becoming easier. Buccal stretches and mirror expressions completed.  Patient Independently used speech strategies and self corrected x3. Current goals remain appropriate. Goals to be updated as necessary.     Patient prognosis is Good. Patient will continue to benefit from skilled outpatient speech and language therapy to address the deficits listed in the problem list on initial evaluation, provide patient/family education and to maximize patient's level of independence in the home and community environment.   Medical necessity is demonstrated by the following IMPAIRMENTS:  Decreased speech intelligibility results in decreased efficiency communicating medical and social wants and needs in the case of emergency.  Barriers to Therapy: none  Patient's spiritual, cultural and educational needs considered and patient agreeable to plan of care and goals.  Plan:   Continue Plan of Care with focus on increasing speech intelligibility.     ROULA Schneider, CCC-SLP  Speech Language Pathologist   6/14/2022

## 2022-06-16 ENCOUNTER — CLINICAL SUPPORT (OUTPATIENT)
Dept: REHABILITATION | Facility: HOSPITAL | Age: 42
End: 2022-06-16
Payer: MEDICAID

## 2022-06-16 DIAGNOSIS — R42 DIZZINESS AND GIDDINESS: ICD-10-CM

## 2022-06-16 DIAGNOSIS — R26.89 IMPAIRMENT OF BALANCE: Primary | ICD-10-CM

## 2022-06-16 PROCEDURE — 97110 THERAPEUTIC EXERCISES: CPT | Mod: PO

## 2022-06-16 NOTE — PROGRESS NOTES
Physical Therapy Daily Treatment Note     Name: Miriam MILLS [facility] Number: [id number]    Therapy Diagnosis:   Encounter Diagnoses   Name Primary?   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 It hasn't gone away since last night. Closing her eyes and lying down helps.   She was compliant with home exercise program.  Response to previous treatment: No adverse effects reported   Functional change: Ongoing     Pain: 0/10  Location:  base of skull - tightness    Objective     No objective measurement taken this date.     Treatment     Miriam received therapeutic exercises to develop flexibility for 43 minutes including:    3 x 30s each side Levator Scap stretch    2 x 5 reps with 3s holds shoulder shrugs x shoulder depression  2 x 5 each direction B shoulder rolls    2 x 60s, VORx2 - horizontal   2 x 60s, VORx2 - vertical     X 10 reps, Cervical Flexion <> extension with 5 sec holds  X 10 reps, Cervical L<>R rotation with 5 sec holds    Standby by assist:  1 x 100 feet, Ambulation in hallway  - horizontal head turns  1 x 100 feet, Ambulation in hallway  - horizontal head turns - backwards, Contact guard assistance  (Raquel for 1 LOB)  1 x 100 feet, Ambulation in hallway  - vertical head turns  1 x 100 feet, Ambulation in hallway  - vertical head turns  - backwards, Contact guard assistance   1 x 100 feet, Ambulation in hallway - each diagonal plane head turns    1 x 60s, Swissball bounces, eyes fixated on target - dizziness reported ~30 sec after completion that resolved in 5-6 minutes    X 10 reps, Cervical Flexion <> extension with 5 sec holds      Home Exercises Provided and Patient Education Provided     Education provided:   - HEP, POC, Proper technique with therapeutic interventions, Benefits/purpose of today's therapeutic interventions    Written Home Exercises Provided: yes.  Exercises were reviewed and Miriam was able to demonstrate them prior to the end of the session.  Miriam demonstrated good  understanding of the education provided.     See EMR under Patient Instructions for exercises provided 6/3/2022.    Assessment   Miriam tolerated today's session fairly well. She began today's session with reports of increased symptoms of dizziness which she stated began to resolve following levator scap stretching and shoulder mobility. She demonstrated mild veering with forward ambulation for horizontal head turns and some imbalance with these during backward ambulation, requiring Raquel for one near LOB. Attempted 60 sec bouncing on the swiss ball again as she did not report any dizziness throughout this trial until about 30 seconds after completion. This ultimately did resolve but required several minutes. PT to continue per established POC, emphasizing balance training and motion tolerance training.      Miriam Is progressing well towards her goals.   Pt prognosis is Good.     Pt will continue to benefit from skilled outpatient physical therapy to address the deficits listed in the problem list box on initial evaluation, provide pt/family education and to maximize pt's level of independence in the home and community environment.     Pt's spiritual, cultural and educational needs considered and pt agreeable to plan of care and goals.     Anticipated barriers to  physical therapy: co-morbidities     Goals:     Short term goals (STG): 4 weeks  Long Term Goals (LTG), 8 weeks.   Pt agrees to goals set. Eval date: 2022       Status   LTG: Patient will be independent with HEP emphasizing gaze stability and motion tolerance.  Education required Ongoing   STG: Patient will exhibit improved Dizziness Handicap Inventory to 50 indicating decreased self-perceived disability related to dizziness.  LT  68 - severe  Ongoing   LTG: Patient will exhibit </= 2 line loss with Dynamic Visual Acuity testing, indicating improved gaze stabilization To be assessed Ongoing   STG: Patient will exhibit improved MCTSIB score to 100/120 indicating improved static postural balance.   LT/120 92/120 Ongoing   STG: Patient will exhibit improved Functional Gait Assessment score to >/= 20 /30 indicating improved dynamic balance for increased safety ambulatory tasks  LT/30 16/30 Ongoing        Plan     Progress VORx2 and continue motion tolerance training and balance training. Continue cervical stretching as indicated.    Pieter Trejo, PT

## 2022-06-17 NOTE — PROGRESS NOTES
Physical Therapy Daily Treatment Note     Name: Miriam MILLS [facility] Number: [id number]    Therapy Diagnosis:   Encounter Diagnoses   Name Primary?   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   She was compliant with home exercise program.  Response to previous treatment: No adverse effects reported   Functional change: Ongoing     Pain: 0/10  Location:  base of skull - tightness    Objective       Short term goals (STG): 4 weeks  Long Term Goals (LTG), 8 weeks.   Pt agrees to goals set. Eval date: 2022 Status   LTG: Patient will be independent with HEP emphasizing gaze stability and motion tolerance.  Education required Reports compliance Progressing   STG: Patient will exhibit improved Dizziness Handicap Inventory to 50 indicating decreased self-perceived disability related to dizziness.  LT  68 - severe  46 - Moderate STG Met  LTG Progressing   LTG: Patient will exhibit </= 2 line loss with Dynamic Visual Acuity testing, indicating improved gaze stabilization To be assessed 6 line loss Ongoing   STG: Patient will exhibit improved MCTSIB score to 100/120 indicating improved static postural balance.   LT/120 92/120 116/120 STG MET  LTG Progressing   STG:  Patient will exhibit improved Functional Gait Assessment score to >/= 20 /30 indicating improved dynamic balance for increased safety ambulatory tasks  LT Progressing      Functional Gait Assessment:   1. Gait on level surface =  2   (3) Normal: less than 5.5 sec, no A.D., no imbalance, normal gait pattern, deviates< 6in   (2) Mild impairment: 7-5.6 sec, uses A.D., mild gait deviations, or deviates 6-10 in   (1) Moderate impairment: > 7 sec, slow speed, imbalance, deviates 10-15 in.   (0) Severe impairment: needs assist, deviates >15 in, reach/touch wall  2. Change in Gait Speed = 2   (3) Normal: smooth change w/o loss of balance or gait deviation, deviates < 6 in, significant difference between speeds   (2) Mild impairment: changes speed, but demonstrates mild gait deviations, deviates 6-10 in, OR no deviations but unable to significantly speed, OR uses A.D.   (1) Moderate impairment: minor changes to speed, OR changes speed w/ significant deviations, deviates 10-15 in, OR  Changes speed , but loses balance & recovers   (0) Severe impairment: cannot change speed, deviates >15 in, or loses balance & needs assist  3. Gait with horizontal head turns  = 2   (3) Normal: no change in gait, deviates <6 in   (2) Mild impairment: slight change in speed, deviates 6-10 in, OR uses A.D.   (1) Moderate impairment: moderate change in speed, deviates 10-15 in   (0) Severe impairment: severe disruption of gait, deviates >15in  4. Gait with vertical head turns = 3   (3) Normal: no change in gait, deviates <6 in   (2) Mild impairment: slight change in speed, deviates 6-10 in OR uses A.D.   (1) Moderate impairment: moderate change in speed, deviates 10-15 in   (0) Severe impairment: severe disruption of gait, deviates >15 in  5. Gait with pivot turns = 3   (3) Normal: performs safely in 3 sec, no LOB   (2) Mild impairment: performs in >3 sec & no LOB, OR turns safely & requires several steps to regain LOB   (1)  Moderate impairment: turns slow, OR requires several small steps for balance following turn & stop   (0) Severe impairment: cannot turn safely, needs assist  6. Step over obstacle = 2   (3) Normal: steps over 2 stacked boxes w/o change in speed or LOB   (2) Mild impairment: able to step over 1 box w/o change in speed or LOB   (1) Moderate impairment: steps over 1 box but must slow down, may require VC   (0) Severe impairment: cannot perform w/o assist  7. Gait with Narrow CANDICE = 1   (3) Normal: 10 steps no staggering   (2) Mild impairment: 7-9 steps   (1) Moderate impairment: 4-7 steps   (0) Severe impairment: < 4 steps or cannot perform w/o assist  8. Gait with eyes closed = 1   (3) Normal: < 7 sec, no A.D., no LOB, normal gait pattern, deviates <6 in   (2) Mild impairment: 7.1-9 sec, mild gait deviations, deviates 6-10 in   (1) Moderate impairment: > 9 sec, abnormal pattern, LOB, deviates 10-15 in   (0) Severe impairment: cannot perform w/o assist, LOB, deviates >15in  9. Ambulating Backwards = 2   (3) Normal: no A.D., no LOB, normal gait pattern, deviates <6in   (2) Mild impairment: uses A.D., slower speed, mild gait deviations, deviates 6-10 in   (1) Moderate impairment: slow speed, abnormal gait pattern, LOB, deviates 10-15 in   (0) Severe impairment: severe gait deviations or LOB, deviates >15in  10. Steps = 2   (3) Normal: alternating feet, no rail   (2) Mild Impairment: alternating feet, uses rail   (1) Moderate impairment: step-to, uses rail   (0) Severe impairment: cannot perform safely    Score 19/30     Score:   <22/30 fall risk   <20/30 fall risk in older adults   <18/30 fall risk in Parkinsons       Postural control: MCTSIB: Evaluation 06/21/2022 (Average of 3 trials)   1. Eyes Open/feet together/Firm: 30 seconds   2. Eyes Closed/feet together/Firm:  30 seconds   3. Eyes Open/feet together/Foam:  30 seconds   4. Eyes Closed/feet together/Foam: 26 seconds   TOTAL 116/120         Treatment     Miriam  received therapeutic exercises to develop flexibility for 43 minutes including:    Includes time for objective measurements.    Floor:  X 30s, Feet together, horizontal head turns - eyes closed  X 30s, Feet together, vertical head turns - eyes closed  2 X 30s with each LE fwd, Tandem stance, horizontal head turns - eyes open    Airex:  2 x 30s, Feet together eyes open with horizontal head turns  2 x 30s, eyes closed    Home Exercises Provided and Patient Education Provided     Education provided:   - HEP, POC, Proper technique with therapeutic interventions, Benefits/purpose of today's therapeutic interventions    Written Home Exercises Provided: yes.  Exercises were reviewed and Miriam was able to demonstrate them prior to the end of the session.  Miriam demonstrated good  understanding of the education provided.     See EMR under Patient Instructions for exercises provided 6/3/2022.    Assessment   Reassessment period: 05/19/2022-6/21/2022. Pt reassessed for progress today, exhibiting good progress and participation motivation at this time. She demonstrates improved static balance and dynamic balance. She reports less overall dizziness which is well-reflected in her Dizziness Handicap Inventory score improvement by 22 points. Dynamic Visual Acuity was deferred at initial evaluation due to high symptom irritability and surgical site pain. With Dynamic Visual Acuity testing today, she exhibited a 6 line loss, indicating poor gaze stability; continued training required. She is progressing appropriately with vestibular rehabilitation at this time. PT to continue per established POC, emphasizing balance training, gaze stabilization training, and motion tolerance training.      Miriam Is progressing well towards her goals.   Pt prognosis is Good.     Pt will continue to benefit from skilled outpatient physical therapy to address the deficits listed in the problem list box on initial evaluation, provide pt/family  education and to maximize pt's level of independence in the home and community environment.     Pt's spiritual, cultural and educational needs considered and pt agreeable to plan of care and goals.     Anticipated barriers to physical therapy: co-morbidities     Goals:     Short term goals (STG): 4 weeks  Long Term Goals (LTG), 8 weeks.   Pt agrees to goals set. Eval date: 2022 Status   LTG: Patient will be independent with HEP emphasizing gaze stability and motion tolerance.  Education required Reports compliance Progressing   STG: Patient will exhibit improved Dizziness Handicap Inventory to 50 indicating decreased self-perceived disability related to dizziness.  LT  68 - severe  46 - Moderate STG Met  LTG Progressing   LTG: Patient will exhibit </= 2 line loss with Dynamic Visual Acuity testing, indicating improved gaze stabilization To be assessed 6 line loss Ongoing   STG: Patient will exhibit improved MCTSIB score to 100/120 indicating improved static postural balance.   LT/120 92/120 116/120 STG MET  LTG Progressing   STG: Patient will exhibit improved Functional Gait Assessment score to >/= 20 /30 indicating improved dynamic balance for increased safety ambulatory tasks  LT/30 16/30 19/30 Progressing        Plan     Progress VORx2 and continue motion tolerance training and balance training. Continue cervical stretching as indicated.    Rissa Willis, PT

## 2022-06-21 ENCOUNTER — CLINICAL SUPPORT (OUTPATIENT)
Dept: REHABILITATION | Facility: HOSPITAL | Age: 42
End: 2022-06-21
Payer: MEDICAID

## 2022-06-21 DIAGNOSIS — R26.89 IMPAIRMENT OF BALANCE: Primary | ICD-10-CM

## 2022-06-21 DIAGNOSIS — R42 DIZZINESS AND GIDDINESS: ICD-10-CM

## 2022-06-21 DIAGNOSIS — R47.1 DYSARTHRIA: Primary | ICD-10-CM

## 2022-06-21 PROCEDURE — 97110 THERAPEUTIC EXERCISES: CPT | Mod: PO,59

## 2022-06-21 PROCEDURE — 92507 TX SP LANG VOICE COMM INDIV: CPT | Mod: PO

## 2022-06-21 NOTE — PROGRESS NOTES
[facility]  Speech Therapy Treatment Note- Neurological Rehabilitation  Date: 6/21/2022     Name: Miriam Mendez   MRN: [id number]   Therapy Diagnosis:   Encounter Diagnosis   Name Primary?   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  Reports she does facial exercises every day a couple of times during the day. She goes to the opthoplastic surgeon on this Friday at [facility].  She was compliant to home exercise program.   Response to previous treatment: positive   Pain Scale:  0/10 on a Visual Analog Scale currently.   Pain Location: n/a    Objective:         UNTIMED  Procedure Min.   Speech- Language- Voice Therapy 40          Total Timed Units: 0  Total Untimed Units: 1  Charges Billed/Number of units: 1    Short Term Goals: (4 weeks) Current Progress:   1.Patient will rate her speech while reading as at least a 2 on a 3 point scale ( 1 = same as before beginning therapy, 2 =better but not best, 3 =best possible outcome) on  8 /10 trials.    Met/discontinue 6/21/2022   Not formally addressed      Met x 1 previously   2. Patient  will differentiate between her speech and error-free speech by giving specific examples of differences on  8 /10 trials.     Met/discontinue 6/21/2022   Reports better when she slows speech down to get better lip  "seal. Accurate differentiation when in conversation, self corrected several times.     Met x 3   3. Patient will use motor speech strategies and answer questions in conversation with a self-rating of at least 2 on a 3 point scale ( 1 = same as before beginning therapy, 2 =better but not best, 3 =best possible outcome) on  8 /10 trials.    Met/discontinue  6/21/2022   2.5/3 rating  "better" using strategies     Met x 3   4. Patient will complete 50-75 straw sips against resistance with high viscosity bolus (I.e., pudding) given minimum cues to improve overall lingual/labial strength/ range of motion.    Met/discontinue 6/21/2022   Not formally addressed    Encouraged to alternate between teeth and no teeth when practicing at home.     Met x 3   5. Patient will complete semantic feature analysis (SFA) chart when given an object/verb with 90% acc given min cues to improve word finding abilities    Goal discontinue 6/9/2022   Goal no longer appropriate.      6. Patient will complete word finding tasks with semantic relationships (I.e. Similarities and differences, synonyms, antonyms, semantic category) with 90% acc independently to improve word finding    Goal discontinue 6/9/2022   Patient reported no word finding difficulty.          7. Patient will demonstrate ability to complete buccal stretches.    Progressing 6/21/2022   Patient completed all stretches outlined below at home without difficulty. Additionally recommended adding exercises in mirror: lip seal, lip seal with bubble, pucker, pucker side to side, sig smile to pucker, emotion facial expressions.     Icing performed on right side facial muscles for cold stimulation.      Introduced eyelid stretching - pulled upper lid down and hold for 30 secs 5x then actively try to close right eye.     Met x 2                     Patient Education/Response:   Educated patient on motor speech strategies and buccal stretches. Additionally recommended adding exercises in " mirror: lip seal, lip seal with bubble, pucker, pucker side to side, sig smile to pucker, emotion facial expressions. Demonstrated and introduced eyelid stretching.  She verbalized understanding.     Home program established: Patient instructed to continue prior program  Exercises were reviewed and Miriam was able to demonstrate them prior to the end of the session.  Miriam demonstrated good  understanding of the education provided.     See Electronic Medical Record under Patient Instructions for exercises provided throughout therapy.  Assessment:   Miriam is progressing well towards her goals. Speech was intelligible throughout entire session. Patient reported sucking through straw is becoming easier. Buccal stretches, mirror expressions, eyelid stretching, and facial icing completed.  Patient Independently used speech strategies and self corrected as needed. Current goals remain appropriate. Goals to be updated as necessary. Discharge at end of POC.     Patient prognosis is Good. Patient will continue to benefit from skilled outpatient speech and language therapy to address the deficits listed in the problem list on initial evaluation, provide patient/family education and to maximize patient's level of independence in the home and community environment.   Medical necessity is demonstrated by the following IMPAIRMENTS:  Decreased speech intelligibility results in decreased efficiency communicating medical and social wants and needs in the case of emergency.  Barriers to Therapy: none  Patient's spiritual, cultural and educational needs considered and patient agreeable to plan of care and goals.  Plan:   Continue Plan of Care with focus on increasing speech intelligibility. Discharge at end of POC.     TONG Pearson., CCC-SLP, IS  Speech-Language Pathologist    6/21/2022

## 2022-06-22 NOTE — PLAN OF CARE
Physical Therapy Daily Treatment Note     Name: Miriam MILLS [facility] Number: [id number]    Therapy Diagnosis:   Encounter Diagnoses   Name Primary?   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   She was compliant with home exercise program.  Response to previous treatment: No adverse effects reported   Functional change: Ongoing     Pain: 0/10  Location:  base of skull - tightness    Objective       Short term goals (STG): 4 weeks  Long Term Goals (LTG), 8 weeks.   Pt agrees to goals set. Eval date: 2022 Status   LTG: Patient will be independent with HEP emphasizing gaze stability and motion tolerance.  Education required Reports compliance Progressing   STG: Patient will exhibit improved Dizziness Handicap Inventory to 50 indicating decreased self-perceived disability related to dizziness.  LT  68 - severe  46 - Moderate STG Met  LTG Progressing   LTG: Patient will exhibit </= 2 line loss with Dynamic Visual Acuity testing, indicating improved gaze stabilization To be assessed 6 line loss Ongoing   STG: Patient will exhibit improved MCTSIB score to 100/120 indicating improved static postural balance.   LT/120 92/120 116/120 STG MET  LTG Progressing   STG:  Patient will exhibit improved Functional Gait Assessment score to >/= 20 /30 indicating improved dynamic balance for increased safety ambulatory tasks  LT Progressing      Functional Gait Assessment:   1. Gait on level surface =  2   (3) Normal: less than 5.5 sec, no A.D., no imbalance, normal gait pattern, deviates< 6in   (2) Mild impairment: 7-5.6 sec, uses A.D., mild gait deviations, or deviates 6-10 in   (1) Moderate impairment: > 7 sec, slow speed, imbalance, deviates 10-15 in.   (0) Severe impairment: needs assist, deviates >15 in, reach/touch wall  2. Change in Gait Speed = 2   (3) Normal: smooth change w/o loss of balance or gait deviation, deviates < 6 in, significant difference between speeds   (2) Mild impairment: changes speed, but demonstrates mild gait deviations, deviates 6-10 in, OR no deviations but unable to significantly speed, OR uses A.D.   (1) Moderate impairment: minor changes to speed, OR changes speed w/ significant deviations, deviates 10-15 in, OR  Changes speed , but loses balance & recovers   (0) Severe impairment: cannot change speed, deviates >15 in, or loses balance & needs assist  3. Gait with horizontal head turns  = 2   (3) Normal: no change in gait, deviates <6 in   (2) Mild impairment: slight change in speed, deviates 6-10 in, OR uses A.D.   (1) Moderate impairment: moderate change in speed, deviates 10-15 in   (0) Severe impairment: severe disruption of gait, deviates >15in  4. Gait with vertical head turns = 3   (3) Normal: no change in gait, deviates <6 in   (2) Mild impairment: slight change in speed, deviates 6-10 in OR uses A.D.   (1) Moderate impairment: moderate change in speed, deviates 10-15 in   (0) Severe impairment: severe disruption of gait, deviates >15 in  5. Gait with pivot turns = 3   (3) Normal: performs safely in 3 sec, no LOB   (2) Mild impairment: performs in >3 sec & no LOB, OR turns safely & requires several steps to regain LOB   (1)  Moderate impairment: turns slow, OR requires several small steps for balance following turn & stop   (0) Severe impairment: cannot turn safely, needs assist  6. Step over obstacle = 2   (3) Normal: steps over 2 stacked boxes w/o change in speed or LOB   (2) Mild impairment: able to step over 1 box w/o change in speed or LOB   (1) Moderate impairment: steps over 1 box but must slow down, may require VC   (0) Severe impairment: cannot perform w/o assist  7. Gait with Narrow CANDICE = 1   (3) Normal: 10 steps no staggering   (2) Mild impairment: 7-9 steps   (1) Moderate impairment: 4-7 steps   (0) Severe impairment: < 4 steps or cannot perform w/o assist  8. Gait with eyes closed = 1   (3) Normal: < 7 sec, no A.D., no LOB, normal gait pattern, deviates <6 in   (2) Mild impairment: 7.1-9 sec, mild gait deviations, deviates 6-10 in   (1) Moderate impairment: > 9 sec, abnormal pattern, LOB, deviates 10-15 in   (0) Severe impairment: cannot perform w/o assist, LOB, deviates >15in  9. Ambulating Backwards = 2   (3) Normal: no A.D., no LOB, normal gait pattern, deviates <6in   (2) Mild impairment: uses A.D., slower speed, mild gait deviations, deviates 6-10 in   (1) Moderate impairment: slow speed, abnormal gait pattern, LOB, deviates 10-15 in   (0) Severe impairment: severe gait deviations or LOB, deviates >15in  10. Steps = 2   (3) Normal: alternating feet, no rail   (2) Mild Impairment: alternating feet, uses rail   (1) Moderate impairment: step-to, uses rail   (0) Severe impairment: cannot perform safely    Score 19/30     Score:   <22/30 fall risk   <20/30 fall risk in older adults   <18/30 fall risk in Parkinsons       Postural control: MCTSIB: Evaluation 06/21/2022 (Average of 3 trials)   1. Eyes Open/feet together/Firm: 30 seconds   2. Eyes Closed/feet together/Firm:  30 seconds   3. Eyes Open/feet together/Foam:  30 seconds   4. Eyes Closed/feet together/Foam: 26 seconds   TOTAL 116/120         Treatment     Miriam  received therapeutic exercises to develop flexibility for 43 minutes including:    Includes time for objective measurements.    Floor:  X 30s, Feet together, horizontal head turns - eyes closed  X 30s, Feet together, vertical head turns - eyes closed  2 X 30s with each LE fwd, Tandem stance, horizontal head turns - eyes open    Airex:  2 x 30s, Feet together eyes open with horizontal head turns  2 x 30s, eyes closed    Home Exercises Provided and Patient Education Provided     Education provided:   - HEP, POC, Proper technique with therapeutic interventions, Benefits/purpose of today's therapeutic interventions    Written Home Exercises Provided: yes.  Exercises were reviewed and Miriam was able to demonstrate them prior to the end of the session.  Miriam demonstrated good  understanding of the education provided.     See EMR under Patient Instructions for exercises provided 6/3/2022.    Assessment   Reassessment period: 05/19/2022-6/21/2022. Pt reassessed for progress today, exhibiting good progress and participation motivation at this time. She demonstrates improved static balance and dynamic balance. She reports less overall dizziness which is well-reflected in her Dizziness Handicap Inventory score improvement by 22 points. Dynamic Visual Acuity was deferred at initial evaluation due to high symptom irritability and surgical site pain. With Dynamic Visual Acuity testing today, she exhibited a 6 line loss, indicating poor gaze stability; continued training required. She is progressing appropriately with vestibular rehabilitation at this time. PT to continue per established POC, emphasizing balance training, gaze stabilization training, and motion tolerance training.      Miriam Is progressing well towards her goals.   Pt prognosis is Good.     Pt will continue to benefit from skilled outpatient physical therapy to address the deficits listed in the problem list box on initial evaluation, provide pt/family  education and to maximize pt's level of independence in the home and community environment.     Pt's spiritual, cultural and educational needs considered and pt agreeable to plan of care and goals.     Anticipated barriers to physical therapy: co-morbidities     Goals:     Short term goals (STG): 4 weeks  Long Term Goals (LTG), 8 weeks.   Pt agrees to goals set. Eval date: 2022 Status   LTG: Patient will be independent with HEP emphasizing gaze stability and motion tolerance.  Education required Reports compliance Progressing   STG: Patient will exhibit improved Dizziness Handicap Inventory to 50 indicating decreased self-perceived disability related to dizziness.  LT  68 - severe  46 - Moderate STG Met  LTG Progressing   LTG: Patient will exhibit </= 2 line loss with Dynamic Visual Acuity testing, indicating improved gaze stabilization To be assessed 6 line loss Ongoing   STG: Patient will exhibit improved MCTSIB score to 100/120 indicating improved static postural balance.   LT/120 92/120 116/120 STG MET  LTG Progressing   STG: Patient will exhibit improved Functional Gait Assessment score to >/= 20 /30 indicating improved dynamic balance for increased safety ambulatory tasks  LT/30 16/30 19/30 Progressing        Plan     Progress VORx2 and continue motion tolerance training and balance training. Continue cervical stretching as indicated.    Rissa Willis, PT

## 2022-06-27 ENCOUNTER — TELEPHONE (OUTPATIENT)
Dept: OBSTETRICS AND GYNECOLOGY | Facility: CLINIC | Age: 42
End: 2022-06-27
Payer: MEDICAID

## 2022-06-27 NOTE — TELEPHONE ENCOUNTER
----- Message from José Dickson sent at 6/27/2022  4:01 PM CDT -----  Regarding: Appt  Contact: WILL SORIANO [[id number]]  Name of Who is Calling: WILL SORIANO [[id number]]      What is the request in detail: Would like to speak with staff in regards to rescheduling missed colpo. Please advise      Can the clinic reply by [facility]: yes      What Number to [facility] Back [facility] not [facility]: [phone]

## 2022-06-27 NOTE — PROGRESS NOTES
Physical Therapy Daily Treatment Note     Name: Miriam MILLS [facility] Number: [id number]    Therapy Diagnosis:   Encounter Diagnoses   Name Primary?   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 She states that when the dizziness returns, she finds her speech to be more difficult well.   She was compliant with home exercise program.  Response to previous treatment: No adverse effects reported   Functional change: Ongoing     Pain: 0/10  Location:  base of skull - tightness    Objective     No objective measurements taken this date    Treatment     Miriam received therapeutic exercises to develop flexibility for 40 minutes including:    Standing:  2 x 30 seconds, VORx2 horizontal - attempted 60s but ceased due to high symptoms    Hallway Ambulation:  1 x 100 feet, Horizontal head turns  1 x 100 feet, Vertical head turns  1 x 100 feet, Diagonal head turns  2 x 100 feet, VORx1 horizontal  2 x 100 feet, VORx1 vertical  2 x 100 feet, Trunk twist - retrieving<>replacing mini-basketball with PT positioned behind her    Floor:  X 30s, Feet together, horizontal head turns - eyes closed  X 30s, Feet together, vertical head turns - eyes closed  2 X 30s with each LE fwd, Tandem stance, horizontal head turns -  eyes open    Airex:  2 x 30s, eyes closed   2 x 30s, eyes closed - Horizontal   2 x 30s, eyes closed - Vertical     6 x 40 feet, eyes closed ambulation, standby by assist     Home Exercises Provided and Patient Education Provided     Education provided:   - HEP, POC, Proper technique with therapeutic interventions, Benefits/purpose of today's therapeutic interventions    Written Home Exercises Provided: yes.  Exercises were reviewed and Miriam was able to demonstrate them prior to the end of the session.  Miriam demonstrated good  understanding of the education provided.     See EMR under Patient Instructions for exercises provided 6/3/2022.    Assessment   Miriam tolerated today's session well, despite reports of increased dizziness today. She was challenged with static VORx2, but tolerated and performed static and dynamic balance/motion tolerance interventions well. Discussed OT order request again. Since her MD is not within the [facility] system, advised her to call and request an occupational order request for facial rehab with Janay Eng. PT to continue per established POC, emphasizing balance training, gaze stabilization training, and motion tolerance training.      Miriam Is progressing well towards her goals.   Pt prognosis is Good.     Pt will continue to benefit from skilled outpatient physical therapy to address the deficits listed in the problem list box on initial evaluation, provide pt/family education and to maximize pt's level of independence in the home and community environment.     Pt's spiritual, cultural and educational needs considered and pt agreeable to plan of care and goals.     Anticipated barriers to physical therapy: co-morbidities     Goals:     Short term goals (STG): 4 weeks  Long Term Goals (LTG), 8 weeks.   Pt agrees to goals set. Eval date: 05/19/2022 06/21/2022 Status   LTG: Patient will be independent with HEP emphasizing gaze stability and motion tolerance.  Education  required Reports compliance Progressing   STG: Patient will exhibit improved Dizziness Handicap Inventory to 50 indicating decreased self-perceived disability related to dizziness.  LT  68 - severe  46 - Moderate STG Met  LTG Progressing   LTG: Patient will exhibit </= 2 line loss with Dynamic Visual Acuity testing, indicating improved gaze stabilization To be assessed 6 line loss Ongoing   STG: Patient will exhibit improved MCTSIB score to 100/120 indicating improved static postural balance.   LT/120 92/120 116/120 STG MET  LTG Progressing   STG: Patient will exhibit improved Functional Gait Assessment score to >/= 20 /30 indicating improved dynamic balance for increased safety ambulatory tasks  LT/30 1630 /30 Progressing        Plan     Progress VORx2 and continue motion tolerance training and balance training. Continue cervical stretching as indicated.    Rissa Willis, PT

## 2022-06-28 ENCOUNTER — CLINICAL SUPPORT (OUTPATIENT)
Dept: REHABILITATION | Facility: HOSPITAL | Age: 42
End: 2022-06-28
Payer: MEDICAID

## 2022-06-28 DIAGNOSIS — R47.1 DYSARTHRIA: Primary | ICD-10-CM

## 2022-06-28 DIAGNOSIS — R26.89 IMPAIRMENT OF BALANCE: Primary | ICD-10-CM

## 2022-06-28 DIAGNOSIS — R42 DIZZINESS AND GIDDINESS: ICD-10-CM

## 2022-06-28 PROCEDURE — 97112 NEUROMUSCULAR REEDUCATION: CPT | Mod: PO

## 2022-06-28 PROCEDURE — 92507 TX SP LANG VOICE COMM INDIV: CPT | Mod: PO

## 2022-06-28 NOTE — PROGRESS NOTES
[facility]  Speech Therapy Treatment Note- Neurological Rehabilitation  Date: 6/28/2022     Name: Miriam Mendez   MRN: [id number]   Therapy Diagnosis:   Encounter Diagnosis   Name Primary?   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 Eye plastic surgeon seen and reportedly he wants her to wait an additional 4 more weeks (total 12 weeks) before doing any surgical intervention.   She was compliant to home exercise program.   Response to previous treatment: positive   Pain Scale:  3/10 on a Visual Analog Scale currently. Dizziness and pressure on the right head and face at 7/10 prior to PT session and 20 minutes into the session.  Pain Location: n/a    Objective:         UNTIMED  Procedure Min.   Speech- Language- Voice Therapy 45          Total Timed Units: 0  Total Untimed Units: 1  Charges Billed/Number of units: 1    Short Term Goals: (4 weeks) Current Progress:   1.Patient will rate her speech while reading as at least a 2 on a 3 point scale ( 1 = same as before beginning therapy, 2 =better but not best, 3 =best possible outcome) on  8 /10 trials.    Met/discontinue 6/21/2022   Not formally addressed      Met x 1 previously   2. Patient  will differentiate between her speech and error-free speech  "by giving specific examples of differences on  8 /10 trials.     Met/discontinue 6/21/2022   Reports better when she slows speech down to get better lip seal. Accurate differentiation when in conversation, self corrected several times.     Met x 3   3. Patient will use motor speech strategies and answer questions in conversation with a self-rating of at least 2 on a 3 point scale ( 1 = same as before beginning therapy, 2 =better but not best, 3 =best possible outcome) on  8 /10 trials.    Met/discontinue  6/21/2022   2.5/3 rating  "better" using strategies     Met x 3   4. Patient will complete 50-75 straw sips against resistance with high viscosity bolus (I.e., pudding) given minimum cues to improve overall lingual/labial strength/ range of motion.    Met/discontinue 6/21/2022   Not formally addressed    Encouraged to alternate between teeth and no teeth when practicing at home.     Met x 3   5. Patient will complete semantic feature analysis (SFA) chart when given an object/verb with 90% acc given min cues to improve word finding abilities    Goal discontinue 6/9/2022   Goal no longer appropriate.      6. Patient will complete word finding tasks with semantic relationships (I.e. Similarities and differences, synonyms, antonyms, semantic category) with 90% acc independently to improve word finding    Goal discontinue 6/9/2022   Patient reported no word finding difficulty.          7. Patient will demonstrate ability to complete buccal stretches.    Progressing 6/28/2022   Patient completed all stretches outlined below at home without difficulty. Additionally recommended adding exercises in mirror: lip seal, lip seal with bubble, pucker, pucker side to side, smile to pucker, emotion facial expressions.     More difficulty to pucker to right side and she bites her lip.    Patient performs eyelid stretching at home. pulled upper lid down and hold for 30 secs 5x then actively try to close right eye.     Patient is " using lynne roller on the right side of her face for stimulation.     Increased drooling from right side of mouth with talking today.     Met x 2                     Patient Education/Response:   Discussed discharge at end of POC this week. Educated patient on motor speech strategies and buccal stretches. Additionally recommended adding exercises in mirror: lip seal, lip seal with bubble, pucker, pucker side to side, sig smile to pucker, emotion facial expressions, and eyelid stretching. Discussed and recommended MBSS to assess swallowing function and for baseline measure.  She verbalized understanding.     Home program established: Patient instructed to continue prior program  Exercises were reviewed and Miriam was able to demonstrate them prior to the end of the session.  Miriam demonstrated good  understanding of the education provided.     See Electronic Medical Record under Patient Instructions for exercises provided throughout therapy.  Assessment:   Miriam is progressing well towards her goals. Speech was intelligible throughout entire session. Patient reported some exercises are easier to do than others but she continue performing them independently. Increased drooling or pooling of saliva from right side of mouth today while speaking.  Patient Independently used speech strategies and self corrected as needed. Patient reported inconsistent coughing on saliva and when eating and drinking. Recommend MBSS as discussed previously.  Current goals remain appropriate. Goals to be updated as necessary. Discharge at end of POC.     Patient prognosis is Good. Patient will continue to benefit from skilled outpatient speech and language therapy to address the deficits listed in the problem list on initial evaluation, provide patient/family education and to maximize patient's level of independence in the home and community environment.   Medical necessity is demonstrated by the following IMPAIRMENTS:  Decreased speech  intelligibility results in decreased efficiency communicating medical and social wants and needs in the case of emergency.  Barriers to Therapy: none  Patient's spiritual, cultural and educational needs considered and patient agreeable to plan of care and goals.  Plan:   Continue Plan of Care with focus on increasing speech intelligibility. Discharge at end of POC this week.     TONG Pearson., CCC-SLP, IS  Speech-Language Pathologist    6/28/2022

## 2022-06-29 ENCOUNTER — INFUSION (OUTPATIENT)
Dept: INFUSION THERAPY | Facility: HOSPITAL | Age: 42
End: 2022-06-29
Attending: INTERNAL MEDICINE
Payer: MEDICAID

## 2022-06-29 VITALS
DIASTOLIC BLOOD PRESSURE: 75 MMHG | RESPIRATION RATE: 16 BRPM | TEMPERATURE: 98 F | HEART RATE: 75 BPM | SYSTOLIC BLOOD PRESSURE: 115 MMHG

## 2022-06-29 DIAGNOSIS — D50.8 OTHER IRON DEFICIENCY ANEMIA: Primary | ICD-10-CM

## 2022-06-29 PROCEDURE — 63600175 PHARM REV CODE 636 W HCPCS: Performed by: INTERNAL MEDICINE

## 2022-06-29 PROCEDURE — 96374 THER/PROPH/DIAG INJ IV PUSH: CPT

## 2022-06-29 PROCEDURE — 25000003 PHARM REV CODE 250: Performed by: INTERNAL MEDICINE

## 2022-06-29 RX ORDER — SODIUM CHLORIDE 0.9 % (FLUSH) 0.9 %
10 SYRINGE (ML) INJECTION
Status: DISCONTINUED | OUTPATIENT
Start: 2022-06-29 | End: 2022-06-29 | Stop reason: HOSPADM

## 2022-06-29 RX ORDER — HEPARIN 100 UNIT/ML
500 SYRINGE INTRAVENOUS
Status: DISCONTINUED | OUTPATIENT
Start: 2022-06-29 | End: 2022-06-29 | Stop reason: HOSPADM

## 2022-06-29 RX ADMIN — IRON SUCROSE 200 MG: 20 INJECTION, SOLUTION INTRAVENOUS at 04:06

## 2022-06-29 RX ADMIN — SODIUM CHLORIDE: 9 INJECTION, SOLUTION INTRAVENOUS at 04:06

## 2022-06-29 NOTE — PLAN OF CARE
Patient tolerated venofer today through PIV. Pt stayed for 30 minute observation. Pt declined AVS, uses [facility]. Discharged home, ambulated independently.

## 2022-06-30 ENCOUNTER — DOCUMENTATION ONLY (OUTPATIENT)
Dept: REHABILITATION | Facility: HOSPITAL | Age: 42
End: 2022-06-30
Payer: MEDICAID

## 2022-06-30 DIAGNOSIS — R47.1 DYSARTHRIA: Primary | ICD-10-CM

## 2022-06-30 NOTE — PROGRESS NOTES
Outpatient Therapy Discharge Summary   Discharge Date: 6/30/2022   Name: Miriam MILLS [facility] Number: [id number]  Therapy Diagnosis:   Encounter Diagnosis   Name Primary?   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 Speech was intelligible throughout entire sessions. Patient reported some exercises are easier to do than others but she continue performing them independently. Inconsistent drooling or pooling of saliva from right side of mouth while speaking but overall improved.  Patient Independently used speech strategies and self corrected as needed. Patient reported inconsistent coughing on saliva and when eating and drinking. Recommend MBSS as discussed previously.    Short Term Goals (4 weeks):   1.Patient will rate her speech while reading as at least a 2 on a 3 point scale ( 1 = same as before beginning therapy, 2 =better but not best, 3 =best possible outcome) on  8 /10 trials. met  2. Patient  will differentiate between her speech and error-free speech by giving specific examples of differences on  8 /10 trials.  met  3. Patient will use motor speech strategies and answer questions in conversation with a self-rating of at least 2 on a 3 point scale ( 1 = same as before beginning therapy, 2 =better but not best, 3 =best possible outcome) on  8 /10 trials.  met  4. Patient will complete 50-75 straw sips against resistance with high viscosity bolus (I.e., pudding) given minimum cues to improve overall lingual/labial strength/ range of motion.  met  5. Patient will  complete semantic feature analysis (SFA) chart when given an object/verb with 90% acc given min cues to improve word finding abilities.  met  6. Patient will complete word finding tasks with semantic relationships (I.e. Similarities and differences, synonyms, antonyms, semantic category) with 90% acc independently to improve word finding.  met  7. Patient will demonstrate ability to complete buccal stretches.  met     Long Term Goals (6 weeks):   1. She  will develop functional and intelligible speech and utilize compensatory strategies through the use of adequate labial and lingual function, increased articulatory precision and speech prosody. met  2. She  will develop functional cognitive-linguistic based skills and utilize compensatory strategies to communicate wants and needs effectively to different conversational partners, maintain safety, and participate socially in functional living environment.  met    Discharge reason: Patient has met all of her goals and Patient has reached the maximum rehab potential for the present time    Plan   This patient is discharged from Speech Therapy    ROULA Rodriguez, CCC-SLP, CBIS   6/30/2022

## 2022-07-07 ENCOUNTER — DOCUMENTATION ONLY (OUTPATIENT)
Dept: REHABILITATION | Facility: HOSPITAL | Age: 42
End: 2022-07-07
Payer: MEDICAID

## 2022-07-07 NOTE — PROGRESS NOTES
Documentation Only/ No Show    Patient: Miriam Mendez  Date of Session: 07/07/2022  MRN: [id number]  Miriam Mendez did not attend her scheduled physical therapy appointment today. Miriam Mendez did not call to cancel nor reschedule. This is the 2nd appointment that the patient has not attended. No charges have been posted today.     Rissa Willis, PT  07/07/2022

## 2022-07-13 ENCOUNTER — INFUSION (OUTPATIENT)
Dept: INFUSION THERAPY | Facility: HOSPITAL | Age: 42
End: 2022-07-13
Attending: INTERNAL MEDICINE
Payer: MEDICAID

## 2022-07-13 VITALS
HEART RATE: 74 BPM | SYSTOLIC BLOOD PRESSURE: 137 MMHG | TEMPERATURE: 98 F | RESPIRATION RATE: 18 BRPM | OXYGEN SATURATION: 100 % | DIASTOLIC BLOOD PRESSURE: 80 MMHG

## 2022-07-13 DIAGNOSIS — D50.8 OTHER IRON DEFICIENCY ANEMIA: Primary | ICD-10-CM

## 2022-07-13 PROCEDURE — 63600175 PHARM REV CODE 636 W HCPCS: Performed by: INTERNAL MEDICINE

## 2022-07-13 PROCEDURE — 25000003 PHARM REV CODE 250: Performed by: INTERNAL MEDICINE

## 2022-07-13 PROCEDURE — 96374 THER/PROPH/DIAG INJ IV PUSH: CPT

## 2022-07-13 RX ORDER — SODIUM CHLORIDE 0.9 % (FLUSH) 0.9 %
10 SYRINGE (ML) INJECTION
Status: DISCONTINUED | OUTPATIENT
Start: 2022-07-13 | End: 2022-07-13 | Stop reason: HOSPADM

## 2022-07-13 RX ORDER — HEPARIN 100 UNIT/ML
500 SYRINGE INTRAVENOUS
Status: DISCONTINUED | OUTPATIENT
Start: 2022-07-13 | End: 2022-07-13 | Stop reason: HOSPADM

## 2022-07-13 RX ADMIN — SODIUM CHLORIDE: 9 INJECTION, SOLUTION INTRAVENOUS at 03:07

## 2022-07-13 RX ADMIN — IRON SUCROSE 200 MG: 20 INJECTION, SOLUTION INTRAVENOUS at 03:07

## 2022-07-13 NOTE — PLAN OF CARE
Patient tolerated venofer with no complications. PIV removed and patient discharged in NAD post 30 min observation period.

## 2022-07-19 ENCOUNTER — CLINICAL SUPPORT (OUTPATIENT)
Dept: REHABILITATION | Facility: HOSPITAL | Age: 42
End: 2022-07-19
Payer: MEDICAID

## 2022-07-19 ENCOUNTER — OFFICE VISIT (OUTPATIENT)
Dept: PRIMARY CARE CLINIC | Facility: CLINIC | Age: 42
End: 2022-07-19
Payer: MEDICAID

## 2022-07-19 DIAGNOSIS — Z76.89 ENCOUNTER TO ESTABLISH CARE WITH NEW DOCTOR: Primary | ICD-10-CM

## 2022-07-19 DIAGNOSIS — R26.89 IMPAIRMENT OF BALANCE: Primary | ICD-10-CM

## 2022-07-19 DIAGNOSIS — Z76.0 ENCOUNTER FOR MEDICATION REFILL: ICD-10-CM

## 2022-07-19 DIAGNOSIS — R42 DIZZINESS AND GIDDINESS: ICD-10-CM

## 2022-07-19 DIAGNOSIS — Z98.84 HISTORY OF GASTRIC BYPASS: ICD-10-CM

## 2022-07-19 DIAGNOSIS — K21.9 GASTROESOPHAGEAL REFLUX DISEASE WITHOUT ESOPHAGITIS: ICD-10-CM

## 2022-07-19 PROCEDURE — 1160F PR REVIEW ALL MEDS BY PRESCRIBER/CLIN PHARMACIST DOCUMENTED: ICD-10-PCS | Mod: CPTII,95,, | Performed by: NURSE PRACTITIONER

## 2022-07-19 PROCEDURE — 99213 PR OFFICE/OUTPT VISIT, EST, LEVL III, 20-29 MIN: ICD-10-PCS | Mod: 95,,, | Performed by: NURSE PRACTITIONER

## 2022-07-19 PROCEDURE — 1160F RVW MEDS BY RX/DR IN RCRD: CPT | Mod: CPTII,95,, | Performed by: NURSE PRACTITIONER

## 2022-07-19 PROCEDURE — 97112 NEUROMUSCULAR REEDUCATION: CPT | Mod: PO

## 2022-07-19 PROCEDURE — 1159F PR MEDICATION LIST DOCUMENTED IN MEDICAL RECORD: ICD-10-PCS | Mod: CPTII,95,, | Performed by: NURSE PRACTITIONER

## 2022-07-19 PROCEDURE — 99213 OFFICE O/P EST LOW 20 MIN: CPT | Mod: 95,,, | Performed by: NURSE PRACTITIONER

## 2022-07-19 PROCEDURE — 1159F MED LIST DOCD IN RCRD: CPT | Mod: CPTII,95,, | Performed by: NURSE PRACTITIONER

## 2022-07-19 RX ORDER — OMEPRAZOLE 20 MG/1
20 CAPSULE, DELAYED RELEASE ORAL 2 TIMES DAILY
Qty: 60 CAPSULE | Refills: 0 | Status: SHIPPED | OUTPATIENT
Start: 2022-07-19 | End: 2023-04-11 | Stop reason: SDUPTHER

## 2022-07-19 NOTE — PLAN OF CARE
Physical Therapy Daily Treatment Note     Name: Miriam MILLS [facility] Number: [id number]    Therapy Diagnosis:   Encounter Diagnoses   Name Primary?   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 She reports that dizziness has steadily improved. She no longer has the constant sensation of rocking on a boat, just occasional episodes.    She was compliant with home exercise program.   Response to previous treatment: No adverse effects reported   Functional change: Ongoing     Pain: 0/10  Location:  base of skull - tightness    Objective       Short term goals (STG): 4 weeks  Long Term Goals (LTG), 8 weeks.   Pt agrees to goals set. Eval date: 2022   LTG: Patient will be independent with HEP emphasizing gaze stability and motion tolerance.  Education required Reports compliance   STG: Patient will exhibit improved Dizziness Handicap Inventory to 50 indicating decreased self-perceived disability related to dizziness.  LT  68 - severe  36 - Moderate   LTG: Patient will exhibit </= 2 line loss with Dynamic Visual Acuity testing, indicating improved gaze stabilization To be assessed 3 line loss   STG: Patient will exhibit improved MCTSIB score to 100/120  "indicating improved static postural balance.   LT/120 92/120 95/120   STG: Patient will exhibit improved Functional Gait Assessment score to >/= 20 /30 indicating improved dynamic balance for increased safety ambulatory tasks  LT/30 16/30 20/30      Functional Gait Assessment:   1. Gait on level surface =  2  2. Change in Gait Speed = 2  3. Gait with horizontal head turns  = 2  4. Gait with vertical head turns = 2  5. Gait with pivot turns = 2  6. Step over obstacle = 2  7. Gait with Narrow CANDICE = 2  8. Gait with eyes closed = 1  9. Ambulating Backwards = 3  10. Steps = 2    Score 20/30       Postural control: MCTSIB: Evaluation 2022 (Average of 3 trials)   1. Eyes Open/feet together/Firm: 30 seconds   2. Eyes Closed/feet together/Firm:  30 seconds   3. Eyes Open/feet together/Foam:  30 seconds   4. Eyes Closed/feet together/Foam:  5 seconds   TOTAL 95/120       Treatment     Miriam received therapeutic exercises to develop flexibility for 40 minutes including:    Includes time for objective testing. See above.     Home Exercises Provided and Patient Education Provided     Education provided:   - HEP, POC, Proper technique with therapeutic interventions, Benefits/purpose of today's therapeutic interventions    Written Home Exercises Provided: yes.  Exercises were reviewed and Miriam was able to demonstrate them prior to the end of the session.  Miriam demonstrated good  understanding of the education provided.     See EMR under Patient Instructions for exercises provided 6/3/2022.     Assessment   Reassessment period: 2022-2022. Pt reassessed for progress today. Miriam returned to PT today following lapse in treatment due to personal obligations. Miriam reports that her dizziness symptoms have improved. She stated that her dizziness is longer a constant sensation of "rocking on a boat", but rather episodic. Her chief complaint appears to be her balance impairments. She reports continued " participation in gaze stabilization home exercise program during lapse. This is reflected well by her Dynamic Visual Acuity index improvement from 6 lines initially to 3 lines today.  Functional Gait Assessment score remains relatively consistent with prior measure; her score still indicates impaired dynamic balance and places her in the elevated fall risk category. Suprisingly, her mCTSIB score reflects a significant decline of static balance, possibly attributable to lapse in balance training. Miriam is progressing appropriately with physical therapy and will benefit from plan of care extension x 8 weeks to maximize gains. PT to emphasize balance training, gaze stabilization, and motion tolerance training.      Miriam Is progressing well towards her goals.   Pt prognosis is Good.     Pt will continue to benefit from skilled outpatient physical therapy to address the deficits listed in the problem list box on initial evaluation, provide pt/family education and to maximize pt's level of independence in the home and community environment.     Pt's spiritual, cultural and educational needs considered and pt agreeable to plan of care and goals.     Anticipated barriers to physical therapy: co-morbidities     Goals:     Short term goals (STG): 4 weeks  Long Term Goals (LTG), 8 weeks.   Pt agrees to goals set. Eval date: 2022 Status   LTG: Patient will be independent with HEP emphasizing gaze stability and motion tolerance.  Education required Reports compliance Progressing   STG: Patient will exhibit improved Dizziness Handicap Inventory to 50 indicating decreased self-perceived disability related to dizziness.  LT  68 - severe  36 - Moderate STG Met  LTG Progressing   LTG: Patient will exhibit </= 2 line loss with Dynamic Visual Acuity testing, indicating improved gaze stabilization To be assessed 3 line loss Progressing    STG: Patient will exhibit improved MCTSIB score to 100/120  indicating improved static postural balance.   LT/120 92/120 95/120 STG - previously met but declined  LTG - Ongoing   STG: Patient will exhibit improved Functional Gait Assessment score to >/= 20 /30 indicating improved dynamic balance for increased safety ambulatory tasks  LT Progressing        Plan   Updated certification dates: 2022-2022.    Continue PT 2x weekly under established Plan of Care, with treatment to include: pt education, HEP, gait training, therapeutic exercises, neuromuscular re-education/balance exercises, and therapeutic activities to work towards established goals. Pt may be seen by PTA to carry out plan of care.     Progress VORx2 and continue motion tolerance training and balance training. Continue cervical stretching as indicated.    Rissa Willis, PT

## 2022-07-19 NOTE — PROGRESS NOTES
Physical Therapy Daily Treatment Note     Name: Miriam MILLS [facility] Number: [id number]    Therapy Diagnosis:   Encounter Diagnoses   Name Primary?   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 She reports that dizziness has steadily improved. She no longer has the constant sensation of rocking on a boat, just occasional episodes.    She was compliant with home exercise program.   Response to previous treatment: No adverse effects reported   Functional change: Ongoing     Pain: 0/10  Location:  base of skull - tightness    Objective       Short term goals (STG): 4 weeks  Long Term Goals (LTG), 8 weeks.   Pt agrees to goals set. Eval date: 2022   LTG: Patient will be independent with HEP emphasizing gaze stability and motion tolerance.  Education required Reports compliance   STG: Patient will exhibit improved Dizziness Handicap Inventory to 50 indicating decreased self-perceived disability related to dizziness.  LT  68 - severe  36 - Moderate   LTG: Patient will exhibit </= 2 line loss with Dynamic Visual Acuity testing, indicating improved gaze stabilization To be assessed 3 line loss   STG: Patient will exhibit improved MCTSIB score to 100/120  "indicating improved static postural balance.   LT/120 92/120 95/120   STG: Patient will exhibit improved Functional Gait Assessment score to >/= 20 /30 indicating improved dynamic balance for increased safety ambulatory tasks  LT/30 16/30 20/30      Functional Gait Assessment:   1. Gait on level surface =  2  2. Change in Gait Speed = 2  3. Gait with horizontal head turns  = 2  4. Gait with vertical head turns = 2  5. Gait with pivot turns = 2  6. Step over obstacle = 2  7. Gait with Narrow CANDICE = 2  8. Gait with eyes closed = 1  9. Ambulating Backwards = 3  10. Steps = 2    Score 20/30       Postural control: MCTSIB: Evaluation 2022 (Average of 3 trials)   1. Eyes Open/feet together/Firm: 30 seconds   2. Eyes Closed/feet together/Firm:  30 seconds   3. Eyes Open/feet together/Foam:  30 seconds   4. Eyes Closed/feet together/Foam:  5 seconds   TOTAL 95/120       Treatment     Miriam received therapeutic exercises to develop flexibility for 40 minutes including:    Includes time for objective testing. See above.     Home Exercises Provided and Patient Education Provided     Education provided:   - HEP, POC, Proper technique with therapeutic interventions, Benefits/purpose of today's therapeutic interventions    Written Home Exercises Provided: yes.  Exercises were reviewed and Miriam was able to demonstrate them prior to the end of the session.  Miriam demonstrated good  understanding of the education provided.     See EMR under Patient Instructions for exercises provided 6/3/2022.     Assessment   Reassessment period: 2022-2022. Pt reassessed for progress today. Miriam returned to PT today following lapse in treatment due to personal obligations. Miriam reports that her dizziness symptoms have improved. She stated that her dizziness is longer a constant sensation of "rocking on a boat", but rather episodic. Her chief complaint appears to be her balance impairments. She reports continued " participation in gaze stabilization home exercise program during lapse. This is reflected well by her Dynamic Visual Acuity index improvement from 6 lines initially to 3 lines today.  Functional Gait Assessment score remains relatively consistent with prior measure; her score still indicates impaired dynamic balance and places her in the elevated fall risk category. Suprisingly, her mCTSIB score reflects a significant decline of static balance, possibly attributable to lapse in balance training. Miriam is progressing appropriately with physical therapy and will benefit from plan of care extension x 8 weeks to maximize gains. PT to emphasize balance training, gaze stabilization, and motion tolerance training.      Miriam Is progressing well towards her goals.   Pt prognosis is Good.     Pt will continue to benefit from skilled outpatient physical therapy to address the deficits listed in the problem list box on initial evaluation, provide pt/family education and to maximize pt's level of independence in the home and community environment.     Pt's spiritual, cultural and educational needs considered and pt agreeable to plan of care and goals.     Anticipated barriers to physical therapy: co-morbidities     Goals:     Short term goals (STG): 4 weeks  Long Term Goals (LTG), 8 weeks.   Pt agrees to goals set. Eval date: 2022 Status   LTG: Patient will be independent with HEP emphasizing gaze stability and motion tolerance.  Education required Reports compliance Progressing   STG: Patient will exhibit improved Dizziness Handicap Inventory to 50 indicating decreased self-perceived disability related to dizziness.  LT  68 - severe  36 - Moderate STG Met  LTG Progressing   LTG: Patient will exhibit </= 2 line loss with Dynamic Visual Acuity testing, indicating improved gaze stabilization To be assessed 3 line loss Progressing    STG: Patient will exhibit improved MCTSIB score to 100/120  indicating improved static postural balance.   LT/120 92/120 95/120 STG - previously met but declined  LTG - Ongoing   STG: Patient will exhibit improved Functional Gait Assessment score to >/= 20 /30 indicating improved dynamic balance for increased safety ambulatory tasks  LT Progressing        Plan   Updated certification dates: 2022-2022.    Continue PT 2x weekly under established Plan of Care, with treatment to include: pt education, HEP, gait training, therapeutic exercises, neuromuscular re-education/balance exercises, and therapeutic activities to work towards established goals. Pt may be seen by PTA to carry out plan of care.     Progress VORx2 and continue motion tolerance training and balance training. Continue cervical stretching as indicated.    Rissa Willis, PT

## 2022-07-19 NOTE — PROGRESS NOTES
The patient location is: [place].  The chief complaint leading to consultation is: seeking referral for PCP and medication refill.     Visit type: audiovisual    Face to Face time with patient: 10  20 minutes of total time spent on the encounter, which includes face to face time and non-face to face time preparing to see the patient (eg, review of tests), Obtaining and/or reviewing separately obtained history, Documenting clinical information in the electronic or other health record, Independently interpreting results (not separately reported) and communicating results to the patient/family/caregiver, or Care coordination (not separately reported).         Each patient to whom he or she provides medical services by telemedicine is:  (1) informed of the relationship between the physician and patient and the respective role of any other health care provider with respect to management of the patient; and (2) notified that he or she may decline to receive medical services by telemedicine and may withdraw from such care at any time.    Notes:   Subjective:       Patient ID: Miriam Mendez is a 41 y.o. female.    Chief Complaint: seeking referral for PCP and medication refill.     Ms. Miriam Mendez is a 41 year old female, new to me, presents via telemedicine seeking referral for PCP and medication refill. No PCP. Medical and surgical history in addition to problem list reviewed as listed below.    Patient presents seeking referral for primary care management and medication refill. PCP retired. No acute concerns.      Past Medical History:   Diagnosis Date    ADD (attention deficit disorder with hyperactivity)     psych eval 3/10    Anxiety     Bronchitis     RAD /bronchitis episodes    Cellulitis     face    Depression     undergoing psychotherapy    Easy bruising 6/23/2014    Fibromyalgia     History of ITP 6/23/2014    Iron deficiency anemia 6/23/2014    Morbid obesity     improved with s/p gastric  bypass    Pica 2014    Pseudotumor cerebri syndrome     Thrombocytopenia         Past Surgical History:   Procedure Laterality Date     SECTION  2007    CHOLECYSTECTOMY  2008    GASTRIC BYPASS  2009        Family History   Problem Relation Age of Onset    Breast cancer Mother 57    Hypertension Mother     Cancer Mother 57        breast cancer    Cancer Father     Cancer Paternal Grandmother         breast    Cancer Paternal Grandfather         kidney    Colon cancer Neg Hx     Diabetes Neg Hx     Ovarian cancer Neg Hx     Stroke Neg Hx        Social History     Tobacco Use   Smoking Status Never Smoker   Smokeless Tobacco Never Used       Social History     Social History Narrative    Not on file       Review of patient's allergies indicates:   Allergen Reactions    Sulfa (sulfonamide antibiotics)      Other reaction(s): Hives        Review of Systems   HENT: Negative.    Respiratory: Negative.    Cardiovascular: Negative.    Gastrointestinal: Positive for nausea.   Genitourinary: Negative.    Neurological: Negative.    Psychiatric/Behavioral: Negative.          Objective:        There were no vitals filed for this visit.     Limited physical examination due to nature of virtual/telemedicine visit.    Physical Exam  Constitutional:       Appearance: Normal appearance.   Neurological:      Mental Status: She is alert and oriented to person, place, and time.   Psychiatric:         Behavior: Behavior normal.         Assessment:       1. Encounter to establish care with new doctor    2. Encounter for medication refill    3. Gastroesophageal reflux disease without esophagitis    4. History of gastric bypass        Plan:       Encounter to establish care with new doctor  -     Ambulatory referral/consult to Internal Medicine; Future; Expected date: 2022    Encounter for medication refill  -     omeprazole (PRILOSEC) 20 MG capsule; Take 1 capsule (20 mg total) by mouth 2 (two) times a  day.  Dispense: 60 capsule; Refill: 0    Gastroesophageal reflux disease without esophagitis  -     omeprazole (PRILOSEC) 20 MG capsule; Take 1 capsule (20 mg total) by mouth 2 (two) times a day.  Dispense: 60 capsule; Refill: 0    History of gastric bypass  -     omeprazole (PRILOSEC) 20 MG capsule; Take 1 capsule (20 mg total) by mouth 2 (two) times a day.  Dispense: 60 capsule; Refill: 0       Medication List with Changes/Refills   New Medications    OMEPRAZOLE (PRILOSEC) 20 MG CAPSULE    Take 1 capsule (20 mg total) by mouth 2 (two) times a day.   Current Medications    ACETAMINOPHEN (TYLENOL) 500 MG TABLET    Take 500 mg by mouth daily as needed for Pain.    CLONAZEPAM (KLONOPIN) 1 MG TABLET    Take 1 mg by mouth 2 (two) times daily as needed for Anxiety.    DEXTROAMPHETAMINE-AMPHETAMINE 30 MG TAB    1 tablet po in the morning and 1/2 tab in the afternoon    FLUOXETINE 40 MG CAPSULE    Take 40 mg by mouth nightly.    IBUPROFEN (ADVIL,MOTRIN) 200 MG TABLET    Take 800 mg by mouth daily as needed for Pain.    ZOLPIDEM (AMBIEN) 10 MG TAB    Take 10 mg by mouth every evening.   Discontinued Medications    OMEPRAZOLE 20 MG TBEC    Take by mouth 2 (two) times a day.            Follow up if symptoms worsen or fail to improve.    Jackelin Trejo APRN, MSN, FNP-C

## 2022-07-21 NOTE — PROGRESS NOTES
Physical Therapy Daily Treatment Note     Name: Miriam MILLS [facility] Number: [id number]    Therapy Diagnosis:   No diagnosis found.  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 Her doctor told her that her facial tone is better. She reports that dizziness has steadily improved, and she no longer has the constant sensation of rocking on a boat, just occasional episodes. She stated that moving up and down ramps has triggered dizziness, while dizziness while driving has decreased. She feels her dizziness has reduced to the point that it is affecting her life much less.      She was compliant with home exercise program.   Response to previous treatment: No adverse effects reported   Functional change: Ongoing     Pain: 0/10  Location:  base of skull - tightness    Objective      No objective tests performed this date.    Treatment     Miriam participated in therapeutic exercise activities to improve: Balance and Dizziness for 46 minutes. The following activities were included:    Standing:  2 x 30 seconds, VOR 2 horizontal  2 x 30 seconds, VOR 2 vertical   2 x 30 seconds, VOR 1 horizontal, increased some dizziness  2 x 30 seconds, VOR 1 vertical, slightly easier    Hallway Ambulation:  2 x 100 feet, Horizontal head turns,  contact guard assist, occurrences of deviations towards wall  2 x 100 feet, Vertical head turns, contact guard assist    2 trials, Standing between desk of different heights, picking up cones and placing on other desk, stacking and unstacking cones, alternate trunk rotations, standby assist     Floor:  2 X 30s, Feet together, horizontal head turns - eyes closed, contact guard assist  2 X 30s, Feet together, vertical head turns - eyes closed, contact guard assist  3 X 30s, Eyes closed, feet together, on Airex, contact guard assist    X 4 laps,Tandem ambulation forwards at ballet bar, contact guard assist  1 rep for 30s, tandem stance on Airex, eyes closed, contact guard assist, moderate swaying and difficulty maintaining tandem position  2 reps, 10s, each foot in front, tandem stance on floor eyes closed, contact guard assist  2 reps, 30s, tandem stance on airex eyes open,     Home Exercises Provided and Patient Education Provided     Education provided:   - HEP, POC, Proper technique with therapeutic interventions, Benefits/purpose of today's therapeutic interventions    Written Home Exercises Provided: yes.  Exercises were reviewed and Miriam was able to demonstrate them prior to the end of the session.  Miriam demonstrated good  understanding of the education provided.     See EMR under Patient Instructions for exercises provided 6/3/2022.     Assessment   Miriam tolerated today's session well. Miriam reports that her dizziness symptoms have improved.  While performing the VOR 1 exercises while standing her dizziness increased slightly. Although she had a few instances of unsteadiness and deviating towards the wall while performing hallway ambulation with head turns, she feels those exercises have gotten easier. She did experience some difficulty with tandem stance on an Airex; it was challenging for her to maintain tandem stance with eyes closed and arms crossed. She had moderate swaying and unsteadiness. She  attempted tandem stance with eyes closed and arms crossed on the floor, and she still experienced difficulty. Additional exercises focusing on tandem stance will be included in future sessions. PT to emphasize tandem balance, balance training, gaze stabilization, and motion tolerance training.     Miriam Is progressing well towards her goals.   Pt prognosis is Good.     Pt will continue to benefit from skilled outpatient physical therapy to address the deficits listed in the problem list box on initial evaluation, provide pt/family education and to maximize pt's level of independence in the home and community environment.     Pt's spiritual, cultural and educational needs considered and pt agreeable to plan of care and goals.     Anticipated barriers to physical therapy: co-morbidities     Goals:     Short term goals (STG): 4 weeks  Long Term Goals (LTG), 8 weeks.   Pt agrees to goals set. Eval date: 2022 Status   LTG: Patient will be independent with HEP emphasizing gaze stability and motion tolerance.  Education required Reports compliance Progressing   STG: Patient will exhibit improved Dizziness Handicap Inventory to 50 indicating decreased self-perceived disability related to dizziness.  LT  68 - severe  36 - Moderate STG Met  LTG Progressing   LTG: Patient will exhibit </= 2 line loss with Dynamic Visual Acuity testing, indicating improved gaze stabilization To be assessed 3 line loss Progressing    STG: Patient will exhibit improved MCTSIB score to 100/120 indicating improved static postural balance.   LT/120 92/120 95/120 STG - previously met but declined  LTG - Ongoing   STG: Patient will exhibit improved Functional Gait Assessment score to >/= 20 /30 indicating improved dynamic balance for increased safety ambulatory tasks  LT/30 16/30 20/30 Progressing        Plan   Continued PT treatment sessions to focus on: pt education, HEP, gait training, therapeutic exercises,  neuromuscular re-education/balance exercises, and therapeutic activities to work towards established goals. Pt may be seen by PTA to carry out plan of care.     Progress VORx2 and continue motion tolerance training and balance training. Continue cervical stretching as indicated.    Grazyna Card, SPT    I certify that I was present in the room directing the student in service delivery and guiding them using my skilled judgment. As the co-signing therapist, I have reviewed the student's documentation and am responsible for the treatment, assessment and plan.    Rissa Willis, PT

## 2022-07-22 ENCOUNTER — CLINICAL SUPPORT (OUTPATIENT)
Dept: REHABILITATION | Facility: HOSPITAL | Age: 42
End: 2022-07-22
Payer: MEDICAID

## 2022-07-22 DIAGNOSIS — R42 DIZZINESS AND GIDDINESS: ICD-10-CM

## 2022-07-22 DIAGNOSIS — R26.89 IMPAIRMENT OF BALANCE: Primary | ICD-10-CM

## 2022-07-22 PROCEDURE — 97112 NEUROMUSCULAR REEDUCATION: CPT | Mod: PO

## 2022-07-25 NOTE — PROGRESS NOTES
Physical Therapy Daily Treatment Note     Name: Miriam MILLS [facility] Number: [id number]    Therapy Diagnosis:   Encounter Diagnoses   Name Primary?   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       She was compliant with home exercise program.   Response to previous treatment: No adverse effects reported   Functional change: Ongoing     Pain: 0/10  Location:  base of skull - tightness    Objective      No objective tests performed this date.    Treatment     Miriam participated in therapeutic exercise activities to improve: Balance and Dizziness for 43 minutes. The following activities were included:    X 8 minutes, Treadmill ambulation up to 1.7 mph   Incline adjusted as sequenced below:   X 2 minutes at 2.0 incline   X 1 minute at 0 incline   X 2 minutes at 3.0 incline   X 1 minute at 0 incline   X 1 minute at 2.0 incline   X 1 minute at 3.0 incline   X 1 minute at 4.0 incline     Seated:   2 x 30 seconds, VOR 2 horizontal  2 x 30 seconds, VOR 2 vertical     Ambulation around neuro gym: standby by assist   2 x 125 feet, VORx1 horizontal   2 x 125 feet, VORx1 vertical     Hallway Ambulation: standby by assist   2 x 100 feet, Horizontal head turns, contact guard assist, occurrences of deviations  towards wall  2 x 100 feet, Vertical head turns, contact guard assist     Rocker board in //bars:  3 x 30s, A/P weightshifts, no UE support SBA  1 x 30s, Static balance with rocker board making posterior contact, eyes open, standby by assist   2 x 30s, Static balance with rocker board making posterior contact, eyes closed, standby by assist   1 x 30s, Static balance with rocker board making posterior contact, eyes open, standby by assist   2 x 30s, Static balance with rocker board making posterior contact, eyes closed, Contact guard assistance - standby by assist      Home Exercises Provided and Patient Education Provided     Education provided:   - HEP, POC, Proper technique with therapeutic interventions, Benefits/purpose of today's therapeutic interventions    Written Home Exercises Provided: yes.  Exercises were reviewed and Miriam was able to demonstrate them prior to the end of the session.  Miriam demonstrated good  understanding of the education provided.     See EMR under Patient Instructions for exercises provided 6/3/2022.     Assessment   Miriam tolerated today's session fairly well. Treadmill ambulation at an incline was included today for motion tolerance training of ramps. She reported significant dizziness upon completion of treadmill ambulation, requiring a prolonged rest break. Symptoms improved, but remained somewhat elevated throughout the remainder of session. She demonstrated good speed with VOR interventions. Mild instability noted with dynamic tasks today. PT to emphasize tandem balance, balance training, gaze stabilization, and motion tolerance training.      Miriam Is progressing well towards her goals.   Pt prognosis is Good.     Pt will continue to benefit from skilled outpatient physical therapy to address the deficits listed in the problem list box on initial evaluation, provide pt/family education and to maximize pt's level of independence in the home and community environment.      Pt's spiritual, cultural and educational needs considered and pt agreeable to plan of care and goals.     Anticipated barriers to physical therapy: co-morbidities     Goals:     Short term goals (STG): 4 weeks  Long Term Goals (LTG), 8 weeks.   Pt agrees to goals set. Eval date: 2022 Status   LTG: Patient will be independent with HEP emphasizing gaze stability and motion tolerance.  Education required Reports compliance Progressing   STG: Patient will exhibit improved Dizziness Handicap Inventory to 50 indicating decreased self-perceived disability related to dizziness.  LT  68 - severe  36 - Moderate STG Met  LTG Progressing   LTG: Patient will exhibit </= 2 line loss with Dynamic Visual Acuity testing, indicating improved gaze stabilization To be assessed 3 line loss Progressing    STG: Patient will exhibit improved MCTSIB score to 100/120 indicating improved static postural balance.   LT/120 92/120 95/120 STG - previously met but declined  LTG - Ongoing   STG: Patient will exhibit improved Functional Gait Assessment score to >/= 20 /30 indicating improved dynamic balance for increased safety ambulatory tasks  LT/30 16/30 20/30 Progressing        Plan   Continued PT treatment sessions to focus on: pt education, HEP, gait training, therapeutic exercises, neuromuscular re-education/balance exercises, and therapeutic activities to work towards established goals. Pt may be seen by PTA to carry out plan of care.     Progress VORx2 and continue motion tolerance training and balance training. Continue cervical stretching as indicated.    Rissa Willis, PT

## 2022-07-26 ENCOUNTER — CLINICAL SUPPORT (OUTPATIENT)
Dept: REHABILITATION | Facility: HOSPITAL | Age: 42
End: 2022-07-26
Payer: MEDICAID

## 2022-07-26 DIAGNOSIS — R42 DIZZINESS AND GIDDINESS: ICD-10-CM

## 2022-07-26 DIAGNOSIS — R26.89 IMPAIRMENT OF BALANCE: Primary | ICD-10-CM

## 2022-07-26 PROCEDURE — 97110 THERAPEUTIC EXERCISES: CPT | Mod: PO

## 2022-07-27 ENCOUNTER — TELEPHONE (OUTPATIENT)
Dept: OBSTETRICS AND GYNECOLOGY | Facility: CLINIC | Age: 42
End: 2022-07-27

## 2022-07-27 NOTE — TELEPHONE ENCOUNTER
----- Message from Roge Stokes sent at 7/27/2022  8:28 AM CDT -----  Contact: pt.  .Type:  Sooner Apoointment Request    Caller is requesting a sooner appointment.  Caller declined first available appointment listed below.  Caller will not accept being placed on the waitlist and is requesting a message be sent to doctor.  Name of Caller:pt  When is the first available appointment?  Symptoms:annual  Would the patient rather a call back or a response via [facility]? Call back  Best Call Back Number:[phone]  Additional Information: Pt. Had to cancel her appt. For this morning because she started her period.  And the schedule would not allow me to reschedule her .  Please call the pt. Back to reschedule.  thanks

## 2022-07-28 NOTE — PROGRESS NOTES
Physical Therapy Daily Treatment Note     Name: Miriam MILLS [facility] Number: [id number]    Therapy Diagnosis:   Encounter Diagnoses   Name Primary?   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 She was able to get scheduled for OT.     She was compliant with home exercise program.   Response to previous treatment: No adverse effects reported   Functional change: Ongoing     Pain: 0/10  Location:  base of skull - tightness    Objective      No objective tests performed this date.    Treatment     Miriam participated in therapeutic exercise activities to improve: Balance and Dizziness for 43 minutes. The following activities were included:    X 8 minutes, Treadmill ambulation up to 2.4 mph   Incline adjusted as sequenced below:   X 2 minutes at 0 incline   X 2 minute at 2.0 incline   X 2 minutes at 0 incline   X 1 minute at 2.0 incline   X 1 minute at 0 incline    Seated:   2 x 30 seconds, VOR 2 horizontal  2 x 30 seconds, VOR 2 vertical     Hallway Ambulation: standby by assist   2 x 100 feet, Horizontal head turns, standby by assist, occurrences of deviations towards wall  2 x 100 feet, Vertical head turns, standby by assist   2 x 100 feet, Diagonal plane head turns, standby by assist    2 x 100 feet, Vision eliminated ambulation, VCs for path maintenance     Rocker board in //bars:  X 60s, A/P weightshifts with eyes open, no UE support SBA  X 60s, A/P weightshifts with eyes open with R<>L head turns at each end point, No UE support, standby by assist      Home Exercises Provided and Patient Education Provided     Education provided:   - HEP, POC, Proper technique with therapeutic interventions, Benefits/purpose of today's therapeutic interventions    Written Home Exercises Provided: yes.  Exercises were reviewed and Miriam was able to demonstrate them prior to the end of the session.  Miriam demonstrated good  understanding of the education provided.     See EMR under Patient Instructions for exercises provided 6/3/2022.     Assessment   Miriam tolerated today's session fairly well, though she did exhibit increased unsteadiness today. Occasional staggering and veering from path noted dynamic tasks. Vision eliminated balance was performed to improve kinesthethic sense in the absence of visual input. Reports of symptoms remained low throughout the session. PT to continue per established POC, emphasizing motion tolerance training, balance training, and gaze stabilization training.      Miriam Is progressing well towards her goals.   Pt prognosis is Good.     Pt will continue to benefit from skilled outpatient physical therapy to address the deficits listed in the problem list box on initial evaluation, provide pt/family education and to maximize pt's level of independence in the home and community environment.     Pt's spiritual, cultural and educational needs considered and pt agreeable to plan of care and goals.     Anticipated barriers to physical therapy: co-morbidities     Goals:     Short term goals (STG): 4 weeks  Long Term Goals (LTG), 8 weeks.   Pt agrees to goals set. Eval date: 05/19/2022 07/19/2022 Status   LTG: Patient will be independent with HEP emphasizing gaze stability and  motion tolerance.  Education required Reports compliance Progressing   STG: Patient will exhibit improved Dizziness Handicap Inventory to 50 indicating decreased self-perceived disability related to dizziness.  LT  68 - severe  36 - Moderate STG Met  LTG Progressing   LTG: Patient will exhibit </= 2 line loss with Dynamic Visual Acuity testing, indicating improved gaze stabilization To be assessed 3 line loss Progressing    STG: Patient will exhibit improved MCTSIB score to 100/120 indicating improved static postural balance.   LT/120 92/120 95/120 STG - previously met but declined  LTG - Ongoing   STG: Patient will exhibit improved Functional Gait Assessment score to >/= 20 /30 indicating improved dynamic balance for increased safety ambulatory tasks  LT/30 16/30 20/30 Progressing        Plan   Continued PT treatment sessions to focus on: pt education, HEP, gait training, therapeutic exercises, neuromuscular re-education/balance exercises, and therapeutic activities to work towards established goals. Pt may be seen by PTA to carry out plan of care.     Progress VORx2 and continue motion tolerance training and balance training. Continue cervical stretching as indicated.    Rissa Willis, PT

## 2022-07-29 ENCOUNTER — CLINICAL SUPPORT (OUTPATIENT)
Dept: REHABILITATION | Facility: HOSPITAL | Age: 42
End: 2022-07-29
Payer: MEDICAID

## 2022-07-29 DIAGNOSIS — R42 DIZZINESS AND GIDDINESS: ICD-10-CM

## 2022-07-29 DIAGNOSIS — R26.89 IMPAIRMENT OF BALANCE: Primary | ICD-10-CM

## 2022-07-29 PROCEDURE — 97110 THERAPEUTIC EXERCISES: CPT | Mod: PO

## 2022-08-03 ENCOUNTER — TELEPHONE (OUTPATIENT)
Dept: HEMATOLOGY/ONCOLOGY | Facility: CLINIC | Age: 42
End: 2022-08-03
Payer: MEDICAID

## 2022-08-03 ENCOUNTER — CLINICAL SUPPORT (OUTPATIENT)
Dept: REHABILITATION | Facility: HOSPITAL | Age: 42
End: 2022-08-03
Attending: NEUROLOGICAL SURGERY
Payer: MEDICAID

## 2022-08-03 ENCOUNTER — INFUSION (OUTPATIENT)
Dept: INFUSION THERAPY | Facility: HOSPITAL | Age: 42
End: 2022-08-03
Payer: MEDICAID

## 2022-08-03 VITALS
SYSTOLIC BLOOD PRESSURE: 126 MMHG | HEART RATE: 95 BPM | RESPIRATION RATE: 18 BRPM | TEMPERATURE: 98 F | DIASTOLIC BLOOD PRESSURE: 81 MMHG

## 2022-08-03 DIAGNOSIS — G51.0 FACIAL PALSY: Primary | ICD-10-CM

## 2022-08-03 DIAGNOSIS — R42 DIZZINESS AND GIDDINESS: ICD-10-CM

## 2022-08-03 DIAGNOSIS — D50.8 OTHER IRON DEFICIENCY ANEMIA: Primary | ICD-10-CM

## 2022-08-03 DIAGNOSIS — R26.89 IMPAIRMENT OF BALANCE: Primary | ICD-10-CM

## 2022-08-03 PROCEDURE — 63600175 PHARM REV CODE 636 W HCPCS: Performed by: INTERNAL MEDICINE

## 2022-08-03 PROCEDURE — 97530 THERAPEUTIC ACTIVITIES: CPT | Mod: PO

## 2022-08-03 PROCEDURE — 97165 OT EVAL LOW COMPLEX 30 MIN: CPT | Mod: PO

## 2022-08-03 PROCEDURE — 25000003 PHARM REV CODE 250: Performed by: INTERNAL MEDICINE

## 2022-08-03 PROCEDURE — 96374 THER/PROPH/DIAG INJ IV PUSH: CPT

## 2022-08-03 PROCEDURE — 97110 THERAPEUTIC EXERCISES: CPT | Mod: PO

## 2022-08-03 RX ORDER — SODIUM CHLORIDE 0.9 % (FLUSH) 0.9 %
10 SYRINGE (ML) INJECTION
Status: DISCONTINUED | OUTPATIENT
Start: 2022-08-03 | End: 2022-08-03 | Stop reason: HOSPADM

## 2022-08-03 RX ORDER — HEPARIN 100 UNIT/ML
500 SYRINGE INTRAVENOUS
Status: DISCONTINUED | OUTPATIENT
Start: 2022-08-03 | End: 2022-08-03 | Stop reason: HOSPADM

## 2022-08-03 RX ADMIN — SODIUM CHLORIDE: 9 INJECTION, SOLUTION INTRAVENOUS at 12:08

## 2022-08-03 RX ADMIN — IRON SUCROSE 200 MG: 20 INJECTION, SOLUTION INTRAVENOUS at 12:08

## 2022-08-03 NOTE — PLAN OF CARE
[id number] pt tolerated venofer IVP without issue, sent message to dr amador  to add 3 more infusions for venofer, pt missed, pt reclined in chair, continue to monitor

## 2022-08-03 NOTE — PROGRESS NOTES
Physical Therapy Daily Treatment Note     Name: Miriam MILLS [facility] Number: [id number]    Therapy Diagnosis:   Encounter Diagnoses   Name Primary?   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 She is doing fine.     She was compliant with home exercise program.   Response to previous treatment: No adverse effects reported   Functional change: Ongoing     Pain: 0/10  Location:  base of skull - tightness    Objective      No objective tests performed this date.    Treatment     Miriam participated in therapeutic exercise activities to improve: Balance and Dizziness for 43 minutes. The following activities were included:    X 8 minutes, Treadmill ambulation up to 2.5 mph   Incline adjusted as sequenced below:   X 2 minutes at 0 incline   X 1 minute at 2.0 incline   X 1 minutes at 1.0 incline   X 1 minute at 2.0 incline   X 1 minute at 0 incline   X 2 minute at 2.0 incline     Seated:   2 x 45 seconds, VOR 2 horizontal  2 x 30 seconds, VOR 2 vertical     Hallway Ambulation: standby by assist   2 x 100 feet, Horizontal head turns, standby by assist, occurrences of deviations towards wall  2 x 100 feet, Vertical head turns, standby by assist   2 x 100 feet, Diagonal plane head turns, standby by assist   2 x 100  feet, Vision eliminated ambulation, VCs for path maintenance  2 x 100 feet, Turn and catch using mini-basketball, standby by assist     X 10 reps each direction, Self ball toss - Contact guard assistance - Minimal assist      Home Exercises Provided and Patient Education Provided     Education provided:   - HEP, POC, Proper technique with therapeutic interventions, Benefits/purpose of today's therapeutic interventions    Written Home Exercises Provided: yes.  Exercises were reviewed and Miriam was able to demonstrate them prior to the end of the session.  Miriam demonstrated good  understanding of the education provided.     See EMR under Patient Instructions for exercises provided 6/3/2022.     Assessment   Miriam tolerated today's session well. She reported that VOR interventions felt effortful; no retinal slip observed. She continues exhibit moderate sway with ambulation tasks. She demonstrated moderate instability with self ball toss overhead, but demonstrated good independence recovery of stability. She reported dizziness upon departure. PT to continue per established POC, emphasizing motion tolerance training, balance training, and gaze stabilization training.      Miriam Is progressing well towards her goals.   Pt prognosis is Good.     Pt will continue to benefit from skilled outpatient physical therapy to address the deficits listed in the problem list box on initial evaluation, provide pt/family education and to maximize pt's level of independence in the home and community environment.     Pt's spiritual, cultural and educational needs considered and pt agreeable to plan of care and goals.     Anticipated barriers to physical therapy: co-morbidities     Goals:     Short term goals (STG): 4 weeks  Long Term Goals (LTG), 8 weeks.   Pt agrees to goals set. Eval date: 05/19/2022 07/19/2022 Status   LTG: Patient will be independent with HEP emphasizing gaze stability and motion tolerance.  Education  required Reports compliance Progressing   STG: Patient will exhibit improved Dizziness Handicap Inventory to 50 indicating decreased self-perceived disability related to dizziness.  LT  68 - severe  36 - Moderate STG Met  LTG Progressing   LTG: Patient will exhibit </= 2 line loss with Dynamic Visual Acuity testing, indicating improved gaze stabilization To be assessed 3 line loss Progressing    STG: Patient will exhibit improved MCTSIB score to 100/120 indicating improved static postural balance.   LT/120 92/120 95/120 STG - previously met but declined  LTG - Ongoing   STG: Patient will exhibit improved Functional Gait Assessment score to >/= 20 /30 indicating improved dynamic balance for increased safety ambulatory tasks  LT/30 16/30 20/30 Progressing        Plan   Continued PT treatment sessions to focus on: pt education, HEP, gait training, therapeutic exercises, neuromuscular re-education/balance exercises, and therapeutic activities to work towards established goals. Pt may be seen by PTA to carry out plan of care.     Progress VORx2 and continue motion tolerance training and balance training. Continue cervical stretching as indicated.    Rissa Willis, PT

## 2022-08-03 NOTE — PLAN OF CARE
"  [facility] Occupational Therapy  Initial Facial (CN VII) Palsy Evaluation     Date: 8/3/2022  Patient: Miriam Mendez  Chart Number: [id number]    Therapy Diagnosis:   Encounter Diagnosis   Name Primary?    Facial palsy Yes     Physician: Duong Kat Jr.*    Physician Orders: Eval and Treat - Neuro OT  Medical Diagnosis: Z98.890 (ICD-10-CM) - Status post craniotomy  Evaluation Date: 8/3/2022  Plan of Care Expiration Period: 10 visits; around 11/30/2022  Insurance Authorization period Expiration: 12/31/2022  Date of Return to MD: 8/30/2022 [facility]   Visit # / Visits Authorized: 1 / 1  FOTO: not administered; inappropriate diagnosis     Time In: 1015  Time Out: 1120  Total Billable (one on one) Time: 65 minutes    Precautions: Standard    Subjective     History of Current Condition: Miriam Mendez is a 41 y.o. female who presents to [facility] for evaluation and treatment s/p craniotomy. Pt received surgery to remove right sided acoustic neuroma on April 28, 2022. Pt reported that prior to surgery she had no feeling on the right side of her face but full function. Pt reported neuroma was found almost 1 year after initial symptoms began because she had a staff infection on right side of face. Immediately following surgery, pt with right facial droop and abnormal "itching" sensation of right frontalis region. Pt now has full sensation in right face but still lacks voluntary movement. Pt reported that she has seen little to no improvement of facial function since onset except for one more pronounced wrinkle that she noticed ~1 wk ago. Pt has no hearing in right ear but reported severe tinnitus. Her eye does not water at all so she is using lubricating drops in [place] scleral lens (contact lens). Pt is currently seeing physical therapy for vestibular rehab to address dizziness.     Involved Side: Right  Date of Onset: September " " initial symptoms began; acoustic neuroma resection 2022  Surgical Procedure: Acoustic neuroma resection 2022  Imaging: see full imaging in imaging section; CT scan films   2022 CT Head: "IMPRESSION - 1. No CT evidence of acute intracranial pathology.  Further evaluation and follow-up as clinically warranted. 2. Mild soft tissue swelling and subcutaneous air in the left periorbital soft tissues. 3. No CT evidence of acute fracture or traumatic malalignment of the cervical spine."  2022 CT Cervical Spine: "IMPRESSION - 1. No CT evidence of acute intracranial pathology.  Further evaluation and follow-up as clinically warranted. 2. Mild soft tissue swelling and subcutaneous air in the left periorbital soft tissues. 3. No CT evidence of acute fracture or traumatic malalignment of the cervical spine."  Previous Therapy: IPR x2 weeks; Currently in PT for vestibular rehab     Past Medical History/Physical Systems Review:     Past Medical History:  Miriam Mendez  has a past medical history of ADD (attention deficit disorder with hyperactivity), Anxiety, Bronchitis, Cellulitis, Depression, Easy bruising, Fibromyalgia, History of ITP, Iron deficiency anemia, Morbid obesity, Pica, Pseudotumor cerebri syndrome, and Thrombocytopenia.    Past Surgical History:  Miriam Mendez  has a past surgical history that includes Gastric bypass ();  section (); and Cholecystectomy ().    Current Medications:  Miriam has a current medication list which includes the following prescription(s): acetaminophen, clonazepam, dextroamphetamine-amphetamine, fluoxetine, ibuprofen, omeprazole, zolpidem, and [DISCONTINUED] nuvaring.    Allergies:  Review of patient's allergies indicates:   Allergen Reactions    Sulfa (sulfonamide antibiotics)      Other reaction(s): Hives      Patient's Goals for Therapy: "To get my facial function back."    Pain:  Pain Related Behaviors Observed: no "   Functional Pain Scale Rating 0-10:   n/a/10 on average  n/a/10 at best  n/a/10 at worst  Location: N/A  Description: N/A  Aggravating Factors: N/A  Easing Factors: N/A    Occupation:    Working presently: unemployed  Duties: Basic self/home care    Functional Limitations/Social History:    Prior Level of Function: Independent with ADLs, IADLs, and functional mobility  Current Level of Function: Independent with ADLs, IADLs, and functional mobility     Home/Living environment: Lives with aunt   Home Access: 1 story home   DME: none     Leisure: Watching TV    Driving: No; pt stopped driving because of dizziness ~1 month prior to surgery     Facial Clinimetric Evaluation (FaCE) Instrument: see full copy in media section (higher score indicates better function; 0=worst; 100=best)  (Derived from the following formula: [(sum of all 15 items - 15) / 60) x 100] OR [(total score - # of items scored) / (# of items scored x 4) x 100]  Total Score: 17.9    Facial Disability Index (FDI): see full copy in media section (higher score indicates better function; 100=indicates unimpaired physical or social/well-being function)  (Derived from the following formula: Physical Function=[(Total Score (questions 1-5) - N / N) x 110 / 4] ;   Social Function=[(Total Score (questions 6-10) / N) x 100 / 5] ; N = Number of questions answered)  Total Score Physical Function: 27.5  Total Score Social Function: 52    Objective     [facility] Facial Grading System (FGS): see full copy in media section (higher percentage indicates better movement; 100%=full range movement)   Total Score: 13%     The following images can be seen in media section:   - Forehead wrinkle (frontalis (FRO))  - Gentle eye closure (orbicularis oculi superioris (OCS))   - Tight eye closure (orbicularis oculi superioris (OCS))   - Open mouth smile (zygomaticus (ZYG)/risorius (RIS))   - Closed mouth smile (zygomaticus (ZYG))  - Snarl (levator labii alaeque (LLA)/levator  "labii superioris (LLS))  - Lip pucker (orbicularis oris superioris (OOS)/orbicularis oris inferioris (OOI))     Sensation/Palpation:   - Forehead (V1): intact  - Cheeks (V2): intact  - Chin/Jaw (V3): intact  - Zygomaticus major & minor muscle: Pt reported right side feels tighter   - Buccinator muscle: Pt reported ride side feels harder, "almost like a knot"    TMJ screen: negative    Physical Exam  Postural examination: WNL  Head Control/Neck Mobility: WNL                    Functional Status      Functional Mobility: Independent     ADL's:  Feeding: Mod I; pt reported difficulty moving food from right>left side of mouth, holding food on right side of mouth, and occasional difficulty with swallowing   Drinking: Mod I; pt reported that she is unable to drink from straw at midline or on right side and that liquid will fall out of right side of mouth occasionally     IADL's: Independent     FOTO: not administered; inappropriate diagnosis      Treatment     Treatment Time In: 1055  Treatment Time Out: 1120  Total Treatment time separate from Evaluation time: 25 minutes    Miriam participated in dynamic functional therapeutic activities to improve functional performance for 25 minutes, including:  - Facial home exercise program thoroughly reviewed including the following stretches/exercises:  ·  Eyelid stretch  · Use of lynne roller  · Cheek massage for zygomaticus muscles   · Cheek stretch for buccinator muscle   · Nose massage   · Lip massage  · Forehead stretch   · Stretch for depressor (JUAN ANTONIO) muscle   · Smile practice with assisted smile   · Lip pucker  · Snarl   · Forehead elevation   · Lower lip depression  - Reviewed neuromuscular re-education key points including the following:   - Reviewed neuro muscular re-education key points including the following:   · Importance of NOT performing mass/maximal movements  · Relaxing unaffected side of face  · Importance of NOT trying to form facial movements using " compensatory/uninvolved portions of face  · Definition of synkinesis   · Use of visual and tactile feedback   · Facial nerve regeneration   · Practice schedule     Home Exercises and Patient Education Provided    Education provided:   -role of OT, goals for OT, scheduling/cancellations, insurance limitations with patient.  -Additional Education provided: Administered HEP    Written Home Exercises Provided: yes.  Exercises were reviewed and Miriam was able to demonstrate them prior to the end of the session.    Miriam demonstrated good  understanding of the education provided.     See EMR under Patient Instructions for exercises provided 8/3/2022.    Assessment     Miriam Mendez is a 41 y.o. female referred to outpatient occupational therapy and presents with a medical diagnosis of right sided facial paralysis s/p acoustic neuroma resection, resulting in impaired facial range of motion, limiting facial movement, facial comfort, oralmotor function, eye movement/comfort, lacrimal control, and social function and demonstrates limitations as described in the chart below. Pt is unable to close eye at all with gentle eye closure. ~3 mm scleral show with tight eye closure. Pt with no lacrimation in right eye and is using artificial tears with special contact lens to compensate. Pt with little to no return since facial paralysis onset 3 months ago. Only voluntary movement noted today was with lip pucker. However, pt with hyperactivity on unaffected, left side, resulting in poor midline orientation and symmetry. Also noted that patient is compensating for lack of facial movement on affected, right side, with unaffected, left side, resulting in some tightness and unwanted facial movement on left. Sensation is intact bilaterally, but patient reported more tightness in right cheek as compared to left. Pt's current status is limiting confidence, social participation, and overall quality of life along with ability to eat and  drink. Following medical record review it is determined that patient will benefit from occupational therapy services in order to maximize facial and oralmotor function needed for feeding/drinking and social function/participation.     Patient prognosis is Fair due to time since onset and little return   Patient will benefit from skilled outpatient Occupational Therapy to address the deficits stated above and in the chart below, provide patient/family education, and to maximize patient's level of independence.     Plan of care discussed with patient: Yes  Patient's spiritual, cultural and educational needs considered and patient is agreeable to the plan of care and goals as stated below:     Anticipated Barriers for therapy: none noted     Medical Necessity is demonstrated by the following  Profile and History Assessment of Occupational Performance Level of Clinical Decision Making Complexity Score   Occupational Profile:   Miriam Mendez is a 41 y.o. female who lives with their family and is currently unemployed Miriam Mendez has difficulty with  feeding and drinking  social participation  affecting his/her daily functional abilities. His/her main goal for therapy is to improve facial movement and symmetry.     Comorbidities:   ADD (attention deficit disorder with hyperactivity), Anxiety, Bronchitis, Cellulitis, Depression, Easy bruising, Fibromyalgia, History of ITP, Iron deficiency anemia, Morbid obesity, Pica, Pseudotumor cerebri syndrome, and Thrombocytopenia.    Medical and Therapy History Review:   Expanded               Performance Deficits    Physical:  Facial movement     Cognitive:  No Deficits    Psychosocial:    Group Participation     Clinical Decision Making:  low    Assessment Process:  Problem-Focused Assessments    Modification/Need for Assistance:  Not Necessary    Intervention Selection:  Limited Treatment Options       low  Based on PMHX, co morbidities , data from assessments and  functional level of assistance required with task and clinical presentation directly impacting function.       The following goals were discussed with the patient and patient is in agreement with them as to be addressed in the treatment plan.     Goals:  Short Term Goals: 5 weeks   1) Pt will be independent with neuromuscular re-education exercises and facial massages.   2) Pt will be compliant with mindfulness/relaxation strategies needed to manage compensatory movements on unaffected, left side of face  3) Pt to demonstrate ~2 mm scleral show with tight eye closure.   4) Pt to score 2 or more using [place] Facial Grading System (demonstrating ~3 mm scleral show) with gentle eye closure.   5) Pt to demonstrate improved midline orientation/symmetry with lip pucker   6) Score on [place] Facial Grading System to increase by at least 10% to demonstrate improved facial symmetry    Long Term Goals: 10 weeks  1) Score on FaCE Instrument to increase by 10 points or more to demonstrate improved self perception of facial, oral, and ocular function.   2) Physical function score on Facial Disability Index to improve to 35 points or more to demonstrate improved self perception of facial movement  3) Social function score on Facial Disability Index to improve to 60 points or more to demonstrate improved social participation   4) Score on [place] Facial Grading System to increase by at least 20% to demonstrate improved facial symmetry   5) Pt will demonstrate improved symmetric smile   6) Pt to demonstrate no more than ~1mm scleral show with tight eye closure.   7) Pt will demonstrate/report ability to better hold food in mouth for improved oralmotor performance during feeding.  8) Pt will demonstrate/report ability to drink from cup without spilling.     Plan     Certification Period/Plan of care expiration: 10 visits; 8/3/2022 to around 11/30/2022.    Outpatient Occupational Therapy 1 times weekly for 10 weeks to  include the following interventions: Neuromuscular Re-ed, Patient Education, Self Care, Therapeutic Activities and Therapeutic Exercise.    Janay Eng, OT      I certify the need for these services furnished under this plan of treatment and while under my care.  ____________________________________ Physician/Referring Practitioner   Date of Signature

## 2022-08-03 NOTE — PLAN OF CARE
1250 pt here for venofer injection, hx, meds, allergies reviewed, pt with no new complaints at this time, reclined in chair, continue to monitor

## 2022-08-11 NOTE — PROGRESS NOTES
Physical Therapy Daily Treatment Note     Name: Miriam MILLS [facility] Number: [id number]    Therapy Diagnosis:   Encounter Diagnoses   Name Primary?    Impairment of balance Yes    Dizziness and giddiness      Physician: Macario Mccallum,*    Visit Date: 8/12/2022    Physician Orders: PT Eval and Treat   Medical Diagnosis from Referral: Z22.7 (ICD-10-CM) - LTBI (latent tuberculosis infection) D49.6 (ICD-10-CM) - Neoplasm of unspecified behavior of brain   Evaluation Date: 5/19/2022  Insurance Authorization Period: 05/19/2022 - 12/31/2022  Plan of Care Expiration: 07/19/2022   POC Extension Expiration: 09/19/2022  Visit # / Visits authorized: 12/20       Time In:10:03a  Time Out: 10:45a  Total Billable Time: 42 minutes (Therapeutic Exercise)     Precautions: Standard and Fall    PTA Visit #: 0/5     Missed visits: 2  Cancelled visits: 1  Subjective     Pt reports: having more dizziness since missing last 2-3 PT sessions. She stated it's not as bad as when she first started therapy, but she feels she has regressed compared to a few weeks ago of having minimal or decreased symptoms.    She was compliant with home exercise program.   Response to previous treatment: No adverse effects reported   Functional change: Ongoing     Pain: 0/10  Location:  base of skull - tightness    Objective      No objective tests performed this date.    Treatment     Miriam participated in therapeutic exercise activities to improve: Balance and Dizziness for 42 minutes. The following activities were included:    X 8 minutes, Treadmill ambulation up to 2.3 mph   Incline adjusted as sequenced below:   X 2 minutes at 0 incline   X 1 minute at 2.0 incline   X 1 minutes at 1.0 incline   X 1 minute at 2.0 incline   X 1 minute at 0 incline   X 2 minute at 2.0 incline     Seated:   2 x 30 seconds, VOR 2 horizontal, slight increase in dizziness  2 x 30 seconds, VOR 2 vertical    Hallway Ambulation: standby by assist   2 x 100 feet,  Horizontal head turns, standby by assist  2 x 100 feet, Vertical head turns, standby by assist  2 x 100 feet, Diagonal plane head turns, occasional occurrences of deviating towards the wall, standby by assist    4 laps, Vision eliminated ambulation, near ballet bar, contact guard assist  4 laps, Vision eliminated ambulation with horizontal head turns, VCs for path maintenance, in open environment, contact guard assist     X 10 reps each direction, Self ball toss - Contact guard assistance    2 sets of 10 reps, Standing on foam fitter while catching/tossing a ball, tossing back and forth to PT, contact guard assist from SPT  4 laps, Tandem walking at ballet bar, contact guard assist     Home Exercises Provided and Patient Education Provided     Education provided:   - HEP, POC, Proper technique with therapeutic interventions, Benefits/purpose of today's therapeutic interventions    Written Home Exercises Provided: yes.  Exercises were reviewed and Miriam was able to demonstrate them prior to the end of the session.  Miriam demonstrated good  understanding of the education provided.     See EMR under Patient Instructions for exercises provided 6/3/2022.     Assessment   Miriam tolerated today's session well. She reported that VOR 2 horizontal slightly increased her dizziness. While performing diagonal head turns during hallway ambulation, she occasionally deviated towards the wall but she was able to correct and re-center herself. She continues to exhibit moderate sway with ambulation tasks. She demonstrated moderate instability with self ball toss overhead, but demonstrated pretty good independence recovery of stability. She would occasionally drop the ball and have slight instability when trying to recover the ball. PT to continue per established POC, emphasizing motion tolerance training, balance training, and gaze stabilization training.      Miriam Is progressing well towards her goals.   Pt prognosis is Good.      Pt will continue to benefit from skilled outpatient physical therapy to address the deficits listed in the problem list box on initial evaluation, provide pt/family education and to maximize pt's level of independence in the home and community environment.     Pt's spiritual, cultural and educational needs considered and pt agreeable to plan of care and goals.     Anticipated barriers to physical therapy: co-morbidities     Goals:     Short term goals (STG): 4 weeks  Long Term Goals (LTG), 8 weeks.   Pt agrees to goals set. Eval date: 2022 Status   LTG: Patient will be independent with HEP emphasizing gaze stability and motion tolerance.  Education required Reports compliance Progressing   STG: Patient will exhibit improved Dizziness Handicap Inventory to 50 indicating decreased self-perceived disability related to dizziness.  LT  68 - severe  36 - Moderate STG Met  LTG Progressing   LTG: Patient will exhibit </= 2 line loss with Dynamic Visual Acuity testing, indicating improved gaze stabilization To be assessed 3 line loss Progressing    STG: Patient will exhibit improved MCTSIB score to 100/120 indicating improved static postural balance.   LT/120 92/120 95/120 STG - previously met but declined  LTG - Ongoing   STG: Patient will exhibit improved Functional Gait Assessment score to >/= 20 /30 indicating improved dynamic balance for increased safety ambulatory tasks  LT/30 16/30 20/30 Progressing        Plan     Progress VORx2 and continue motion tolerance training and balance training. Continue cervical stretching as indicated.    Grazyna Card, SPT    I certify that I was present in the room directing the student in service delivery and guiding them using my skilled judgment. As the co-signing therapist, I have reviewed the student's documentation and am responsible for the treatment, assessment and plan.    Rissa Willis, PT

## 2022-08-12 ENCOUNTER — CLINICAL SUPPORT (OUTPATIENT)
Dept: REHABILITATION | Facility: HOSPITAL | Age: 42
End: 2022-08-12
Attending: NEUROLOGICAL SURGERY
Payer: MEDICAID

## 2022-08-12 DIAGNOSIS — R42 DIZZINESS AND GIDDINESS: ICD-10-CM

## 2022-08-12 DIAGNOSIS — R26.89 IMPAIRMENT OF BALANCE: Primary | ICD-10-CM

## 2022-08-12 PROCEDURE — 97110 THERAPEUTIC EXERCISES: CPT | Mod: PO

## 2022-08-15 NOTE — PROGRESS NOTES
Physical Therapy Daily Treatment Note     Name: Miriam MILLS [facility] Number: [id number]    Therapy Diagnosis:   Encounter Diagnoses   Name Primary?   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 She has noticed a decrease in headaches since this past Saturday. She stated that she stumbled and slipped Saturday on her aunt's porch due to the porch being slippery. To catch herself from falling, she accidentally elbowed the porch screen. She denied dizziness being the cause of the stumble.  She was compliant with home exercise program.   Response to previous treatment: No adverse effects reported   Functional change: Ongoing     Pain: 0/10  Location:  N/A    Objective      No objective tests performed this date.    Treatment     Miriam participated in therapeutic exercise activities to improve: Balance and Dizziness for 39 minutes. The following activities were included:    X 8 minutes, Treadmill ambulation up to 2.3 mph   Incline adjusted as sequenced below:   X 2 minutes at 0 incline   X 1 minute at 2.0 incline   X 1 minutes at 1.0 incline   X 1 minute at 2.0 incline   X 1 minute at 0 incline   X 2 minute at 2.0 incline     Seated:   2 x 30 seconds, VOR 2 horizontal, slight increase in  dizziness  2 x 30 seconds, VOR 2 vertical    Hallway Ambulation: standby by assist   2 x 100 feet, Horizontal head turns, standby by assist, dizziness during 2nd lap  2 x 100 feet, Vertical head turns, standby by assist    Ambulation around neuro gym:  2 x 100 feet, Diagonal plane head turns, occasional occurrences of deviating towards the wall, contact guard assist    X 2 laps, Vision eliminated ambulation with horizontal head turns, VCs for path maintenance, in open environment, contact guard assist  X 2 laps, Vision eliminated ambulation with vertical head turns, VC for path maintenance, in open environment, contact guard assist    2 sets of 10 reps, Sit to stands, while catching a ball in standing, tossing ball back and forth to rehab tech, CGA from SPT, slight increase in dizziness     Home Exercises Provided and Patient Education Provided     Education provided:   - HEP, POC, Proper technique with therapeutic interventions, Benefits/purpose of today's therapeutic interventions    Written Home Exercises Provided: yes.  Exercises were reviewed and Miriam was able to demonstrate them prior to the end of the session.  Miriam demonstrated good  understanding of the education provided.     See EMR under Patient Instructions for exercises provided 6/3/2022.     Assessment   Miriam tolerated today's session well. She reported that VOR 2 horizontal slightly increased her dizziness. During the second lap of hallway ambulation with horizontal head turns, she stated her dizziness did increase and she required a longer rest break before continuing with ambulation tasks down the hallway. While ambulating around the neuro gym with diagonal head turns, she displayed more deviating from the pathway, which required verbal cueing from the SPT to re-center herself. She had minimal difficulty with sit to stands with ball tossing. Although she stated that her dizziness increased slightly from this exercises, her dizziness was not  severe to the point that she needed to stop with the task. PT to continue per established POC, emphasizing motion tolerance training, balance training, and gaze stabilization training.     Miriam Is progressing well towards her goals.   Pt prognosis is Good.     Pt will continue to benefit from skilled outpatient physical therapy to address the deficits listed in the problem list box on initial evaluation, provide pt/family education and to maximize pt's level of independence in the home and community environment.     Pt's spiritual, cultural and educational needs considered and pt agreeable to plan of care and goals.     Anticipated barriers to physical therapy: co-morbidities     Goals:     Short term goals (STG): 4 weeks  Long Term Goals (LTG), 8 weeks.   Pt agrees to goals set. Eval date: 2022 Status   LTG: Patient will be independent with HEP emphasizing gaze stability and motion tolerance.  Education required Reports compliance Progressing   STG: Patient will exhibit improved Dizziness Handicap Inventory to 50 indicating decreased self-perceived disability related to dizziness.  LT  68 - severe  36 - Moderate STG Met  LTG Progressing   LTG: Patient will exhibit </= 2 line loss with Dynamic Visual Acuity testing, indicating improved gaze stabilization To be assessed 3 line loss Progressing    STG: Patient will exhibit improved MCTSIB score to 100/120 indicating improved static postural balance.   LT/120 92/120 95/120 STG - previously met but declined  LTG - Ongoing   STG: Patient will exhibit improved Functional Gait Assessment score to >/= 20 /30 indicating improved dynamic balance for increased safety ambulatory tasks  LT/30 16/30 20/30 Progressing        Plan     Progress VORx2 and continue motion tolerance training and balance training. Continue cervical stretching as indicated.    Grazyna Card, SPT    I certify that I was present in the room directing the student  in service delivery and guiding them using my skilled judgment. As the co-signing therapist, I have reviewed the student's documentation and am responsible for the treatment, assessment and plan.    Rissa Willis, PT

## 2022-08-16 ENCOUNTER — CLINICAL SUPPORT (OUTPATIENT)
Dept: REHABILITATION | Facility: HOSPITAL | Age: 42
End: 2022-08-16
Attending: NEUROLOGICAL SURGERY
Payer: MEDICAID

## 2022-08-16 DIAGNOSIS — R26.89 IMPAIRMENT OF BALANCE: Primary | ICD-10-CM

## 2022-08-16 DIAGNOSIS — R42 DIZZINESS AND GIDDINESS: ICD-10-CM

## 2022-08-16 PROCEDURE — 97110 THERAPEUTIC EXERCISES: CPT | Mod: PO

## 2022-08-16 NOTE — PROGRESS NOTES
"Occupational Therapy Treatment Note     Date: 8/17/2022  Name: Miriam MILLS [facility] Number: [id number]    Therapy Diagnosis:   Encounter Diagnosis   Name Primary?    Facial palsy Yes     Physician: Duong Kat Jr.*    Physician Orders: Eval and Treat - Neuro OT  Medical Diagnosis: Z98.890 (ICD-10-CM) - Status post craniotomy  Evaluation Date: 8/3/2022  Insurance Authorization Period Expiration: 6/7/2023  Plan of Care Certification Period: 10 visits; around 11/30/2022  Date of Return to MD: 8/30/2022 [facility]   Previous Botox Injection: N/A    Visit # / Visits authorized: 1 / 10 (+eval)  Time In: 1555  Time Out: 1658  Total Billable (one on one) Time: 63 minutes    Precautions: Standard    Subjective     Pt reports: "I feel like things are a little better." Pt also reported that her eye is starting to water and it never used to.   She was compliant with home exercise program given last session.   Response to previous treatment: first follow up since eval   Functional change: first follow up since eval     Involved Side: Right  Date of Onset: September 2020 initial symptoms began; acoustic neuroma resection April 28, 2022  Surgical Procedure: Acoustic neuroma resection April 28, 2022    Pain: 0/10  Location: N/A     Patient's Goals for Therapy: "To get my facial function back."    Objective      Miriam participated in neuromuscular re-education activities to improve: Kinesthetic Sense and to promote neuroplasticity needed for facial function for 63 minutes. The following activities were included:  Pt made the following facial expressions to compare movement with evaluation; images of each can be seen in media section:   - Forehead wrinkle (frontalis (FRO))  - Gentle eye closure (orbicularis oculi superioris (OCS))   - Tight eye closure (orbicularis oculi superioris (OCS))   - Open mouth smile (zygomaticus (ZYG)/risorius (RIS))   - Closed mouth smile (zygomaticus (ZYG))  - Snarl (levator labii " alaeque (LLA)/levator labii superioris (LLS))  - Lip pucker (orbicularis oris superioris (OOS)/orbicularis oris inferioris (OOI))      Facial ROM:   - Practiced facial symmetry at rest, with mirror feedback and verbal cueing, focusing on performing facial scan and relaxing unaffected, left side   - Lip pucker practice, with mirror feedback and active assist, using bimanual contacts; verbal cues provided and pt focused on improving midline symmetry; pt also educated on tactile cue on philtrum to help with midline corrections    Smile Practice:  - Open mouth smile with mirror feedback, x5 reps; verbal cues to relax left side to promote increased symmetry  - Assisted open and closed mouth smile with mirror feedback, x5 reps each; pt held active assisted motion x10 seconds while working to maximize symmetry; pt then released active assist, attempting to maintain symmetry for 3-5 seconds actively and utilized mental imagery     Pt education:   - Pt educated on identifying specific people to provide immediate verbal feedback when pt is performing correct and incorrect facial movements spontaneously   - Pt educated on relaxing unaffected, left side of face, being mindful of this throughout the day, and checking facial symmetry at rest at leat 5x throughout the day    - Discussed improvements seen since initial evaluation     Home Exercises and Education Provided      Education provided:   - Facial HEP  - Progress towards goals    Written Home Exercises Provided: Patient instructed to cont prior HEP.  Exercises were reviewed and Miriam was able to demonstrate them prior to the end of the session.    Miriam demonstrated good  understanding of the education provided.     See EMR under Patient Instructions for exercises provided prior visit.  8/17/2022: focus on resting facial symmetry     Assessment      Pt continues with right sided facial paralysis s/p acoustic neuroma resection on 4/28/2022. Pt also demonstrates  compensatory/hyperactivy of unaffected, left side of face. Pt demonstrated improved gentle and tight eye closure today with less scleral show. Also noted slightly improved smile excursion on right side of face during closed mouth smile. Discussed, at length, importance of relaxing left side of face, especially at rest; pt understanding and demonstrated success. Pt able to relax mouth during tight eye closure with practice. Pt also demonstrated some improvement with midline symmetry during lip pucker, although very difficult to achieve. Pt continues with little to no smile excursion on right side with open mouth smile and unable to hold smile post assisted smile practice. However, pt cued to utilize mental imagery to promote neuroplasticity and was able to relax left side of face to improve smile symmetry. Pt was very receptive to all education topics discussed and was positive upon leaving session. Messaged Dr. Tc Dodson today requesting consult. Pt would continue to benefit from skilled occupational therapy services to maximize facial and oralmotor function needed for feeding/drink and social function/participation.     Miriam is progressing well towards her goals and there are no updates to goals at this time. Pt prognosis is Fair due to time since onset and little return.     Pt will continue to benefit from skilled outpatient occupational therapy to address the deficits listed in the problem list on initial evaluation provide pt/family education and to maximize pt's level of independence in the home and community environment.     Pt's spiritual, cultural and educational needs considered and pt agreeable to plan of care and goals.    Anticipated barriers to occupational therapy: none noted    Goals:  Short Term Goals: 5 weeks   1) Pt will be independent with neuromuscular re-education exercises and facial massages. ongoing  2) Pt will be compliant with mindfulness/relaxation strategies needed to manage  compensatory movements on unaffected, left side of face ongoing  3) Pt to demonstrate ~2 mm scleral show with tight eye closure. ongoing  4) Pt to score 2 or more using [place] Facial Grading System (demonstrating ~3 mm scleral show) with gentle eye closure. ongoing  5) Pt to demonstrate improved midline orientation/symmetry with lip pucker ongoing  6) Score on [place] Facial Grading System to increase by at least 10% to demonstrate improved facial symmetry ongoing     Long Term Goals: 10 weeks  1) Score on FaCE Instrument to  increase by 10 points or more to demonstrate improved self perception of facial, oral, and ocular function. ongoing  2) Physical function score on Facial Disability Index to improve to 35 points or more to demonstrate improved self perception of facial movement ongoing  3) Social function score on Facial Disability Index to improve to 60 points or more to demonstrate improved social participation ongoing  4) Score on [place] Facial Grading System to increase by at least 20% to demonstrate improved facial symmetry ongoing  5) Pt will demonstrate improved symmetric smile ongoing  6) Pt to demonstrate no more than ~1mm scleral show with tight eye closure. ongoing  7) Pt will demonstrate/report ability to better hold food in mouth for improved oralmotor performance during feeding. ongoing  8) Pt will demonstrate/report ability to drink from cup without spilling. ongoing    Plan     Certification Period/Plan of care expiration: 10 visits; 8/3/2022 to around 11/30/2022.     Outpatient Occupational Therapy 1 times weekly for 10 weeks to include the following interventions: Neuromuscular Re-ed, Patient Education, Self Care, Therapeutic Activities and Therapeutic Exercise.    Updates/Grading for next session: left side relaxation; facial symmetry      Janay Eng, OT

## 2022-08-17 ENCOUNTER — CLINICAL SUPPORT (OUTPATIENT)
Dept: REHABILITATION | Facility: HOSPITAL | Age: 42
End: 2022-08-17
Attending: NEUROLOGICAL SURGERY
Payer: MEDICAID

## 2022-08-17 ENCOUNTER — INFUSION (OUTPATIENT)
Dept: INFUSION THERAPY | Facility: HOSPITAL | Age: 42
End: 2022-08-17
Attending: INTERNAL MEDICINE
Payer: MEDICAID

## 2022-08-17 VITALS
RESPIRATION RATE: 18 BRPM | SYSTOLIC BLOOD PRESSURE: 120 MMHG | TEMPERATURE: 98 F | HEART RATE: 87 BPM | DIASTOLIC BLOOD PRESSURE: 76 MMHG

## 2022-08-17 DIAGNOSIS — D50.8 OTHER IRON DEFICIENCY ANEMIA: Primary | ICD-10-CM

## 2022-08-17 DIAGNOSIS — G51.0 FACIAL PALSY: Primary | ICD-10-CM

## 2022-08-17 PROCEDURE — 63600175 PHARM REV CODE 636 W HCPCS: Performed by: INTERNAL MEDICINE

## 2022-08-17 PROCEDURE — 96374 THER/PROPH/DIAG INJ IV PUSH: CPT

## 2022-08-17 PROCEDURE — 25000003 PHARM REV CODE 250: Performed by: INTERNAL MEDICINE

## 2022-08-17 PROCEDURE — 97112 NEUROMUSCULAR REEDUCATION: CPT | Mod: PO

## 2022-08-17 RX ORDER — SODIUM CHLORIDE 0.9 % (FLUSH) 0.9 %
10 SYRINGE (ML) INJECTION
Status: DISCONTINUED | OUTPATIENT
Start: 2022-08-17 | End: 2022-08-17 | Stop reason: HOSPADM

## 2022-08-17 RX ORDER — HEPARIN 100 UNIT/ML
500 SYRINGE INTRAVENOUS
Status: DISCONTINUED | OUTPATIENT
Start: 2022-08-17 | End: 2022-08-17 | Stop reason: HOSPADM

## 2022-08-17 RX ADMIN — SODIUM CHLORIDE: 0.9 INJECTION, SOLUTION INTRAVENOUS at 01:08

## 2022-08-17 RX ADMIN — IRON SUCROSE 200 MG: 20 INJECTION, SOLUTION INTRAVENOUS at 01:08

## 2022-08-17 NOTE — PLAN OF CARE
1408-Patient tolerated iron well and was monitored for 30 minutes afterwards without issue. Discharged without complaints or S/S of adverse event.   Instructed to call provider for any questions or concerns.

## 2022-08-17 NOTE — PLAN OF CARE
1310-Labs , hx, and medications reviewed. Assessment completed. Discussed plan of care with patient. Patient in agreement. Chair reclined and warm blanket and snack offered.

## 2022-08-18 ENCOUNTER — TELEPHONE (OUTPATIENT)
Dept: OTOLARYNGOLOGY | Facility: CLINIC | Age: 42
End: 2022-08-18
Payer: MEDICAID

## 2022-08-18 NOTE — TELEPHONE ENCOUNTER
----- Message from Tc Dodson MD sent at 8/18/2022  7:23 AM CDT -----  Regarding: RE: referral recommendation  Thanks Janay, we'll get her set up for evaluation.     Gillian,  Can we set up an new patient visit this patient's convenience? Referral for facial paralysis.    Tc Hoover   ----- Message -----  From: Janay Eng OT  Sent: 8/17/2022   5:10 PM CDT  To: Tc Dodson MD  Subject: referral recommendation                          Good afternoon Dr. Dodson,   I am seeing a patient that was referred to me for facial rehab by physical and speech therapy. Pt is s/p right sided acoustic neuroma resection on April 28, 2022. Her current ENT that performed the surgery is a [facility] doctor, but I feel she would benefit from a consult with you.     Status update includes the following:   I performed her initial evaluation on 8/3/2022 and saw her today (8/17/2022) for first follow up visit. Since initial onset she has very little return to the right side of her face. Since her initial eval with me ~2 weeks ago, I am noticing some improved eye closure and smile excursion with closed mouth smile. All else remains pretty much the same. In addition to the facial paralysis on the affected side, another big problem she is having is overcompensation and hyperactivity of her unaffected, left side. In addition to her other facial exercises, we are working on relaxation to promote better facial symmetry.     Please let me know if you have any further questions. If you do feel like this is an appropriate referral, please let me know if there is anything that I need to do on my part. Thanks, and I hope all is well!     Janay Eng OT

## 2022-08-23 NOTE — PROGRESS NOTES
"Occupational Therapy Treatment Note     Date: 8/26/2022  Name: Miriam MILLS [facility] Number: [id number]    Therapy Diagnosis:   Encounter Diagnosis   Name Primary?    Facial palsy Yes     Physician: Duong Kat Jr.*    Physician Orders: Eval and Treat - Neuro OT  Medical Diagnosis: Z98.890 (ICD-10-CM) - Status post craniotomy  Evaluation Date: 8/3/2022  Insurance Authorization Period Expiration: 6/7/2023  Plan of Care Certification Period: 10 visits; around 11/30/2022  Date of Return to MD: 8/30/2022 [facility]   Previous Botox Injection: N/A    Visit # / Visits authorized: 2 / 10 (+eval)  Time In: 1051  Time Out: 1153  Total Billable (one on one) Time: 62 minutes    Precautions: Standard    Subjective     Pt reports: that she is being a lot more conscious of relaxing left side of face and feels the right side is coming back more. Pt reported she has an appointment with Dr. Dodson today. Pt also expressed concerns about having difficulty swallowing.   She was compliant with home exercise program given last session.   Response to previous treatment: first follow up since eval   Functional change: first follow up since eval     Involved Side: Right  Date of Onset: September 2020 initial symptoms began; acoustic neuroma resection April 28, 2022  Surgical Procedure: Acoustic neuroma resection April 28, 2022    Pain: 0/10  Location: N/A     Patient's Goals for Therapy: "To get my facial function back."    Objective      Miriam participated in dynamic functional therapeutic activities to improve functional performance for 62 minutes, including:  Pt made the following facial expressions and spent extensive time comparing movement with evaluation and previous sessions; images of each can be seen in media section:   - Resting symmetry   - Forehead wrinkle (frontalis (FRO))  - Gentle eye closure (orbicularis oculi superioris (OCS))   - Tight eye closure (orbicularis oculi superioris (OCS))   - Open mouth smile " (zygomaticus (ZYG)/risorius (RIS))   - Closed mouth smile (zygomaticus (ZYG))  - Snarl (levator labii alaeque (LLA)/levator labii superioris (LLS))  - Lip pucker (orbicularis oris superioris (OOS)/orbicularis oris inferioris (OOI))      Soft Tissue Massage and Stretching:  - Forehead massage (2 ways)   - Eyelid stretch, 5 x 30 seconds; Active eye closure 5x post stretch   - Eye socket stretch   - Cheek massage for the zygomaticus major and minor muscles   - Cheek stretch for the buccinator muscle     Facial ROM:   - Lip pucker practice with mirror feedback, x multiple reps; tactile cue on philtrum used frequently throughout; pt focused on improving midline symmetry    Smile Practice:  - Assisted open mouth smile with mirror feedback, x multiple reps; pt held active assisted motion x10 seconds while working to maximize symmetry; pt then released active assist, attempting to maintain symmetry for 3-5 seconds actively  - Open mouth smile with mirror feedback, x3 reps; pt focused on relaxing unaffected side to improve smile symmetry     Pt/Family education:   - Reviewed stretches including how to correctly use lynne roller   - Pt's significant other educated on how to provide appropriate/immediate feedback   - Pt's significant other educated on sequence of HEP including performing stretches first then exercises; provided additional copy of home exercise program to significant other   - Frequency of exercise program discussed   - Definition of sykinesis provided   - Provided education on goal of promoting neuroplasticity   - Thoroughly discussed improvements seen in past two weeks   - Pt educated to discuss difficulty swallowing with Dr. Dodson and to take increased time when eating and drinking     Home Exercises and Education Provided      Education provided:   - Facial HEP  - Progress towards goals    Written Home Exercises Provided: Patient instructed to cont prior HEP.  Exercises were reviewed and Miriam was able to  demonstrate them prior to the end of the session.    Miriam demonstrated good  understanding of the education provided.     See EMR under Patient Instructions for exercises provided prior visit.  8/17/2022: focus on resting facial symmetry   8/26/2022: eye socket massage and speech/language exercises     Assessment      Pt continues with right sided facial paralysis s/p acoustic neuroma resection on 4/28/2022. Pt presented to today's session demonstrating less hyperactivity of unaffected, left side and more excursion of right corner of mouth at rest. Better smile symmetry noted, especially with automatic/spontaneous smiles. Pt also with better eye closure post eyelid stretches. Pt's significant other present throughout entirety of today's session to learn about appropriate cueing and pt's home exercise program. Pt appeared more confident today than in past sessions and is starting to notice some improvement. Pt would continue to benefit from skilled occupational therapy services to maximize facial and oralmotor function needed for feeding/drink and social function/participation.     Miriam is progressing well towards her goals and there are no updates to goals at this time. Pt prognosis is Fair due to time since onset and little return.     Pt will continue to benefit from skilled outpatient occupational therapy to address the deficits listed in the problem list on initial evaluation provide pt/family education and to maximize pt's level of independence in the home and community environment.     Pt's spiritual, cultural and educational needs considered and pt agreeable to plan of care and goals.    Anticipated barriers to occupational therapy: none noted    Goals:  Short Term Goals: 5 weeks   1) Pt will be independent with neuromuscular re-education exercises and facial massages. ongoing  2) Pt will be compliant with mindfulness/relaxation strategies needed to manage compensatory movements on unaffected, left side  of face ongoing  3) Pt to demonstrate ~2 mm scleral show with tight eye closure. ongoing  4) Pt to score 2 or more using [place] Facial Grading System (demonstrating ~3 mm scleral show) with gentle eye closure. ongoing  5) Pt to demonstrate improved midline orientation/symmetry with lip pucker ongoing  6) Score on [place] Facial Grading System to increase by at least 10% to demonstrate improved facial symmetry ongoing     Long Term Goals: 10 weeks  1) Score on FaCE Instrument to  increase by 10 points or more to demonstrate improved self perception of facial, oral, and ocular function. ongoing  2) Physical function score on Facial Disability Index to improve to 35 points or more to demonstrate improved self perception of facial movement ongoing  3) Social function score on Facial Disability Index to improve to 60 points or more to demonstrate improved social participation ongoing  4) Score on [place] Facial Grading System to increase by at least 20% to demonstrate improved facial symmetry ongoing  5) Pt will demonstrate improved symmetric smile ongoing  6) Pt to demonstrate no more than ~1mm scleral show with tight eye closure. ongoing  7) Pt will demonstrate/report ability to better hold food in mouth for improved oralmotor performance during feeding. ongoing  8) Pt will demonstrate/report ability to drink from cup without spilling. ongoing    Plan     Certification Period/Plan of care expiration: 10 visits; 8/3/2022 to around 11/30/2022.     Outpatient Occupational Therapy 1 times weekly for 10 weeks to include the following interventions: Neuromuscular Re-ed, Patient Education, Self Care, Therapeutic Activities and Therapeutic Exercise.    Updates/Grading for next session: left side relaxation; facial symmetry      Janay Eng, OT

## 2022-08-24 ENCOUNTER — INFUSION (OUTPATIENT)
Dept: INFUSION THERAPY | Facility: HOSPITAL | Age: 42
End: 2022-08-24
Attending: INTERNAL MEDICINE
Payer: MEDICAID

## 2022-08-24 VITALS
SYSTOLIC BLOOD PRESSURE: 127 MMHG | TEMPERATURE: 98 F | DIASTOLIC BLOOD PRESSURE: 80 MMHG | RESPIRATION RATE: 17 BRPM | HEART RATE: 79 BPM

## 2022-08-24 DIAGNOSIS — D50.8 OTHER IRON DEFICIENCY ANEMIA: Primary | ICD-10-CM

## 2022-08-24 PROCEDURE — 96365 THER/PROPH/DIAG IV INF INIT: CPT

## 2022-08-24 PROCEDURE — 25000003 PHARM REV CODE 250: Performed by: INTERNAL MEDICINE

## 2022-08-24 PROCEDURE — 63600175 PHARM REV CODE 636 W HCPCS: Performed by: INTERNAL MEDICINE

## 2022-08-24 RX ORDER — HEPARIN 100 UNIT/ML
500 SYRINGE INTRAVENOUS
Status: DISCONTINUED | OUTPATIENT
Start: 2022-08-24 | End: 2022-08-24 | Stop reason: HOSPADM

## 2022-08-24 RX ORDER — SODIUM CHLORIDE 0.9 % (FLUSH) 0.9 %
10 SYRINGE (ML) INJECTION
Status: DISCONTINUED | OUTPATIENT
Start: 2022-08-24 | End: 2022-08-24 | Stop reason: HOSPADM

## 2022-08-24 RX ADMIN — IRON SUCROSE 200 MG: 20 INJECTION, SOLUTION INTRAVENOUS at 03:08

## 2022-08-24 RX ADMIN — SODIUM CHLORIDE: 0.9 INJECTION, SOLUTION INTRAVENOUS at 03:08

## 2022-08-26 ENCOUNTER — CLINICAL SUPPORT (OUTPATIENT)
Dept: REHABILITATION | Facility: HOSPITAL | Age: 42
End: 2022-08-26
Attending: NEUROLOGICAL SURGERY
Payer: MEDICAID

## 2022-08-26 ENCOUNTER — OFFICE VISIT (OUTPATIENT)
Dept: OTOLARYNGOLOGY | Facility: CLINIC | Age: 42
End: 2022-08-26
Payer: MEDICAID

## 2022-08-26 VITALS — SYSTOLIC BLOOD PRESSURE: 120 MMHG | TEMPERATURE: 97 F | DIASTOLIC BLOOD PRESSURE: 80 MMHG | HEART RATE: 77 BPM

## 2022-08-26 DIAGNOSIS — H02.23A PARALYTIC LAGOPHTHALMOS OF UPPER AND LOWER EYELID OF RIGHT EYE: ICD-10-CM

## 2022-08-26 DIAGNOSIS — G51.0 FACIAL PARALYSIS: Primary | ICD-10-CM

## 2022-08-26 DIAGNOSIS — G51.0 FACIAL PALSY: Primary | ICD-10-CM

## 2022-08-26 DIAGNOSIS — D33.3 UNILATERAL VESTIBULAR SCHWANNOMA: ICD-10-CM

## 2022-08-26 PROCEDURE — 1159F MED LIST DOCD IN RCRD: CPT | Mod: CPTII,S$GLB,, | Performed by: OTOLARYNGOLOGY

## 2022-08-26 PROCEDURE — 3074F SYST BP LT 130 MM HG: CPT | Mod: CPTII,S$GLB,, | Performed by: OTOLARYNGOLOGY

## 2022-08-26 PROCEDURE — 99214 OFFICE O/P EST MOD 30 MIN: CPT | Mod: S$GLB,,, | Performed by: OTOLARYNGOLOGY

## 2022-08-26 PROCEDURE — 99214 PR OFFICE/OUTPT VISIT, EST, LEVL IV, 30-39 MIN: ICD-10-PCS | Mod: S$GLB,,, | Performed by: OTOLARYNGOLOGY

## 2022-08-26 PROCEDURE — 97530 THERAPEUTIC ACTIVITIES: CPT | Mod: PO

## 2022-08-26 PROCEDURE — 3079F DIAST BP 80-89 MM HG: CPT | Mod: CPTII,S$GLB,, | Performed by: OTOLARYNGOLOGY

## 2022-08-26 PROCEDURE — 1159F PR MEDICATION LIST DOCUMENTED IN MEDICAL RECORD: ICD-10-PCS | Mod: CPTII,S$GLB,, | Performed by: OTOLARYNGOLOGY

## 2022-08-26 PROCEDURE — 3074F PR MOST RECENT SYSTOLIC BLOOD PRESSURE < 130 MM HG: ICD-10-PCS | Mod: CPTII,S$GLB,, | Performed by: OTOLARYNGOLOGY

## 2022-08-26 PROCEDURE — 1160F RVW MEDS BY RX/DR IN RCRD: CPT | Mod: CPTII,S$GLB,, | Performed by: OTOLARYNGOLOGY

## 2022-08-26 PROCEDURE — 3079F PR MOST RECENT DIASTOLIC BLOOD PRESSURE 80-89 MM HG: ICD-10-PCS | Mod: CPTII,S$GLB,, | Performed by: OTOLARYNGOLOGY

## 2022-08-26 PROCEDURE — 1160F PR REVIEW ALL MEDS BY PRESCRIBER/CLIN PHARMACIST DOCUMENTED: ICD-10-PCS | Mod: CPTII,S$GLB,, | Performed by: OTOLARYNGOLOGY

## 2022-08-26 NOTE — PROGRESS NOTES
Subjective:     Chief Complaint:   Chief Complaint   Patient presents with    Paralysis     facial       Miriam Mendez is a 41 y.o. female who was referred to me by Miriam Bahena in consultation for facial paralysis.    Onset: 22  Cause: acoustic neuroma   Side: right  Initial extent: complete    Patient has a history of right-sided acoustic neuroma and underwent trans-labyrinthine removal on 2022.  She reports complete right facial nerve paralysis following her surgery.  She has been using lubricating eyedrops has seen Ophthalmology.  She has a scleral contact lens for protection.  She denies vision changes.  Paralysis has been complete until approximately 3 weeks ago.  She is noticed some slight improvement in the lower half of the face with improved tone and slight movement of the oral commissure.  She recently started facial rehabilitation here at [facility].  She was referred to me for further evaluation.    She is not an active smoker.    Past Medical History  She has a past medical history of ADD (attention deficit disorder with hyperactivity), Anxiety, Bronchitis, Cellulitis, Depression, Easy bruising, Fibromyalgia, History of ITP, Iron deficiency anemia, Morbid obesity, Pica, Pseudotumor cerebri syndrome, and Thrombocytopenia.    Past Surgical History  She has a past surgical history that includes Gastric bypass ();  section (); and Cholecystectomy ().    Family History  Her family history includes Breast cancer (age of onset: 57) in her mother; Cancer in her father, paternal grandfather, and paternal grandmother; Cancer (age of onset: 57) in her mother; Hypertension in her mother.    Social History  She reports that she has never smoked. She has never used smokeless tobacco. She reports current alcohol use. She reports that she does not use drugs.    Allergies  She is allergic to sulfa (sulfonamide antibiotics).    Medications  She has a current medication list which  includes the following prescription(s): acetaminophen, clonazepam, dextroamphetamine-amphetamine, fluoxetine, ibuprofen, zolpidem, omeprazole, and [DISCONTINUED] nuvaring.    Review of Systems  Negative except per HPI     Objective:     /80   Pulse 77   Temp 97 °F (36.1 °C) (Temporal)      General: Alert and oriented in no acute distress  Head: Normocephaic, atruamatic  Eyes: Cranial nerves II, III, VI, VII, VIII-XII are grossly intact  Skin: Skin texture is medium thickness rated as Rueda II  Ears:   Pinna are normal in shape and position  EACs clear bilaterally  TMs clear without ESTELA or perforation bilaterally    Eyes:  Palpebral height asymmetry  present. normal  on left, wide on right  Gentle closure: complete closure on left, no lagophthalmos present   Gentle closure: incomplete  closure on right, 4 mm of lagophthalmos present   Maximum closure: complete closure on left, no lagophthalmos present   Maximum  closure: incomplete  closure on right, 3 mm of lagophthalmos present   Lower lid laxity absent on left, present on right   no scleral show present on left, 2 mm of scleral show present on right     Nose:  Nasal skin is medium thickness    The caudal septum is midline  Anterior rhinoscopy reveals the septum is straight without perforation or synechiae.    The turbinates are normal with normal mucosa  bilaterally.    There are not pathological secretions present.   The alar base is normal  on the left and normal  on the right   The external nasal valve is normal and patent  on the right without dynamic collapse  The external nasal valve is normal and patent  on the left without dynamic collapse  The internal nasal valve is normal and patent  on the right and normal and patent  on the left.      Face:  Rest:  Brow asymmetry  present. normal on left, lower on right   Nasolabial fold asymmetry  present. Normal on left, Less pronounced  on right   Oral commissure asymmetry  present. normal  on left,  dropped  on right     Dynamic:  Forehead (Frontalis): movement complete  on left, unable to initiate on right  Gentle eye closure (orbicularis oculi): movement complete  on left, initiate slight movement  on right  Closed mouth smile asymmetry  present   Open mouth smile (zygomaticus/risorius): movement complete  on left, initiate slight movement  on right  -Commissure excursion: normal on left, limited on right  -Lower lip height asymmetry  present; DLI movement present on left, DLI movement absent on right  Snarl (LLA/LLS): movement complete  on left, initiate slight movement  on right  Pucker (OOS/OOI): movement complete  on left, initiate slight movement  on right    Synkinesis:  None    Sensation: Intact V1, V2, V3 bilateral    Strength:   Masseter 5/5 on left, Masseter 5/5 on right  Temporalis 5/5 on left, Temporalis 5/5 on right     Oral cavity and oropharynx: Mucous membranes moist without lesions present.  Tongue protrudes midline and palate elevates midline.  Neck: Supple without LAD.    Lungs:breathing comfortably  Psychiatric:  Mood and affect are normal    S[place] Score: 21  House-Brackmann Score: 5/6    Procedure:    None          Assessment:     1. Facial paralysis    2. Unilateral vestibular schwannoma    3. 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  Patient is approximately 4 months out from vestibular schwannoma resection and is starting to see slight improvement of facial paralysis.  She still has significant lagophthalmos but is maintaining ocular health and is using a scleral contact lens.  Discussed continuation of facial rehabilitation.  Discussed options for management moving forward.  She is a candidate for a platinum weight in lateral canthoplasty to improve her lagophthalmos.  Given the slight improvement she is recently seen, she would  like to weight additional time prior to moving forward with surgery.  This is reasonable given her ocular health.  We discussed options for chemodenervation to improve facial symmetry during this time.  We discussed options for further intervention in the future should significant paresis remain present. Recommend waiting around 9-12 months prior to any nerve transfer procedure. I am encouraged by her recent progress. Follow up in 6 weeks, sooner if needed. All questions answered and patient encouraged to call with any questions or concerns.

## 2022-08-31 ENCOUNTER — INFUSION (OUTPATIENT)
Dept: INFUSION THERAPY | Facility: HOSPITAL | Age: 42
End: 2022-08-31
Attending: INTERNAL MEDICINE
Payer: MEDICAID

## 2022-08-31 VITALS
RESPIRATION RATE: 16 BRPM | HEART RATE: 75 BPM | TEMPERATURE: 98 F | SYSTOLIC BLOOD PRESSURE: 120 MMHG | DIASTOLIC BLOOD PRESSURE: 60 MMHG

## 2022-08-31 DIAGNOSIS — D50.8 OTHER IRON DEFICIENCY ANEMIA: Primary | ICD-10-CM

## 2022-08-31 PROCEDURE — 25000003 PHARM REV CODE 250: Performed by: INTERNAL MEDICINE

## 2022-08-31 PROCEDURE — 96374 THER/PROPH/DIAG INJ IV PUSH: CPT

## 2022-08-31 PROCEDURE — 63600175 PHARM REV CODE 636 W HCPCS: Performed by: INTERNAL MEDICINE

## 2022-08-31 PROCEDURE — A4216 STERILE WATER/SALINE, 10 ML: HCPCS | Performed by: INTERNAL MEDICINE

## 2022-08-31 RX ORDER — OXYCODONE HYDROCHLORIDE 5 MG/1
CAPSULE ORAL
COMMUNITY
Start: 2022-05-13 | End: 2023-04-11 | Stop reason: ALTCHOICE

## 2022-08-31 RX ORDER — DEXAMETHASONE 2 MG/1
2 TABLET ORAL DAILY
COMMUNITY
Start: 2022-05-13 | End: 2023-04-11

## 2022-08-31 RX ORDER — VALACYCLOVIR HYDROCHLORIDE 500 MG/1
500 TABLET, FILM COATED ORAL 2 TIMES DAILY
COMMUNITY
Start: 2022-05-13 | End: 2023-07-17 | Stop reason: CLARIF

## 2022-08-31 RX ORDER — FAMOTIDINE 20 MG/1
20 TABLET, FILM COATED ORAL 2 TIMES DAILY
COMMUNITY
Start: 2022-05-13 | End: 2023-04-11

## 2022-08-31 RX ORDER — DIAZEPAM 5 MG/1
TABLET ORAL
COMMUNITY
Start: 2022-05-13 | End: 2023-04-11

## 2022-08-31 RX ORDER — DOCUSATE SODIUM 100 MG/1
100 CAPSULE, LIQUID FILLED ORAL 2 TIMES DAILY
COMMUNITY
Start: 2022-05-13 | End: 2023-04-11

## 2022-08-31 RX ORDER — SODIUM CHLORIDE 0.9 % (FLUSH) 0.9 %
10 SYRINGE (ML) INJECTION
Status: DISCONTINUED | OUTPATIENT
Start: 2022-08-31 | End: 2022-08-31 | Stop reason: HOSPADM

## 2022-08-31 RX ADMIN — IRON SUCROSE 200 MG: 20 INJECTION, SOLUTION INTRAVENOUS at 03:08

## 2022-08-31 RX ADMIN — Medication 10 ML: at 03:08

## 2022-08-31 RX ADMIN — SODIUM CHLORIDE: 0.9 INJECTION, SOLUTION INTRAVENOUS at 03:08

## 2022-08-31 NOTE — PLAN OF CARE
Pt arrived for Venofer #8. Treatment plan reviewed with pt, states agreement.  PIV started on 2nd stick to R. Hand.  Pt tolerated infusion.  Pt stayed for 20 mins and then left, not wanting to stay longer for obs.  Pt states has been tolerating iron.  Pt knows to f/u with MD in 1 month.  PIV d/c'd,  Pt discharged to home.

## 2022-09-06 NOTE — PATIENT INSTRUCTIONS
"Eyelid Stretch:   - Hold eyelashes and pull down while pulling eyebrow up (note: can use an eyelash curler to help pull eyelashes down)   - Don't press the lid against the eyebal;, pull it out a litlte  - Hold stretch for 30-60 seconds and then actively close the eye  - After the stretch, gently close eye at least 10 times and attempt to hold for 3-5 seconds     Use of Lynne Roller or GuaSha Stone (lynne or gianna quartz):   - Start at midline and pull outwards and down neck   - Use larger part for cheeks/forehead and smaller part for under eyes  - used to bring blood flow to the area. If the face isn't moving normally, normal pumping isn't happening causing some sort of edema  - Perform 2-3 times a day  - Can find tutorial on [facility] Facebook page or find video via [email or web address] or [email or web address]  - [employer].com search "lynne roller"    Remember:   - Use mirror feedback when performing exercises  - Mindfulness and relaxation is important. Feel how the unaffected side of your face is moving as compared to the affected side of your face  - Limit synkinesis   - Do not produce mass/maximal movement                                                                                  "
83

## 2022-09-09 ENCOUNTER — DOCUMENTATION ONLY (OUTPATIENT)
Dept: REHABILITATION | Facility: HOSPITAL | Age: 42
End: 2022-09-09
Payer: MEDICAID

## 2022-09-09 NOTE — PROGRESS NOTES
Occupational Therapy: No show/Cancellation of Visit  Date: 09/09/2022    Patient cancelled OT appointment on 9/8/2022. Reason for cancellation: transportation . Patient's next scheduled appointment is 9/13/2022.     Cancel: 1  No show: 0    Therapist: Janay Eng OT

## 2022-09-13 ENCOUNTER — DOCUMENTATION ONLY (OUTPATIENT)
Dept: REHABILITATION | Facility: HOSPITAL | Age: 42
End: 2022-09-13
Payer: MEDICAID

## 2022-09-13 NOTE — PROGRESS NOTES
Occupational Therapy: No show/Cancellation of Visit  Date: 09/13/2022    Patient was a no show to today's OT appointment. Miriam Mendez did not call to cancel nor reschedule. No charges have been posted today. Patient's next scheduled appointment is 9/20/2022.     Cancel: 1  No show: 1    Therapist: Janay Eng, OT

## 2022-09-23 ENCOUNTER — TELEPHONE (OUTPATIENT)
Dept: REHABILITATION | Facility: HOSPITAL | Age: 42
End: 2022-09-23
Payer: MEDICAID

## 2022-09-23 NOTE — TELEPHONE ENCOUNTER
Occupational Therapy: No show/Cancellation of Visit  Date: 09/23/2022    Patient cancelled today's OT appointment. Reason for cancellation: transportation. Patient does not have future OT appointments scheduled.     Cancel: 3  No show: 1    Attempted to call patient today on behalf of OT and PT regarding recent cancellations and no shows. Pt did not answer. Left message asking patient to call or get in touch via patient portal to discuss functional status and plan to continue with therapy vs discharge.    Therapist: Janay Eng OT

## 2022-09-29 ENCOUNTER — OFFICE VISIT (OUTPATIENT)
Dept: HEMATOLOGY/ONCOLOGY | Facility: CLINIC | Age: 42
End: 2022-09-29
Payer: MEDICAID

## 2022-09-29 ENCOUNTER — LAB VISIT (OUTPATIENT)
Dept: LAB | Facility: HOSPITAL | Age: 42
End: 2022-09-29
Attending: INTERNAL MEDICINE
Payer: MEDICAID

## 2022-09-29 VITALS
HEART RATE: 69 BPM | TEMPERATURE: 97 F | OXYGEN SATURATION: 100 % | BODY MASS INDEX: 30.91 KG/M2 | HEIGHT: 65 IN | SYSTOLIC BLOOD PRESSURE: 99 MMHG | WEIGHT: 185.5 LBS | DIASTOLIC BLOOD PRESSURE: 62 MMHG | RESPIRATION RATE: 16 BRPM

## 2022-09-29 DIAGNOSIS — D50.9 IRON DEFICIENCY ANEMIA, UNSPECIFIED IRON DEFICIENCY ANEMIA TYPE: ICD-10-CM

## 2022-09-29 DIAGNOSIS — D50.9 IRON DEFICIENCY ANEMIA, UNSPECIFIED IRON DEFICIENCY ANEMIA TYPE: Primary | ICD-10-CM

## 2022-09-29 DIAGNOSIS — D69.6 THROMBOCYTOPENIA: ICD-10-CM

## 2022-09-29 LAB
ALBUMIN SERPL BCP-MCNC: 3.9 G/DL (ref 3.5–5.2)
ALP SERPL-CCNC: 75 U/L (ref 55–135)
ALT SERPL W/O P-5'-P-CCNC: 22 U/L (ref 10–44)
ANION GAP SERPL CALC-SCNC: 11 MMOL/L (ref 8–16)
AST SERPL-CCNC: 22 U/L (ref 10–40)
BASOPHILS # BLD AUTO: 0.08 K/UL (ref 0–0.2)
BASOPHILS NFR BLD: 1.4 % (ref 0–1.9)
BILIRUB SERPL-MCNC: 0.7 MG/DL (ref 0.1–1)
BUN SERPL-MCNC: 7 MG/DL (ref 6–20)
CALCIUM SERPL-MCNC: 9.4 MG/DL (ref 8.7–10.5)
CHLORIDE SERPL-SCNC: 103 MMOL/L (ref 95–110)
CO2 SERPL-SCNC: 24 MMOL/L (ref 23–29)
CREAT SERPL-MCNC: 0.7 MG/DL (ref 0.5–1.4)
DIFFERENTIAL METHOD: ABNORMAL
EOSINOPHIL # BLD AUTO: 0.1 K/UL (ref 0–0.5)
EOSINOPHIL NFR BLD: 1.2 % (ref 0–8)
ERYTHROCYTE [DISTWIDTH] IN BLOOD BY AUTOMATED COUNT: 14.7 % (ref 11.5–14.5)
EST. GFR  (NO RACE VARIABLE): >60 ML/MIN/1.73 M^2
FERRITIN SERPL-MCNC: 142 NG/ML (ref 20–300)
GLUCOSE SERPL-MCNC: 82 MG/DL (ref 70–110)
HCT VFR BLD AUTO: 40.8 % (ref 37–48.5)
HGB BLD-MCNC: 13.2 G/DL (ref 12–16)
IMM GRANULOCYTES # BLD AUTO: 0.02 K/UL (ref 0–0.04)
IMM GRANULOCYTES NFR BLD AUTO: 0.4 % (ref 0–0.5)
IRON SERPL-MCNC: 68 UG/DL (ref 30–160)
LYMPHOCYTES # BLD AUTO: 1.4 K/UL (ref 1–4.8)
LYMPHOCYTES NFR BLD: 23.7 % (ref 18–48)
MCH RBC QN AUTO: 30.1 PG (ref 27–31)
MCHC RBC AUTO-ENTMCNC: 32.4 G/DL (ref 32–36)
MCV RBC AUTO: 93 FL (ref 82–98)
MONOCYTES # BLD AUTO: 0.4 K/UL (ref 0.3–1)
MONOCYTES NFR BLD: 7.6 % (ref 4–15)
NEUTROPHILS # BLD AUTO: 3.7 K/UL (ref 1.8–7.7)
NEUTROPHILS NFR BLD: 65.7 % (ref 38–73)
NRBC BLD-RTO: 0 /100 WBC
PLATELET # BLD AUTO: 111 K/UL (ref 150–450)
PMV BLD AUTO: 13.8 FL (ref 9.2–12.9)
POTASSIUM SERPL-SCNC: 4.1 MMOL/L (ref 3.5–5.1)
PROT SERPL-MCNC: 7.1 G/DL (ref 6–8.4)
RBC # BLD AUTO: 4.39 M/UL (ref 4–5.4)
RETICS/RBC NFR AUTO: 1.4 % (ref 0.5–2.5)
SATURATED IRON: 23 % (ref 20–50)
SODIUM SERPL-SCNC: 138 MMOL/L (ref 136–145)
TOTAL IRON BINDING CAPACITY: 302 UG/DL (ref 250–450)
TRANSFERRIN SERPL-MCNC: 204 MG/DL (ref 200–375)
WBC # BLD AUTO: 5.69 K/UL (ref 3.9–12.7)

## 2022-09-29 PROCEDURE — 84466 ASSAY OF TRANSFERRIN: CPT | Performed by: INTERNAL MEDICINE

## 2022-09-29 PROCEDURE — 82728 ASSAY OF FERRITIN: CPT | Performed by: INTERNAL MEDICINE

## 2022-09-29 PROCEDURE — 3074F PR MOST RECENT SYSTOLIC BLOOD PRESSURE < 130 MM HG: ICD-10-PCS | Mod: CPTII,,, | Performed by: INTERNAL MEDICINE

## 2022-09-29 PROCEDURE — 3008F PR BODY MASS INDEX (BMI) DOCUMENTED: ICD-10-PCS | Mod: CPTII,,, | Performed by: INTERNAL MEDICINE

## 2022-09-29 PROCEDURE — 99214 PR OFFICE/OUTPT VISIT, EST, LEVL IV, 30-39 MIN: ICD-10-PCS | Mod: S$PBB,,, | Performed by: INTERNAL MEDICINE

## 2022-09-29 PROCEDURE — 1160F RVW MEDS BY RX/DR IN RCRD: CPT | Mod: CPTII,,, | Performed by: INTERNAL MEDICINE

## 2022-09-29 PROCEDURE — 3078F PR MOST RECENT DIASTOLIC BLOOD PRESSURE < 80 MM HG: ICD-10-PCS | Mod: CPTII,,, | Performed by: INTERNAL MEDICINE

## 2022-09-29 PROCEDURE — 99999 PR PBB SHADOW E&M-EST. PATIENT-LVL IV: CPT | Mod: PBBFAC,,, | Performed by: INTERNAL MEDICINE

## 2022-09-29 PROCEDURE — 36415 COLL VENOUS BLD VENIPUNCTURE: CPT | Performed by: INTERNAL MEDICINE

## 2022-09-29 PROCEDURE — 99214 OFFICE O/P EST MOD 30 MIN: CPT | Mod: PBBFAC | Performed by: INTERNAL MEDICINE

## 2022-09-29 PROCEDURE — 80053 COMPREHEN METABOLIC PANEL: CPT | Performed by: INTERNAL MEDICINE

## 2022-09-29 PROCEDURE — 85045 AUTOMATED RETICULOCYTE COUNT: CPT | Performed by: INTERNAL MEDICINE

## 2022-09-29 PROCEDURE — 3008F BODY MASS INDEX DOCD: CPT | Mod: CPTII,,, | Performed by: INTERNAL MEDICINE

## 2022-09-29 PROCEDURE — 1159F PR MEDICATION LIST DOCUMENTED IN MEDICAL RECORD: ICD-10-PCS | Mod: CPTII,,, | Performed by: INTERNAL MEDICINE

## 2022-09-29 PROCEDURE — 3078F DIAST BP <80 MM HG: CPT | Mod: CPTII,,, | Performed by: INTERNAL MEDICINE

## 2022-09-29 PROCEDURE — 99999 PR PBB SHADOW E&M-EST. PATIENT-LVL IV: ICD-10-PCS | Mod: PBBFAC,,, | Performed by: INTERNAL MEDICINE

## 2022-09-29 PROCEDURE — 3074F SYST BP LT 130 MM HG: CPT | Mod: CPTII,,, | Performed by: INTERNAL MEDICINE

## 2022-09-29 PROCEDURE — 99214 OFFICE O/P EST MOD 30 MIN: CPT | Mod: S$PBB,,, | Performed by: INTERNAL MEDICINE

## 2022-09-29 PROCEDURE — 85025 COMPLETE CBC W/AUTO DIFF WBC: CPT | Performed by: INTERNAL MEDICINE

## 2022-09-29 PROCEDURE — 1160F PR REVIEW ALL MEDS BY PRESCRIBER/CLIN PHARMACIST DOCUMENTED: ICD-10-PCS | Mod: CPTII,,, | Performed by: INTERNAL MEDICINE

## 2022-09-29 PROCEDURE — 1159F MED LIST DOCD IN RCRD: CPT | Mod: CPTII,,, | Performed by: INTERNAL MEDICINE

## 2022-09-29 NOTE — PROGRESS NOTES
Route Chart for Scheduling    BMT Chart Routing      Follow up with physician 1 year. in benign heme clinic.   Follow up with [facility]    Infusion scheduling note    Injection scheduling note    Labs CBC, CMP, ferritin and iron and TIBC   Lab interval: every 12 weeks  Please also include reticulocytes. Every 12 weeks until return visit.   Imaging Other   Please schedule abdominal ultrasound at patient's convenience.   Pharmacy appointment    Other referrals          Supportive Plan Information  OP IRON SUCROSE IVP QW   Lexa Larios MD   Upcoming Treatment Dates - OP IRON SUCROSE IVP QW    No upcoming days in selected categories.

## 2022-10-06 NOTE — PROGRESS NOTES
Physical Therapy Daily Treatment Note     Name: Miriam MILLS [facility] Number: [id number]    Therapy Diagnosis:   Encounter Diagnoses   Name Primary?    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 She reports that she is experiencing synkinesis of the R side of her face as well as cramping R facial pain.   She was compliant with home exercise program.   Response to previous treatment: No adverse effects reported   Functional change: Ongoing     Pain: 0/10  Location:  N/A    Objective     Short term goals (STG): 4 weeks  Long Term Goals (LTG), 8 weeks.   Pt agrees to goals set. Eval date: 2022       2022 10/06/2022 Status   LTG: Patient will be independent with HEP emphasizing gaze stability and motion tolerance.  Education required Reports compliance Reports compliance Progressing   STG: Patient will exhibit improved Dizziness Handicap Inventory to 50 indicating decreased self-perceived disability related to dizziness.  LT  68 - severe  36 - Moderate 46 - Moderate STG Met  LTG Progressing   LTG: Patient will exhibit </= 2 line loss with Dynamic Visual Acuity testing, indicating improved gaze stabilization To be  assessed 3 line loss 3 line loss Progressing    STG: Patient will exhibit improved MCTSIB score to 100/120 indicating improved static postural balance.   LT/120 92/120 95/120 100/120 STG - met  LTG - Progressing   STG: Patient will exhibit improved Functional Gait Assessment score to >/= 20 /30 indicating improved dynamic balance for increased safety ambulatory tasks  LT/30 16/30 20/30 21/30 Progressing          Postural control: MCTSIB: Evaluation 10/07/2022 (Average of 3 trials)   1. Eyes Open/feet together/Firm: 30 seconds   2. Eyes Closed/feet together/Firm:  30 seconds   3. Eyes Open/feet together/Foam:  30 seconds   4. Eyes Closed/feet together/Foam:  10 seconds   TOTAL 100/120      Functional Gait Assessment:   1. Gait on level surface =  2  2. Change in Gait Speed = 3  3. Gait with horizontal head turns  = 2  4. Gait with vertical head turns = 3  5. Gait with pivot turns = 2  6. Step over obstacle = 2  7. Gait with Narrow CANDICE = 1  8. Gait with eyes closed = 1  9. Ambulating Backwards = 2  10. Steps = 3    Score 21/30       Treatment     Miriam participated in therapeutic exercise activities to improve: Balance and Dizziness for 35 minutes. The following activities were included:    All time spent of objective measurements. See above.        Home Exercises Provided and Patient Education Provided     Education provided:   - HEP, POC, Proper technique with therapeutic interventions, Benefits/purpose of today's therapeutic interventions    Written Home Exercises Provided: yes.  Exercises were reviewed and Miriam was able to demonstrate them prior to the end of the session.  Miriam demonstrated good  understanding of the education provided.     See EMR under Patient Instructions for exercises provided 6/3/2022.     Assessment   Reassessment period: 2022-10/7/2022. Miriam returned to outpatient PT today following nearly 2 month lapse in treatment due to transportation issues. She was reassessed  for progress today. She has maintained prior gains well, but continues to exhibit deficits of static balance, dynamic balance, and VOR. Functional Gait Assessment score continues to place her within the elevated fall risk range; she reports frustration due to veering when walking in the community. Miriam also reported frustration about the progression of her facial symptoms; she exhibits hypertonia of facial musculature. Miriam will benefit from continued outpatient PT to improve functional mobility for return to prior level of functioning with community participation.    Miriam Is progressing well towards her goals.   Pt prognosis is Good.     Pt will continue to benefit from skilled outpatient physical therapy to address the deficits listed in the problem list box on initial evaluation, provide pt/family education and to maximize pt's level of independence in the home and community environment.     Pt's spiritual, cultural and educational needs considered and pt agreeable to plan of care and goals.     Anticipated barriers to physical therapy: co-morbidities     Goals:     Short term goals (STG): 4 weeks  Long Term Goals (LTG), 8 weeks.   Pt agrees to goals set. Eval date: 2022 Status   LTG: Patient will be independent with HEP emphasizing gaze stability and motion tolerance.  Education required Reports compliance Progressing   STG: Patient will exhibit improved Dizziness Handicap Inventory to 50 indicating decreased self-perceived disability related to dizziness.  LT  68 - severe  36 - Moderate STG Met  LTG Progressing   LTG: Patient will exhibit </= 2 line loss with Dynamic Visual Acuity testing, indicating improved gaze stabilization To be assessed 3 line loss Progressing    STG: Patient will exhibit improved MCTSIB score to 100/120 indicating improved static postural balance.   LT/120 92/120 95/120 STG - previously met but declined  LTG - Ongoing   STG: Patient will exhibit  improved Functional Gait Assessment score to >/= 20 /30 indicating improved dynamic balance for increased safety ambulatory tasks  LT Progressing        Plan   Updated certification dates: 10/7/2022-2022.    Continue PT 2x weekly under established Plan of Care, with treatment to include: pt education, HEP, therapeutic exercises, neuromuscular re-education/balance exercises, and therapeutic activities to work towards established goals. Pt may be seen by PTA to carry out plan of care.     Vision eliminated balance and motion tolerance training.     Rissa Willis, PT

## 2022-10-06 NOTE — PROGRESS NOTES
"Occupational Therapy Reassessment/Treatment Note     Date: 10/10/2022  Name: Miriam MILLS [facility] Number: [id number]    Therapy Diagnosis:   Encounter Diagnosis   Name Primary?    Facial palsy Yes     Physician: Duong Kat Jr.*    Physician Orders: Eval and Treat - Neuro OT  Medical Diagnosis: Z98.890 (ICD-10-CM) - Status post craniotomy  Evaluation Date: 8/3/2022  Insurance Authorization Period Expiration: 6/7/2023  Plan of Care Certification Period: 10 visits; around 11/30/2022  Date of Return to MD: 8/30/2022 [facility]   Previous Botox Injection: 10/7/2022    Visit # / Visits authorized: 3 / 10 (+eval)  Time In: 0720  Time Out: 0819  Total Billable (one on one) Time: 59 minutes    Precautions: Standard    Subjective     Pt reports: Pt presented today reporting onset of synkinesis ~2 weeks ago. She said it was the most excruciating pain she has ever felt. She still has intermittent pain when muscles feel tight but it will relax. Pt reported she received Botox on Friday, 10/7/22 and is happy with results. Pt reported she is drooling and has to drink through a straw.   She was compliant with home exercise program given last session.   Response to previous treatment: onset of synkinesis   Functional change: ongoing     Involved Side: Right  Date of Onset: September 2020 initial symptoms began; acoustic neuroma resection April 28, 2022  Surgical Procedure: Acoustic neuroma resection April 28, 2022    Pain: 0/10  Location: N/A     Patient's Goals for Therapy: "To get my facial function back."    Objective      Miriam participated in dynamic functional therapeutic activities to improve functional performance for 59 minutes, including:  Reassessment:   Facial Clinimetric Evaluation (FaCE) Instrument: see full copy in media section (higher score indicates better function; 0=worst; 100=best)  (Derived from the following formula: [(sum of all 15 items - 15) / 60) x 100] OR [(total score - # of items " scored) / (# of items scored x 4) x 100]  Total Score:   Eval: 17.9  10/10/2022: 32.1     Facial Disability Index (FDI): see full copy in media section (higher score indicates better function; 100=indicates unimpaired physical or social/well-being function)  (Derived from the following formula: Physical Function=[(Total Score (questions 1-5) - N / N) x 110 / 4] ;   Social Function=[(Total Score (questions 6-10) / N) x 100 / 5] ; N = Number of questions answered)  Total Score Physical Function:   Eval: 27.5  10/10/2022: 60.5  Total Score Social Function:   Eval: 52  10/10/2022: 48    Pt made the following facial expressions and spent extensive time comparing movement with evaluation and previous sessions; images of each can be seen in media section:   - Resting symmetry   - Forehead wrinkle (frontalis (FRO))  - Gentle eye closure (orbicularis oculi superioris (OCS))   - Tight eye closure (orbicularis oculi superioris (OCS))   - Open mouth smile (zygomaticus (ZYG)/risorius (RIS))   - Closed mouth smile (zygomaticus (ZYG))  - Snarl (levator labii alaeque (LLA)/levator labii superioris (LLS))  - Lip pucker (orbicularis oris superioris (OOS)/orbicularis oris inferioris (OOI))      [facility] Facial Grading System (FGS): see full copy in media section (higher percentage indicates better movement; 100%=full range movement)   Total Score:   Eval: 13%   10/10/2022: 30%    Treatment:  Pt education:   - Pt thoroughly educated on definition of synkinesis   - Pt educated on new order to perform facial HEP includin) Relaxation 2) Soft tissue massage and stretches 3) Neuro re-ed exercises   - Pt re-educated on how to perform relaxation, including facial scans, with increased emphasis on relaxing affected, right side due to new onset of mild synkinesis   - Pt educated to focus on relaxation especially at rest and during conversation/speech  - Pt educated to use light, bimanual cues to face for re-education regarding state of  relaxation vs facial contractions  - Pt educated to increase emphasis on lower lip depression stretch and exercise using active assistive techniques; pt provided return demonstration with use of mirror feedback  - Discussed benefits of Botox received last Friday, 10/7/2022, and when to expect Botox to begin wearing off (3-4 months time)    - Discussed scheduling/pt appointments     Home Exercises and Education Provided      Education provided:   - Facial HEP  - Progress towards goals    Written Home Exercises Provided: Patient instructed to cont prior HEP.  Exercises were reviewed and Miriam was able to demonstrate them prior to the end of the session.    Miriam demonstrated good  understanding of the education provided.     See EMR under Patient Instructions for exercises provided prior visit.  8/17/2022: focus on resting facial symmetry   8/26/2022: eye socket massage and speech/language exercises   10/10/2022: order to perform exercises with mild onset of synkinesis     Assessment      Pt continues with right sided facial paralysis s/p acoustic neuroma resection on 4/28/2022. Pt presented to today's session reporting onset of synkinesis ~2 weeks ago. Pt was seen by Dr. Dodson last Friday 10/7/2022 and received Botox, mainly to unaffected, right side, to help with facial symmetry. Pt is very happy with results. Noted mild synkinesis in corner of mouth when patient is at rest and during speech/conversation, but this can be managed with relaxation strategies. Pt did not demonstrate any synkinesis on the [facility] Facial Grading System assessment with voluntary facial movement. When patient was actively relaxed, corners of mouth were even, but right cheek was slightly more pronounced than left, which is a sign of synkinesis onset. Although eye closure is improving, right eye is still wider than left, as patient still cannot achieve full eye closure. During today's reassessment, pt's subjective score on the FaCE  instrument increased by 14.2 points indicating improved self perception of facial and eye movement/comfort. Physical function score on the Facial Disability Index improved by 33 points. 4 point decrease on social function score on the Facial Disability Index. Overall score on the [facility] Facial Grading System improved by 17%. Corners of pt's mouth are now more even with smiling. Lack of lower lip depression on affected side is most limiting in regards to open mouth smile. Pt is still demonstrating little to no excursion with forehead wrinkle or snarl. Pt was understanding of all education topics discussed today. Pt would continue to benefit from skilled occupational therapy services to maximize facial and oralmotor function needed for feeding/drink and social function/participation.     Miriam is progressing well towards her goals and updates to goals can be seen below. Pt prognosis is Fair due to time since onset and little return.     Pt will continue to benefit from skilled outpatient occupational therapy to address the deficits listed in the problem list on initial evaluation provide pt/family education and to maximize pt's level of independence in the home and community environment.     Pt's spiritual, cultural and educational needs considered and pt agreeable to plan of care and goals.    Anticipated barriers to occupational therapy: none noted    Goals:  Short Term Goals: 5 weeks   1) Pt will be independent with neuromuscular re-education exercises and facial massages. MET 10/10/2022  2) Pt will be compliant with mindfulness/relaxation strategies needed to manage compensatory movements on unaffected, left side of face ongoing  3) Pt to demonstrate ~2 mm scleral show with tight eye closure. ongoing  4) Pt to score 2 or more using [facility] Facial Grading System (demonstrating ~3 mm scleral show) with gentle eye closure. MET 10/10/2022  5) Pt to demonstrate improved midline orientation/symmetry with lip  pucker ongoing  6) Score on [facility] Facial Grading System to increase by at least 10% to demonstrate improved facial symmetry MET 10/10/2022     Long Term Goals: 10 weeks  1) Score on FaCE Instrument to  increase by 10 points or more to demonstrate improved self perception of facial, oral, and ocular function. MET 10/10/2022  2) Physical function score on Facial Disability Index to improve to 35 points or more to demonstrate improved self perception of facial movement MET 10/10/2022  3) Social function score on Facial Disability Index to improve to 60 points or more to demonstrate improved social participation ongoing  4) Score on [place] Facial Grading System to increase by at least 20% to demonstrate improved facial symmetry MET 10/10/2022  5) Pt will demonstrate improved symmetric smile ongoing  6) Pt to demonstrate no more than ~1mm scleral show with tight eye closure. ongoing  7) Pt will demonstrate/report ability to better hold food in mouth for improved oralmotor performance during feeding. ongoing  8) Pt will demonstrate/report ability to drink from cup without spilling. ongoing    Plan     Certification Period/Plan of care expiration: 10 visits; 8/3/2022 to around 11/30/2022.     Outpatient Occupational Therapy 1 times weekly for 10 weeks to include the following interventions: Neuromuscular Re-ed, Patient Education, Self Care, Therapeutic Activities and Therapeutic Exercise.    Updates/Grading for next session: right side relaxation with onset of synkinesis; soft tissue massage; drinking from cup; lower lip depression; mindfulness recording        Janay Eng, OT

## 2022-10-07 ENCOUNTER — OFFICE VISIT (OUTPATIENT)
Dept: OTOLARYNGOLOGY | Facility: CLINIC | Age: 42
End: 2022-10-07
Payer: MEDICAID

## 2022-10-07 ENCOUNTER — CLINICAL SUPPORT (OUTPATIENT)
Dept: REHABILITATION | Facility: HOSPITAL | Age: 42
End: 2022-10-07
Attending: PHYSICAL MEDICINE & REHABILITATION
Payer: MEDICAID

## 2022-10-07 VITALS — DIASTOLIC BLOOD PRESSURE: 84 MMHG | TEMPERATURE: 98 F | SYSTOLIC BLOOD PRESSURE: 124 MMHG | HEART RATE: 81 BPM

## 2022-10-07 DIAGNOSIS — G51.39 FACIAL SPASM: Primary | ICD-10-CM

## 2022-10-07 DIAGNOSIS — G51.0 FACIAL PARALYSIS: ICD-10-CM

## 2022-10-07 DIAGNOSIS — R25.8 SYNKINESIS: ICD-10-CM

## 2022-10-07 DIAGNOSIS — R26.89 IMPAIRMENT OF BALANCE: Primary | ICD-10-CM

## 2022-10-07 DIAGNOSIS — D33.3 UNILATERAL VESTIBULAR SCHWANNOMA: ICD-10-CM

## 2022-10-07 DIAGNOSIS — R42 DIZZINESS AND GIDDINESS: ICD-10-CM

## 2022-10-07 PROCEDURE — 1159F MED LIST DOCD IN RCRD: CPT | Mod: CPTII,S$GLB,, | Performed by: OTOLARYNGOLOGY

## 2022-10-07 PROCEDURE — 64612 DESTROY NERVE FACE MUSCLE: CPT | Mod: RT,S$GLB,, | Performed by: OTOLARYNGOLOGY

## 2022-10-07 PROCEDURE — 3079F DIAST BP 80-89 MM HG: CPT | Mod: CPTII,S$GLB,, | Performed by: OTOLARYNGOLOGY

## 2022-10-07 PROCEDURE — 3074F PR MOST RECENT SYSTOLIC BLOOD PRESSURE < 130 MM HG: ICD-10-PCS | Mod: CPTII,S$GLB,, | Performed by: OTOLARYNGOLOGY

## 2022-10-07 PROCEDURE — 1160F RVW MEDS BY RX/DR IN RCRD: CPT | Mod: CPTII,S$GLB,, | Performed by: OTOLARYNGOLOGY

## 2022-10-07 PROCEDURE — 3074F SYST BP LT 130 MM HG: CPT | Mod: CPTII,S$GLB,, | Performed by: OTOLARYNGOLOGY

## 2022-10-07 PROCEDURE — 99213 OFFICE O/P EST LOW 20 MIN: CPT | Mod: 25,S$GLB,, | Performed by: OTOLARYNGOLOGY

## 2022-10-07 PROCEDURE — 1159F PR MEDICATION LIST DOCUMENTED IN MEDICAL RECORD: ICD-10-PCS | Mod: CPTII,S$GLB,, | Performed by: OTOLARYNGOLOGY

## 2022-10-07 PROCEDURE — 1160F PR REVIEW ALL MEDS BY PRESCRIBER/CLIN PHARMACIST DOCUMENTED: ICD-10-PCS | Mod: CPTII,S$GLB,, | Performed by: OTOLARYNGOLOGY

## 2022-10-07 PROCEDURE — 3079F PR MOST RECENT DIASTOLIC BLOOD PRESSURE 80-89 MM HG: ICD-10-PCS | Mod: CPTII,S$GLB,, | Performed by: OTOLARYNGOLOGY

## 2022-10-07 PROCEDURE — 99213 PR OFFICE/OUTPT VISIT, EST, LEVL III, 20-29 MIN: ICD-10-PCS | Mod: 25,S$GLB,, | Performed by: OTOLARYNGOLOGY

## 2022-10-07 PROCEDURE — 64612 PR DEST,NERVE,FACIAL: ICD-10-PCS | Mod: RT,S$GLB,, | Performed by: OTOLARYNGOLOGY

## 2022-10-07 PROCEDURE — 97110 THERAPEUTIC EXERCISES: CPT | Mod: PO
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
## 2022-10-07 NOTE — PLAN OF CARE
Physical Therapy Daily Treatment Note     Name: Miriam MILLS [facility] Number: [id number]    Therapy Diagnosis:   Encounter Diagnoses   Name Primary?    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 She reports that she is experiencing synkinesis of the R side of her face as well as cramping R facial pain.   She was compliant with home exercise program.   Response to previous treatment: No adverse effects reported   Functional change: Ongoing     Pain: 0/10  Location:  N/A    Objective     Short term goals (STG): 4 weeks  Long Term Goals (LTG), 8 weeks.   Pt agrees to goals set. Eval date: 2022       2022 10/06/2022 Status   LTG: Patient will be independent with HEP emphasizing gaze stability and motion tolerance.  Education required Reports compliance Reports compliance Progressing   STG: Patient will exhibit improved Dizziness Handicap Inventory to 50 indicating decreased self-perceived disability related to dizziness.  LT  68 - severe  36 - Moderate 46 - Moderate STG Met  LTG Progressing   LTG: Patient will exhibit </= 2 line loss with Dynamic Visual Acuity testing, indicating improved gaze stabilization To be  assessed 3 line loss 3 line loss Progressing    STG: Patient will exhibit improved MCTSIB score to 100/120 indicating improved static postural balance.   LT/120 92/120 95/120 100/120 STG - met  LTG - Progressing   STG: Patient will exhibit improved Functional Gait Assessment score to >/= 20 /30 indicating improved dynamic balance for increased safety ambulatory tasks  LT/30 16/30 20/30 21/30 Progressing          Postural control: MCTSIB: Evaluation 10/07/2022 (Average of 3 trials)   1. Eyes Open/feet together/Firm: 30 seconds   2. Eyes Closed/feet together/Firm:  30 seconds   3. Eyes Open/feet together/Foam:  30 seconds   4. Eyes Closed/feet together/Foam:  10 seconds   TOTAL 100/120      Functional Gait Assessment:   1. Gait on level surface =  2  2. Change in Gait Speed = 3  3. Gait with horizontal head turns  = 2  4. Gait with vertical head turns = 3  5. Gait with pivot turns = 2  6. Step over obstacle = 2  7. Gait with Narrow CANDICE = 1  8. Gait with eyes closed = 1  9. Ambulating Backwards = 2  10. Steps = 3    Score 21/30       Treatment     Miriam participated in therapeutic exercise activities to improve: Balance and Dizziness for 35 minutes. The following activities were included:    All time spent of objective measurements. See above.        Home Exercises Provided and Patient Education Provided     Education provided:   - HEP, POC, Proper technique with therapeutic interventions, Benefits/purpose of today's therapeutic interventions    Written Home Exercises Provided: yes.  Exercises were reviewed and Miriam was able to demonstrate them prior to the end of the session.  Miriam demonstrated good  understanding of the education provided.     See EMR under Patient Instructions for exercises provided 6/3/2022.     Assessment   Reassessment period: 2022-10/7/2022. Miriam returned to outpatient PT today following nearly 2 month lapse in treatment due to transportation issues. She was reassessed  for progress today. She has maintained prior gains well, but continues to exhibit deficits of static balance, dynamic balance, and VOR. Functional Gait Assessment score continues to place her within the elevated fall risk range; she reports frustration due to veering when walking in the community. Miriam also reported frustration about the progression of her facial symptoms; she exhibits hypertonia of facial musculature. Miriam will benefit from continued outpatient PT to improve functional mobility for return to prior level of functioning with community participation.    Miriam Is progressing well towards her goals.   Pt prognosis is Good.     Pt will continue to benefit from skilled outpatient physical therapy to address the deficits listed in the problem list box on initial evaluation, provide pt/family education and to maximize pt's level of independence in the home and community environment.     Pt's spiritual, cultural and educational needs considered and pt agreeable to plan of care and goals.     Anticipated barriers to physical therapy: co-morbidities     Goals:     Short term goals (STG): 4 weeks  Long Term Goals (LTG), 8 weeks.   Pt agrees to goals set. Eval date: 2022 Status   LTG: Patient will be independent with HEP emphasizing gaze stability and motion tolerance.  Education required Reports compliance Progressing   STG: Patient will exhibit improved Dizziness Handicap Inventory to 50 indicating decreased self-perceived disability related to dizziness.  LT  68 - severe  36 - Moderate STG Met  LTG Progressing   LTG: Patient will exhibit </= 2 line loss with Dynamic Visual Acuity testing, indicating improved gaze stabilization To be assessed 3 line loss Progressing    STG: Patient will exhibit improved MCTSIB score to 100/120 indicating improved static postural balance.   LT/120 92/120 95/120 STG - previously met but declined  LTG - Ongoing   STG: Patient will exhibit  improved Functional Gait Assessment score to >/= 20 /30 indicating improved dynamic balance for increased safety ambulatory tasks  LT Progressing        Plan   Updated certification dates: 10/7/2022-2022.    Continue PT 2x weekly under established Plan of Care, with treatment to include: pt education, HEP, therapeutic exercises, neuromuscular re-education/balance exercises, and therapeutic activities to work towards established goals. Pt may be seen by PTA to carry out plan of care.     Vision eliminated balance and motion tolerance training.     Rissa Willis, PT

## 2022-10-07 NOTE — PROGRESS NOTES
Subjective:     Chief Complaint:   Chief Complaint   Patient presents with    facial paralysis         Miriam Mendez is a 41 y.o. female who was referred to me by Miriam Bahena in consultation for facial paralysis.    Onset: 22  Cause: acoustic neuroma   Side: right  Initial extent: complete    Patient has a history of right-sided acoustic neuroma and underwent trans-labyrinthine removal on 2022.  She reports complete right facial nerve paralysis following her surgery.  She has been using lubricating eyedrops has seen Ophthalmology.  She has a scleral contact lens for protection.  She denies vision changes.  Paralysis has been complete until approximately 3 weeks ago.  She is noticed some slight improvement in the lower half of the face with improved tone and slight movement of the oral commissure.  She recently started facial rehabilitation here at [facility].  She was referred to me for further evaluation.    She is not an active smoker.    Today (10/07/2022): Patient returns for follow up visit. Patient reports noticing tightness and pulling of the right side of her face.  This is causing pain discomfort.  She is also noticed some jaw pain and some mild trouble swallowing at times.  She is wearing a corneal contact lens in his noticed some improvement of eye closure.  She has an appointment with Janay next week for facial rehabilitation.      Past Medical History  She has a past medical history of ADD (attention deficit disorder with hyperactivity), Anxiety, Bronchitis, Cellulitis, Depression, Easy bruising, Fibromyalgia, History of ITP, Iron deficiency anemia, Morbid obesity, Pica, Pseudotumor cerebri syndrome, and Thrombocytopenia.    Past Surgical History  She has a past surgical history that includes Gastric bypass ();  section (); and Cholecystectomy ().    Family History  Her family history includes Breast cancer (age of onset: 57) in her mother; Cancer in her father,  paternal grandfather, and paternal grandmother; Cancer (age of onset: 57) in her mother; Hypertension in her mother.    Social History  She reports that she has never smoked. She has never used smokeless tobacco. She reports current alcohol use. She reports that she does not use drugs.    Allergies  She is allergic to sulfa (sulfonamide antibiotics).    Medications  She has a current medication list which includes the following prescription(s): acetaminophen, clonazepam, dexamethasone, dextroamphetamine-amphetamine, diazepam, docusate sodium, famotidine, fluoxetine, ibuprofen, omeprazole, oxycodone, valacyclovir, zolpidem, and [DISCONTINUED] nuvaring.    Review of Systems  Negative except per HPI     Objective:     /84   Pulse 81   Temp 97.7 °F (36.5 °C) (Temporal)      General: Alert and oriented in no acute distress  Head: Normocephaic, atruamatic  Eyes: Cranial nerves II, III, VI, VII, VIII-XII are grossly intact  Skin: Skin texture is medium thickness rated as Rueda II  Ears:   Pinna are normal in shape and position  EACs clear bilaterally  TMs clear without ESTELA or perforation bilaterally    Eyes:  Palpebral height asymmetry  present. normal  on left, wide on right  Gentle closure: complete closure on left, no lagophthalmos present   Gentle closure: incomplete  closure on right, 3 mm of lagophthalmos present   Maximum closure: complete closure on left, no lagophthalmos present   Maximum  closure: incomplete  closure on right, 0.5 mm of lagophthalmos present   Lower lid laxity absent on left, present on right   no scleral show present on left, no scleral show present on right     Nose:  Nasal skin is medium thickness    The caudal septum is midline  Anterior rhinoscopy reveals the septum is straight without perforation or synechiae.    The turbinates are normal with normal mucosa  bilaterally.    There are not pathological secretions present.   The alar base is normal  on the left and normal  on the  right   The external nasal valve is normal and patent  on the right without dynamic collapse  The external nasal valve is normal and patent  on the left without dynamic collapse  The internal nasal valve is normal and patent  on the right and normal and patent  on the left.      Face:  Rest:  Brow asymmetry  present. normal on left, lower on right   Nasolabial fold asymmetry  present. Normal on left, More pronounced on right   Oral commissure asymmetry  present. normal  on left, pulled up/out on right     Dynamic:  Forehead (Frontalis): movement complete  on left, unable to initiate on right  Gentle eye closure (orbicularis oculi): movement complete  on left, initiates with mild movement  on right  Closed mouth smile asymmetry  present   Open mouth smile (zygomaticus/risorius): movement complete  on left, initiate slight movement  on right  -Commissure excursion: normal on left, limited on right  -Lower lip height asymmetry  present; DLI movement present on left, DLI movement absent on right  Snarl (LLA/LLS): movement complete  on left, initiate slight movement  on right  Pucker (OOS/OOI): movement complete  on left, initiate slight movement  on right    Synkinesis:  Moderate oculo-oral synkinesis     Sensation: Intact V1, V2, V3 bilateral    Strength:   Masseter 5/5 on left, Masseter 5/5 on right  Temporalis 5/5 on left, Temporalis 5/5 on right     Oral cavity and oropharynx: Mucous membranes moist without lesions present.  Tongue protrudes midline and palate elevates midline.  Neck: Supple without LAD.    Lungs:breathing comfortably  Psychiatric:  Mood and affect are normal    Sunnybrook Score: 25  House-Brackmann Score: 3/6    Procedure:    Chemodenervation  Discussed the risks benefits alternatives of botulism toxin injection.  Patient voiced understanding.  Written consent was obtained.  Topical benzocaine lidocaine tetracaine cream applied to the treatment areas.  Adequate time was allowed for anesthetic to  take effect.  The face was then wiped clean.  The treatment areas were then prepped with alcohol. Total of 32.5 units of botulinum toxin injected.    Frontalis: 10  Glabella: 12.5  DLI: 2.5  Mentalis: 5  Buccinator: 2.5     patient tolerated the procedure well without complications.    Assessment:     1. Facial spasm    2. Synkinesis    3. Unilateral vestibular schwannoma    4. Facial paralysis            Plan:     I had a long discussion with the patient regarding her condition and the further workup and management options.  Patient has had improvement of facial movement and is now experiencing significant synkinesis.  Discussed options for management.  She has an appointment with Janay for facial rehabilitation which she has been doing prior.  Discussed that this is important for management of synkinesis.  We also discussed the options for botulinum toxin injection.  She tolerated this well in the office today.  She will monitor symptoms continue her rehabilitation over the next several months.  She is had significant improvement of her eye closure and is continuing to wear corneal protective lens.  She has some dysphagia symptoms would she may be related to synkinesis of the posterior belly of the digastric.  We discussed options are limited at this point but may consider ultrasound versus EMG guided chemodenervation in the future.  She will follow-up with me in 3-4 months, sooner if needed.  All questions answered patient was encouraged call with any questions or concerns.

## 2022-10-10 ENCOUNTER — CLINICAL SUPPORT (OUTPATIENT)
Dept: REHABILITATION | Facility: HOSPITAL | Age: 42
End: 2022-10-10
Payer: MEDICAID

## 2022-10-10 DIAGNOSIS — G51.0 FACIAL PALSY: Primary | ICD-10-CM

## 2022-10-10 PROCEDURE — 97530 THERAPEUTIC ACTIVITIES: CPT | Mod: PO

## 2022-10-10 NOTE — PATIENT INSTRUCTIONS
Order of exercises:   1) Relaxation  - 7-10 minutes; [email or web address] (search for mindfulness. Relaxation, body/facial scan)    2) Soft tissue massage and stretches; increase focus on lower lip depression   3) Neuro re- ed exercises; be mindful of synkinesis patterns discussed; increase focus on lower lip depression

## 2022-10-27 ENCOUNTER — TELEPHONE (OUTPATIENT)
Dept: REHABILITATION | Facility: HOSPITAL | Age: 42
End: 2022-10-27

## 2022-10-27 ENCOUNTER — DOCUMENTATION ONLY (OUTPATIENT)
Dept: REHABILITATION | Facility: HOSPITAL | Age: 42
End: 2022-10-27

## 2022-10-27 ENCOUNTER — CLINICAL SUPPORT (OUTPATIENT)
Dept: REHABILITATION | Facility: HOSPITAL | Age: 42
End: 2022-10-27
Attending: NURSE PRACTITIONER
Payer: MEDICAID

## 2022-10-27 DIAGNOSIS — G51.0 FACIAL PALSY: Primary | ICD-10-CM

## 2022-10-27 NOTE — TELEPHONE ENCOUNTER
Attempted to call patient today regarding missed OT visit this morning. Pt did not answer. Left voicemail. Pt informed that OT's schedule was not blocked correctly at time of scheduling, and that future OT appointments will need to be cancelled and rescheduled due to therapist need to staff clinic at [facility]. Pt is unable to see another OT due to need for facial rehab. Also reminded patient of her PT appointment tomorrow (10/29/2022) scheduled for 7:00 AM. Informed patient that if she does not attend this appointment, all future PT appointments will also be cancelled and that she will have to schedule one appointment at a time. Provided patient with number to  to call with any questions.     Janay Eng, MOT, LOTR  10/27/2022

## 2022-10-27 NOTE — PROGRESS NOTES
Occupational Therapy: No show/Cancellation of Visit  Date: 10/27/2022    Patient was a no show to today's OT appointment. Miriam Mendez did not call to cancel nor reschedule. This is the second appointment that the patient has 'no showed'. No charges have been posted today. Patient's next scheduled appointment is 11/3/2022; however, this appointment will need to be re-scheduled as therapist will be staffing another clinic.     Cancel: 3  No show: 2    Therapist: Janay Eng, OT

## 2022-11-25 ENCOUNTER — DOCUMENTATION ONLY (OUTPATIENT)
Dept: REHABILITATION | Facility: HOSPITAL | Age: 42
End: 2022-11-25
Payer: MEDICAID

## 2022-11-25 NOTE — PROGRESS NOTES
[facility]  Discharge Note    Name: Miriam MILLS [facility] Number: [id number]    Therapy Diagnosis:   Encounter Diagnosis   Name Primary?    Facial palsy Yes     Physician: Duong Kat Jr.*    Physician Orders: Eval and Treat - Neuro OT  Medical Diagnosis: Z98.890 (ICD-10-CM) - Status post craniotomy  Evaluation Date: 8/3/2022    Date of Last visit: 10/10/2022  Total Visits Received: 3 + eval     ASSESSMENT      Pt was attending OT for facial rehab s/p craniotomy resulting in right sided facial paralysis and recent onset of synkinesis. Pt was making gradual progress with therapy, but pt with poor attendance to follow up sessions. Pt has 'no showed' and cancelled multiple times and have been unable to get in touch with pt via telephone. Attempted to call patient on 10/27/2022 (see telephone note). Pt has not called to reschedule any follow up visits and is discharged from OT at this time. Pt will have to obtain new orders from physician to return if needed. Reassessment was performed during last in person session, and status of below goals reflects that of last in person visit.     Discharge reason: Patient has not attended therapy since 10/10/2022    Discharge FOTO Score: N/A    Goals:   Short Term Goals: 5 weeks   1) Pt will be independent with neuromuscular re-education exercises and facial massages. MET 10/10/2022  2) Pt will be compliant with mindfulness/relaxation strategies needed to manage compensatory movements on unaffected, left side of face ongoing  3) Pt to demonstrate ~2 mm scleral show with tight eye closure. ongoing  4) Pt to score 2 or more using Sunnybrook Facial Grading System (demonstrating ~3 mm scleral show) with gentle eye closure. MET 10/10/2022  5) Pt to demonstrate improved midline orientation/symmetry with lip pucker ongoing  6) Score on Sunnybrook Facial Grading System to increase by at least 10% to demonstrate improved facial symmetry MET 10/10/2022      Long Term Goals: 10 weeks  1) Score on FaCE Instrument to  increase by 10 points or more to demonstrate improved self perception of facial, oral, and ocular function. MET 10/10/2022  2) Physical function score on Facial Disability Index to improve to 35 points or more to demonstrate improved self perception of facial movement MET 10/10/2022  3) Social function score on Facial Disability Index to improve to 60 points or more to demonstrate improved social participation ongoing  4) Score on Sunnybrook Facial Grading System to increase by at least 20% to demonstrate improved facial symmetry MET 10/10/2022  5) Pt will demonstrate improved symmetric smile ongoing  6) Pt to demonstrate no more than ~1mm scleral show with tight eye closure. ongoing  7) Pt will demonstrate/report ability to better hold food in mouth for improved oralmotor performance during feeding. ongoing  8) Pt will demonstrate/report ability to drink from cup without spilling. ongoing    PLAN     This patient is discharged from Occupational Therapy.       Janay Eng, OT

## 2022-12-01 ENCOUNTER — DOCUMENTATION ONLY (OUTPATIENT)
Dept: REHABILITATION | Facility: HOSPITAL | Age: 42
End: 2022-12-01
Payer: MEDICAID

## 2022-12-01 DIAGNOSIS — R26.89 IMPAIRMENT OF BALANCE: Primary | ICD-10-CM

## 2022-12-01 DIAGNOSIS — R42 DIZZINESS AND GIDDINESS: ICD-10-CM

## 2022-12-01 NOTE — PROGRESS NOTES
"  OUTPATIENT PHYSICAL THERAPY DISCHARGE SUMMARY     Name: Miriam MILLS [facility] Number: [id number]    Diagnosis:   Encounter Diagnoses   Name Primary?    Impairment of balance Yes    Dizziness and giddiness      Physician: Macario Mccallum   Treatment Orders: PT Eval and Treat  Past Medical History:   Diagnosis Date    ADD (attention deficit disorder with hyperactivity)     psych eval 3/10    Anxiety     Bronchitis     RAD /bronchitis episodes    Cellulitis     face    Depression     undergoing psychotherapy    Easy bruising 6/23/2014    Fibromyalgia     History of ITP 6/23/2014    Iron deficiency anemia 6/23/2014    Morbid obesity     improved with s/p gastric bypass    Pica 6/23/2014    Pseudotumor cerebri syndrome     Thrombocytopenia        Initial visit: 5/19/2022   Date of Last visit: 10/7/2022   Date of Discharge Note:  12/01/2022  Total Visits Received: 15  Missed Visits: 3+    ASSESSMENT   Miriam participated in vestibular rehabilitation for several weeks. Visit attendance compliance was poor throughout the plan of care. The following is from her most recent progress assessment dated 10/06/2022 which is the last visit patient attended: "Reassessment period: 07/19/2022-10/7/2022. Miriam returned to outpatient PT today following nearly 2 month lapse in treatment due to transportation issues. She was reassessed for progress today. She has maintained prior gains well, but continues to exhibit deficits of static balance, dynamic balance, and VOR. Functional Gait Assessment score continues to place her within the elevated fall risk range; she reports frustration due to veering when walking in the community. Miriam also reported frustration about the progression of her facial symptoms; she exhibits hypertonia of facial musculature. Miriam will benefit from continued outpatient PT to improve functional mobility for return to prior level of functioning with community participation."    Miriam has cancelled/no " showed all remaining appointments and has not rescheduled. PT plan of care is now ; therefore, PT plan of care is being discharged at this time.      Discharge reason: Patient self discharge    PLAN   This patient is discharged from Outpatient Physical Therapy Services.     Rissa Willis, PT  2022

## 2023-01-20 DIAGNOSIS — S72.491A: ICD-10-CM

## 2023-01-20 DIAGNOSIS — S72.401D: Primary | ICD-10-CM

## 2023-01-23 ENCOUNTER — CLINICAL SUPPORT (OUTPATIENT)
Dept: REHABILITATION | Facility: HOSPITAL | Age: 43
End: 2023-01-23
Payer: MEDICAID

## 2023-01-23 DIAGNOSIS — M25.60 DECREASED RANGE OF MOTION: ICD-10-CM

## 2023-01-23 DIAGNOSIS — M25.561 ACUTE PAIN OF RIGHT KNEE: ICD-10-CM

## 2023-01-23 DIAGNOSIS — R26.9 ABNORMAL GAIT: ICD-10-CM

## 2023-01-23 PROBLEM — M25.569 KNEE PAIN: Status: ACTIVE | Noted: 2023-01-23

## 2023-01-23 PROCEDURE — 97161 PT EVAL LOW COMPLEX 20 MIN: CPT | Mod: PN

## 2023-01-23 NOTE — PLAN OF CARE
"[facility]  Physical Therapy Initial Evaluation    Name: Miriam MILLS [facility] Number: [id number]    Therapy Diagnosis:   Encounter Diagnoses   Name Primary?    Abnormal gait     Acute pain of right knee     Decreased range of motion      Physician: Order, Paper    Physician Orders: PT Eval and Treat NWB range of motion lower extremity   Medical Diagnosis from Referral:   S72.401D (ICD-10-CM) - Closed fracture of lower end of right femur with routine healing   S72.491A (ICD-10-CM) - Closed comminuted intra-articular fracture of distal femur, right, initial encounter     Evaluation Date: 1/23/2023  Authorization Period Expiration: 12/31/2023  Plan of Care Expiration: 1/23/2023 to 3/24/2023  Visit # / Visits authorized: 1/ 1  FOTO#:1/5    Time In: 12:28pm  Time Out: 1:00pm  Total Billable Time: 32 minutes (1LCE)    Precautions: dx of brain surgery with right sided hearing loss and facial paralysis  S/p ORIF right distal femur NWB 6 weeks in knee immobilizer  Subjective     Date of onset: 12/25/2022    History of current condition - Miriam reports: Pt reports that she lost her balance and fell. She was at the [employer] for [place] dinner and was going to the bathroom and she was trying to negotiate which way she was going to go and she lost her balance and fellt down 3 stairs. Pt reports she broke her femur and "another bone in her knee". Pt reports she had surgery and has plates and screws. She is currently getting home health. She has had her 3 week post op appointment already. Her follow up with her surgeon again is on Feb 15th.      Medical History:   Past Medical History:   Diagnosis Date    ADD (attention deficit disorder with hyperactivity)     psych eval 3/10    Anxiety     Bronchitis     RAD /bronchitis episodes    Cellulitis     face    Depression     undergoing psychotherapy    Easy bruising 6/23/2014    Fibromyalgia     History of ITP 6/23/2014    Iron deficiency anemia " 2014    Morbid obesity     improved with s/p gastric bypass    Pica 2014    Pseudotumor cerebri syndrome     Thrombocytopenia        Surgical History:   Miriam Mendez  has a past surgical history that includes Gastric bypass ();  section (); and Cholecystectomy ().    Medications:   Miriam has a current medication list which includes the following prescription(s): acetaminophen, clonazepam, dexamethasone, dextroamphetamine-amphetamine, diazepam, docusate sodium, famotidine, fluoxetine, ibuprofen, omeprazole, oxycodone, valacyclovir, zolpidem, and [DISCONTINUED] nuvaring, and the following Facility-Administered Medications: [START ON 2023] onabotulinumtoxina.    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 Difficulty with stairs .     Pain:  Current 4/10, worst 6/10, best 2/10   Location: right thigh and knee   Description: Throbbing, Tingling, and Shooting  Aggravating Factors: Sitting, Standing, Laying, and Walking  Easing Factors: pain medication and ice    Pts goals: Pt would like to be able to walk again.     Objective     Observation: pt looks well nourished. Obvious right sided facial droop. Knee immobilizer donned  Posture: Pt stands in slight flexion   Gait:  pt ambulates c FWW and knee immobilizer NWB on right. Very slow moving.   Palpation: TTP along incision  Sensation: intact  DTRs: NT  Myotomes: NT  Dermatomes: NT    Range of Motion/Strength:     Knee Right Left Pain/Dysfunction with Movement    AROM/PROM Passive range of motion R     flexion  60  150/151    extension  -5/-3  3/4    Knee strength: To be further assessed at upcoming visit, time constraint 2/2 pt being very late.     Joint Mobility:   - knee: good patellar mobility, tibiofemoral mobility not tested 2/2 plates      CMS Impairment/Limitation/Restriction for FOTO Survey    Therapist reviewed FOTO scores for Miriam Mendez on 1/23/2023.   FOTO documents entered into EPIC - see Media section.    Limitation Score: 88%  Predicted Score:48%  LEFS: 3.7       TREATMENT     Treatment Time In: 00  Treatment Time Out: 00  Total Treatment time separate from Evaluation: 00 minutes    NEXT VISIT:  Joint mobility - patella, infrapatellar fat pad  Range of motion knee flexion and extension  Quad control       Home Exercises and Patient Education Provided    Education provided:   - Pt educated on POC  - Pt educated on HEP  - Pt educated on anatomy and physiology of current condition as it relates to signs and symptoms    Written Home Exercises Provided: yes.  Exercises were reviewed and Miriam was able to demonstrate them prior to the end of the session.  Miriam demonstrated good  understanding of the education provided.     See EMR under Patient Instructions for exercises provided 1/23/2023.    Assessment     Miriam is a 42 y.o. female referred to outpatient Physical Therapy with a medical diagnosis of s/p right Distal femur ORIF. Pt presents to PT with NWB for another 4-6 weeks. Incision is healing well with no current signs of infection. Pt demonstrates poor knee flexion range of motion with painful end feel. Terminal knee extension lacking compared to contralateral lower extremity. Pt was late for assessment and further evaluation of strength to be performed at upcoming visits. Pt educated heavily on wbing status and progress of PT.      Pt will benefit from skilled outpatient Physical Therapy to address the deficits stated above and in the chart  below, provide pt/family education, and to maximize pt's level of independence.   Pt prognosis is Good.       Plan of care discussed with patient: Yes  Pt's spiritual, cultural and educational needs considered and pt agreeable to plan of care and goals as stated below:     Anticipated Barriers for therapy: [place], transportation.       Medical Necessity is demonstrated by the following  History  Co-morbidities and personal factors that may impact the plan of care Co-morbidities:   [place], hx of [place].     Personal Factors:   attitudes     moderate   Examination  Body Structures and Functions, activity limitations and participation restrictions that may impact the plan of care Body Regions:   back  lower extremities    Body Systems:    ROM  strength  balance  gait  transfers  transitions  motor control    Participation Restrictions:   Passive range of motion and NWB     Activity limitations:   Learning and applying knowledge  no deficits    General Tasks and Commands  no deficits    Communication  no deficits    Mobility  lifting and carrying objects  walking  driving (bike, car, motorcycle)    Self care  dressing    Domestic Life  shopping  cooking  doing house work (cleaning house, washing dishes, laundry)    Interactions/Relationships  no deficits    Life Areas  employment    Community and Social Life  community life  recreation and leisure         high   Clinical Presentation stable and uncomplicated low   Decision Making/ Complexity Score: low         Goals:  Short Term Goals (6 Weeks):  1. Pt will be compliant with HEP to supplement PT in restoring pain free function.  2. Pt will be able to ambulate 300ft sans assistive device in order to get around home safely  3. Pt will improve knee flexion range of motion to >90' in order to perform functional tasks at home  4. Pt will demonstrate full terminal knee extension in order to ambulate with proper mechanics.     Long Term Goals (12 Weeks):  1. Pt will improve FOTO score  to </= 48% limited to decrease perceived limitation with mobility.   2.Pt will improve knee flexion range of motion to >120' in order to perform functional tasks at home  3. Pt will be able to ambulate without an increase in pain greater than 2 pts on NPRS   4. Pt will be independent c final home exercise program in order to DC to home program.     Plan     Plan of care Certification: 1/23/2023 to 3/24/2023.    Outpatient Physical Therapy 2 times weekly for 12 weeks to include the following interventions: Electrical Stimulation PRN, Gait Training, Manual Therapy, Moist Heat/ Ice, Neuromuscular Re-ed, Patient Education, Therapeutic Activities, and Therapeutic Exercise. All other modalities PRN. Pt will be treated in conjunction c PTA prn. Dry Needling PRN.    Sarai Mcdaniel, PT, DPT, OCS

## 2023-02-06 DIAGNOSIS — Z71.9 HEALTH EDUCATION/COUNSELING: Primary | ICD-10-CM

## 2023-02-07 ENCOUNTER — OFFICE VISIT (OUTPATIENT)
Dept: OTOLARYNGOLOGY | Facility: CLINIC | Age: 43
End: 2023-02-07
Payer: COMMERCIAL

## 2023-02-07 DIAGNOSIS — R25.8 SYNKINESIS: Primary | ICD-10-CM

## 2023-02-07 DIAGNOSIS — G51.39 FACIAL SPASM: ICD-10-CM

## 2023-02-07 DIAGNOSIS — R25.8 SYNKINESIS: ICD-10-CM

## 2023-02-07 DIAGNOSIS — G51.39 FACIAL SPASM: Primary | ICD-10-CM

## 2023-02-07 DIAGNOSIS — D33.3 UNILATERAL VESTIBULAR SCHWANNOMA: ICD-10-CM

## 2023-02-07 PROCEDURE — 64612 DESTROY NERVE FACE MUSCLE: CPT | Mod: 50,S$GLB,, | Performed by: OTOLARYNGOLOGY

## 2023-02-07 PROCEDURE — 99213 OFFICE O/P EST LOW 20 MIN: CPT | Mod: 25,S$GLB,, | Performed by: OTOLARYNGOLOGY

## 2023-02-07 PROCEDURE — 1160F PR REVIEW ALL MEDS BY PRESCRIBER/CLIN PHARMACIST DOCUMENTED: ICD-10-PCS | Mod: CPTII,S$GLB,, | Performed by: OTOLARYNGOLOGY

## 2023-02-07 PROCEDURE — 1160F RVW MEDS BY RX/DR IN RCRD: CPT | Mod: CPTII,S$GLB,, | Performed by: OTOLARYNGOLOGY

## 2023-02-07 PROCEDURE — 1159F PR MEDICATION LIST DOCUMENTED IN MEDICAL RECORD: ICD-10-PCS | Mod: CPTII,S$GLB,, | Performed by: OTOLARYNGOLOGY

## 2023-02-07 PROCEDURE — 99213 PR OFFICE/OUTPT VISIT, EST, LEVL III, 20-29 MIN: ICD-10-PCS | Mod: 25,S$GLB,, | Performed by: OTOLARYNGOLOGY

## 2023-02-07 PROCEDURE — 64612 PR DEST,NERVE,FACIAL: ICD-10-PCS | Mod: 50,S$GLB,, | Performed by: OTOLARYNGOLOGY

## 2023-02-07 PROCEDURE — 1159F MED LIST DOCD IN RCRD: CPT | Mod: CPTII,S$GLB,, | Performed by: OTOLARYNGOLOGY

## 2023-02-07 NOTE — PROGRESS NOTES
Subjective:     Chief Complaint:   Follow up facial synkinesis       Miriam Mendez is a 42 y.o. female who was referred to me by Miriam Bahena in consultation for facial paralysis.    Onset: 22  Cause: acoustic neuroma   Side: right  Initial extent: complete    Patient has a history of right-sided acoustic neuroma and underwent trans-labyrinthine removal on 2022.  She reports complete right facial nerve paralysis following her surgery.  She has been using lubricating eyedrops has seen Ophthalmology.  She has a scleral contact lens for protection.  She denies vision changes.  Paralysis has been complete until approximately 3 weeks ago.  She is noticed some slight improvement in the lower half of the face with improved tone and slight movement of the oral commissure.  She recently started facial rehabilitation here at [facility].  She was referred to me for further evaluation.    She is not an active smoker.    Today (2023): Patient returns for follow up visit. Patient reports improvement of facial movement and eye closure. No longer needing corneal lens. She has noticed increased unwanted eye closure and limited smile.  She is performing facial rehabilitation exercises.      Past Medical History  She has a past medical history of ADD (attention deficit disorder with hyperactivity), Anxiety, Bronchitis, Cellulitis, Depression, Easy bruising, Fibromyalgia, History of ITP, Iron deficiency anemia, Morbid obesity, Pica, Pseudotumor cerebri syndrome, and Thrombocytopenia.    Past Surgical History  She has a past surgical history that includes Gastric bypass ();  section (); and Cholecystectomy ().    Family History  Her family history includes Breast cancer (age of onset: 57) in her mother; Cancer in her father, paternal grandfather, and paternal grandmother; Cancer (age of onset: 57) in her mother; Hypertension in her mother.    Social History  She reports that she has never  smoked. She has never used smokeless tobacco. She reports current alcohol use. She reports that she does not use drugs.    Allergies  She is allergic to sulfa (sulfonamide antibiotics).    Medications  She has a current medication list which includes the following prescription(s): acetaminophen, clonazepam, dexamethasone, dextroamphetamine-amphetamine, diazepam, docusate sodium, famotidine, fluoxetine, ibuprofen, omeprazole, oxycodone, valacyclovir, zolpidem, and [DISCONTINUED] nuvaring, and the following Facility-Administered Medications: [START ON 5/9/2023] onabotulinumtoxina.    Review of Systems  Negative except per HPI     Objective:        General: Alert and oriented in no acute distress  Head: Normocephaic, atruamatic  Eyes: Cranial nerves II, III, VI, VII, VIII-XII are grossly intact  Skin: Skin texture is medium thickness rated as Rueda II  Ears:   Pinna are normal in shape and position  EACs clear bilaterally  TMs clear without ESTELA or perforation bilaterally    Eyes:  Palpebral height asymmetry  present. normal  on left, normal  on right  Gentle closure: complete closure on left, no lagophthalmos present   Gentle closure: complete closure on right, no lagophthalmos present   Maximum closure: complete closure on left, no lagophthalmos present   Maximum  closure: complete closure on right, no of lagophthalmos present   Lower lid laxity absent on left, absent on right   no scleral show present on left, no scleral show present on right     Nose:  Nasal skin is medium thickness    The caudal septum is midline  Anterior rhinoscopy reveals the septum is straight without perforation or synechiae.    The turbinates are normal with normal mucosa  bilaterally.    There are not pathological secretions present.   The alar base is normal  on the left and normal  on the right   The external nasal valve is normal and patent  on the right without dynamic collapse  The external nasal valve is normal and patent  on the  left without dynamic collapse  The internal nasal valve is normal and patent  on the right and normal and patent  on the left.      Face:  Rest:  Brow asymmetry  present. normal on left, normal on right   Nasolabial fold asymmetry  present. Normal on left, More pronounced on right   Oral commissure asymmetry  present. normal  on left, pulled up/out on right     Dynamic:  Forehead (Frontalis): movement complete  on left, initiate slight movement  on right  Gentle eye closure (orbicularis oculi): movement complete  on left, movement complete  on right  Closed mouth smile asymmetry  present   Open mouth smile (zygomaticus/risorius): movement complete  on left, initiate slight movement  on right  -Commissure excursion: normal on left, limited on right  -Lower lip height asymmetry  present; DLI movement present on left, DLI movement absent on right  Snarl (LLA/LLS): movement complete  on left, initiate slight movement  on right  Pucker (OOS/OOI): movement complete  on left, initiate slight movement  on right    Synkinesis:  Moderate oculo-oral synkinesis     Sensation: Intact V1, V2, V3 bilateral    Strength:   Masseter 5/5 on left, Masseter 5/5 on right  Temporalis 5/5 on left, Temporalis 5/5 on right     Oral cavity and oropharynx: Mucous membranes moist without lesions present.  Tongue protrudes midline and palate elevates midline.  Neck: Supple without LAD.    Lungs:breathing comfortably  Psychiatric:  Mood and affect are normal    House-Brackmann Score: 3/6    Procedure:    Chemodenervation  Discussed the risks benefits alternatives of botulism toxin injection.  Patient voiced understanding.  Written consent was obtained.  Topical benzocaine lidocaine tetracaine cream applied to the treatment areas.  Adequate time was allowed for anesthetic to take effect.  The face was then wiped clean.  The treatment areas were then prepped with alcohol. Total of 57.5 units of botulinum toxin injected.    Frontalis: 10  Glabella:  12.5  Orbicularis oculi: 20  DLI: 2.5  JUAN ANTONIO: 2.5  Orbicularis oris: 2.5  Mentalis: 5  Buccinator: 2.5     patient tolerated the procedure well without complications.    Assessment:     1. Synkinesis    2. Facial spasm    3. Unilateral vestibular schwannoma            Plan:     I had a long discussion with the patient regarding her condition and the further workup and management options.  Patient has had improvement of facial movement and is now experiencing significant synkinesis.  Discussed options for management.  Cont facial rehabilitation,  Discussed that this is important for management of synkinesis.  We also discussed the options for botulinum toxin injection.  She tolerated this well in the office today.  She will monitor symptoms continue her rehabilitation over the next several months.  She will follow-up with me in 3 months for repeat chemodenervation, sooner if needed.  All questions answered patient was encouraged call with any questions or concerns.

## 2023-02-08 ENCOUNTER — CLINICAL SUPPORT (OUTPATIENT)
Dept: REHABILITATION | Facility: HOSPITAL | Age: 43
End: 2023-02-08
Payer: MEDICAID

## 2023-02-08 DIAGNOSIS — M25.561 ACUTE PAIN OF RIGHT KNEE: ICD-10-CM

## 2023-02-08 DIAGNOSIS — Z71.9 HEALTH EDUCATION/COUNSELING: Primary | ICD-10-CM

## 2023-02-08 DIAGNOSIS — R26.9 ABNORMAL GAIT: Primary | ICD-10-CM

## 2023-02-08 DIAGNOSIS — M25.60 DECREASED RANGE OF MOTION: ICD-10-CM

## 2023-02-08 PROCEDURE — 97110 THERAPEUTIC EXERCISES: CPT | Mod: PN

## 2023-02-08 NOTE — PROGRESS NOTES
[facility]   Physical Therapy Treatment Note     Name: Miriam MILLS [facility] Number: [id number]    Therapy Diagnosis:   Encounter Diagnoses   Name Primary?    Abnormal gait Yes    Acute pain of right knee     Decreased range of motion      Physician: Order, Paper    Visit Date: 2/8/2023    Physician Orders: PT Eval and Treat NWB range of motion lower extremity   Medical Diagnosis from Referral:   S72.401D (ICD-10-CM) - Closed fracture of lower end of right femur with routine healing   S72.491A (ICD-10-CM) - Closed comminuted intra-articular fracture of distal femur, right, initial encounter      Physician Orders: PT Eval and Treat NWB range of motion lower extremity   Medical Diagnosis from Referral:   S72.401D (ICD-10-CM) - Closed fracture of lower end of right femur with routine healing   S72.491A (ICD-10-CM) - Closed comminuted intra-articular fracture of distal femur, right, initial encounter      Evaluation Date: 1/23/2023  Authorization Period Expiration: 12/31/2023  Plan of Care Expiration: 1/23/2023 to 3/24/2023  Visit # / Visits authorized: 1/ 40 + eval  FOTO#:2/5     Time In: 12:02pm  Time Out: 1:00pm  Total Billable Time: 58minutes (4TE mediaid)     Precautions: hx of brain surgery with right sided hearing loss and facial paralysis  S/p ORIF right distal femur NWB 6 weeks in knee immobilizer  SUBJECTIVE     Pt reports: Pt missed her last appt because her transportation messed up the time. She feels sore and really wants to just walk   She was compliant with home exercise program.  Response to previous treatment: last was evaluation  Functional change: no change    Pain: 2/10  Location: right knee      OBJECTIVE     Objective Measures updated at progress report unless specified.     Observation: pt has open wounds on her lateral thigh. Reports that she has been picking at the scabs that were there but she is trying to stop.     Treatment     Miriam received the treatments  "listed below:      Miriam received the following manual therapy techniques: Joint mobilizations were applied to the: right knee for 25 minutes, including:  Patellar mobilization  Infrapatellar fat pad mobilization  Knee hinge for terminal knee extension  EOT tibial posterior glides   EOT tibiofemoral distraction     Miriam participated in neuromuscular re-education activities to improve: Proprioception and Posture for 13 minutes. The following activities were included:  Quad sets 2x20 5"  Short arc quads 3x10 5"    Mirima received therapeutic exercises to develop strength, endurance, ROM, and flexibility for 20 minutes including:  Heel prop 5 min   Heel slides 5 min   Sidelying hip abd 3x10   Long sitting ankle plantarflexion c blue theraband 2x20    Patient Education and Home Exercises     Home Exercises Provided and Patient Education Provided     Education provided:   - Pt educated on POC  - Pt educated on anatomy and physiology of current conditions as it relates to signs and symptoms  - Pt educated on HEP - pt provided c blue theraband for gastroc strengthening     Written Home Exercises Provided: yes. Exercises were reviewed and Miriam was able to demonstrate them prior to the end of the session.  Miriam demonstrated good  understanding of the education provided. See EMR under Patient Instructions for exercises provided during therapy sessions    ASSESSMENT     Miriam presents to PT after missing her last follow up appt. She continues to demonstrate limitations into knee flexion and terminal knee extension compared to contralateral lower extremity. Skin along incision is irritated 2/2 pt picking at her scabs. Bandage placed over them today to prevent pt from continuing to irritate incision site. Pt has good quad control today and was able to be progressed in quad strengthening activities.     Miriam Is progressing well towards her goals.   Pt prognosis is Fair.     Pt will continue to benefit from skilled " outpatient physical therapy to address the deficits listed in the problem list box on initial evaluation, provide pt/family education and to maximize pt's level of independence in the home and community environment.     Pt's spiritual, cultural and educational needs considered and pt agreeable to plan of care and goals.     Anticipated barriers to physical therapy: Compliance/Transportation    Goals:   Short Term Goals (6 Weeks):  1. Pt will be compliant with HEP to supplement PT in restoring pain free function.  2. Pt will be able to ambulate 300ft sans assistive device in order to get around home safely  3. Pt will improve knee flexion range of motion to >90' in order to perform functional tasks at home  4. Pt will demonstrate full terminal knee extension in order to ambulate with proper mechanics.      Long Term Goals (12 Weeks):  1. Pt will improve FOTO score to </= 48% limited to decrease perceived limitation with mobility.   2.Pt will improve knee flexion range of motion to >120' in order to perform functional tasks at home  3. Pt will be able to ambulate without an increase in pain greater than 2 pts on NPRS   4. Pt will be independent c final home exercise program in order to DC to home program.     PLAN     Continue with plan of care as indicated above    Sarai Mcdaniel, PT, DPT, OCS

## 2023-02-15 ENCOUNTER — CLINICAL SUPPORT (OUTPATIENT)
Dept: REHABILITATION | Facility: HOSPITAL | Age: 43
End: 2023-02-15
Attending: INTERNAL MEDICINE
Payer: MEDICAID

## 2023-02-15 DIAGNOSIS — R26.9 ABNORMAL GAIT: Primary | ICD-10-CM

## 2023-02-15 DIAGNOSIS — M25.561 ACUTE PAIN OF RIGHT KNEE: ICD-10-CM

## 2023-02-15 DIAGNOSIS — M25.60 DECREASED RANGE OF MOTION: ICD-10-CM

## 2023-02-15 PROCEDURE — 97110 THERAPEUTIC EXERCISES: CPT | Mod: PN

## 2023-02-15 NOTE — PROGRESS NOTES
[facility]   Physical Therapy Treatment Note     Name: Miriam MILLS [facility] Number: [id number]    Therapy Diagnosis:   Encounter Diagnoses   Name Primary?    Abnormal gait Yes    Acute pain of right knee     Decreased range of motion        Physician: Order, Paper    Visit Date: 2/15/2023    Physician Orders: PT Eval and Treat NWB range of motion lower extremity   Medical Diagnosis from Referral:   S72.401D (ICD-10-CM) - Closed fracture of lower end of right femur with routine healing   S72.491A (ICD-10-CM) - Closed comminuted intra-articular fracture of distal femur, right, initial encounter      Physician Orders: PT Eval and Treat NWB range of motion lower extremity   Medical Diagnosis from Referral:   S72.401D (ICD-10-CM) - Closed fracture of lower end of right femur with routine healing   S72.491A (ICD-10-CM) - Closed comminuted intra-articular fracture of distal femur, right, initial encounter      Evaluation Date: 1/23/2023  Authorization Period Expiration: 12/31/2023  Plan of Care Expiration: 1/23/2023 to 3/24/2023  Visit # / Visits authorized: 3/ 40 + eval  FOTO#:4/5     Time In: 10:40m  Time Out: 11:40m  Total Billable Time: 58minutes (4TE mediaid)     Precautions: hx of brain surgery with right sided hearing loss and facial paralysis  S/p ORIF right distal femur NWB 6 weeks in knee immobilizer  SUBJECTIVE     Pt reports: Pt reports that she has stopped picking at her wound and it looks a lot better. She has been working really hard on her exercises and feels like the swelling has come down and the knee is moving better.   She was compliant with home exercise program.  Response to previous treatment: felt better  Functional change: no change    Pain: 2/10  Location: right knee      OBJECTIVE     Objective Measures updated at progress report unless specified.     Observation: scabs healing, less redness and irritation    Knee range of motion: 0-1-95    Treatment     Miriam  "received the treatments listed below:      Miriam received the following manual therapy techniques: Joint mobilizations were applied to the: right knee for 25 minutes, including:  Patellar mobilization  Infrapatellar fat pad mobilization  Knee hinge for terminal knee extension  EOT tibial posterior glides   EOT tibiofemoral distraction   EOT muscle energy technique for knee flexion     Miriam participated in neuromuscular re-education activities to improve: Proprioception and Posture for 13 minutes. The following activities were included:  Quad sets 2x20 5"  Short arc quads 3x10 5"    Miriam received therapeutic exercises to develop strength, endurance, ROM, and flexibility for 22 minutes including:  Heel prop 5 min   Heel slides 5 min   Sidelying hip abd 3x10   Clamshell 3x10 bilateral   Long sitting ankle plantarflexion c blue theraband 2x20    Patient Education and Home Exercises     Home Exercises Provided and Patient Education Provided     Education provided:   - Pt educated on POC  - Pt educated on anatomy and physiology of current conditions as it relates to signs and symptoms  - Pt educated on HEP - pt provided c blue theraband for gastroc strengthening     Written Home Exercises Provided: yes. Exercises were reviewed and Miriam was able to demonstrate them prior to the end of the session.  Miriam demonstrated good  understanding of the education provided. See EMR under Patient Instructions for exercises provided during therapy sessions    ASSESSMENT     Miriam presents to PT today with improvement in knee range of motion. Strength is also improving and pt was able to perform more difficulty quad activation exercises today. Pt does demonstrate fatigue and muscle burn indicative of proper challenge. Pt is very eager to walk and education again provided on why it is important for her to continue to follow WB precautions.     Miriam Is progressing well towards her goals.   Pt prognosis is Fair.     Pt will " continue to benefit from skilled outpatient physical therapy to address the deficits listed in the problem list box on initial evaluation, provide pt/family education and to maximize pt's level of independence in the home and community environment.     Pt's spiritual, cultural and educational needs considered and pt agreeable to plan of care and goals.     Anticipated barriers to physical therapy: Compliance/Transportation    Goals:   Short Term Goals (6 Weeks):  1. Pt will be compliant with HEP to supplement PT in restoring pain free function.  2. Pt will be able to ambulate 300ft sans assistive device in order to get around home safely  3. Pt will improve knee flexion range of motion to >90' in order to perform functional tasks at home  4. Pt will demonstrate full terminal knee extension in order to ambulate with proper mechanics.      Long Term Goals (12 Weeks):  1. Pt will improve FOTO score to </= 48% limited to decrease perceived limitation with mobility.   2.Pt will improve knee flexion range of motion to >120' in order to perform functional tasks at home  3. Pt will be able to ambulate without an increase in pain greater than 2 pts on NPRS   4. Pt will be independent c final home exercise program in order to DC to home program.     PLAN     Continue with plan of care as indicated above    Sarai Mcdaniel, PT, DPT, OCS

## 2023-03-01 ENCOUNTER — CLINICAL SUPPORT (OUTPATIENT)
Dept: REHABILITATION | Facility: HOSPITAL | Age: 43
End: 2023-03-01
Payer: MEDICAID

## 2023-03-01 DIAGNOSIS — M25.561 ACUTE PAIN OF RIGHT KNEE: ICD-10-CM

## 2023-03-01 DIAGNOSIS — R26.9 ABNORMAL GAIT: Primary | ICD-10-CM

## 2023-03-01 DIAGNOSIS — M25.60 DECREASED RANGE OF MOTION: ICD-10-CM

## 2023-03-01 PROCEDURE — 97110 THERAPEUTIC EXERCISES: CPT | Mod: PN

## 2023-03-10 ENCOUNTER — CLINICAL SUPPORT (OUTPATIENT)
Dept: REHABILITATION | Facility: HOSPITAL | Age: 43
End: 2023-03-10
Payer: MEDICAID

## 2023-03-10 DIAGNOSIS — M25.561 ACUTE PAIN OF RIGHT KNEE: ICD-10-CM

## 2023-03-10 DIAGNOSIS — R26.9 ABNORMAL GAIT: Primary | ICD-10-CM

## 2023-03-10 DIAGNOSIS — M25.60 DECREASED RANGE OF MOTION: ICD-10-CM

## 2023-03-10 PROCEDURE — 97110 THERAPEUTIC EXERCISES: CPT | Mod: PN

## 2023-03-10 NOTE — PROGRESS NOTES
[facility]   Physical Therapy Treatment Note     Name: Miriam MILLS [facility] Number: [id number]    Therapy Diagnosis:   Encounter Diagnoses   Name Primary?    Abnormal gait Yes    Acute pain of right knee     Decreased range of motion          Physician: Order, Paper    Visit Date: 3/10/2023    Physician Orders: PT Eval and Treat NWB range of motion lower extremity   Medical Diagnosis from Referral:   S72.401D (ICD-10-CM) - Closed fracture of lower end of right femur with routine healing   S72.491A (ICD-10-CM) - Closed comminuted intra-articular fracture of distal femur, right, initial encounter      Evaluation Date: 1/23/2023  Authorization Period Expiration: 12/31/2023  Plan of Care Expiration: 1/23/2023 to 3/24/2023  Visit # / Visits authorized: 5/ 40 + eval  FOTO#:5/5     Time In: 11:16am  Time Out: 12:16am  Total Billable Time: 60minutes (4TE mediaid)      Precautions: 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   SUBJECTIVE     Pt reports: Missed her last appts cause she didn't have transportation and didn't know she had them. Thinks she is looking at the wrong schedule. Knee feels deidre worse. Very stiff. She had staples removed and was told she may end up needing a manipulation if the knee continues to be stiff.   She was compliant with home exercise program.  Response to previous treatment: missed several visits  Functional change: reduced capacity 2/2 new surgery    Pain: 6/10  Location: right knee      OBJECTIVE     Objective Measures updated at progress report unless specified.       Treatment     Miriam received the treatments listed below:      Miriam received the following manual therapy techniques: Joint mobilizations were applied to the: right knee for 20 minutes, including:  Patellar  "mobilization  Infrapatellar fat pad mobilization  Knee hinge for terminal knee extension  AP tibial glide knee flexion     EOT tibial posterior glides   EOT tibiofemoral distraction   EOT muscle energy technique for knee flexion     Miriam participated in neuromuscular re-education activities to improve: Proprioception and Posture for 15 minutes. The following activities were included:  Quad sets 2x30 5"  Short arc quads 3x10     Miriam received therapeutic exercises to develop strength, endurance, ROM, and flexibility for 20 minutes including:  Heel prop 5 min   Heel slides 5 min   Knee flexion LLLD stretch c 1# - 5min   Long sitting gastroc/hamstring stretch 10x15"     Sidelying hip abd 3x10   Clamshell 3x10 bilateral   Long sitting ankle plantarflexion c blue theraband 2x20    Patient Education and Home Exercises     Home Exercises Provided and Patient Education Provided     Education provided:   - Pt educated on POC  - Pt educated on anatomy and physiology of current conditions as it relates to signs and symptoms  - Pt educated on HEP - pt provided c blue theraband for gastroc strengthening     Written Home Exercises Provided: yes. Exercises were reviewed and Miriam was able to demonstrate them prior to the end of the session.  Miriam demonstrated good  understanding of the education provided. See EMR under Patient Instructions for exercises provided during therapy sessions    ASSESSMENT     Miriam presents to PT today after missing her last two appts. She has increased knee stiffness into flexion but extension seems to be improving. LLLD stretching for both flexion and extension performed today with improvement seen in both directions. Pt educated on possible dynasplint. PT to continue to focus on knee range of motion and Quad activation.     Miriam Is progressing well towards her goals.   Pt prognosis is Fair.     Pt will continue to benefit from skilled outpatient physical therapy to address the deficits " listed in the problem list box on initial evaluation, provide pt/family education and to maximize pt's level of independence in the home and community environment.     Pt's spiritual, cultural and educational needs considered and pt agreeable to plan of care and goals.     Anticipated barriers to physical therapy: Compliance/Transportation    Goals:   Short Term Goals (6 Weeks):  1. Pt will be compliant with HEP to supplement PT in restoring pain free function.  2. Pt will be able to ambulate 300ft sans assistive device in order to get around home safely  3. Pt will improve knee flexion range of motion to >90' in order to perform functional tasks at home  4. Pt will demonstrate full terminal knee extension in order to ambulate with proper mechanics.      Long Term Goals (12 Weeks):  1. Pt will improve FOTO score to </= 48% limited to decrease perceived limitation with mobility.   2.Pt will improve knee flexion range of motion to >120' in order to perform functional tasks at home  3. Pt will be able to ambulate without an increase in pain greater than 2 pts on NPRS   4. Pt will be independent c final home exercise program in order to DC to home program.     PLAN     Continue with plan of care as indicated above    Sarai Mcdaniel, PT, DPT, OCS

## 2023-03-15 ENCOUNTER — CLINICAL SUPPORT (OUTPATIENT)
Dept: REHABILITATION | Facility: HOSPITAL | Age: 43
End: 2023-03-15
Payer: MEDICAID

## 2023-03-15 DIAGNOSIS — M25.561 ACUTE PAIN OF RIGHT KNEE: ICD-10-CM

## 2023-03-15 DIAGNOSIS — R26.9 ABNORMAL GAIT: Primary | ICD-10-CM

## 2023-03-15 DIAGNOSIS — M25.60 DECREASED RANGE OF MOTION: ICD-10-CM

## 2023-03-15 PROCEDURE — 97110 THERAPEUTIC EXERCISES: CPT | Mod: PN

## 2023-03-15 NOTE — PROGRESS NOTES
[facility]   Physical Therapy Treatment Note     Name: Miriam MILLS [facility] Number: [id number]    Therapy Diagnosis:   Encounter Diagnoses   Name Primary?    Abnormal gait Yes    Acute pain of right knee     Decreased range of motion        Physician: Order, Paper    Visit Date: 3/15/2023    Physician Orders: PT Eval and Treat NWB range of motion lower extremity   Medical Diagnosis from Referral:   S72.401D (ICD-10-CM) - Closed fracture of lower end of right femur with routine healing   S72.491A (ICD-10-CM) - Closed comminuted intra-articular fracture of distal femur, right, initial encounter      Evaluation Date: 1/23/2023  Authorization Period Expiration: 12/31/2023  Plan of Care Expiration: 1/23/2023 to 3/24/2023  Visit # / Visits authorized: 6/ 40 + eval  FOTO#:5/5      Time In: 12:01pm  Time Out: 12:50pm  Total Billable Time: 50minutes (4TE mediaid)      Precautions: 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   SUBJECTIVE     Pt reports: Pt reports that she thinks she has an infection. She said she has a part of her incision where she thinks the staple was. When she pushes on it is oozes yellow and green. It is swollen. She put a bandaid on it but isnt sure what else to do. She tried to call her doctor but they said he wouldn't be in clinic until today.   She was compliant with home exercise program.  Response to previous treatment: felt fine  Functional change: reduced capacity 2/2 new surgery    Pain: 6/10  Location: right knee      OBJECTIVE     Objective Measures updated at progress report unless specified.     Observation: Pt demonstrates 2 open wounds on the lateral side of her right leg along the incision line. More proximal would possibly stage 2-3 2/2 slough noted. Distal wound stage  "1-2 with drainage.     Treatment     Miriam received the treatments listed below:      Miriam received the following manual therapy techniques: Joint mobilizations were applied to the: right knee for 20 minutes, including:    Assessment of wounds with bandage change  Pt education     Patellar mobilization  Infrapatellar fat pad mobilization  Knee hinge for terminal knee extension  AP tibial glide knee flexion     EOT tibial posterior glides   EOT tibiofemoral distraction   EOT muscle energy technique for knee flexion     Miriam participated in neuromuscular re-education activities to improve: Proprioception and Posture for 20 minutes. The following activities were included:  Quad sets 2x30 5"  Short arc quads 3x10 5"     Miriam received therapeutic exercises to develop strength, endurance, ROM, and flexibility for 5 minutes including:  Heel prop 5 min   Heel slides 5 min   Knee flexion LLLD stretch c 1# - 5min   Long sitting gastroc/hamstring stretch 10x15"     Sidelying hip abd 3x10   Clamshell 3x10 bilateral   Long sitting ankle plantarflexion c blue theraband 2x20    Patient Education and Home Exercises     Home Exercises Provided and Patient Education Provided     Education provided:   - Pt educated on going to urgent care to assess wounds     Written Home Exercises Provided: Patient instructed to cont prior HEP. Exercises were reviewed and Miriam was able to demonstrate them prior to the end of the session.  Miriam demonstrated good  understanding of the education provided. See EMR under Patient Instructions for exercises provided during therapy sessions    ASSESSMENT     Miriam presents to PT today with open wounds along the incision site. Based on observation, possible stage 2-3 wounds present. PT called doctor and was instructed to send pt to urgent care. Pt was not taken through full extent of treatment 2/2 possible infection in wounds. PT to resume once pt has been cleared.       Miriam Is progressing well " towards her goals.   Pt prognosis is Fair.     Pt will continue to benefit from skilled outpatient physical therapy to address the deficits listed in the problem list box on initial evaluation, provide pt/family education and to maximize pt's level of independence in the home and community environment.     Pt's spiritual, cultural and educational needs considered and pt agreeable to plan of care and goals.     Anticipated barriers to physical therapy: Compliance/Transportation    Goals:   Short Term Goals (6 Weeks):  1. Pt will be compliant with HEP to supplement PT in restoring pain free function.  2. Pt will be able to ambulate 300ft sans assistive device in order to get around home safely  3. Pt will improve knee flexion range of motion to >90' in order to perform functional tasks at home  4. Pt will demonstrate full terminal knee extension in order to ambulate with proper mechanics.      Long Term Goals (12 Weeks):  1. Pt will improve FOTO score to </= 48% limited to decrease perceived limitation with mobility.   2.Pt will improve knee flexion range of motion to >120' in order to perform functional tasks at home  3. Pt will be able to ambulate without an increase in pain greater than 2 pts on NPRS   4. Pt will be independent c final home exercise program in order to DC to home program.     PLAN     Continue with plan of care as indicated above    Sarai Mcdaniel, PT, DPT, OCS

## 2023-03-17 ENCOUNTER — PATIENT MESSAGE (OUTPATIENT)
Dept: REHABILITATION | Facility: HOSPITAL | Age: 43
End: 2023-03-17

## 2023-03-22 ENCOUNTER — CLINICAL SUPPORT (OUTPATIENT)
Dept: REHABILITATION | Facility: HOSPITAL | Age: 43
End: 2023-03-22
Payer: MEDICAID

## 2023-03-22 DIAGNOSIS — R26.9 ABNORMAL GAIT: Primary | ICD-10-CM

## 2023-03-22 DIAGNOSIS — M25.60 DECREASED RANGE OF MOTION: ICD-10-CM

## 2023-03-22 DIAGNOSIS — M25.561 ACUTE PAIN OF RIGHT KNEE: ICD-10-CM

## 2023-03-22 PROCEDURE — 97110 THERAPEUTIC EXERCISES: CPT | Mod: PN

## 2023-03-22 NOTE — PROGRESS NOTES
[facility] OUTPATIENT THERAPY AND WELLNESS   Physical Therapy Treatment Note     Name: Miriam MILLS [facility] Number: [id number]    Therapy Diagnosis:   Encounter Diagnoses   Name Primary?    Abnormal gait Yes    Acute pain of right knee     Decreased range of motion        Physician: Order, Paper    Visit Date: 3/22/2023    Physician Orders: PT Eval and Treat NWB range of motion lower extremity   Medical Diagnosis from Referral:   S72.401D (ICD-10-CM) - Closed fracture of lower end of right femur with routine healing   S72.491A (ICD-10-CM) - Closed comminuted intra-articular fracture of distal femur, right, initial encounter      Evaluation Date: 1/23/2023  Authorization Period Expiration: 12/31/2023  Plan of Care Expiration: 1/23/2023 to 3/24/2023  Visit # / Visits authorized: 6/ 40 + eval  FOTO#:5/5      Time In: 10:35am  Time Out: 11:35am  Total Billable Time: 60minutes (3TE mediaid with tech assist)      Precautions: 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   SUBJECTIVE     Pt reports: Pt went to the urgent care after last visit then heard from her surgeon and went into clinic to see him instead. She was diagnosed with an infection and was put on multiple antibiotics. Infection was not to the bone. She had excessive discharge over the weekend and went back to urgent care. They said nothing more they could do. She does return to her surgeon again today.   She was compliant with home exercise program.  Response to previous treatment: felt fine  Functional change: reduced capacity 2/2 new surgery    Pain: 6/10  Location: right knee      OBJECTIVE     Objective Measures updated at progress report unless specified.     Observation: pt provided PT of video of drainage. Excessive yellow drainage visible. Currently only having  "minimal discharge. Bandage over wound. Still presents with redness and points of obvious irritation.     Palpation: Swelling restriction patellar motion and knee flexion     Treatment     Miriam received the treatments listed below:      Miriam received the following manual therapy techniques: Joint mobilizations were applied to the: right knee for 10 minutes, including:  Patellar mobilization  Infrapatellar fat pad mobilization  Knee hinge for terminal knee extension  Pt education     AP tibial glide knee flexion   EOT tibial posterior glides   EOT tibiofemoral distraction   EOT muscle energy technique for knee flexion     Miriam participated in neuromuscular re-education activities to improve: Proprioception and Posture for 20 minutes. The following activities were included:  Quad sets 2x30 5"  Short arc quads 3x10 5"     Miriam received therapeutic exercises to develop strength, endurance, ROM, and flexibility for 25 minutes including:  Heel prop 5 min   Heel slides 5 min   Long Arc Quad 3x10   Standing TKE 2x15    Knee flexion LLLD stretch c 1# - 5min   Long sitting gastroc/hamstring stretch 10x15"     Sidelying hip abd 3x10   Clamshell 3x10 bilateral   Long sitting ankle plantarflexion c blue theraband 2x20    Patient Education and Home Exercises     Home Exercises Provided and Patient Education Provided     Education provided:   - Pt educated on going to urgent care to assess wounds     Written Home Exercises Provided: Patient instructed to cont prior HEP. Exercises were reviewed and Miriam was able to demonstrate them prior to the end of the session.  Miriam demonstrated good  understanding of the education provided. See EMR under Patient Instructions for exercises provided during therapy sessions    ASSESSMENT     Miriam presents to PT today with continued drainage of the lateral thigh wound being treated with anti-biotics. She continues to demonstrate anxiety driven picking at her wound. She had increased " swelling  most likely 2/2 infection. Quad remains able to be activated and range of motion is slowly improving. Addressed possible dynasplint if knee flexion doesn't start to improve as swelling subsides. PT to continue to address range of motion and strength deficits.     Miriam Is progressing well towards her goals.   Pt prognosis is Fair.     Pt will continue to benefit from skilled outpatient physical therapy to address the deficits listed in the problem list box on initial evaluation, provide pt/family education and to maximize pt's level of independence in the home and community environment.     Pt's spiritual, cultural and educational needs considered and pt agreeable to plan of care and goals.     Anticipated barriers to physical therapy: Compliance/Transportation    Goals:   Short Term Goals (6 Weeks):  1. Pt will be compliant with HEP to supplement PT in restoring pain free function.  2. Pt will be able to ambulate 300ft sans assistive device in order to get around home safely  3. Pt will improve knee flexion range of motion to >90' in order to perform functional tasks at home  4. Pt will demonstrate full terminal knee extension in order to ambulate with proper mechanics.      Long Term Goals (12 Weeks):  1. Pt will improve FOTO score to </= 48% limited to decrease perceived limitation with mobility.   2.Pt will improve knee flexion range of motion to >120' in order to perform functional tasks at home  3. Pt will be able to ambulate without an increase in pain greater than 2 pts on NPRS   4. Pt will be independent c final home exercise program in order to DC to home program.     PLAN     Continue with plan of care as indicated above    Sarai Mcdaniel, PT, DPT, OCS

## 2023-03-29 ENCOUNTER — CLINICAL SUPPORT (OUTPATIENT)
Dept: REHABILITATION | Facility: HOSPITAL | Age: 43
End: 2023-03-29
Payer: MEDICAID

## 2023-03-29 DIAGNOSIS — R26.9 ABNORMAL GAIT: Primary | ICD-10-CM

## 2023-03-29 DIAGNOSIS — M25.561 ACUTE PAIN OF RIGHT KNEE: ICD-10-CM

## 2023-03-29 DIAGNOSIS — M25.60 DECREASED RANGE OF MOTION: ICD-10-CM

## 2023-03-29 PROCEDURE — 97110 THERAPEUTIC EXERCISES: CPT | Mod: PN

## 2023-03-29 NOTE — PROGRESS NOTES
[facility]   Physical Therapy Treatment Note     Name: Miriam MILLS [facility] Number: [id number]    Therapy Diagnosis:   Encounter Diagnoses   Name Primary?    Abnormal gait Yes    Acute pain of right knee     Decreased range of motion          Physician: Order, Paper    Visit Date: 3/29/2023    Physician Orders: PT Eval and Treat NWB range of motion lower extremity   Medical Diagnosis from Referral:   S72.401D (ICD-10-CM) - Closed fracture of lower end of right femur with routine healing   S72.491A (ICD-10-CM) - Closed comminuted intra-articular fracture of distal femur, right, initial encounter      Evaluation Date: 1/23/2023  Authorization Period Expiration: 12/31/2023  Plan of Care Expiration: 1/23/2023 to 3/24/2023  Visit # / Visits authorized: 7/ 40 + eval  FOTO#:5/5      Time In: 10:45am  Time Out: 11:45am  Total Billable Time: 60minutes (3TE mediaid with tech assist)      Precautions: 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   SUBJECTIVE     Pt reports: Pt has seen her surgeon a few times and was asked that she decide sooner than later if she wants to get manipulated. She wanted to talk to PT first. She is now having right hip pain and feels like her knee is not doing well.   She was compliant with home exercise program.  Response to previous treatment: felt fine  Functional change: reduced capacity 2/2 new surgery    Pain: 6/10  Location: right knee      OBJECTIVE     Objective Measures updated at progress report unless specified.     Knee range of motion:   Pre tx- 0-2-85  Post tx 0-0-100    Strength: not tested 2/2 pain     Treatment     Miriam received the treatments listed below:      Miriam received the following manual therapy techniques: Joint mobilizations were applied to the: right  "knee for 19 minutes, including:  Patellar mobilization  Infrapatellar fat pad mobilization  Knee hinge for terminal knee extension  AP glide of tibial with inferior glide of patella  EOT Tibial Med/lat rotation  Pt education       Miriam participated in neuromuscular re-education activities to improve: Proprioception and Posture for 8 minutes. The following activities were included:  Short arc quads 3x10 5" 3#    Miriam received therapeutic exercises to develop strength, endurance, ROM, and flexibility for 25 minutes including:  Heel prop 5 min   Heel slides 5 min   Knee flexion mobilization on step 4x1min   Long Arc Quad x25    Knee flexion LLLD stretch c 1# - 5min   Long sitting gastroc/hamstring stretch 10x15"   Sidelying hip abd 3x10   Clamshell 3x10 bilateral   Long sitting ankle plantarflexion c blue theraband 2x20    Miriam participated in gait training to improve functional mobility and safety for 8  minutes, including:  Pt was educated on ambulated with increased knee flexion during swing with reduced lateral sway.     Patient Education and Home Exercises     Home Exercises Provided and Patient Education Provided     Education provided:   - Pt educated on going to urgent care to assess wounds     Written Home Exercises Provided: Patient instructed to cont prior HEP. Exercises were reviewed and Miriam was able to demonstrate them prior to the end of the session.  Miriam demonstrated good  understanding of the education provided. See EMR under Patient Instructions for exercises provided during therapy sessions    ASSESSMENT     See updated plan of care for details.     PLAN     See updated plan of care for details.     Sarai Mcdaniel, PT, DPT, OCS                          "

## 2023-03-29 NOTE — PLAN OF CARE
Outpatient Therapy Updated Plan of Care     Visit Date: 3/29/2023  Name: Miriam MILLS [facility] Number: [id number]    Therapy Diagnosis:   Encounter Diagnoses   Name Primary?    Abnormal gait Yes    Acute pain of right knee     Decreased range of motion      Physician: Order, Paper    Physician Orders: PT Eval and Treat NWB range of motion lower extremity   Medical Diagnosis from Referral:   S72.401D (ICD-10-CM) - Closed fracture of lower end of right femur with routine healing   S72.491A (ICD-10-CM) - Closed comminuted intra-articular fracture of distal femur, right, initial encounter      Evaluation Date: 1/23/2023  Authorization Period Expiration: 12/31/2023  Plan of Care Expiration: 1/23/2023 to 3/24/2023  Visit # / Visits authorized: 7/ 40 + eval  FOTO#:5/5     Subjective     Update:   Pt reports: Pt has seen her surgeon a few times and was asked that she decide sooner than later if she wants to get manipulated. She wanted to talk to PT first. She is now having right hip pain and feels like her knee is not doing well.   She was compliant with home exercise program.  Response to previous treatment: felt fine  Functional change: reduced capacity 2/2 new surgery     Pain: 6/10  Location: right knee      Objective     Update:   Knee range of motion:   Pre tx- 0-2-85  Post tx 0-0-100     Strength: not tested 2/2 pain     Assessment     Update:   Miriam presents to PT today with improved infection. She continue to lack functional knee flexion. Increased manual performed today with good increased in joint range of motion post tx. Pt educated that putting an RENZO on the schedule would not be a bad idea and we can work towards as much flexion as we can in the mean time. She has good joint mobility with improvements within tx today. Suspect most restrictions 2/2 swelling and pain/guarding. Since initial evaluation, pt had a second procedure which is partly why there was a delay in PT Followed by an infection. Pt would  continue to benefit from skilled PT to address gait, strength, and range of motion deficits preventing her from performing functional ADLs.     Miriam Is progressing well towards her goals.   Pt prognosis is Fair.     Pt will continue to benefit from skilled outpatient physical therapy to address the deficits listed in the problem list box on initial evaluation, provide pt/family education and to maximize pt's level of independence in the home and community environment.     Pt's spiritual, cultural and educational needs considered and pt agreeable to plan of care and goals.     Anticipated barriers to physical therapy: Compliance/Transportation    Goals:   Short Term Goals (6 Weeks):  1. Pt will be compliant with HEP to supplement PT in restoring pain free function.  2. Pt will be able to ambulate 300ft sans assistive device in order to get around home safely  3. Pt will improve knee flexion range of motion to >90' in order to perform functional tasks at home  4. Pt will demonstrate full terminal knee extension in order to ambulate with proper mechanics.      Long Term Goals (12 Weeks):  1. Pt will improve FOTO score to </= 48% limited to decrease perceived limitation with mobility.   2.Pt will improve knee flexion range of motion to >120' in order to perform functional tasks at home  3. Pt will be able to ambulate without an increase in pain greater than 2 pts on NPRS   4. Pt will be independent c final home exercise program in order to DC to home program.     Previous Short Term Goals Status:  See above  New Short Term Goals Status:   no new STG  Long Term Goal Status:   continue per initial plan of care.  Reasons for Recertification of Therapy:   range of motion, strength, gait deficits     Plan     Updated Certification Period: 3/29/2023 to 5/29/2023  Recommended Treatment Plan: 3 times per week for 8 weeks: Electrical Stimulation PRN, Gait Training, Manual Therapy, Moist Heat/ Ice, Neuromuscular Re-ed, Patient  Education, Therapeutic Activities, and Therapeutic Exercise  Other Recommendations: See above    Sarai Mcdaniel, PT  3/29/2023      I CERTIFY THE NEED FOR THESE SERVICES FURNISHED UNDER THIS PLAN OF TREATMENT AND WHILE UNDER MY CARE    Physician's comments:        Physician's Signature: ___________________________________________________

## 2023-04-05 ENCOUNTER — CLINICAL SUPPORT (OUTPATIENT)
Dept: REHABILITATION | Facility: HOSPITAL | Age: 43
End: 2023-04-05
Payer: MEDICAID

## 2023-04-05 DIAGNOSIS — R26.9 ABNORMAL GAIT: Primary | ICD-10-CM

## 2023-04-05 DIAGNOSIS — M25.60 DECREASED RANGE OF MOTION: ICD-10-CM

## 2023-04-05 DIAGNOSIS — M25.561 ACUTE PAIN OF RIGHT KNEE: ICD-10-CM

## 2023-04-05 PROCEDURE — 97110 THERAPEUTIC EXERCISES: CPT | Mod: PN

## 2023-04-05 NOTE — PROGRESS NOTES
[facility]   Physical Therapy Treatment Note     Name: Miriam MILLS [facility] Number: [id number]    Therapy Diagnosis:   Encounter Diagnoses   Name Primary?    Abnormal gait Yes    Acute pain of right knee     Decreased range of motion        Physician: Order, Paper    Visit Date: 4/5/2023    Physician Orders: PT Eval and Treat NWB range of motion lower extremity   Medical Diagnosis from Referral:   S72.401D (ICD-10-CM) - Closed fracture of lower end of right femur with routine healing   S72.491A (ICD-10-CM) - Closed comminuted intra-articular fracture of distal femur, right, initial encounter      Evaluation Date: 1/23/2023  Authorization Period Expiration: 12/31/2023  Plan of Care Expiration: 3/29/2023 to 5/29/2023  Visit # / Visits authorized: 8/ 40 + eval  FOTO#:5/5      Time In: 10:45am  Time Out: 11:45am  Total Billable Time: 60minutes (3TE mediaid with tech assist)      Precautions: 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   SUBJECTIVE     Pt reports: Missed her last two appt because she was sick. Saw doc who agreed that pushing back RENZO is a good idea.   She was compliant with home exercise program.  Response to previous treatment: felt good and has been working on her motion at home.   Functional change: walking without cane more.     Pain: 4/10  Location: right knee      OBJECTIVE     Objective Measures updated at progress report unless specified.     4/5/2023  Knee range of motion:   Pre tx- 0-2-100  Post tx 0-0-108    Strength: not tested 2/2 pain     Treatment     Miriam received the treatments listed below:      Miriam received the following manual therapy techniques: Joint mobilizations were applied to the: right knee for 20 minutes, including:  Patellar mobilization  Infrapatellar  "fat pad mobilization  Knee hinge for terminal knee extension  AP glide of tibial with inferior glide of patella  EOT Tibial Med/lat rotation  Pt education       Miriam participated in neuromuscular re-education activities to improve: Proprioception and Posture for 12 minutes. The following activities were included:  Short arc quads 3x10 5" 3#  Long arc quad c red theraband 3x12 c cuing on terminal knee ext.     Miriam received therapeutic exercises to develop strength, endurance, ROM, and flexibility for 23 minutes including:  Heel prop 5 min   Heel slides 5 min   Knee flexion mobilization on step [id number]   TKE purpler sports cord 2x15 5"    Knee flexion LLLD stretch c 1# - 5min   Long sitting gastroc/hamstring stretch 10x15"   Sidelying hip abd 3x10   Clamshell 3x10 bilateral   Long sitting ankle plantarflexion c blue theraband 2x20    Miriam participated in gait training to improve functional mobility and safety for 0 minutes, including:  Pt was educated on ambulated with increased knee flexion during swing with reduced lateral sway.     Patient Education and Home Exercises     Home Exercises Provided and Patient Education Provided     Education provided:   - Pt educated on trying to avoid picking at her scabs to allow wound to heal.     Written Home Exercises Provided: Patient instructed to cont prior HEP. Exercises were reviewed and Miriam was able to demonstrate them prior to the end of the session.  Miriam demonstrated good  understanding of the education provided. See EMR under Patient Instructions for exercises provided during therapy sessions    ASSESSMENT   Miriam presents to PT today after missing her last two appt due to being ill. She has seen her surgeon and will be postponing manipulation for the time being. Treatment today focused on quad control and knee range of motion. Post treatment she measured 108' knee flexion which is an improvement from last visit. Joint mobility primarily restricted by " swelling. PT to continue to address range of motion and strength deficits with improvement in gait mechanics.       Miriam Is progressing well towards her goals.   Pt prognosis is Fair.      Pt will continue to benefit from skilled outpatient physical therapy to address the deficits listed in the problem list box on initial evaluation, provide pt/family education and to maximize pt's level of independence in the home and community environment.      Pt's spiritual, cultural and educational needs considered and pt agreeable to plan of care and goals.     Anticipated barriers to physical therapy: Compliance/Transportation     Goals:   Short Term Goals (6 Weeks):  1. Pt will be compliant with HEP to supplement PT in restoring pain free function.  2. Pt will be able to ambulate 300ft sans assistive device in order to get around home safely  3. Pt will improve knee flexion range of motion to >90' in order to perform functional tasks at home  4. Pt will demonstrate full terminal knee extension in order to ambulate with proper mechanics.      Long Term Goals (12 Weeks):  1. Pt will improve FOTO score to </= 48% limited to decrease perceived limitation with mobility.   2.Pt will improve knee flexion range of motion to >120' in order to perform functional tasks at home  3. Pt will be able to ambulate without an increase in pain greater than 2 pts on NPRS   4. Pt will be independent c final home exercise program in order to DC to home program.     PLAN     Continue with knee range of motion and strength     Sarai Mcdaniel, PT, DPT, OCS

## 2023-04-06 ENCOUNTER — CLINICAL SUPPORT (OUTPATIENT)
Dept: REHABILITATION | Facility: HOSPITAL | Age: 43
End: 2023-04-06
Payer: MEDICAID

## 2023-04-06 DIAGNOSIS — R26.9 ABNORMAL GAIT: Primary | ICD-10-CM

## 2023-04-06 DIAGNOSIS — M25.60 DECREASED RANGE OF MOTION: ICD-10-CM

## 2023-04-06 DIAGNOSIS — M25.561 ACUTE PAIN OF RIGHT KNEE: ICD-10-CM

## 2023-04-06 PROCEDURE — 97110 THERAPEUTIC EXERCISES: CPT | Mod: PN

## 2023-04-06 NOTE — PROGRESS NOTES
[facility]   Physical Therapy Treatment Note     Name: Miriam MLILS [facility] Number: [id number]    Therapy Diagnosis:   Encounter Diagnoses   Name Primary?    Abnormal gait Yes    Acute pain of right knee     Decreased range of motion        Physician: Order, Paper    Visit Date: 4/6/2023    Physician Orders: PT Eval and Treat NWB range of motion lower extremity   Medical Diagnosis from Referral:   S72.401D (ICD-10-CM) - Closed fracture of lower end of right femur with routine healing   S72.491A (ICD-10-CM) - Closed comminuted intra-articular fracture of distal femur, right, initial encounter      Evaluation Date: 1/23/2023  Authorization Period Expiration: 12/31/2023  Plan of Care Expiration: 3/29/2023 to 5/29/2023  Visit # / Visits authorized: 9/ 40 + eval  FOTO#:5/5      Time In: 8:16am  Time Out: 9:14am  Total Billable Time: 58minutes (3TE mediaid with tech assist)      Precautions: 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   SUBJECTIVE     Pt reports: Doctor said its looking better. She is moving around more without cane. Wants to really push her knee but knows there is still swelling in there.   She was compliant with home exercise program.  Response to previous treatment: felt good and has been working on her motion at home.   Functional change: walking without cane more.     Pain: 3/10  Location: right knee      OBJECTIVE     Objective Measures updated at progress report unless specified.     4/6/2023  Knee range of motion:   Pre tx- 0-2-105  Post tx 0-0-110    Treatment     Miriam received the treatments listed below:      Miriam received the following manual therapy techniques: Joint mobilizations were applied to the: right knee for 20 minutes, including:  Patellar  "mobilization  Infrapatellar fat pad mobilization  Knee hinge for terminal knee extension  AP glide of tibial with inferior glide of patella  EOT Tibial Med/lat rotation  EOT muscle energy technique for knee flexion c patellar caudal glide  Pt education       Miriam participated in neuromuscular re-education activities to improve: Proprioception and Posture for 12 minutes. The following activities were included:  Short arc quads 3x10 5" 3#  Long arc quad c red theraband 3x12 c cuing on terminal knee ext.     Miriam received therapeutic exercises to develop strength, endurance, ROM, and flexibility for 23 minutes including:  Heel prop 5 min   Heel slides 5 min   Knee flexion mobilization on step [id number]   Step ups 4" x30    TKE purple sports cord 2x15 5"  Knee flexion LLLD stretch c 1# - 5min   Long sitting gastroc/hamstring stretch 10x15"   Sidelying hip abd 3x10   Clamshell 3x10 bilateral   Long sitting ankle plantarflexion c blue theraband 2x20    Miriam participated in gait training to improve functional mobility and safety for 0 minutes, including:  Pt was educated on ambulated with increased knee flexion during swing with reduced lateral sway.     Patient Education and Home Exercises     Home Exercises Provided and Patient Education Provided     Education provided:   - Pt educated on trying to avoid picking at her scabs to allow wound to heal.     Written Home Exercises Provided: Patient instructed to cont prior HEP. Exercises were reviewed and Miriam was able to demonstrate them prior to the end of the session.  Miriam demonstrated good  understanding of the education provided. See EMR under Patient Instructions for exercises provided during therapy sessions    ASSESSMENT   Miriam presents to PT today with good carryover of knee flexion range of motion. She continues to demonstrate gradual improvement within visit as well. She does have a fairly soft endfeel most likely restricted 2/2 continued swelling. She is " slowly being progressed in strength and functional activities. She would continue to benefit from skilled PT to address range of motion and strength deficits leading to abnormal gait mechanics.      Miriam Is progressing well towards her goals.   Pt prognosis is Fair.      Pt will continue to benefit from skilled outpatient physical therapy to address the deficits listed in the problem list box on initial evaluation, provide pt/family education and to maximize pt's level of independence in the home and community environment.      Pt's spiritual, cultural and educational needs considered and pt agreeable to plan of care and goals.     Anticipated barriers to physical therapy: Compliance/Transportation     Goals:   Short Term Goals (6 Weeks):  1. Pt will be compliant with HEP to supplement PT in restoring pain free function.  2. Pt will be able to ambulate 300ft sans assistive device in order to get around home safely  3. Pt will improve knee flexion range of motion to >90' in order to perform functional tasks at home  4. Pt will demonstrate full terminal knee extension in order to ambulate with proper mechanics.      Long Term Goals (12 Weeks):  1. Pt will improve FOTO score to </= 48% limited to decrease perceived limitation with mobility.   2.Pt will improve knee flexion range of motion to >120' in order to perform functional tasks at home  3. Pt will be able to ambulate without an increase in pain greater than 2 pts on NPRS   4. Pt will be independent c final home exercise program in order to DC to home program.     PLAN     Continue with knee range of motion and strength     Sarai Mcdaniel, PT, DPT, OCS

## 2023-04-11 ENCOUNTER — OFFICE VISIT (OUTPATIENT)
Dept: PRIMARY CARE CLINIC | Facility: CLINIC | Age: 43
End: 2023-04-11
Payer: COMMERCIAL

## 2023-04-11 VITALS
HEIGHT: 65 IN | HEART RATE: 69 BPM | BODY MASS INDEX: 31.39 KG/M2 | DIASTOLIC BLOOD PRESSURE: 78 MMHG | OXYGEN SATURATION: 100 % | SYSTOLIC BLOOD PRESSURE: 106 MMHG | WEIGHT: 188.38 LBS

## 2023-04-11 DIAGNOSIS — Z79.899 CHRONIC PRESCRIPTION BENZODIAZEPINE USE: ICD-10-CM

## 2023-04-11 DIAGNOSIS — K21.9 GASTROESOPHAGEAL REFLUX DISEASE WITHOUT ESOPHAGITIS: ICD-10-CM

## 2023-04-11 DIAGNOSIS — Z98.84 HISTORY OF GASTRIC BYPASS: ICD-10-CM

## 2023-04-11 DIAGNOSIS — R87.619 ABNORMAL CERVICAL PAPANICOLAOU SMEAR, UNSPECIFIED ABNORMAL PAP FINDING: Primary | ICD-10-CM

## 2023-04-11 DIAGNOSIS — Z12.31 ENCOUNTER FOR SCREENING MAMMOGRAM FOR MALIGNANT NEOPLASM OF BREAST: ICD-10-CM

## 2023-04-11 DIAGNOSIS — F41.9 ANXIETY AND DEPRESSION: ICD-10-CM

## 2023-04-11 DIAGNOSIS — F41.1 GENERALIZED ANXIETY DISORDER: Chronic | ICD-10-CM

## 2023-04-11 DIAGNOSIS — F90.9 ATTENTION DEFICIT HYPERACTIVITY DISORDER (ADHD), UNSPECIFIED ADHD TYPE: ICD-10-CM

## 2023-04-11 DIAGNOSIS — Z79.899 POLYPHARMACY: ICD-10-CM

## 2023-04-11 DIAGNOSIS — F32.A ANXIETY AND DEPRESSION: ICD-10-CM

## 2023-04-11 DIAGNOSIS — N89.8 VAGINAL DISCHARGE: ICD-10-CM

## 2023-04-11 PROCEDURE — 3008F BODY MASS INDEX DOCD: CPT | Mod: CPTII,S$GLB,, | Performed by: STUDENT IN AN ORGANIZED HEALTH CARE EDUCATION/TRAINING PROGRAM

## 2023-04-11 PROCEDURE — 81514 NFCT DS BV&VAGINITIS DNA ALG: CPT | Performed by: STUDENT IN AN ORGANIZED HEALTH CARE EDUCATION/TRAINING PROGRAM

## 2023-04-11 PROCEDURE — 1159F MED LIST DOCD IN RCRD: CPT | Mod: CPTII,S$GLB,, | Performed by: STUDENT IN AN ORGANIZED HEALTH CARE EDUCATION/TRAINING PROGRAM

## 2023-04-11 PROCEDURE — 3074F SYST BP LT 130 MM HG: CPT | Mod: CPTII,S$GLB,, | Performed by: STUDENT IN AN ORGANIZED HEALTH CARE EDUCATION/TRAINING PROGRAM

## 2023-04-11 PROCEDURE — 99999 PR PBB SHADOW E&M-EST. PATIENT-LVL V: ICD-10-PCS | Mod: PBBFAC,,, | Performed by: STUDENT IN AN ORGANIZED HEALTH CARE EDUCATION/TRAINING PROGRAM

## 2023-04-11 PROCEDURE — 1159F PR MEDICATION LIST DOCUMENTED IN MEDICAL RECORD: ICD-10-PCS | Mod: CPTII,S$GLB,, | Performed by: STUDENT IN AN ORGANIZED HEALTH CARE EDUCATION/TRAINING PROGRAM

## 2023-04-11 PROCEDURE — 3078F DIAST BP <80 MM HG: CPT | Mod: CPTII,S$GLB,, | Performed by: STUDENT IN AN ORGANIZED HEALTH CARE EDUCATION/TRAINING PROGRAM

## 2023-04-11 PROCEDURE — 99999 PR PBB SHADOW E&M-EST. PATIENT-LVL V: CPT | Mod: PBBFAC,,, | Performed by: STUDENT IN AN ORGANIZED HEALTH CARE EDUCATION/TRAINING PROGRAM

## 2023-04-11 PROCEDURE — 99215 OFFICE O/P EST HI 40 MIN: CPT | Mod: PBBFAC,PN | Performed by: STUDENT IN AN ORGANIZED HEALTH CARE EDUCATION/TRAINING PROGRAM

## 2023-04-11 PROCEDURE — 99214 OFFICE O/P EST MOD 30 MIN: CPT | Mod: S$GLB,,, | Performed by: STUDENT IN AN ORGANIZED HEALTH CARE EDUCATION/TRAINING PROGRAM

## 2023-04-11 PROCEDURE — 3074F PR MOST RECENT SYSTOLIC BLOOD PRESSURE < 130 MM HG: ICD-10-PCS | Mod: CPTII,S$GLB,, | Performed by: STUDENT IN AN ORGANIZED HEALTH CARE EDUCATION/TRAINING PROGRAM

## 2023-04-11 PROCEDURE — 99214 PR OFFICE/OUTPT VISIT, EST, LEVL IV, 30-39 MIN: ICD-10-PCS | Mod: S$GLB,,, | Performed by: STUDENT IN AN ORGANIZED HEALTH CARE EDUCATION/TRAINING PROGRAM

## 2023-04-11 PROCEDURE — 3078F PR MOST RECENT DIASTOLIC BLOOD PRESSURE < 80 MM HG: ICD-10-PCS | Mod: CPTII,S$GLB,, | Performed by: STUDENT IN AN ORGANIZED HEALTH CARE EDUCATION/TRAINING PROGRAM

## 2023-04-11 PROCEDURE — 3008F PR BODY MASS INDEX (BMI) DOCUMENTED: ICD-10-PCS | Mod: CPTII,S$GLB,, | Performed by: STUDENT IN AN ORGANIZED HEALTH CARE EDUCATION/TRAINING PROGRAM

## 2023-04-11 RX ORDER — ASPIRIN 81 MG/1
81 TABLET ORAL 2 TIMES DAILY
COMMUNITY
Start: 2023-02-28 | End: 2023-07-17 | Stop reason: CLARIF

## 2023-04-11 RX ORDER — DEXTROAMPHETAMINE SACCHARATE, AMPHETAMINE ASPARTATE, DEXTROAMPHETAMINE SULFATE AND AMPHETAMINE SULFATE 3.75; 3.75; 3.75; 3.75 MG/1; MG/1; MG/1; MG/1
TABLET ORAL
COMMUNITY
Start: 2023-03-09 | End: 2023-05-16 | Stop reason: SDUPTHER

## 2023-04-11 RX ORDER — OMEPRAZOLE 20 MG/1
20 CAPSULE, DELAYED RELEASE ORAL 2 TIMES DAILY
Qty: 60 CAPSULE | Refills: 11 | Status: SHIPPED | OUTPATIENT
Start: 2023-04-11 | End: 2023-08-19 | Stop reason: SDUPTHER

## 2023-04-11 RX ORDER — FLUTICASONE PROPIONATE 50 MCG
SPRAY, SUSPENSION (ML) NASAL
COMMUNITY
Start: 2022-11-18 | End: 2023-07-17 | Stop reason: CLARIF

## 2023-04-11 RX ORDER — METHOCARBAMOL 500 MG/1
500 TABLET, FILM COATED ORAL 4 TIMES DAILY
COMMUNITY
Start: 2023-02-28 | End: 2023-07-17 | Stop reason: CLARIF

## 2023-04-11 RX ORDER — GABAPENTIN 300 MG/1
300 CAPSULE ORAL 3 TIMES DAILY
COMMUNITY
Start: 2023-03-29 | End: 2023-07-17 | Stop reason: CLARIF

## 2023-04-11 RX ORDER — FAMOTIDINE 40 MG/1
40 TABLET, FILM COATED ORAL
COMMUNITY
Start: 2023-02-28 | End: 2023-07-17 | Stop reason: CLARIF

## 2023-04-11 RX ORDER — DEXTROAMPHETAMINE SACCHARATE, AMPHETAMINE ASPARTATE MONOHYDRATE, DEXTROAMPHETAMINE SULFATE AND AMPHETAMINE SULFATE 7.5; 7.5; 7.5; 7.5 MG/1; MG/1; MG/1; MG/1
30 CAPSULE, EXTENDED RELEASE ORAL
COMMUNITY
End: 2023-06-07

## 2023-04-11 NOTE — PROGRESS NOTES
2023    Miriam Mendez  [id number]    Chief Complaint   Patient presents with    Establish Care              HPI    This patient is new to me and presents to transfer care. Pmh sig for mood disorder and AHD. Recent history of fall resulting in left femur fracture status post surgery; still in physical therapy.  Patient needs medication refill of her psych meds, because long-term psychiatry PA is no longer able to prescribe.        Negative 10 point ROS outside of HPI    Social History     Socioeconomic History    Marital status:    Tobacco Use    Smoking status: Never    Smokeless tobacco: Never   Substance and Sexual Activity    Alcohol use: Yes    Drug use: No    Sexual activity: Yes     Partners: Male     Birth control/protection: None         Current Outpatient Medications:     aspirin (ECOTRIN) 81 MG EC tablet, Take 81 mg by mouth 2 (two) times daily., Disp: , Rfl:     clonazePAM (KLONOPIN) 1 MG tablet, Take 1 mg by mouth 2 (two) times daily as needed for Anxiety., Disp: , Rfl:     dextroamphetamine-amphetamine (ADDERALL XR) 30 MG 24 hr capsule, Take 30 mg by mouth., Disp: , Rfl:     dextroamphetamine-amphetamine (ADDERALL) 15 mg tablet, , Disp: , Rfl:     famotidine (PEPCID) 40 MG tablet, Take 40 mg by mouth., Disp: , Rfl:     FLUoxetine 40 MG capsule, Take 40 mg by mouth nightly., Disp: , Rfl:     gabapentin (NEURONTIN) 300 MG capsule, Take 300 mg by mouth 3 (three) times daily., Disp: , Rfl:     ibuprofen (ADVIL,MOTRIN) 200 MG tablet, Take 800 mg by mouth daily as needed for Pain., Disp: , Rfl:     methocarbamoL (ROBAXIN) 500 MG Tab, Take 500 mg by mouth 4 (four) times daily., Disp: , Rfl:     oxyCODONE (OXY-IR) 5 mg Cap, SMARTSI Milligram(s) By Mouth Every 6 Hours PRN, Disp: , Rfl:     valACYclovir (VALTREX) 500 MG tablet, Take 500 mg by mouth 2 (two) times daily., Disp: , Rfl:     zolpidem (AMBIEN) 10 mg Tab, Take 10 mg by mouth every evening., Disp: , Rfl:     omeprazole (PRILOSEC) 20 MG  capsule, Take 1 capsule (20 mg total) by mouth 2 (two) times a day., Disp: 60 capsule, Rfl: 0    Current Facility-Administered Medications:     [START ON 5/9/2023] onabotulinumtoxina injection 100 Units, 100 Units, Intramuscular, 1 time in Clinic/HOD, Tc Dodson MD      Physical Exam  Vitals:    04/11/23 1424   BP: 106/78   Pulse: 69       Physical Exam      Gen: well appearing, NAD, facial asymmetry   Resp: non labored breathing, no crackles, no wheezes, CTAB  CV: RRR no murmur, gallops, rubs, no LE edema  Abd: soft nontender BS present no organomegaly    1. Gastroesophageal reflux disease without esophagitis  stable  - omeprazole (PRILOSEC) 20 MG capsule; Take 1 capsule (20 mg total) by mouth 2 (two) times a day.  Dispense: 60 capsule; Refill: 11    2. History of gastric bypass  - omeprazole (PRILOSEC) 20 MG capsule; Take 1 capsule (20 mg total) by mouth 2 (two) times a day.  Dispense: 60 capsule; Refill: 11    4. Abnormal cervical Papanicolaou smear, unspecified abnormal pap finding  - Ambulatory referral/consult to Gynecology; Future    5. Encounter for screening mammogram for malignant neoplasm of breast  - Mammo Digital Screening Bilat w/ Paulie; Future    6. Generalized anxiety disorder  Stable with current regimen    7. Anxiety and depression  Stable with current regimen  - Ambulatory referral/consult to Psychiatry; Future    8. Chronic prescription benzodiazepine use  - Ambulatory referral/consult to Psychiatry; Future  -clonazepam 1 mg BID; will not be marilyn to prescribe long term,    9. Attention deficit hyperactivity disorder (ADHD), unspecified ADHD type  Stable  Continue current regimen  - Ambulatory referral/consult to Psychiatry; Future    10. Vaginal discharge  - Vaginosis Screen by DNA Probe: collected    Polypharmacy  Discussed associated risk with pt.     reviewed for all control substanced.       Makayla Hawley MD  Family Medicine

## 2023-04-12 ENCOUNTER — CLINICAL SUPPORT (OUTPATIENT)
Dept: REHABILITATION | Facility: HOSPITAL | Age: 43
End: 2023-04-12
Payer: MEDICAID

## 2023-04-12 DIAGNOSIS — M25.561 ACUTE PAIN OF RIGHT KNEE: ICD-10-CM

## 2023-04-12 DIAGNOSIS — R26.9 ABNORMAL GAIT: Primary | ICD-10-CM

## 2023-04-12 DIAGNOSIS — M25.60 DECREASED RANGE OF MOTION: ICD-10-CM

## 2023-04-12 PROCEDURE — 97110 THERAPEUTIC EXERCISES: CPT | Mod: PN

## 2023-04-12 NOTE — PROGRESS NOTES
[facility]   Physical Therapy Treatment Note     Name: Miriam MILLS [facility] Number: [id number]    Therapy Diagnosis:   Encounter Diagnoses   Name Primary?    Abnormal gait Yes    Acute pain of right knee     Decreased range of motion          Physician: Order, Paper    Visit Date: 4/12/2023    Physician Orders: PT Eval and Treat NWB range of motion lower extremity   Medical Diagnosis from Referral:   S72.401D (ICD-10-CM) - Closed fracture of lower end of right femur with routine healing   S72.491A (ICD-10-CM) - Closed comminuted intra-articular fracture of distal femur, right, initial encounter      Evaluation Date: 1/23/2023  Authorization Period Expiration: 12/31/2023  Plan of Care Expiration: 3/29/2023 to 5/29/2023  Visit # / Visits authorized: 10/ 40 + eval  FOTO#:5/5      Time In: 10:46am  Time Out: 11:45am  Total Billable Time: 59minutes (3TE mediaid with tech assist)      Precautions: 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   SUBJECTIVE     Pt reports: reports that her knee is getting better. Had to go to work on Monday and on Tuesday she was in a good amount of pain but it feels a lot better today.   She was compliant with home exercise program.  Response to previous treatment: felt better  Functional change: back to work     Pain: 3/10  Location: right knee      OBJECTIVE     Objective Measures updated at progress report unless specified.     4/6/2023  Knee range of motion:   Pre tx- 0-2-105  Post tx 0-0-110    Treatment     Miriam received the treatments listed below:      Miriam received the following manual therapy techniques: Joint mobilizations were applied to the: right knee for 20 minutes, including:  Patellar mobilization  Infrapatellar fat pad mobilization  Knee hinge for  "terminal knee extension  AP glide of tibial with inferior glide of patella  EOT Tibial Med/lat rotation  EOT muscle energy technique for knee flexion c patellar caudal glide  Pt education       Miriam participated in neuromuscular re-education activities to improve: Proprioception and Posture for 12 minutes. The following activities were included:  Short arc quads 3x10 5" 4#  Long arc quad c green theraband 3x12 c cuing on terminal knee ext.     Miriam received therapeutic exercises to develop strength, endurance, ROM, and flexibility for 10 minutes including:  Heel prop 5 min   Heel slides 5 min     Step ups 4" x30  Knee flexion mobilization on step [id number]     TKE purple sports cord 2x15 5"  Knee flexion LLLD stretch c 1# - 5min   Long sitting gastroc/hamstring stretch 10x15"   Sidelying hip abd 3x10   Clamshell 3x10 bilateral   Long sitting ankle plantarflexion c blue theraband 2x20    Miriam participated in dynamic functional therapeutic activities to improve functional performance for 15  minutes, including:  TRK squat for knee flexion 3x10  Shuttle 75# 3x10 c knee flexion holds and TKA      Miriam participated in gait training to improve functional mobility and safety for 0 minutes, including:  Pt was educated on ambulated with increased knee flexion during swing with reduced lateral sway.     Patient Education and Home Exercises     Home Exercises Provided and Patient Education Provided     Education provided:   - Pt educated on trying to avoid picking at her scabs to allow wound to heal.     Written Home Exercises Provided: Patient instructed to cont prior HEP. Exercises were reviewed and Miriam was able to demonstrate them prior to the end of the session.  Miriam demonstrated good  understanding of the education provided. See EMR under Patient Instructions for exercises provided during therapy sessions    ASSESSMENT   Miriam presents to PT with reports of work related pain. She was educated on gradual " return to work and taking rests breaks. More CKC knee flexion and functional mobility exercise performed today in order to improve knee flexion and strength. No increase in knee pain post tx.      Miriam Is progressing well towards her goals.   Pt prognosis is Fair.      Pt will continue to benefit from skilled outpatient physical therapy to address the deficits listed in the problem list box on initial evaluation, provide pt/family education and to maximize pt's level of independence in the home and community environment.      Pt's spiritual, cultural and educational needs considered and pt agreeable to plan of care and goals.     Anticipated barriers to physical therapy: Compliance/Transportation     Goals:   Short Term Goals (6 Weeks):  1. Pt will be compliant with HEP to supplement PT in restoring pain free function.  2. Pt will be able to ambulate 300ft sans assistive device in order to get around home safely  3. Pt will improve knee flexion range of motion to >90' in order to perform functional tasks at home  4. Pt will demonstrate full terminal knee extension in order to ambulate with proper mechanics.      Long Term Goals (12 Weeks):  1. Pt will improve FOTO score to </= 48% limited to decrease perceived limitation with mobility.   2.Pt will improve knee flexion range of motion to >120' in order to perform functional tasks at home  3. Pt will be able to ambulate without an increase in pain greater than 2 pts on NPRS   4. Pt will be independent c final home exercise program in order to DC to home program.     PLAN     Continue with knee range of motion and strength     Sarai Mcdaniel, PT, DPT, OCS

## 2023-04-13 LAB
BACTERIAL VAGINOSIS DNA: NEGATIVE
CANDIDA GLABRATA DNA: NEGATIVE
CANDIDA KRUSEI DNA: NEGATIVE
CANDIDA RRNA VAG QL PROBE: NEGATIVE
T VAGINALIS RRNA GENITAL QL PROBE: POSITIVE

## 2023-04-14 ENCOUNTER — PATIENT MESSAGE (OUTPATIENT)
Dept: PRIMARY CARE CLINIC | Facility: CLINIC | Age: 43
End: 2023-04-14
Payer: MEDICAID

## 2023-04-14 ENCOUNTER — CLINICAL SUPPORT (OUTPATIENT)
Dept: REHABILITATION | Facility: HOSPITAL | Age: 43
End: 2023-04-14
Payer: MEDICAID

## 2023-04-14 ENCOUNTER — TELEPHONE (OUTPATIENT)
Dept: PRIMARY CARE CLINIC | Facility: CLINIC | Age: 43
End: 2023-04-14
Payer: MEDICAID

## 2023-04-14 DIAGNOSIS — Z98.84 HISTORY OF GASTRIC BYPASS: ICD-10-CM

## 2023-04-14 DIAGNOSIS — Z76.0 ENCOUNTER FOR MEDICATION REFILL: ICD-10-CM

## 2023-04-14 DIAGNOSIS — K21.9 GASTROESOPHAGEAL REFLUX DISEASE WITHOUT ESOPHAGITIS: ICD-10-CM

## 2023-04-14 DIAGNOSIS — M25.561 ACUTE PAIN OF RIGHT KNEE: ICD-10-CM

## 2023-04-14 DIAGNOSIS — R26.9 ABNORMAL GAIT: Primary | ICD-10-CM

## 2023-04-14 DIAGNOSIS — M25.60 DECREASED RANGE OF MOTION: ICD-10-CM

## 2023-04-14 PROCEDURE — 97110 THERAPEUTIC EXERCISES: CPT | Mod: PN

## 2023-04-14 NOTE — TELEPHONE ENCOUNTER
Spoke to pt advised that messages have been forwarded to PCP and she will review labs and advise her of next steps. Pt verbalized understanding.

## 2023-04-14 NOTE — TELEPHONE ENCOUNTER
----- Message from Guerita Méndez sent at 4/14/2023  4:24 PM CDT -----  Contact: Patient, [phone]  2TESTRESULTS    Type: Test Results    What test was performed?  Labs    Who ordered the test?  Dr Hawley    When and where were the test performed? 04/11/2023    Would you like response via [employer]: Call back    Comments:  Please call her. Thanks.

## 2023-04-14 NOTE — PROGRESS NOTES
[facility]   Physical Therapy Treatment Note     Name: Miriam MILLS [facility] Number: [id number]    Therapy Diagnosis:   Encounter Diagnoses   Name Primary?    Abnormal gait Yes    Acute pain of right knee     Decreased range of motion        Physician: Order, Paper    Visit Date: 4/14/2023    Physician Orders: PT Eval and Treat NWB range of motion lower extremity   Medical Diagnosis from Referral:   S72.401D (ICD-10-CM) - Closed fracture of lower end of right femur with routine healing   S72.491A (ICD-10-CM) - Closed comminuted intra-articular fracture of distal femur, right, initial encounter      Evaluation Date: 1/23/2023  Authorization Period Expiration: 12/31/2023  Plan of Care Expiration: 3/29/2023 to 5/29/2023  Visit # / Visits authorized: 11/ 40 + eval  FOTO#:5/5      Time In: 10:46am  Time Out: 11:45am  Total Billable Time: 55minutes (4TE)     Precautions: 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   SUBJECTIVE     Pt reports: she thinks she did too much at the last visit. She feels like she cant bend it as much. Been on her feet a work a lot too.   She was compliant with home exercise program.  Response to previous treatment: sore  Functional change: back to work     Pain: 3/10  Location: right knee      OBJECTIVE     Objective Measures updated at progress report unless specified.     4/6/2023  Knee range of motion:   Pre tx- 0-2-105  Post tx 0-0-110    Treatment     Miriam received the treatments listed below:      Miriam received the following manual therapy techniques: Joint mobilizations were applied to the: right knee for 20 minutes, including:  Patellar mobilization  Infrapatellar fat pad mobilization  Knee hinge for terminal knee extension  AP glide of tibial with inferior glide of  "patella  EOT Tibial Med/lat rotation  EOT muscle energy technique for knee flexion c patellar caudal glide  Pt education       Miriam participated in neuromuscular re-education activities to improve: Proprioception and Posture for 00 minutes. The following activities were included:  Short arc quads 3x10 5" 4#  Long arc quad c green theraband 3x12 c cuing on terminal knee ext.     Miriam received therapeutic exercises to develop strength, endurance, ROM, and flexibility for 25 minutes including:  Heel prop 5 min   Heel slides 5 min   Knee flexion LLLD 5# - 5min  Knee mobilization on step x20      Knee flexion mobilization on step [id number]   TKE purple sports cord 2x15 5"  Knee flexion LLLD stretch c 1# - 5min   Long sitting gastroc/hamstring stretch 10x15"   Sidelying hip abd 3x10   Clamshell 3x10 bilateral   Long sitting ankle plantarflexion c blue theraband 2x20    Miriam participated in dynamic functional therapeutic activities to improve functional performance for 8  minutes, including:  TRK squat for knee flexion 3x10  Shuttle 75# 3x10 c knee flexion holds and TKA  Step ups 6" step 2x10      Miriam participated in gait training to improve functional mobility and safety for 0 minutes, including:  Pt was educated on ambulated with increased knee flexion during swing with reduced lateral sway.     Patient Education and Home Exercises     Home Exercises Provided and Patient Education Provided     Education provided:   - Pt educated on trying to avoid picking at her scabs to allow wound to heal.     Written Home Exercises Provided: Patient instructed to cont prior HEP. Exercises were reviewed and Miriam was able to demonstrate them prior to the end of the session.  Miriam demonstrated good  understanding of the education provided. See EMR under Patient Instructions for exercises provided during therapy sessions    ASSESSMENT   Miriam presents to PT with increased knee pain. She had slightly reduced knee flexion range " of motion and therefore the extent of functional knee flexion performed today was reduced. More focus on sustained knee flexion with LLLD holds and control. She continues to have a stiff endfeel but improvement continues to be made within session.      Miriam Is progressing well towards her goals.   Pt prognosis is Fair.      Pt will continue to benefit from skilled outpatient physical therapy to address the deficits listed in the problem list box on initial evaluation, provide pt/family education and to maximize pt's level of independence in the home and community environment.      Pt's spiritual, cultural and educational needs considered and pt agreeable to plan of care and goals.     Anticipated barriers to physical therapy: Compliance/Transportation     Goals:   Short Term Goals (6 Weeks):  1. Pt will be compliant with HEP to supplement PT in restoring pain free function.  2. Pt will be able to ambulate 300ft sans assistive device in order to get around home safely  3. Pt will improve knee flexion range of motion to >90' in order to perform functional tasks at home  4. Pt will demonstrate full terminal knee extension in order to ambulate with proper mechanics.      Long Term Goals (12 Weeks):  1. Pt will improve FOTO score to </= 48% limited to decrease perceived limitation with mobility.   2.Pt will improve knee flexion range of motion to >120' in order to perform functional tasks at home  3. Pt will be able to ambulate without an increase in pain greater than 2 pts on NPRS   4. Pt will be independent c final home exercise program in order to DC to home program.     PLAN     Continue with knee range of motion and strength     Sarai Mcdaniel, PT, DPT, OCS

## 2023-04-16 DIAGNOSIS — A59.01 TRICHOMONAL VAGINITIS: Primary | ICD-10-CM

## 2023-04-16 RX ORDER — METRONIDAZOLE 500 MG/1
500 TABLET ORAL EVERY 12 HOURS
Qty: 14 TABLET | Refills: 0 | Status: SHIPPED | OUTPATIENT
Start: 2023-04-16 | End: 2023-04-23

## 2023-04-18 ENCOUNTER — HOSPITAL ENCOUNTER (OUTPATIENT)
Dept: RADIOLOGY | Facility: HOSPITAL | Age: 43
Discharge: HOME | End: 2023-04-18
Attending: STUDENT IN AN ORGANIZED HEALTH CARE EDUCATION/TRAINING PROGRAM
Payer: COMMERCIAL

## 2023-04-18 DIAGNOSIS — Z12.31 ENCOUNTER FOR SCREENING MAMMOGRAM FOR MALIGNANT NEOPLASM OF BREAST: ICD-10-CM

## 2023-04-18 PROCEDURE — 77063 BREAST TOMOSYNTHESIS BI: CPT | Mod: 26,,, | Performed by: RADIOLOGY

## 2023-04-18 PROCEDURE — 77067 MAMMO DIGITAL SCREENING BILAT WITH TOMO: ICD-10-PCS | Mod: 26,,, | Performed by: RADIOLOGY

## 2023-04-18 PROCEDURE — 77067 SCR MAMMO BI INCL CAD: CPT | Mod: TC

## 2023-04-18 PROCEDURE — 77063 MAMMO DIGITAL SCREENING BILAT WITH TOMO: ICD-10-PCS | Mod: 26,,, | Performed by: RADIOLOGY

## 2023-04-18 PROCEDURE — 77067 SCR MAMMO BI INCL CAD: CPT | Mod: 26,,, | Performed by: RADIOLOGY

## 2023-04-19 ENCOUNTER — PATIENT MESSAGE (OUTPATIENT)
Dept: REHABILITATION | Facility: HOSPITAL | Age: 43
End: 2023-04-19

## 2023-04-19 ENCOUNTER — CLINICAL SUPPORT (OUTPATIENT)
Dept: REHABILITATION | Facility: HOSPITAL | Age: 43
End: 2023-04-19
Payer: MEDICAID

## 2023-04-19 DIAGNOSIS — R26.9 ABNORMAL GAIT: Primary | ICD-10-CM

## 2023-04-19 DIAGNOSIS — M25.561 ACUTE PAIN OF RIGHT KNEE: ICD-10-CM

## 2023-04-19 DIAGNOSIS — M25.60 DECREASED RANGE OF MOTION: ICD-10-CM

## 2023-04-19 PROCEDURE — 97110 THERAPEUTIC EXERCISES: CPT | Mod: PN

## 2023-04-19 NOTE — PROGRESS NOTES
[facility]   Physical Therapy Treatment Note     Name: Miriam MILLS [facility] Number: [id number]    Therapy Diagnosis:   Encounter Diagnoses   Name Primary?    Abnormal gait Yes    Acute pain of right knee     Decreased range of motion          Physician: Order, Paper    Visit Date: 4/19/2023    Physician Orders: PT Eval and Treat NWB range of motion lower extremity   Medical Diagnosis from Referral:   S72.401D (ICD-10-CM) - Closed fracture of lower end of right femur with routine healing   S72.491A (ICD-10-CM) - Closed comminuted intra-articular fracture of distal femur, right, initial encounter      Evaluation Date: 1/23/2023  Authorization Period Expiration: 12/31/2023  Plan of Care Expiration: 3/29/2023 to 5/29/2023  Visit # / Visits authorized: 12/ 40 + eval  FOTO#:5/5      Time In: 10:46am  Time Out: 11:45am  Total Billable Time: 55minutes (3TE)     Precautions: 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   SUBJECTIVE     Pt reports: She has been working 8 hour shifts. Feels like her knee is going backwards. Swelling increasing.   She was compliant with home exercise program.  Response to previous treatment: sore  Functional change: back to work     Pain: 3/10  Location: right knee      OBJECTIVE     Objective Measures updated at progress report unless specified.     4/19/2023  Knee range of motion:   Pre tx- 0-2-97  Post tx 0-0-99    Treatment     Miriam received the treatments listed below:      Miriam received the following manual therapy techniques: Joint mobilizations were applied to the: right knee for 25 minutes, including:  Patellar mobilization  Infrapatellar fat pad mobilization  Knee hinge for terminal knee extension  AP glide of tibial with inferior glide of patella  EOT Tibial  "Med/lat rotation  EOT muscle energy technique for knee flexion c patellar caudal glide  Pt education       Miriam participated in neuromuscular re-education activities to improve: Proprioception and Posture for 00 minutes. The following activities were included:  Short arc quads 3x10 5" 4#  Long arc quad c green theraband 3x12 c cuing on terminal knee ext.     Miriam received therapeutic exercises to develop strength, endurance, ROM, and flexibility for 25 minutes including:  Heel prop 5 min   Heel slides 5 min   Knee flexion LLLD 5# - 5min  Knee mobilization on step x20      Knee flexion mobilization on step [id number]   TKE purple sports cord 2x15 5"  Knee flexion LLLD stretch c 1# - 5min   Long sitting gastroc/hamstring stretch 10x15"   Sidelying hip abd 3x10   Clamshell 3x10 bilateral   Long sitting ankle plantarflexion c blue theraband 2x20    Miriam participated in dynamic functional therapeutic activities to improve functional performance for 0  minutes, including:  TRK squat for knee flexion 3x10  Shuttle 75# 3x10 c knee flexion holds and TKA  Step ups 6" step 2x10      Miriam participated in gait training to improve functional mobility and safety for 0 minutes, including:  Pt was educated on ambulated with increased knee flexion during swing with reduced lateral sway.     Patient Education and Home Exercises     Home Exercises Provided and Patient Education Provided     Education provided:   - Pt educated on trying to avoid picking at her scabs to allow wound to heal.     Written Home Exercises Provided: Patient instructed to cont prior HEP. Exercises were reviewed and Miriam was able to demonstrate them prior to the end of the session.  Miriam demonstrated good  understanding of the education provided. See EMR under Patient Instructions for exercises provided during therapy sessions    ASSESSMENT   Miriam presents to PT with increased swelling around the knee mostly likely 2/2 overuse at work. She was " educated on reducing standing time or possibly work hours to reduce likelihood of repeated swelling. Range of motion has decreased more likely due to swelling. She was progressing well but with return to work, function is decreasing. PT to continue to address range of motion and strength deficits.      Miriam Is progressing well towards her goals.   Pt prognosis is Fair.      Pt will continue to benefit from skilled outpatient physical therapy to address the deficits listed in the problem list box on initial evaluation, provide pt/family education and to maximize pt's level of independence in the home and community environment.      Pt's spiritual, cultural and educational needs considered and pt agreeable to plan of care and goals.     Anticipated barriers to physical therapy: Compliance/Transportation     Goals:   Short Term Goals (6 Weeks):  1. Pt will be compliant with HEP to supplement PT in restoring pain free function.  2. Pt will be able to ambulate 300ft sans assistive device in order to get around home safely  3. Pt will improve knee flexion range of motion to >90' in order to perform functional tasks at home  4. Pt will demonstrate full terminal knee extension in order to ambulate with proper mechanics.      Long Term Goals (12 Weeks):  1. Pt will improve FOTO score to </= 48% limited to decrease perceived limitation with mobility.   2.Pt will improve knee flexion range of motion to >120' in order to perform functional tasks at home  3. Pt will be able to ambulate without an increase in pain greater than 2 pts on NPRS   4. Pt will be independent c final home exercise program in order to DC to home program.     PLAN     Continue with knee range of motion and strength     Sarai Mcdaniel, PT, DPT, OCS

## 2023-04-21 ENCOUNTER — OFFICE VISIT (OUTPATIENT)
Dept: OBSTETRICS AND GYNECOLOGY | Facility: CLINIC | Age: 43
End: 2023-04-21
Payer: COMMERCIAL

## 2023-04-21 ENCOUNTER — CLINICAL SUPPORT (OUTPATIENT)
Dept: REHABILITATION | Facility: HOSPITAL | Age: 43
End: 2023-04-21
Payer: MEDICAID

## 2023-04-21 VITALS — BODY MASS INDEX: 32.1 KG/M2 | WEIGHT: 192.88 LBS | SYSTOLIC BLOOD PRESSURE: 112 MMHG | DIASTOLIC BLOOD PRESSURE: 78 MMHG

## 2023-04-21 DIAGNOSIS — M25.60 DECREASED RANGE OF MOTION: ICD-10-CM

## 2023-04-21 DIAGNOSIS — Z01.419 ENCOUNTER FOR GYNECOLOGICAL EXAMINATION WITHOUT ABNORMAL FINDING: Primary | ICD-10-CM

## 2023-04-21 DIAGNOSIS — R87.619 ABNORMAL CERVICAL PAPANICOLAOU SMEAR, UNSPECIFIED ABNORMAL PAP FINDING: ICD-10-CM

## 2023-04-21 DIAGNOSIS — M25.561 ACUTE PAIN OF RIGHT KNEE: ICD-10-CM

## 2023-04-21 DIAGNOSIS — Z12.31 BREAST CANCER SCREENING BY MAMMOGRAM: ICD-10-CM

## 2023-04-21 DIAGNOSIS — R26.9 ABNORMAL GAIT: Primary | ICD-10-CM

## 2023-04-21 PROCEDURE — 88141 PR  CYTOPATH CERV/VAG INTERPRET: ICD-10-PCS | Mod: ,,, | Performed by: PATHOLOGY

## 2023-04-21 PROCEDURE — 3008F BODY MASS INDEX DOCD: CPT | Mod: CPTII,,, | Performed by: OBSTETRICS & GYNECOLOGY

## 2023-04-21 PROCEDURE — 3008F PR BODY MASS INDEX (BMI) DOCUMENTED: ICD-10-PCS | Mod: CPTII,S$GLB,, | Performed by: OBSTETRICS & GYNECOLOGY

## 2023-04-21 PROCEDURE — 3074F SYST BP LT 130 MM HG: CPT | Mod: CPTII,,, | Performed by: OBSTETRICS & GYNECOLOGY

## 2023-04-21 PROCEDURE — 3078F DIAST BP <80 MM HG: CPT | Mod: CPTII,,, | Performed by: OBSTETRICS & GYNECOLOGY

## 2023-04-21 PROCEDURE — 1159F PR MEDICATION LIST DOCUMENTED IN MEDICAL RECORD: ICD-10-PCS | Mod: CPTII,S$GLB,, | Performed by: OBSTETRICS & GYNECOLOGY

## 2023-04-21 PROCEDURE — 97110 THERAPEUTIC EXERCISES: CPT | Mod: PN

## 2023-04-21 PROCEDURE — 3074F PR MOST RECENT SYSTOLIC BLOOD PRESSURE < 130 MM HG: ICD-10-PCS | Mod: CPTII,S$GLB,, | Performed by: OBSTETRICS & GYNECOLOGY

## 2023-04-21 PROCEDURE — 99386 PREV VISIT NEW AGE 40-64: CPT | Mod: S$PBB,,, | Performed by: OBSTETRICS & GYNECOLOGY

## 2023-04-21 PROCEDURE — 88175 CYTOPATH C/V AUTO FLUID REDO: CPT | Performed by: PATHOLOGY

## 2023-04-21 PROCEDURE — 99213 OFFICE O/P EST LOW 20 MIN: CPT | Mod: PBBFAC | Performed by: OBSTETRICS & GYNECOLOGY

## 2023-04-21 PROCEDURE — 3078F PR MOST RECENT DIASTOLIC BLOOD PRESSURE < 80 MM HG: ICD-10-PCS | Mod: CPTII,S$GLB,, | Performed by: OBSTETRICS & GYNECOLOGY

## 2023-04-21 PROCEDURE — 99999 PR PBB SHADOW E&M-EST. PATIENT-LVL III: ICD-10-PCS | Mod: PBBFAC,,, | Performed by: OBSTETRICS & GYNECOLOGY

## 2023-04-21 PROCEDURE — 1159F MED LIST DOCD IN RCRD: CPT | Mod: CPTII,,, | Performed by: OBSTETRICS & GYNECOLOGY

## 2023-04-21 PROCEDURE — 99386 PR PREVENTIVE VISIT,NEW,40-64: ICD-10-PCS | Mod: S$GLB,,, | Performed by: OBSTETRICS & GYNECOLOGY

## 2023-04-21 PROCEDURE — 87624 HPV HI-RISK TYP POOLED RSLT: CPT | Performed by: OBSTETRICS & GYNECOLOGY

## 2023-04-21 PROCEDURE — 99999 PR PBB SHADOW E&M-EST. PATIENT-LVL III: CPT | Mod: PBBFAC,,, | Performed by: OBSTETRICS & GYNECOLOGY

## 2023-04-21 PROCEDURE — 88141 CYTOPATH C/V INTERPRET: CPT | Mod: ,,, | Performed by: PATHOLOGY

## 2023-04-21 NOTE — PROGRESS NOTES
Subjective:      Patient ID: Miriam Mendez is a 42 y.o. female.    Chief Complaint:  Well Woman      History of Present Illness  HPI  Annual Exam-Premenopausal  Patient presents for annual exam. The patient has no complaints today. The patient is sexually active. GYN screening history: last pap: was abnormal: unsure of abnormality.  Colposcopy was normal . The patient wears seatbelts: yes. The patient participates in regular exercise: yes. Has the patient ever been transfused or tattooed?: yes. The patient reports that there is not domestic violence in her life.    GYN & OB History  Patient's last menstrual period was 2023.   Date of Last Pap: 2023    OB History    Para Term  AB Living   4 3 2 1   2   SAB IAB Ectopic Multiple Live Births           2      # Outcome Date GA Lbr Kobe/2nd Weight Sex Delivery Anes PTL Lv   4 Term 07 39w0d  3.969 kg (8 lb 12 oz) M CS-LTranv Spinal N NOEMI   3  03 36w0d  2.807 kg (6 lb 3 oz) F Vag-Spont EPI Y NOEMI   2 Term            1                Birth Comments: System Generated. Please review and update pregnancy details.     Past Medical History:  Past Medical History:   Diagnosis Date    ADD (attention deficit disorder with hyperactivity)     psych eval 3/10    Anxiety     Bronchitis     RAD /bronchitis episodes    Cellulitis     face    Depression     undergoing psychotherapy    Easy bruising 2014    Fibromyalgia     History of ITP 2014    Iron deficiency anemia 2014    Morbid obesity     improved with s/p gastric bypass    Pica 2014    Pseudotumor cerebri syndrome     Thrombocytopenia        Past Surgical History:  Past Surgical History:   Procedure Laterality Date     SECTION  2007    CHOLECYSTECTOMY  2008    GASTRIC BYPASS  2009       Family History:  Family History   Problem Relation Age of Onset    Breast cancer Mother 57    Hypertension Mother     Cancer Mother 57        breast cancer    Cancer  Father     Breast cancer Paternal Grandmother     Cancer Paternal Grandmother         breast    Cancer Paternal Grandfather         kidney    Colon cancer Neg Hx     Diabetes Neg Hx     Ovarian cancer Neg Hx     Stroke Neg Hx        Allergies:  Review of patient's allergies indicates:   Allergen Reactions    Sulfa (sulfonamide antibiotics)      Other reaction(s): Hives       Medications:  Current Outpatient Medications on File Prior to Visit   Medication Sig Dispense Refill    aspirin (ECOTRIN) 81 MG EC tablet Take 81 mg by mouth 2 (two) times daily.      clonazePAM (KLONOPIN) 1 MG tablet Take 1 mg by mouth 2 (two) times daily as needed for Anxiety.      dextroamphetamine-amphetamine (ADDERALL XR) 30 MG 24 hr capsule Take 30 mg by mouth.      dextroamphetamine-amphetamine (ADDERALL) 15 mg tablet       famotidine (PEPCID) 40 MG tablet Take 40 mg by mouth.      FLUoxetine 40 MG capsule Take 40 mg by mouth nightly.      fluticasone propionate (FLONASE) 50 mcg/actuation nasal spray by Each Nostril route.      gabapentin (NEURONTIN) 300 MG capsule Take 300 mg by mouth 3 (three) times daily.      methocarbamoL (ROBAXIN) 500 MG Tab Take 500 mg by mouth 4 (four) times daily.      metroNIDAZOLE (FLAGYL) 500 MG tablet Take 1 tablet (500 mg total) by mouth every 12 (twelve) hours. for 7 days 14 tablet 0    omeprazole (PRILOSEC) 20 MG capsule Take 1 capsule (20 mg total) by mouth 2 (two) times a day. 60 capsule 11    valACYclovir (VALTREX) 500 MG tablet Take 500 mg by mouth 2 (two) times daily.      zolpidem (AMBIEN) 10 mg Tab Take 10 mg by mouth every evening.      [DISCONTINUED] NUVARING 0.12-0.015 mg/24 hr vaginal ring PLACE 1 RING VAGINALLY EVERY 28 DAYS 1 each 5     Current Facility-Administered Medications on File Prior to Visit   Medication Dose Route Frequency Provider Last Rate Last Admin    [START ON 5/9/2023] onabotulinumtoxina injection 100 Units  100 Units Intramuscular 1 time in Clinic/HOD Tc Dodson MD            Social History:  Social History     Tobacco Use    Smoking status: Never    Smokeless tobacco: Never   Substance Use Topics    Alcohol use: Yes    Drug use: No            Review of Systems  Review of Systems   Constitutional: Negative.    HENT: Negative.     Eyes: Negative.    Respiratory: Negative.     Cardiovascular: Negative.    Gastrointestinal: Negative.    Endocrine: Negative.    Genitourinary: Negative.    Musculoskeletal: Negative.    Integumentary:  Negative.   Neurological: Negative.    Hematological: Negative.    Psychiatric/Behavioral: Negative.     Breast: negative.         Objective:     Physical Exam:   Constitutional: She is oriented to person, place, and time. She appears well-nourished.    HENT:   Head: Normocephalic and atraumatic.    Eyes: EOM are normal. Right eye exhibits normal extraocular motion. Left eye exhibits normal extraocular motion.    Neck: No thyromegaly present.    Cardiovascular:  Normal rate.             Pulmonary/Chest: Effort normal. No respiratory distress. Right breast exhibits no mass, no skin change and no tenderness. Left breast exhibits no mass, no skin change and no tenderness. Breasts are symmetrical.        Abdominal: Soft. She exhibits no distension and no mass. There is no abdominal tenderness.     Genitourinary:    Vagina, uterus, right adnexa and left adnexa normal.      Pelvic exam was performed with patient supine.   The external female genitalia was normal.   No external genitalia lesions identified,Genitalia hair distrobution normal .   Labial bartholins normal.There is no rash or lesion on the right labia. There is no rash or lesion on the left labia. Cervix is normal. Right adnexum displays no tenderness and no fullness. Left adnexum displays no tenderness and no fullness. No  no vaginal discharge or bleeding in the vagina.    No signs of injury in the vagina.   Vagina was moist.Cervix exhibits no motion tenderness and no friability. Uterus consistancy  normal. and Uerus contour normal  Uterus is not tender. Normal urethral meatus.Urethral Meatus exhibits: urethral lesion and prolapsedUrethra findings: no urethral mass and no tendernessBladder findings: no bladder distention and no bladder tenderness          Musculoskeletal: Normal range of motion.      Lymphadenopathy:     She has no cervical adenopathy.    Neurological: She is oriented to person, place, and time.   Cranial Nerves II-XII grossly intact.    Skin: No rash noted. No erythema.    Psychiatric: She has a normal mood and affect. Her behavior is normal.       Assessment:     1. Encounter for gynecological examination without abnormal finding    2. Breast cancer screening by mammogram    3. Abnormal cervical Papanicolaou smear, unspecified abnormal pap finding               Plan:     1. Encounter for gynecological examination without abnormal finding  - Pap and HPV done today.  -   Screening tests as ordered.  - Diet and exercise encouraged.    Counseling: injury prevention: Driving under the influence of alcohol  Seatbelts  Stress management techniques  indications for and frequency of periodic gynecologic exam  reviewed current Pap guidelines. Explained new understanding of natural history of cervical disease and improved Paps. Recommended guideline concordant care.  - Liquid-Based Pap Smear, Screening  - HPV High Risk Genotypes, PCR    2. Breast cancer screening by mammogram  - Self breast exams encouraged

## 2023-04-21 NOTE — PROGRESS NOTES
[facility]   Physical Therapy Treatment Note     Name: Miriam MILLS [facility] Number: [id number]    Therapy Diagnosis:   No diagnosis found.        Physician: Order, Paper    Visit Date: 4/21/2023    Physician Orders: PT Eval and Treat NWB range of motion lower extremity   Medical Diagnosis from Referral:   S72.401D (ICD-10-CM) - Closed fracture of lower end of right femur with routine healing   S72.491A (ICD-10-CM) - Closed comminuted intra-articular fracture of distal femur, right, initial encounter      Evaluation Date: 1/23/2023  Authorization Period Expiration: 12/31/2023  Plan of Care Expiration: 3/29/2023 to 5/29/2023  Visit # / Visits authorized: 12/ 40 + eval  FOTO#:5/5      Time In: 10:46am  Time Out: 11:45am  Total Billable Time: 55minutes (3TE)     Precautions: 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   SUBJECTIVE     Pt reports: She has been working 8 hour shifts. Feels like her knee is going backwards. Swelling increasing.   She was compliant with home exercise program.  Response to previous treatment: sore  Functional change: back to work     Pain: 3/10  Location: right knee      OBJECTIVE     Objective Measures updated at progress report unless specified.     4/19/2023  Knee range of motion:   Pre tx- 0-2-97  Post tx 0-0-99    Treatment     Miriam received the treatments listed below:      Miriam received the following manual therapy techniques: Joint mobilizations were applied to the: right knee for 25 minutes, including:  Patellar mobilization  Infrapatellar fat pad mobilization  Knee hinge for terminal knee extension  AP glide of tibial with inferior glide of patella  EOT Tibial Med/lat rotation  EOT muscle energy technique for knee flexion c patellar caudal glide  Pt education  "      Miriam participated in neuromuscular re-education activities to improve: Proprioception and Posture for 00 minutes. The following activities were included:  Short arc quads 3x10 5" 4#  Long arc quad c green theraband 3x12 c cuing on terminal knee ext.     Miriam received therapeutic exercises to develop strength, endurance, ROM, and flexibility for 25 minutes including:  Heel prop 5 min   Heel slides 5 min   Knee flexion LLLD 5# - 5min  Knee mobilization on step x20      Knee flexion mobilization on step [id number]   TKE purple sports cord 2x15 5"  Knee flexion LLLD stretch c 1# - 5min   Long sitting gastroc/hamstring stretch 10x15"   Sidelying hip abd 3x10   Clamshell 3x10 bilateral   Long sitting ankle plantarflexion c blue theraband 2x20    Miriam participated in dynamic functional therapeutic activities to improve functional performance for 0  minutes, including:  TRK squat for knee flexion 3x10  Shuttle 75# 3x10 c knee flexion holds and TKA  Step ups 6" step 2x10      Miriam participated in gait training to improve functional mobility and safety for 0 minutes, including:  Pt was educated on ambulated with increased knee flexion during swing with reduced lateral sway.     Patient Education and Home Exercises     Home Exercises Provided and Patient Education Provided     Education provided:   - Pt educated on trying to avoid picking at her scabs to allow wound to heal.     Written Home Exercises Provided: Patient instructed to cont prior HEP. Exercises were reviewed and Miriam was able to demonstrate them prior to the end of the session.  Miriam demonstrated good  understanding of the education provided. See EMR under Patient Instructions for exercises provided during therapy sessions    ASSESSMENT   Miriam presents to PT with increased swelling around the knee mostly likely 2/2 overuse at work. She was educated on reducing standing time or possibly work hours to reduce likelihood of repeated swelling. Range " of motion has decreased more likely due to swelling. She was progressing well but with return to work, function is decreasing. PT to continue to address range of motion and strength deficits.      Miriam Is progressing well towards her goals.   Pt prognosis is Fair.      Pt will continue to benefit from skilled outpatient physical therapy to address the deficits listed in the problem list box on initial evaluation, provide pt/family education and to maximize pt's level of independence in the home and community environment.      Pt's spiritual, cultural and educational needs considered and pt agreeable to plan of care and goals.     Anticipated barriers to physical therapy: Compliance/Transportation     Goals:   Short Term Goals (6 Weeks):  1. Pt will be compliant with HEP to supplement PT in restoring pain free function.  2. Pt will be able to ambulate 300ft sans assistive device in order to get around home safely  3. Pt will improve knee flexion range of motion to >90' in order to perform functional tasks at home  4. Pt will demonstrate full terminal knee extension in order to ambulate with proper mechanics.      Long Term Goals (12 Weeks):  1. Pt will improve FOTO score to </= 48% limited to decrease perceived limitation with mobility.   2.Pt will improve knee flexion range of motion to >120' in order to perform functional tasks at home  3. Pt will be able to ambulate without an increase in pain greater than 2 pts on NPRS   4. Pt will be independent c final home exercise program in order to DC to home program.     PLAN     Continue with knee range of motion and strength     Reta Curtis, PTA

## 2023-04-21 NOTE — PROGRESS NOTES
[facility]   Physical Therapy Treatment Note     Name: Miriam MILLS [facility] Number: [id number]    Therapy Diagnosis:   Encounter Diagnoses   Name Primary?    Abnormal gait Yes    Acute pain of right knee     Decreased range of motion        Physician: Order, Paper    Visit Date: 4/21/2023    Physician Orders: PT Eval and Treat NWB range of motion lower extremity   Medical Diagnosis from Referral:   S72.401D (ICD-10-CM) - Closed fracture of lower end of right femur with routine healing   S72.491A (ICD-10-CM) - Closed comminuted intra-articular fracture of distal femur, right, initial encounter      Evaluation Date: 1/23/2023  Authorization Period Expiration: 12/31/2023  Plan of Care Expiration: 3/29/2023 to 5/29/2023  Visit # / Visits authorized: 13/ 40 + eval  FOTO#:5/5      Time In: 10:03am  Time Out: 11:00am  Total Billable Time: 57minutes (4TE)     Precautions: 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   SUBJECTIVE     Pt reports: Will be having an RENZO on 4/25. She still has swelling and told the doc she has been using it more. Wants to either scope or RENZO. Will be determined in surgery.   She was compliant with home exercise program.  Response to previous treatment: sore  Functional change: back to work     Pain: 3/10  Location: right knee      OBJECTIVE     Objective Measures updated at progress report unless specified.     4/19/2023:    Knee range of motion:   Pre tx- 0-2-97  Post tx 0-0-99    Treatment     Miriam received the treatments listed below:      Miriam received the following manual therapy techniques: Joint mobilizations were applied to the: right knee for 25 minutes, including:  Patellar mobilization  Infrapatellar fat pad mobilization  Knee hinge for terminal knee extension  AP  "glide of tibial with inferior glide of patella  EOT Tibial Med/lat rotation  EOT muscle energy technique for knee flexion c patellar caudal glide  Pt education       Miriam participated in neuromuscular re-education activities to improve: Proprioception and Posture for 00 minutes. The following activities were included:  Short arc quads 3x10 5" 4#  Long arc quad c green theraband 3x12 c cuing on terminal knee ext.     Miriam received therapeutic exercises to develop strength, endurance, ROM, and flexibility for 25 minutes including:  Heel prop 5 min   Heel slides 5 min   Knee flexion LLLD 5# - 6min  Knee mobilization on step x20      Knee flexion mobilization on step [id number]   TKE purple sports cord 2x15 5"  Knee flexion LLLD stretch c 1# - 5min   Long sitting gastroc/hamstring stretch 10x15"   Sidelying hip abd 3x10   Clamshell 3x10 bilateral   Long sitting ankle plantarflexion c blue theraband 2x20    Miriam participated in dynamic functional therapeutic activities to improve functional performance for 0  minutes, including:  TRK squat for knee flexion 3x10  Shuttle 75# 3x10 c knee flexion holds and TKA  Step ups 6" step 2x10      Miriam participated in gait training to improve functional mobility and safety for 0 minutes, including:  Pt was educated on ambulated with increased knee flexion during swing with reduced lateral sway.     Patient Education and Home Exercises     Home Exercises Provided and Patient Education Provided     Education provided:   - Pt educated on trying to avoid picking at her scabs to allow wound to heal.     Written Home Exercises Provided: Patient instructed to cont prior HEP. Exercises were reviewed and Miriam was able to demonstrate them prior to the end of the session.  Miriam demonstrated good  understanding of the education provided. See EMR under Patient Instructions for exercises provided during therapy sessions    ASSESSMENT   Miriam presents to PT today with continued knee pain " and swelling. She is having an RENZO or scope Monday. She will be re-evaluated post surgery and new plan of care will be initiated at this time.      Miriam Is progressing well towards her goals.   Pt prognosis is Fair.      Pt will continue to benefit from skilled outpatient physical therapy to address the deficits listed in the problem list box on initial evaluation, provide pt/family education and to maximize pt's level of independence in the home and community environment.      Pt's spiritual, cultural and educational needs considered and pt agreeable to plan of care and goals.     Anticipated barriers to physical therapy: Compliance/Transportation     Goals:   Short Term Goals (6 Weeks):  1. Pt will be compliant with HEP to supplement PT in restoring pain free function.  2. Pt will be able to ambulate 300ft sans assistive device in order to get around home safely  3. Pt will improve knee flexion range of motion to >90' in order to perform functional tasks at home  4. Pt will demonstrate full terminal knee extension in order to ambulate with proper mechanics.      Long Term Goals (12 Weeks):  1. Pt will improve FOTO score to </= 48% limited to decrease perceived limitation with mobility.   2.Pt will improve knee flexion range of motion to >120' in order to perform functional tasks at home  3. Pt will be able to ambulate without an increase in pain greater than 2 pts on NPRS   4. Pt will be independent c final home exercise program in order to DC to home program.     PLAN     Update plan of care next visit.     Sarai Mcdaniel, PT

## 2023-04-26 ENCOUNTER — CLINICAL SUPPORT (OUTPATIENT)
Dept: REHABILITATION | Facility: HOSPITAL | Age: 43
End: 2023-04-26
Payer: MEDICAID

## 2023-04-26 DIAGNOSIS — M25.60 DECREASED RANGE OF MOTION: ICD-10-CM

## 2023-04-26 DIAGNOSIS — R26.9 ABNORMAL GAIT: Primary | ICD-10-CM

## 2023-04-26 DIAGNOSIS — M25.561 ACUTE PAIN OF RIGHT KNEE: ICD-10-CM

## 2023-04-26 PROCEDURE — 97110 THERAPEUTIC EXERCISES: CPT | Mod: PN

## 2023-04-26 NOTE — PROGRESS NOTES
[facility]   Physical Therapy Treatment Note     Name: Miriam MILLS [facility] Number: [id number]    Therapy Diagnosis:   Encounter Diagnoses   Name Primary?    Abnormal gait Yes    Acute pain of right knee     Decreased range of motion        Physician: Order, Paper    Visit Date: 4/26/2023    Physician Orders: PT Eval and Treat NWB range of motion lower extremity   Medical Diagnosis from Referral:   S72.401D (ICD-10-CM) - Closed fracture of lower end of right femur with routine healing   S72.491A (ICD-10-CM) - Closed comminuted intra-articular fracture of distal femur, right, initial encounter      Evaluation Date: 1/23/2023  Authorization Period Expiration: 12/31/2023  Plan of Care Expiration: 3/29/2023 to 5/29/2023  Visit # / Visits authorized: 14/ 40 + eval  FOTO#:5/5      Time In: 10:45am  Time Out: 11:43am  Total Billable Time: 57minutes (3TE Tech assist)     Precautions: 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   RENZO 4/25/2023 - 5-130 knee ROM  SUBJECTIVE     Pt reports: Had her manipulation yesterday. Felt great yesterday and was moving all over the place but today she is really sore and pain ful and feels like she lost motion. She took a pain pill right before PT today and feels a bit funny.   She was compliant with home exercise program.  Response to previous treatment: change in status post last tx  Functional change: back to work     Pain: 5/10  Location: right knee      OBJECTIVE     Objective Measures updated at progress report unless specified.     4/26/2023:    Knee range of motion:   Pre tx- 0-2-90  Post tx 0-0-100    Joint mobility: reduced patellar mobilization in cephalic and caudal directions, fair tibial rotation and posterior glide.     Treatment     Miriam received the  "treatments listed below:      Miriam received the following manual therapy techniques: Joint mobilizations were applied to the: right knee for 23 minutes, including:  Patellar mobilization  Infrapatellar fat pad mobilization  Knee hinge for terminal knee extension  AP glide of tibial with inferior glide of patella  Passive range of motion knee flexion c gradual overpressure   EOT Tibial Med/lat rotation  EOT muscle energy technique for knee flexion c patellar caudal glide  Pt education     Miriam participated in neuromuscular re-education activities to improve: Proprioception and Posture for 20 minutes. The following activities were included:  Quad set towel under ankle x20 5"  Short arc quads 3x10 5" no weight 2/2 pain  Long arc quad 3x10 no weight 2/2 pain    Miriam received therapeutic exercises to develop strength, endurance, ROM, and flexibility for 15 minutes including:  Heel prop 5 min   Heel slides 5 min   Knee flexion LLLD 5# - 6min  Knee mobilization on step x20      Knee flexion mobilization on step [id number]   TKE purple sports cord 2x15 5"  Long sitting gastroc/hamstring stretch 10x15"   Sidelying hip abd 3x10   Clamshell 3x10 bilateral   Long sitting ankle plantarflexion c blue theraband 2x20    Miriam participated in dynamic functional therapeutic activities to improve functional performance for 0  minutes, including:  TRK squat for knee flexion 3x10  Shuttle 75# 3x10 c knee flexion holds and TKA  Step ups 6" step 2x10      Miriam participated in gait training to improve functional mobility and safety for 0 minutes, including:  Pt was educated on ambulated with increased knee flexion during swing with reduced lateral sway.     Patient Education and Home Exercises     Home Exercises Provided and Patient Education Provided     Education provided:   - Pt educated on trying to avoid picking at her scabs to allow wound to heal.     Written Home Exercises Provided: Patient instructed to cont prior HEP. " Exercises were reviewed and Miriam was able to demonstrate them prior to the end of the session.  Miriam demonstrated good  understanding of the education provided. See EMR under Patient Instructions for exercises provided during therapy sessions    ASSESSMENT   Miriam presents to PT today s/p RENZO. She has increased pain today and swelling compared to immediately post op. Range of motion pre tx was roughly 90' and post tx was 100' with a soft end feel. She struggled with quad activation today and repeated cuing was required. Surgeon obtained 130' flexion during RENZO and this will be the goal of PT.     Miriam Is progressing well towards her goals.   Pt prognosis is Fair.      Pt will continue to benefit from skilled outpatient physical therapy to address the deficits listed in the problem list box on initial evaluation, provide pt/family education and to maximize pt's level of independence in the home and community environment.      Pt's spiritual, cultural and educational needs considered and pt agreeable to plan of care and goals.     Anticipated barriers to physical therapy: Compliance/Transportation     Goals:   Short Term Goals (6 Weeks):  1. Pt will be compliant with HEP to supplement PT in restoring pain free function.  2. Pt will be able to ambulate 300ft sans assistive device in order to get around home safely  3. Pt will improve knee flexion range of motion to >90' in order to perform functional tasks at home  4. Pt will demonstrate full terminal knee extension in order to ambulate with proper mechanics.      Long Term Goals (12 Weeks):  1. Pt will improve FOTO score to </= 48% limited to decrease perceived limitation with mobility.   2.Pt will improve knee flexion range of motion to >120' in order to perform functional tasks at home  3. Pt will be able to ambulate without an increase in pain greater than 2 pts on NPRS   4. Pt will be independent c final home exercise program in order to DC to home program.      PLAN     Knee range of motion and quad control     Sarai Mcdaniel, PT

## 2023-04-27 ENCOUNTER — CLINICAL SUPPORT (OUTPATIENT)
Dept: REHABILITATION | Facility: HOSPITAL | Age: 43
End: 2023-04-27
Payer: MEDICAID

## 2023-04-27 DIAGNOSIS — R26.9 ABNORMAL GAIT: Primary | ICD-10-CM

## 2023-04-27 DIAGNOSIS — M25.561 ACUTE PAIN OF RIGHT KNEE: ICD-10-CM

## 2023-04-27 DIAGNOSIS — M25.60 DECREASED RANGE OF MOTION: ICD-10-CM

## 2023-04-27 PROCEDURE — 97110 THERAPEUTIC EXERCISES: CPT | Mod: PN,CQ

## 2023-04-27 NOTE — PROGRESS NOTES
[facility]   Physical Therapy Treatment Note     Name: Miriam MILLS [facility] Number: [id number]    Therapy Diagnosis:   Encounter Diagnoses   Name Primary?    Abnormal gait Yes    Acute pain of right knee     Decreased range of motion          Physician: Order, Paper    Visit Date: 4/27/2023    Physician Orders: PT Eval and Treat NWB range of motion lower extremity   Medical Diagnosis from Referral:   S72.401D (ICD-10-CM) - Closed fracture of lower end of right femur with routine healing   S72.491A (ICD-10-CM) - Closed comminuted intra-articular fracture of distal femur, right, initial encounter      Evaluation Date: 1/23/2023  Authorization Period Expiration: 12/31/2023  Plan of Care Expiration: 3/29/2023 to 5/29/2023  Visit # / Visits authorized: 14/ 40 + eval  FOTO#:5/5      Time In: 3:00 pm   Time Out: 4:00 pm   Total Billable Time: 60minutes (4TE)     Precautions: 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   RENZO 4/25/2023 - 5-130 knee ROM  SUBJECTIVE     Pt reports: the knee is still pretty sore.   She was compliant with home exercise program.  Response to previous treatment: change in status post last tx  Functional change: back to work     Pain: 5/10  Location: right knee      OBJECTIVE     Objective Measures updated at progress report unless specified.  PTA measured right knee ROM on 4/27/2023  0-2-92 (beginning of session)  0-0-106 (end of session)      4/26/2023:    Knee range of motion:   Pre tx- 0-2-90  Post tx 0-0-100    Joint mobility: reduced patellar mobilization in cephalic and caudal directions, fair tibial rotation and posterior glide.     Treatment     Miriam received the treatments listed below:      Miriam received the following manual therapy techniques: Joint mobilizations  "were applied to the: right knee for 23 minutes, including:  Patellar mobilization  Infrapatellar fat pad mobilization  Knee hinge for terminal knee extension  AP glide of tibial with inferior glide of patella  Passive range of motion knee flexion c gradual overpressure   EOT Tibial Med/lat rotation  EOT muscle energy technique for knee flexion c patellar caudal glide  Pt education     Miriam participated in neuromuscular re-education activities to improve: Proprioception and Posture for 22 minutes. The following activities were included:  Quad set towel under ankle x30 5"  Short arc quads 3x10 5" no weight 2/2 pain  Long arc quad 3x10 no weight 2/2 pain    Miriam received therapeutic exercises to develop strength, endurance, ROM, and flexibility for 15 minutes including:  Heel prop 5 min   Heel slides 5 min   Knee flexion LLLD 5# - 6min  Knee mobilization on step x20      Knee flexion mobilization on step [id number]   TKE purple sports cord 2x15 5"  Long sitting gastroc/hamstring stretch 10x15"   Sidelying hip abd 3x10   Clamshell 3x10 bilateral   Long sitting ankle plantarflexion c blue theraband 2x20    Miriam participated in dynamic functional therapeutic activities to improve functional performance for 0  minutes, including:  TRK squat for knee flexion 3x10  Shuttle 75# 3x10 c knee flexion holds and TKA  Step ups 6" step 2x10      Miriam participated in gait training to improve functional mobility and safety for 0 minutes, including:  Pt was educated on ambulated with increased knee flexion during swing with reduced lateral sway.     Patient Education and Home Exercises     Home Exercises Provided and Patient Education Provided     Education provided:   - Pt educated on trying to avoid picking at her scabs to allow wound to heal.     Written Home Exercises Provided: Patient instructed to cont prior HEP. Exercises were reviewed and Miriam was able to demonstrate them prior to the end of the session.  Miriam " demonstrated good  understanding of the education provided. See EMR under Patient Instructions for exercises provided during therapy sessions    ASSESSMENT   Miriam presented to PT with reports of decreased right knee pain.  Continued with focus on improved right knee flexion ROM. She was able to reach 104 deg of right knee flexion ROM following heavy manual therapy and therex. Miriam continues to struggle with quad activation, but was able to improve with heavy verbal and tactile cueing. Will continue to progress right knee flexion ROM and quad activation.   Miriam Is progressing well towards her goals.   Pt prognosis is Fair.      Pt will continue to benefit from skilled outpatient physical therapy to address the deficits listed in the problem list box on initial evaluation, provide pt/family education and to maximize pt's level of independence in the home and community environment.      Pt's spiritual, cultural and educational needs considered and pt agreeable to plan of care and goals.     Anticipated barriers to physical therapy: Compliance/Transportation     Goals:   Short Term Goals (6 Weeks):  1. Pt will be compliant with HEP to supplement PT in restoring pain free function.  2. Pt will be able to ambulate 300ft sans assistive device in order to get around home safely  3. Pt will improve knee flexion range of motion to >90' in order to perform functional tasks at home  4. Pt will demonstrate full terminal knee extension in order to ambulate with proper mechanics.      Long Term Goals (12 Weeks):  1. Pt will improve FOTO score to </= 48% limited to decrease perceived limitation with mobility.   2.Pt will improve knee flexion range of motion to >120' in order to perform functional tasks at home  3. Pt will be able to ambulate without an increase in pain greater than 2 pts on NPRS   4. Pt will be independent c final home exercise program in order to DC to home program.     PLAN     Knee range of motion and quad  control     Reta Curtis, PTA

## 2023-04-28 ENCOUNTER — CLINICAL SUPPORT (OUTPATIENT)
Dept: REHABILITATION | Facility: HOSPITAL | Age: 43
End: 2023-04-28
Payer: MEDICAID

## 2023-04-28 DIAGNOSIS — M25.561 ACUTE PAIN OF RIGHT KNEE: ICD-10-CM

## 2023-04-28 DIAGNOSIS — M25.60 DECREASED RANGE OF MOTION: ICD-10-CM

## 2023-04-28 DIAGNOSIS — R26.9 ABNORMAL GAIT: Primary | ICD-10-CM

## 2023-04-28 LAB
FINAL PATHOLOGIC DIAGNOSIS: ABNORMAL
Lab: ABNORMAL

## 2023-04-28 PROCEDURE — 97110 THERAPEUTIC EXERCISES: CPT | Mod: PN,CQ

## 2023-04-28 NOTE — PROGRESS NOTES
[facility]   Physical Therapy Treatment Note     Name: Miriam MILLS [facility] Number: [id number]    Therapy Diagnosis:   Encounter Diagnoses   Name Primary?    Abnormal gait Yes    Acute pain of right knee     Decreased range of motion          Physician: Order, Paper    Visit Date: 4/28/2023    Physician Orders: PT Eval and Treat NWB range of motion lower extremity   Medical Diagnosis from Referral:   S72.401D (ICD-10-CM) - Closed fracture of lower end of right femur with routine healing   S72.491A (ICD-10-CM) - Closed comminuted intra-articular fracture of distal femur, right, initial encounter      Evaluation Date: 1/23/2023  Authorization Period Expiration: 12/31/2023  Plan of Care Expiration: 3/29/2023 to 5/29/2023  Visit # / Visits authorized: 14/ 40 + eval  FOTO#:5/5      Time In: 10:00 am   Time Out: 11:00 am   Total Billable Time: 60minutes (4TE)     Precautions: 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     RENZO 4/25/2023 - 5-130 knee ROM  SUBJECTIVE     Pt reports: yesterday coming into treatment, she kind of  felt like her knee was stuck but it doesn't feel like that today. She actually felt good enough to do a few things at home she just hadn't felt up to doing since surgery.   She was compliant with home exercise program.  Response to previous treatment: change in status post last tx  Functional change: back to work     Pain: 5/10  Location: right knee      OBJECTIVE     Objective Measures updated at progress report unless specified.  PTA measured rigth knee ROM on 4/28/2023  0-2-98 (beginning of session)  0-0-108 (end of session)    measured right knee ROM on 4/27/2023  0-2-92 (beginning of session)  0-0-104(end of session)    4/26/2023:  Knee range of motion:   Pre tx- 0-2-90  Post tx  "0-0-100    Joint mobility: reduced patellar mobilization in cephalic and caudal directions, fair tibial rotation and posterior glide.     Treatment     Miriam received the treatments listed below:      Miriam received the following manual therapy techniques: Joint mobilizations were applied to the: right knee for 23 minutes, including:  Patellar mobilization  Infrapatellar fat pad mobilization  Knee hinge for terminal knee extension  AP glide of tibial with inferior glide of patella  Passive range of motion knee flexion c gradual overpressure   EOT Tibial Med/lat rotation  EOT muscle energy technique for knee flexion c patellar caudal glide  Pt education     Miriam participated in neuromuscular re-education activities to improve: Proprioception and Posture for 22 minutes. The following activities were included:  Quad set towel under ankle x30 5"  Short arc quads 3x10 5" no weight 2/2 pain  Long arc quad 3x10 no weight 2/2 pain    Miriam received therapeutic exercises to develop strength, endurance, ROM, and flexibility for 15 minutes including:  Heel prop 5 min   Heel slides 5 min   Knee flexion LLLD 5# - 6min  Knee mobilization on step x20      Knee flexion mobilization on step [id number]   TKE purple sports cord 2x15 5"  Long sitting gastroc/hamstring stretch 10x15"   Sidelying hip abd 3x10   Clamshell 3x10 bilateral   Long sitting ankle plantarflexion c blue theraband 2x20    Miriam participated in dynamic functional therapeutic activities to improve functional performance for 0  minutes, including:  TRK squat for knee flexion 3x10  Shuttle 75# 3x10 c knee flexion holds and TKA  Step ups 6" step 2x10      Miriam participated in gait training to improve functional mobility and safety for 0 minutes, including:  Pt was educated on ambulated with increased knee flexion during swing with reduced lateral sway.     Patient Education and Home Exercises     Home Exercises Provided and Patient Education Provided     Education " provided:   - Pt educated on trying to avoid picking at her scabs to allow wound to heal.     Written Home Exercises Provided: Patient instructed to cont prior HEP. Exercises were reviewed and Miriam was able to demonstrate them prior to the end of the session.  Miriam demonstrated good  understanding of the education provided. See EMR under Patient Instructions for exercises provided during therapy sessions    ASSESSMENT   Miriam presented to PT with reports of improved right knee function.  Following  heavy manual therapy, Miriam was able to reach 108 deg of right knee flexion, with most improvements noted post AP mobs with inferior patellar glide. She continues to be challenged with quad activation, and may benefit from NMES. Will continue to improve right knee flexion ROM, as well as quad activation and strength.     Miriam Is progressing well towards her goals.   Pt prognosis is Fair.      Pt will continue to benefit from skilled outpatient physical therapy to address the deficits listed in the problem list box on initial evaluation, provide pt/family education and to maximize pt's level of independence in the home and community environment.      Pt's spiritual, cultural and educational needs considered and pt agreeable to plan of care and goals.     Anticipated barriers to physical therapy: Compliance/Transportation     Goals:   Short Term Goals (6 Weeks):  1. Pt will be compliant with HEP to supplement PT in restoring pain free function.  2. Pt will be able to ambulate 300ft sans assistive device in order to get around home safely  3. Pt will improve knee flexion range of motion to >90' in order to perform functional tasks at home  4. Pt will demonstrate full terminal knee extension in order to ambulate with proper mechanics.      Long Term Goals (12 Weeks):  1. Pt will improve FOTO score to </= 48% limited to decrease perceived limitation with mobility.   2.Pt will improve knee flexion range of motion to  >120' in order to perform functional tasks at home  3. Pt will be able to ambulate without an increase in pain greater than 2 pts on NPRS   4. Pt will be independent c final home exercise program in order to DC to home program.     PLAN     Knee range of motion and quad control     Reta Curtis, PTA

## 2023-05-01 ENCOUNTER — CLINICAL SUPPORT (OUTPATIENT)
Dept: REHABILITATION | Facility: HOSPITAL | Age: 43
End: 2023-05-01
Payer: MEDICAID

## 2023-05-01 ENCOUNTER — TELEPHONE (OUTPATIENT)
Dept: OBSTETRICS AND GYNECOLOGY | Facility: CLINIC | Age: 43
End: 2023-05-01
Payer: MEDICAID

## 2023-05-01 DIAGNOSIS — R26.9 ABNORMAL GAIT: Primary | ICD-10-CM

## 2023-05-01 DIAGNOSIS — M25.60 DECREASED RANGE OF MOTION: ICD-10-CM

## 2023-05-01 DIAGNOSIS — M25.561 ACUTE PAIN OF RIGHT KNEE: ICD-10-CM

## 2023-05-01 PROCEDURE — 97110 THERAPEUTIC EXERCISES: CPT | Mod: PN

## 2023-05-01 NOTE — PROGRESS NOTES
[facility]   Physical Therapy Treatment Note     [id number]    Therapy Diagnosis:   No diagnosis found.        Physician: Order, Paper    Visit Date: 5/1/2023    Physician Orders: PT Eval and Treat NWB range of motion lower extremity   Medical Diagnosis from Referral:   S72.401D (ICD-10-CM) - Closed fracture of lower end of right femur with routine healing   S72.491A (ICD-10-CM) - Closed comminuted intra-articular fracture of distal femur, right, initial encounter      Evaluation Date: 1/23/2023  Authorization Period Expiration: 12/31/2023  Plan of Care Expiration: 3/29/2023 to 5/29/2023  Visit # / Visits authorized: 14/ 40 + eval  FOTO#:5/5      Time In: 10:00 am   Time Out: 11:00 am   Total Billable Time: 60minutes (4TE)     Precautions: 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     RENZO 4/25/2023 - 5-130 knee ROM  SUBJECTIVE     Pt reports: yesterday coming into treatment, she kind of  felt like her knee was stuck but it doesn't feel like that today. She actually felt good enough to do a few things at home she just hadn't felt up to doing since surgery.   She was compliant with home exercise program.  Response to previous treatment: change in status post last tx  Functional change: back to work     Pain: 5/10  Location: right knee      OBJECTIVE     Objective Measures updated at progress report unless specified.  PTA measured rigth knee ROM on 4/28/2023  0-2-98 (beginning of session)  0-0-108 (end of session)    measured right knee ROM on 4/27/2023  0-2-92 (beginning of session)  0-0-104(end of session)    4/26/2023:  Knee range of motion:   Pre tx- 0-2-90  Post tx 0-0-100    Joint mobility: reduced patellar mobilization in cephalic and caudal directions, fair  "tibial rotation and posterior glide.     Treatment     Miriam received the treatments listed below:      Miriam received the following manual therapy techniques: Joint mobilizations were applied to the: right knee for 23 minutes, including:  Patellar mobilization  Infrapatellar fat pad mobilization  Knee hinge for terminal knee extension  AP glide of tibial with inferior glide of patella  Passive range of motion knee flexion c gradual overpressure   EOT Tibial Med/lat rotation  EOT muscle energy technique for knee flexion c patellar caudal glide  Pt education     Miriam participated in neuromuscular re-education activities to improve: Proprioception and Posture for 22 minutes. The following activities were included:  Quad set towel under ankle x30 5"  Short arc quads 3x10 5" no weight 2/2 pain  Long arc quad 3x10 no weight 2/2 pain    Miriam received therapeutic exercises to develop strength, endurance, ROM, and flexibility for 15 minutes including:  Heel prop 5 min   Heel slides 5 min   Knee flexion LLLD 5# - 6min  Knee mobilization on step x20      Knee flexion mobilization on step [id number]   TKE purple sports cord 2x15 5"  Long sitting gastroc/hamstring stretch 10x15"   Sidelying hip abd 3x10   Clamshell 3x10 bilateral   Long sitting ankle plantarflexion c blue theraband 2x20    Miriam participated in dynamic functional therapeutic activities to improve functional performance for 0  minutes, including:  TRK squat for knee flexion 3x10  Shuttle 75# 3x10 c knee flexion holds and TKA  Step ups 6" step 2x10      Miriam participated in gait training to improve functional mobility and safety for 0 minutes, including:  Pt was educated on ambulated with increased knee flexion during swing with reduced lateral sway.     Patient Education and Home Exercises     Home Exercises Provided and Patient Education Provided     Education provided:   - Pt educated on trying to avoid picking at her scabs to allow wound to heal. "     Written Home Exercises Provided: Patient instructed to cont prior HEP. Exercises were reviewed and Miriam was able to demonstrate them prior to the end of the session.  Miriam demonstrated good  understanding of the education provided. See EMR under Patient Instructions for exercises provided during therapy sessions    ASSESSMENT   Miriam presented to PT with reports of improved right knee function.  Following  heavy manual therapy, Miriam was able to reach 108 deg of right knee flexion, with most improvements noted post AP mobs with inferior patellar glide. She continues to be challenged with quad activation, and may benefit from NMES. Will continue to improve right knee flexion ROM, as well as quad activation and strength.     Miriam Is progressing well towards her goals.   Pt prognosis is Fair.      Pt will continue to benefit from skilled outpatient physical therapy to address the deficits listed in the problem list box on initial evaluation, provide pt/family education and to maximize pt's level of independence in the home and community environment.      Pt's spiritual, cultural and educational needs considered and pt agreeable to plan of care and goals.     Anticipated barriers to physical therapy: Compliance/Transportation     Goals:   Short Term Goals (6 Weeks):  1. Pt will be compliant with HEP to supplement PT in restoring pain free function.  2. Pt will be able to ambulate 300ft sans assistive device in order to get around home safely  3. Pt will improve knee flexion range of motion to >90' in order to perform functional tasks at home  4. Pt will demonstrate full terminal knee extension in order to ambulate with proper mechanics.      Long Term Goals (12 Weeks):  1. Pt will improve FOTO score to </= 48% limited to decrease perceived limitation with mobility.   2.Pt will improve knee flexion range of motion to >120' in order to perform functional tasks at home  3. Pt will be able to ambulate without an  increase in pain greater than 2 pts on NPRS   4. Pt will be independent c final home exercise program in order to DC to home program.     PLAN     Knee range of motion and quad control     Reta Acevedo PTA

## 2023-05-01 NOTE — TELEPHONE ENCOUNTER
----- Message from Tatiana Ordaz MA sent at 5/1/2023  2:56 PM CDT -----  Regarding: FW: Results  Contact: [phone]    ----- Message -----  From: Yen Merino  Sent: 5/1/2023   2:50 PM CDT  To: [facility]endo[facility] Staff  Subject: Results                                          Patient is calling to speak with someone in office about results. Please contact pt

## 2023-05-01 NOTE — PROGRESS NOTES
[facility]   Physical Therapy Treatment Note     Name: Miriam MILLS [facility] Number: [id number]    Therapy Diagnosis:   Encounter Diagnoses   Name Primary?    Abnormal gait Yes    Acute pain of right knee     Decreased range of motion            Physician: Order, Paper    Visit Date: 5/1/2023    Physician Orders: PT Eval and Treat NWB range of motion lower extremity   Medical Diagnosis from Referral:   S72.401D (ICD-10-CM) - Closed fracture of lower end of right femur with routine healing   S72.491A (ICD-10-CM) - Closed comminuted intra-articular fracture of distal femur, right, initial encounter      Evaluation Date: 1/23/2023  Authorization Period Expiration: 12/31/2023  Plan of Care Expiration: 3/29/2023 to 5/29/2023  Visit # / Visits authorized: 17/ 40 + eval  FOTO#:5/5      Time In: 3:00pm   Time Out: 4:01 pm   Total Billable Time: 61minutes (4TE)     Precautions: 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     RENOZ 4/25/2023 - 5-130 knee ROM  SUBJECTIVE     Pt reports: She thinks its better than it was but still not getting anywhere near what her other leg can do. She still feels like its just not moving.   She was compliant with home exercise program.  Response to previous treatment: more motion but feels the same  Functional change: no change    Pain: 5/10  Location: right knee      OBJECTIVE     Objective Measures updated at progress report unless specified.    5/1/2023:  Knee range of motion:   Pre tx- 0-0-105  Post tx 0-0-111    Gait: Pt demonstrates left circumduction during right stance phase c decrease stance time on right lower extremity.   Treatment     Miriam received the treatments listed below:      Miriam received the following manual therapy techniques: Joint mobilizations were  "applied to the: right knee for 20 minutes, including:  Patellar mobilization  Infrapatellar fat pad mobilization  Knee hinge for terminal knee extension  AP glide of tibial with inferior glide of patella  Passive range of motion knee flexion c gradual overpressure   EOT Tibial Med/lat rotation  EOT muscle energy technique for knee flexion c patellar caudal glide  Pt education     Miriam participated in neuromuscular re-education activities to improve: Proprioception and Posture for 10 minutes. The following activities were included:  Quad set towel under ankle x30 5"  Short arc quads 3x10 5" 3#  Long arc quad 3x10 no weight 2/2 pain    Miriam received therapeutic exercises to develop strength, endurance, ROM, and flexibility for 12 minutes including:  Heel prop 5 min   Heel slides 5 min   Knee flexion LLLD 5# - 6min  Lateral monster walks green theraband 2 laps   Knee mobilization on step x20      Knee flexion mobilization on step [id number]   TKE purple sports cord 2x15 5"  Long sitting gastroc/hamstring stretch 10x15"   Sidelying hip abd 3x10   Clamshell 3x10 bilateral   Long sitting ankle plantarflexion c blue theraband 2x20    Miriam participated in dynamic functional therapeutic activities to improve functional performance for 10  minutes, including:  TRK squat for knee flexion 3x10 5"  Shuttle 75# 3x10 c knee flexion holds and TKA  Step ups 6" step 2x10      Miriam participated in gait training to improve functional mobility and safety for 8 minutes, including:  Pt cuing on activation of hips to stabilize lower extremity while ambulating. Pt struggle with hip strength in order to stabilize and circumduction to improve swing is performed     Patient Education and Home Exercises     Home Exercises Provided and Patient Education Provided     Education provided:   - Pt educated on trying to avoid picking at her scabs to allow wound to heal.     Written Home Exercises Provided: Patient instructed to cont prior HEP. " Exercises were reviewed and Miriam was able to demonstrate them prior to the end of the session.  Miriam demonstrated good  understanding of the education provided. See EMR under Patient Instructions for exercises provided during therapy sessions    ASSESSMENT   Miriam presented to PT with continued complaints of lack of motion in the knee. She gained about 2' compared to last visit but this may not be significant. She has ok AP tibiofemoral motion and lack of flexion may be more due to swelling than actual joint restrictions. Gait mechanics are poor and she circumduct's the left lower extremity which may be an indication of LLD but this will be monitored. PT to continue to address range of motion and strength deficits.      Miriam Is progressing well towards her goals.   Pt prognosis is Fair.      Pt will continue to benefit from skilled outpatient physical therapy to address the deficits listed in the problem list box on initial evaluation, provide pt/family education and to maximize pt's level of independence in the home and community environment.      Pt's spiritual, cultural and educational needs considered and pt agreeable to plan of care and goals.     Anticipated barriers to physical therapy: Compliance/Transportation     Goals:   Short Term Goals (6 Weeks):  1. Pt will be compliant with HEP to supplement PT in restoring pain free function.  2. Pt will be able to ambulate 300ft sans assistive device in order to get around home safely  3. Pt will improve knee flexion range of motion to >90' in order to perform functional tasks at home  4. Pt will demonstrate full terminal knee extension in order to ambulate with proper mechanics.      Long Term Goals (12 Weeks):  1. Pt will improve FOTO score to </= 48% limited to decrease perceived limitation with mobility.   2.Pt will improve knee flexion range of motion to >120' in order to perform functional tasks at home  3. Pt will be able to ambulate without an  increase in pain greater than 2 pts on NPRS   4. Pt will be independent c final home exercise program in order to DC to home program.     PLAN     Knee range of motion and quad control     Sarai Mcdaniel, PT

## 2023-05-02 LAB
HPV HR 12 DNA SPEC QL NAA+PROBE: NEGATIVE
HPV16 AG SPEC QL: POSITIVE
HPV18 DNA SPEC QL NAA+PROBE: NEGATIVE

## 2023-05-04 ENCOUNTER — CLINICAL SUPPORT (OUTPATIENT)
Dept: REHABILITATION | Facility: HOSPITAL | Age: 43
End: 2023-05-04
Payer: MEDICAID

## 2023-05-04 DIAGNOSIS — M25.60 DECREASED RANGE OF MOTION: ICD-10-CM

## 2023-05-04 DIAGNOSIS — M25.561 ACUTE PAIN OF RIGHT KNEE: ICD-10-CM

## 2023-05-04 DIAGNOSIS — R26.9 ABNORMAL GAIT: Primary | ICD-10-CM

## 2023-05-04 PROCEDURE — 97110 THERAPEUTIC EXERCISES: CPT | Mod: PN,CQ

## 2023-05-04 NOTE — PROGRESS NOTES
[facility]   Physical Therapy Treatment Note     Name: Miriam MILLS [facility] Number: [id number]    Therapy Diagnosis:   Encounter Diagnoses   Name Primary?    Abnormal gait Yes    Acute pain of right knee     Decreased range of motion              Physician: Order, Paper    Visit Date: 5/4/2023    Physician Orders: PT Eval and Treat NWB range of motion lower extremity   Medical Diagnosis from Referral:   S72.401D (ICD-10-CM) - Closed fracture of lower end of right femur with routine healing   S72.491A (ICD-10-CM) - Closed comminuted intra-articular fracture of distal femur, right, initial encounter      Evaluation Date: 1/23/2023  Authorization Period Expiration: 12/31/2023  Plan of Care Expiration: 3/29/2023 to 5/29/2023  Visit # / Visits authorized: 17/ 40 + eval  FOTO#:5/5      Time In: 3:06pm   Time Out: 4:01 pm   Total Billable Time: 60minutes (4TE)     Precautions: 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     RENZO 4/25/2023 - 5-130 knee ROM  SUBJECTIVE     Pt reports: She's so sorry she missed her other appointments this week. Her ride to PT didn't show up twice and they were late to pick her up today.    She was compliant with home exercise program.  Response to previous treatment: more motion but feels the same  Functional change: no change    Pain: 5/10  Location: right knee      OBJECTIVE     Objective Measures updated at progress report unless specified.    5/1/2023:  Knee range of motion:   Pre tx- 0-0-105  Post tx 0-0-111    Gait: Pt demonstrates left circumduction during right stance phase c decrease stance time on right lower extremity.   Treatment     Miriam received the treatments listed below:      Miriam received the following manual therapy techniques: Joint mobilizations were  "applied to the: right knee for 20 minutes, including:  Patellar mobilization  Infrapatellar fat pad mobilization  Knee hinge for terminal knee extension  AP glide of tibial with inferior glide of patella  Passive range of motion knee flexion c gradual overpressure   EOT Tibial Med/lat rotation  EOT muscle energy technique for knee flexion c patellar caudal glide  Pt education     Miriam participated in neuromuscular re-education activities to improve: Proprioception and Posture for 10 minutes. The following activities were included:  Quad set towel under ankle x30 5"  Short arc quads 3x10 5" 3#  Long arc quad 3x10 no weight 2/2 pain    Miriam received therapeutic exercises to develop strength, endurance, ROM, and flexibility for 12 minutes including:  Heel prop 5 min   Heel slides 5 min   Knee flexion LLLD 5# - 6min  Lateral monster walks green theraband 2 laps   Knee mobilization on step x20      Knee flexion mobilization on step [id number]   TKE purple sports cord 2x15 5"  Long sitting gastroc/hamstring stretch 10x15"   Sidelying hip abd 3x10   Clamshell 3x10 bilateral   Long sitting ankle plantarflexion c blue theraband 2x20    Miriam participated in dynamic functional therapeutic activities to improve functional performance for 10  minutes, including:  TRK squat for knee flexion 3x10 5"  Shuttle 75# 3x10 c knee flexion holds and TKA  Step ups 6" step 2x10      Miriam participated in gait training to improve functional mobility and safety for 8 minutes, including:  Pt cuing on activation of hips to stabilize lower extremity while ambulating. Pt struggle with hip strength in order to stabilize and circumduction to improve swing is performed     Patient Education and Home Exercises     Home Exercises Provided and Patient Education Provided     Education provided:   - Pt educated on trying to avoid picking at her scabs to allow wound to heal.     Written Home Exercises Provided: Patient instructed to cont prior HEP. " Exercises were reviewed and Miriam was able to demonstrate them prior to the end of the session.  Miriam demonstrated good  understanding of the education provided. See EMR under Patient Instructions for exercises provided during therapy sessions    ASSESSMENT   Miriam presented to PT with reports of  problems with transportation to last few appointments. Advised Miriam of the importance of attending all scheduled visits for continued right knee ROM and strength progressions in PT, to which she verbalized good understanding. Continued to address right knee flexion limitations with manual therapy and exercises. Noted improved right knee flexion following TRX squats. Will continue to improve right knee flexion ROM in conjunction with strength.     Miriam Is progressing well towards her goals.   Pt prognosis is Fair.      Pt will continue to benefit from skilled outpatient physical therapy to address the deficits listed in the problem list box on initial evaluation, provide pt/family education and to maximize pt's level of independence in the home and community environment.      Pt's spiritual, cultural and educational needs considered and pt agreeable to plan of care and goals.     Anticipated barriers to physical therapy: Compliance/Transportation     Goals:   Short Term Goals (6 Weeks):  1. Pt will be compliant with HEP to supplement PT in restoring pain free function.  2. Pt will be able to ambulate 300ft sans assistive device in order to get around home safely  3. Pt will improve knee flexion range of motion to >90' in order to perform functional tasks at home  4. Pt will demonstrate full terminal knee extension in order to ambulate with proper mechanics.      Long Term Goals (12 Weeks):  1. Pt will improve FOTO score to </= 48% limited to decrease perceived limitation with mobility.   2.Pt will improve knee flexion range of motion to >120' in order to perform functional tasks at home  3. Pt will be able to  ambulate without an increase in pain greater than 2 pts on NPRS   4. Pt will be independent c final home exercise program in order to DC to home program.     PLAN     Knee range of motion and quad control     Reta Acevedo PTA

## 2023-05-05 ENCOUNTER — CLINICAL SUPPORT (OUTPATIENT)
Dept: REHABILITATION | Facility: HOSPITAL | Age: 43
End: 2023-05-05
Payer: MEDICAID

## 2023-05-05 ENCOUNTER — TELEPHONE (OUTPATIENT)
Dept: OBSTETRICS AND GYNECOLOGY | Facility: CLINIC | Age: 43
End: 2023-05-05
Payer: MEDICAID

## 2023-05-05 DIAGNOSIS — M25.561 ACUTE PAIN OF RIGHT KNEE: ICD-10-CM

## 2023-05-05 DIAGNOSIS — M25.60 DECREASED RANGE OF MOTION: ICD-10-CM

## 2023-05-05 DIAGNOSIS — R26.9 ABNORMAL GAIT: Primary | ICD-10-CM

## 2023-05-05 PROCEDURE — 97110 THERAPEUTIC EXERCISES: CPT | Mod: PN

## 2023-05-05 NOTE — PROGRESS NOTES
[facility]   Physical Therapy Treatment Note     Name: Miriam MILLS [facility] Number: [id number]    Therapy Diagnosis:   Encounter Diagnoses   Name Primary?    Abnormal gait Yes    Acute pain of right knee     Decreased range of motion        Physician: Order, Paper    Visit Date: 5/5/2023    Physician Orders: PT Eval and Treat NWB range of motion lower extremity   Medical Diagnosis from Referral:   S72.401D (ICD-10-CM) - Closed fracture of lower end of right femur with routine healing   S72.491A (ICD-10-CM) - Closed comminuted intra-articular fracture of distal femur, right, initial encounter      Evaluation Date: 1/23/2023  Authorization Period Expiration: 12/31/2023  Plan of Care Expiration: 3/29/2023 to 5/29/2023  Visit # / Visits authorized: 19/ 40 + eval  FOTO#:5/5      Time In: 10:01am   Time Out: 11:00am   Total Billable Time: 59minutes (TapTapTE tech assist)     Precautions: 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     RENZO 4/25/2023 - 5-130 knee ROM  SUBJECTIVE     Pt reports: been having some trouble with transportation that is why she missed and has been late. Is worried the pain is abnormal and is gonna request imaging at her next doc visit.   She was compliant with home exercise program.  Response to previous treatment: more motion but feels the same  Functional change: no change    Pain: 5/10  Location: right knee      OBJECTIVE     Objective Measures updated at progress report unless specified.    5/1/2023:  Knee range of motion:   Pre tx- 0-0-105  Post tx 0-0-111    Gait: Pt demonstrates left circumduction during right stance phase c decrease stance time on right lower extremity.   Treatment     Miriam received the treatments listed below:      Miriam received the following manual therapy  "techniques: Joint mobilizations were applied to the: right knee for 15 minutes, including:  Patellar mobilization  Infrapatellar fat pad mobilization  Knee hinge for terminal knee extension  AP glide of tibial with inferior glide of patella  Passive range of motion knee flexion c gradual overpressure   EOT Tibial Med/lat rotation  EOT muscle energy technique for knee flexion c patellar caudal glide  Pt education     Miriam participated in neuromuscular re-education activities to improve: Proprioception and Posture for 8 minutes. The following activities were included:  Quad set towel under ankle x30 5"  Short arc quads 3x10 5" 3#  Long arc quad 3x10 GTB    Miriam received therapeutic exercises to develop strength, endurance, ROM, and flexibility for 20 minutes including:  Heel prop 5 min   Heel slides 5 min   Knee flexion LLLD 5# - 6min  Prone knee flexion stretch c strap 6x15"  Clamshell 3x10 bilateral  green theraband     Lateral monster walks green theraband 2 laps   Knee mobilization on step x20  Knee flexion mobilization on step [id number]   TKE purple sports cord 2x15 5"  Long sitting gastroc/hamstring stretch 10x15"   Sidelying hip abd 3x10   Long sitting ankle plantarflexion c blue theraband 2x20    Miriam participated in dynamic functional therapeutic activities to improve functional performance for 15  minutes, including:  TRK squat for knee flexion 3x10 5"  Shuttle 75# 3x10 c knee flexion holds and TKA  Upright bike lv 3 7 min  Step ups 6" step 2x10      Miriam participated in gait training to improve functional mobility and safety for 0 minutes, including:  Pt cuing on activation of hips to stabilize lower extremity while ambulating. Pt struggle with hip strength in order to stabilize and circumduction to improve swing is performed     Patient Education and Home Exercises     Home Exercises Provided and Patient Education Provided     Education provided:   - Pt educated on trying to avoid picking at her " scabs to allow wound to heal.     Written Home Exercises Provided: Patient instructed to cont prior HEP. Exercises were reviewed and Miriam was able to demonstrate them prior to the end of the session.  Miriam demonstrated good  understanding of the education provided. See EMR under Patient Instructions for exercises provided during therapy sessions    ASSESSMENT   Miriam presented to PT with pain with end range knee flexion. She was educated that pain at this stage post manip is still normal but she is concerned. She was slightly progressed today in knee flexion attempts and increase resistance with strengthening. PT to continue to address range of motion deficits as well as strength and functional mobility.     Miriam Is progressing well towards her goals.   Pt prognosis is Fair.      Pt will continue to benefit from skilled outpatient physical therapy to address the deficits listed in the problem list box on initial evaluation, provide pt/family education and to maximize pt's level of independence in the home and community environment.      Pt's spiritual, cultural and educational needs considered and pt agreeable to plan of care and goals.     Anticipated barriers to physical therapy: Compliance/Transportation     Goals:   Short Term Goals (6 Weeks):  1. Pt will be compliant with HEP to supplement PT in restoring pain free function.  2. Pt will be able to ambulate 300ft sans assistive device in order to get around home safely  3. Pt will improve knee flexion range of motion to >90' in order to perform functional tasks at home  4. Pt will demonstrate full terminal knee extension in order to ambulate with proper mechanics.      Long Term Goals (12 Weeks):  1. Pt will improve FOTO score to </= 48% limited to decrease perceived limitation with mobility.   2.Pt will improve knee flexion range of motion to >120' in order to perform functional tasks at home  3. Pt will be able to ambulate without an increase in pain  greater than 2 pts on NPRS   4. Pt will be independent c final home exercise program in order to DC to home program.     PLAN     Knee range of motion and quad control     Sarai Mcdaniel, PT

## 2023-05-05 NOTE — TELEPHONE ENCOUNTER
Pt was called. Pt was already informed and scheduled for colp with provider.      ----- Message from Kelly Lujan MD sent at 5/2/2023 10:02 AM CDT -----  Please call patient regarding results.  ASCUS with + HRHPV.  Needs colposcopy.

## 2023-05-08 ENCOUNTER — CLINICAL SUPPORT (OUTPATIENT)
Dept: REHABILITATION | Facility: HOSPITAL | Age: 43
End: 2023-05-08
Payer: MEDICAID

## 2023-05-08 DIAGNOSIS — M25.60 DECREASED RANGE OF MOTION: ICD-10-CM

## 2023-05-08 DIAGNOSIS — M25.561 ACUTE PAIN OF RIGHT KNEE: ICD-10-CM

## 2023-05-08 DIAGNOSIS — R26.9 ABNORMAL GAIT: Primary | ICD-10-CM

## 2023-05-08 PROCEDURE — 97110 THERAPEUTIC EXERCISES: CPT | Mod: PN

## 2023-05-08 NOTE — PROGRESS NOTES
[facility]   Physical Therapy Treatment Note     Name: Miriam MILLS [facility] Number: [id number]    Therapy Diagnosis:   Encounter Diagnoses   Name Primary?    Abnormal gait Yes    Acute pain of right knee     Decreased range of motion        Physician: Order, Paper    Visit Date: 5/8/2023    Physician Orders: PT Eval and Treat NWB range of motion lower extremity   Medical Diagnosis from Referral:   S72.401D (ICD-10-CM) - Closed fracture of lower end of right femur with routine healing   S72.491A (ICD-10-CM) - Closed comminuted intra-articular fracture of distal femur, right, initial encounter      Evaluation Date: 1/23/2023  Authorization Period Expiration: 12/31/2023  Plan of Care Expiration: 3/29/2023 to 5/29/2023  Visit # / Visits authorized: 20/40 + eval  FOTO#: 5/5      Time In: 12:22 PM  Time Out: 1:20 PM   Total Billable Time: 58 minutes (listedplaces tech assist)     Precautions: 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     RENZO 4/25/2023 - 5-130 knee ROM    SUBJECTIVE     Pt reports: unintentionally walked 1 mile over the weekend and surprisingly her leg felt okay.   She was compliant with home exercise program.  Response to previous treatment: more motion but feels the same  Functional change: no change    Pain: 5/10  Location: right knee      OBJECTIVE     5/1/2023:  Knee range of motion:   Pre tx- 0-0-105  Post tx 0-0-111    Gait: Pt demonstrates left circumduction during right stance phase c decrease stance time on right lower extremity.     Treatment     Miriam received the treatments listed below:      Miriam received the following manual therapy techniques: Joint mobilizations were applied to the: right knee for 15 minutes, including:  Patellar mobilization  Infrapatellar fat pad  "mobilization  Knee hinge for terminal knee extension  AP glide of tibial with inferior glide of patella  Passive range of motion knee flexion c gradual overpressure in prone  EOT Tibial Med/lat rotation  EOT muscle energy technique for knee flexion c patellar caudal glide  Pt education     Miriam participated in neuromuscular re-education activities to improve: Proprioception and Posture for 10 minutes. The following activities were included:  Quad set towel under ankle x30 5"  Short arc quads 3x10 5" 3#  Long arc quad 3x10 GTB    Miriam received therapeutic exercises to develop strength, endurance, ROM, and flexibility for 25 minutes including:  Heel prop: 7 min, large bolster, 4# above, 3# below   Heel slides 5 min   Knee flexion LLLD 5# - 6min  Prone rectus stretch c strap 6x15", 1/2 bolster  Prone Knee flexion: 6x15"  Clamshell 3x10 bilateral  green theraband   Lateral monster walks green theraband 2 laps   Knee mobilization on step x20  Knee flexion mobilization on step [id number]   TKE purple sports cord 2x15 5"  Long sitting gastroc/hamstring stretch 10x15"   Sidelying hip abd 3x10   Long sitting ankle plantarflexion c blue theraband 2x20    Miriam participated in dynamic functional therapeutic activities to improve functional performance for 10 minutes, including:  TRK squat for knee flexion 3x10 5"  Shuttle 75# 3x10 c knee flexion holds and TKA  Upright bike lv 3 7 min  Step ups 6" step 2x10      Patient Education and Home Exercises     Home Exercises Provided and Patient Education Provided     Education provided:   - Pt educated on trying to avoid picking at her scabs to allow wound to heal.     Written Home Exercises Provided: Patient instructed to cont prior HEP. Exercises were reviewed and Miriam was able to demonstrate them prior to the end of the session.  Miriam demonstrated good  understanding of the education provided. See EMR under Patient Instructions for exercises provided during therapy " sessions    ASSESSMENT   Miriam presented to PT with near full extension, but continues to benefit from resisted heel propping in order to improve knee extension to full. Tolerated session well with continued focus on improving knee flexion ROM and quad activation and strength. Maintains 110/111 degrees of extension. Fair performance of shuttle with TKE holds. Will continue to progress as tolerated.     Miriam Is progressing well towards her goals.   Pt prognosis is Fair.      Pt will continue to benefit from skilled outpatient physical therapy to address the deficits listed in the problem list box on initial evaluation, provide pt/family education and to maximize pt's level of independence in the home and community environment.      Pt's spiritual, cultural and educational needs considered and pt agreeable to plan of care and goals.     Anticipated barriers to physical therapy: Compliance/Transportation     Goals:   Short Term Goals (6 Weeks):  1. Pt will be compliant with HEP to supplement PT in restoring pain free function.  2. Pt will be able to ambulate 300ft sans assistive device in order to get around home safely  3. Pt will improve knee flexion range of motion to >90' in order to perform functional tasks at home  4. Pt will demonstrate full terminal knee extension in order to ambulate with proper mechanics.      Long Term Goals (12 Weeks):  1. Pt will improve FOTO score to </= 48% limited to decrease perceived limitation with mobility.   2.Pt will improve knee flexion range of motion to >120' in order to perform functional tasks at home  3. Pt will be able to ambulate without an increase in pain greater than 2 pts on NPRS   4. Pt will be independent c final home exercise program in order to DC to home program.     PLAN     Knee range of motion and quad control     Leonor Colindres, PT

## 2023-05-09 ENCOUNTER — CLINICAL SUPPORT (OUTPATIENT)
Dept: REHABILITATION | Facility: HOSPITAL | Age: 43
End: 2023-05-09
Payer: MEDICAID

## 2023-05-09 ENCOUNTER — OFFICE VISIT (OUTPATIENT)
Dept: OTOLARYNGOLOGY | Facility: CLINIC | Age: 43
End: 2023-05-09
Payer: MEDICAID

## 2023-05-09 VITALS
WEIGHT: 187 LBS | SYSTOLIC BLOOD PRESSURE: 127 MMHG | HEIGHT: 65 IN | BODY MASS INDEX: 31.16 KG/M2 | DIASTOLIC BLOOD PRESSURE: 72 MMHG | HEART RATE: 85 BPM

## 2023-05-09 DIAGNOSIS — R25.8 SYNKINESIS: ICD-10-CM

## 2023-05-09 DIAGNOSIS — R26.9 ABNORMAL GAIT: Primary | ICD-10-CM

## 2023-05-09 DIAGNOSIS — D33.3 UNILATERAL VESTIBULAR SCHWANNOMA: ICD-10-CM

## 2023-05-09 DIAGNOSIS — G51.39 FACIAL SPASM: Primary | ICD-10-CM

## 2023-05-09 DIAGNOSIS — M25.561 ACUTE PAIN OF RIGHT KNEE: ICD-10-CM

## 2023-05-09 DIAGNOSIS — M25.60 DECREASED RANGE OF MOTION: ICD-10-CM

## 2023-05-09 PROCEDURE — 3008F BODY MASS INDEX DOCD: CPT | Mod: CPTII,S$GLB,, | Performed by: OTOLARYNGOLOGY

## 2023-05-09 PROCEDURE — 1160F PR REVIEW ALL MEDS BY PRESCRIBER/CLIN PHARMACIST DOCUMENTED: ICD-10-PCS | Mod: CPTII,S$GLB,, | Performed by: OTOLARYNGOLOGY

## 2023-05-09 PROCEDURE — 1159F PR MEDICATION LIST DOCUMENTED IN MEDICAL RECORD: ICD-10-PCS | Mod: CPTII,S$GLB,, | Performed by: OTOLARYNGOLOGY

## 2023-05-09 PROCEDURE — 64612 PR DEST,NERVE,FACIAL: ICD-10-PCS | Mod: RT,S$GLB,, | Performed by: OTOLARYNGOLOGY

## 2023-05-09 PROCEDURE — 3008F PR BODY MASS INDEX (BMI) DOCUMENTED: ICD-10-PCS | Mod: CPTII,S$GLB,, | Performed by: OTOLARYNGOLOGY

## 2023-05-09 PROCEDURE — 99213 OFFICE O/P EST LOW 20 MIN: CPT | Mod: 25,S$GLB,, | Performed by: OTOLARYNGOLOGY

## 2023-05-09 PROCEDURE — 1159F MED LIST DOCD IN RCRD: CPT | Mod: CPTII,S$GLB,, | Performed by: OTOLARYNGOLOGY

## 2023-05-09 PROCEDURE — 97110 THERAPEUTIC EXERCISES: CPT | Mod: PN

## 2023-05-09 PROCEDURE — 64612 DESTROY NERVE FACE MUSCLE: CPT | Mod: RT,S$GLB,, | Performed by: OTOLARYNGOLOGY

## 2023-05-09 PROCEDURE — 3074F PR MOST RECENT SYSTOLIC BLOOD PRESSURE < 130 MM HG: ICD-10-PCS | Mod: CPTII,S$GLB,, | Performed by: OTOLARYNGOLOGY

## 2023-05-09 PROCEDURE — 3078F PR MOST RECENT DIASTOLIC BLOOD PRESSURE < 80 MM HG: ICD-10-PCS | Mod: CPTII,S$GLB,, | Performed by: OTOLARYNGOLOGY

## 2023-05-09 PROCEDURE — 3078F DIAST BP <80 MM HG: CPT | Mod: CPTII,S$GLB,, | Performed by: OTOLARYNGOLOGY

## 2023-05-09 PROCEDURE — 1160F RVW MEDS BY RX/DR IN RCRD: CPT | Mod: CPTII,S$GLB,, | Performed by: OTOLARYNGOLOGY

## 2023-05-09 PROCEDURE — 3074F SYST BP LT 130 MM HG: CPT | Mod: CPTII,S$GLB,, | Performed by: OTOLARYNGOLOGY

## 2023-05-09 PROCEDURE — 99213 PR OFFICE/OUTPT VISIT, EST, LEVL III, 20-29 MIN: ICD-10-PCS | Mod: 25,S$GLB,, | Performed by: OTOLARYNGOLOGY

## 2023-05-09 NOTE — PROGRESS NOTES
Subjective:     Chief Complaint:   Follow up facial synkinesis       Miriam Mendez is a 42 y.o. female who was referred to me by Miriam Bahena in consultation for facial paralysis.    Onset: 22  Cause: acoustic neuroma   Side: right  Initial extent: complete    Patient has a history of right-sided acoustic neuroma and underwent trans-labyrinthine removal on 2022.  She reports complete right facial nerve paralysis following her surgery.  She has been using lubricating eyedrops has seen Ophthalmology.  She has a scleral contact lens for protection.  She denies vision changes.  Paralysis has been complete until approximately 3 weeks ago.  She is noticed some slight improvement in the lower half of the face with improved tone and slight movement of the oral commissure.  She recently started facial rehabilitation here at [facility].  She was referred to me for further evaluation.    She is not an active smoker.    Today (2023): Patient returns for follow up visit.  Significant improvement of facial tightness after her previous Botox treatment she is noticed this has worn off.      Past Medical History  She has a past medical history of ADD (attention deficit disorder with hyperactivity), Anxiety, Bronchitis, Cellulitis, Depression, Easy bruising, Fibromyalgia, History of ITP, Iron deficiency anemia, Morbid obesity, Pica, Pseudotumor cerebri syndrome, and Thrombocytopenia.    Past Surgical History  She has a past surgical history that includes Gastric bypass ();  section (); and Cholecystectomy ().    Family History  Her family history includes Breast cancer in her paternal grandmother; Breast cancer (age of onset: 57) in her mother; Cancer in her father, paternal grandfather, and paternal grandmother; Cancer (age of onset: 57) in her mother; Hypertension in her mother.    Social History  She reports that she has never smoked. She has never used smokeless tobacco. She reports  current alcohol use. She reports that she does not use drugs.    Allergies  She is allergic to sulfa (sulfonamide antibiotics).    Medications  She has a current medication list which includes the following prescription(s): aspirin, clonazepam, dextroamphetamine-amphetamine, dextroamphetamine-amphetamine, famotidine, fluoxetine, fluticasone propionate, gabapentin, methocarbamol, omeprazole, valacyclovir, zolpidem, and [DISCONTINUED] nuvaring, and the following Facility-Administered Medications: onabotulinumtoxina.    Review of Systems  Negative except per HPI     Objective:        General: Alert and oriented in no acute distress  Head: Normocephaic, atruamatic  Eyes: Cranial nerves II, III, VI, VII, VIII-XII are grossly intact  Skin: Skin texture is medium thickness rated as Rueda II  Ears:   Pinna are normal in shape and position  EACs clear bilaterally  TMs clear without ESTELA or perforation bilaterally    Eyes:  Palpebral height asymmetry  present. normal  on left, normal  on right  Gentle closure: complete closure on left, no lagophthalmos present   Gentle closure: complete closure on right, no lagophthalmos present   Maximum closure: complete closure on left, no lagophthalmos present   Maximum  closure: complete closure on right, no of lagophthalmos present   Lower lid laxity absent on left, absent on right   no scleral show present on left, no scleral show present on right     Nose:  Nasal skin is medium thickness    The caudal septum is midline  Anterior rhinoscopy reveals the septum is straight without perforation or synechiae.    The turbinates are normal with normal mucosa  bilaterally.    There are not pathological secretions present.   The alar base is normal  on the left and normal  on the right   The external nasal valve is normal and patent  on the right without dynamic collapse  The external nasal valve is normal and patent  on the left without dynamic collapse  The internal nasal valve is normal  and patent  on the right and normal and patent  on the left.      Face:  Rest:  Brow asymmetry  present. normal on left, normal on right   Nasolabial fold asymmetry  present. Normal on left, More pronounced on right   Oral commissure asymmetry  present. normal  on left, pulled up/out on right     Dynamic:  Forehead (Frontalis): movement complete  on left, initiate slight movement  on right  Gentle eye closure (orbicularis oculi): movement complete  on left, movement complete  on right  Closed mouth smile asymmetry  present   Open mouth smile (zygomaticus/risorius): movement complete  on left, initiate slight movement  on right  -Commissure excursion: normal on left, limited on right  -Lower lip height asymmetry  present; DLI movement present on left, DLI movement absent on right  Snarl (LLA/LLS): movement complete  on left, initiate slight movement  on right  Pucker (OOS/OOI): movement complete  on left, initiate slight movement  on right    Synkinesis:  Moderate oculo-oral and jojo-ocular synkinesis     Sensation: Intact V1, V2, V3 bilateral    Strength:   Masseter 5/5 on left, Masseter 5/5 on right  Temporalis 5/5 on left, Temporalis 5/5 on right     Oral cavity and oropharynx: Mucous membranes moist without lesions present.  Tongue protrudes midline and palate elevates midline.  Neck: Supple without LAD.    Lungs:breathing comfortably  Psychiatric:  Mood and affect are normal    House-Brackmann Score: 3/6    Procedure:    Chemodenervation  Discussed the risks benefits alternatives of botulism toxin injection.  Patient voiced understanding.  Written consent was obtained.  Topical benzocaine lidocaine tetracaine cream applied to the treatment areas.  Adequate time was allowed for anesthetic to take effect.  The face was then wiped clean.  The treatment areas were then prepped with alcohol. Total of 67.5 units of botulinum toxin injected.    Frontalis: 10  Glabella: 12.5  Orbicularis oculi: 20  DLI: 2.5  JUAN ANTONIO:  2.5  Orbicularis oris: 2.5  Mentalis: 5  Buccinator: 2.5   Platysma: 10    patient tolerated the procedure well without complications.    Assessment:     1. Facial spasm    2. Synkinesis    3. Unilateral vestibular schwannoma              Plan:     I had a long discussion with the patient regarding her condition and the further workup and management options.  Patient has had improvement of facial movement and is now experiencing significant synkinesis.   Cont facial rehabilitation, she will re-visit with Janay,  Discussed that this is important for management of synkinesis.  We also discussed the options for botulinum toxin injection.  She tolerated this well in the office today.  She will monitor symptoms continue her rehabilitation over the next several months.  She will follow-up with me in 3 months for repeat chemodenervation, sooner if needed.  All questions answered patient was encouraged call with any questions or concerns.

## 2023-05-10 NOTE — PROGRESS NOTES
[facility]   Physical Therapy Treatment Note     Name: Miriam MILLS [facility] Number: [id number]    Therapy Diagnosis:   Encounter Diagnoses   Name Primary?    Abnormal gait Yes    Acute pain of right knee     Decreased range of motion        Physician: Order, Paper    Visit Date: 5/9/2023    Physician Orders: PT Eval and Treat NWB range of motion lower extremity   Medical Diagnosis from Referral:   S72.401D (ICD-10-CM) - Closed fracture of lower end of right femur with routine healing   S72.491A (ICD-10-CM) - Closed comminuted intra-articular fracture of distal femur, right, initial encounter      Evaluation Date: 1/23/2023  Authorization Period Expiration: 12/31/2023  Plan of Care Expiration: 3/29/2023 to 5/29/2023  Visit # / Visits authorized: 21/40 + eval  FOTO#: 5/5      Time In: 10:50 AM  Time Out: 11:45 AM   Total Billable Time: 55 minutes (4TE tech assist)     Precautions: 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     RENZO 4/25/2023 - 5-130 knee ROM    SUBJECTIVE     Pt reports: walked home yesterday and had minimal pain  She was compliant with home exercise program.  Response to previous treatment: more motion but feels the same  Functional change: no change    Pain: 5/10  Location: right knee      OBJECTIVE     5/9/2023:  Knee range of motion:   Pre tx- 0-0-105  Post tx 0-0-113    Gait: Pt demonstrates left circumduction during right stance phase c decrease stance time on right lower extremity.     Treatment     Miriam received the treatments listed below:      Miriam received the following manual therapy techniques: Joint mobilizations were applied to the: right knee for 10 minutes, including:  Patellar mobilization  Infrapatellar fat pad mobilization  Knee hinge for terminal knee  "extension  AP glide of tibial with inferior glide of patella  Passive range of motion knee flexion c gradual overpressure in prone  EOT Tibial Med/lat rotation  EOT muscle energy technique for knee flexion c patellar caudal glide      Miriam participated in neuromuscular re-education activities to improve: Proprioception and Posture for 5 minutes. The following activities were included:  Quad set towel under ankle x30 5"  Short arc quads 3x10 5" 3#      Miriam received therapeutic exercises to develop strength, endurance, ROM, and flexibility for 30 minutes including:  Heel prop: 7 min, large bolster, 4# above, 3# below   Supine SLR's: 3x10  +Matrix Knee Extension, DL: 3x10, 10# increased effort from RLE  +DL Gluteal Bridges: 3x10, 5" holds  Heel slides 5 min   Prone rectus stretch c strap 6x15", 1/2 bolster  Prone Knee flexion: 6x15"  Clamshell 3x10 bilateral  green theraband     Not Performed Today:  Knee flexion LLLD 5# - 6min  Lateral monster walks green theraband 2 laps   Knee mobilization on step x20  Knee flexion mobilization on step [id number]   TKE purple sports cord 2x15 5"  Long sitting gastroc/hamstring stretch 10x15"   Sidelying hip abd 3x10   Long sitting ankle plantarflexion c blue theraband 2x20    Miriam participated in dynamic functional therapeutic activities to improve functional performance for 15 minutes, including:  Shuttle 75# 3x10 c knee flexion holds and TKA  Upright bike lv 3 7 min    Not Performed Today:  Step ups 6" step 2x10  TRX squat for knee flexion 3x10 5"    Patient Education and Home Exercises     Home Exercises Provided and Patient Education Provided     Education provided:   - Pt educated on trying to avoid picking at her scabs to allow wound to heal.     Written Home Exercises Provided: Patient instructed to cont prior HEP. Exercises were reviewed and Miriam was able to demonstrate them prior to the end of the session.  Miriam demonstrated good  understanding of the education " provided. See EMR under Patient Instructions for exercises provided during therapy sessions    ASSESSMENT   Miriam presented to PT with near full extension, but continues to benefit from resisted heel propping in order to improve knee extension to full. Tolerated session well with continued focus on improving knee flexion ROM and quad activation and strength. Progressed to 113 degrees of flexion this session with pain and tightness being limiting factors. Progressed quad strengthening to matrix machine for LAQ's with good tolerance. Will continue to progress as tolerated.     Mriiam Is progressing well towards her goals.   Pt prognosis is Fair.      Pt will continue to benefit from skilled outpatient physical therapy to address the deficits listed in the problem list box on initial evaluation, provide pt/family education and to maximize pt's level of independence in the home and community environment.      Pt's spiritual, cultural and educational needs considered and pt agreeable to plan of care and goals.     Anticipated barriers to physical therapy: Compliance/Transportation     Goals:   Short Term Goals (6 Weeks):  1. Pt will be compliant with HEP to supplement PT in restoring pain free function.  2. Pt will be able to ambulate 300ft sans assistive device in order to get around home safely  3. Pt will improve knee flexion range of motion to >90' in order to perform functional tasks at home  4. Pt will demonstrate full terminal knee extension in order to ambulate with proper mechanics.      Long Term Goals (12 Weeks):  1. Pt will improve FOTO score to </= 48% limited to decrease perceived limitation with mobility.   2.Pt will improve knee flexion range of motion to >120' in order to perform functional tasks at home  3. Pt will be able to ambulate without an increase in pain greater than 2 pts on NPRS   4. Pt will be independent c final home exercise program in order to DC to home program.     PLAN     Knee range  of motion and quad control     Leonor Colindres, PT

## 2023-05-11 ENCOUNTER — PATIENT MESSAGE (OUTPATIENT)
Dept: PRIMARY CARE CLINIC | Facility: CLINIC | Age: 43
End: 2023-05-11
Payer: MEDICAID

## 2023-05-12 ENCOUNTER — CLINICAL SUPPORT (OUTPATIENT)
Dept: REHABILITATION | Facility: HOSPITAL | Age: 43
End: 2023-05-12
Payer: MEDICAID

## 2023-05-12 ENCOUNTER — PROCEDURE VISIT (OUTPATIENT)
Dept: OBSTETRICS AND GYNECOLOGY | Facility: CLINIC | Age: 43
End: 2023-05-12
Payer: COMMERCIAL

## 2023-05-12 VITALS
WEIGHT: 188.25 LBS | SYSTOLIC BLOOD PRESSURE: 114 MMHG | DIASTOLIC BLOOD PRESSURE: 78 MMHG | BODY MASS INDEX: 31.33 KG/M2

## 2023-05-12 DIAGNOSIS — R87.610 ASCUS WITH POSITIVE HIGH RISK HPV CERVICAL: Primary | ICD-10-CM

## 2023-05-12 DIAGNOSIS — R26.9 ABNORMAL GAIT: Primary | ICD-10-CM

## 2023-05-12 DIAGNOSIS — R87.810 ASCUS WITH POSITIVE HIGH RISK HPV CERVICAL: Primary | ICD-10-CM

## 2023-05-12 DIAGNOSIS — M25.561 ACUTE PAIN OF RIGHT KNEE: ICD-10-CM

## 2023-05-12 DIAGNOSIS — M25.60 DECREASED RANGE OF MOTION: ICD-10-CM

## 2023-05-12 PROCEDURE — 57456 ENDOCERV CURETTAGE W/SCOPE: CPT | Mod: PBBFAC | Performed by: OBSTETRICS & GYNECOLOGY

## 2023-05-12 PROCEDURE — 97110 THERAPEUTIC EXERCISES: CPT | Mod: PN,CQ

## 2023-05-12 PROCEDURE — 88305 TISSUE EXAM BY PATHOLOGIST: ICD-10-PCS | Mod: 26,,, | Performed by: PATHOLOGY

## 2023-05-12 PROCEDURE — 88305 TISSUE EXAM BY PATHOLOGIST: CPT | Mod: 26,,, | Performed by: PATHOLOGY

## 2023-05-12 PROCEDURE — 81514 NFCT DS BV&VAGINITIS DNA ALG: CPT | Performed by: OBSTETRICS & GYNECOLOGY

## 2023-05-12 PROCEDURE — 88305 TISSUE EXAM BY PATHOLOGIST: CPT | Performed by: PATHOLOGY

## 2023-05-12 NOTE — PROGRESS NOTES
[facility]   Physical Therapy Treatment Note     Name: Miriam MILLS [facility] Number: [id number]    Therapy Diagnosis:   Encounter Diagnoses   Name Primary?    Abnormal gait Yes    Acute pain of right knee     Decreased range of motion          Physician: Order, Paper    Visit Date: 5/12/2023    Physician Orders: PT Eval and Treat NWB range of motion lower extremity   Medical Diagnosis from Referral:   S72.401D (ICD-10-CM) - Closed fracture of lower end of right femur with routine healing   S72.491A (ICD-10-CM) - Closed comminuted intra-articular fracture of distal femur, right, initial encounter      Evaluation Date: 1/23/2023  Authorization Period Expiration: 12/31/2023  Plan of Care Expiration: 3/29/2023 to 5/29/2023  Visit # / Visits authorized: 21/40 + eval  FOTO#: 5/5      Time In: 11:15 am   Time Out: 12:14 pm   Total Billable Time: 55 minutes (4TE)     Precautions: 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     RENZO 4/25/2023 - 5-130 knee ROM    SUBJECTIVE     Pt reports: She's having large amounts of pain today on the lateral and medial knee joint. The pain is intermittent, and at it's worst when she's weightbearing or moving her knee. The pain feels like it's deep in the knee.  She was compliant with home exercise program.  Response to previous treatment: more motion but feels the same  Functional change: no change    Pain: 5/10  Location: right knee      OBJECTIVE     5/9/2023:  Knee range of motion:   Pre tx- 0-0-105  Post tx 0-0-113    Gait: Pt demonstrates left circumduction during right stance phase c decrease stance time on right lower extremity.     Treatment     Miriam received the treatments listed below:      Miriam received the following manual therapy techniques: Joint  "mobilizations were applied to the: right knee for 17 minutes, including:  Patellar mobilization  Infrapatellar fat pad mobilization  Knee hinge for terminal knee extension  AP glide of tibial with inferior glide of patella  Passive range of motion knee flexion c gradual overpressure in prone  EOT Tibial Med/lat rotation  EOT muscle energy technique for knee flexion c patellar caudal glide      Miriam participated in neuromuscular re-education activities to improve: Proprioception and Posture for 5 minutes. The following activities were included:  Quad set towel under ankle x30 5"  Short arc quads 3x10 5" 3#      Miriam received therapeutic exercises to develop strength, endurance, ROM, and flexibility for 38 minutes including:  Heel prop: 7 min, large bolster, 4# above, 3# below   Supine SLR's: 3x10  Matrix Knee Extension, DL: 3x10, 10# increased effort from RLE  DL Gluteal Bridges: 3x10, 5" holds  Heel slides 5 min   Prone rectus stretch c strap 6x15", 1/2 bolster  Prone Knee flexion: 6x15"  Clamshell 3x10 bilateral  green theraband     Not Performed Today:  Knee flexion LLLD 5# - 6min  Lateral monster walks green theraband 2 laps   Knee mobilization on step x20  Knee flexion mobilization on step [id number]   TKE purple sports cord 2x15 5"  Long sitting gastroc/hamstring stretch 10x15"   Sidelying hip abd 3x10   Long sitting ankle plantarflexion c blue theraband 2x20    Miriam participated in dynamic functional therapeutic activities to improve functional performance for 15 minutes, including:  Shuttle 75# 3x10 c knee flexion holds and TKA  Upright bike lv 3 7 min    Not Performed Today:  Step ups 6" step 2x10  TRX squat for knee flexion 3x10 5"    Patient Education and Home Exercises     Home Exercises Provided and Patient Education Provided     Education provided:   - Pt educated on trying to avoid picking at her scabs to allow wound to heal.     Written Home Exercises Provided: Patient instructed to cont prior HEP. " Exercises were reviewed and Miriam was able to demonstrate them prior to the end of the session.  Miriam demonstrated good  understanding of the education provided. See EMR under Patient Instructions for exercises provided during therapy sessions    ASSESSMENT   Miriam presented to PT ambulating with antalgic gait, and reports of high levels of right knee pain that increases with weightbearing and ambulation.  Miriam reported increased activity following PT on Monday, but no increased pain until Wednesday.  Advised Miriam to be mindful of moderate increases in activity, and to avoid such as it may cause increased right knee pain and swelling. Additionally advised Miriam to delay return to work, to provide right knee time to recover. She verbalized good understanding. Focused today's session on reducing high pain levels and maintaining right knee ROM. Will reassess next session.     Miriam Is progressing well towards her goals.   Pt prognosis is Fair.      Pt will continue to benefit from skilled outpatient physical therapy to address the deficits listed in the problem list box on initial evaluation, provide pt/family education and to maximize pt's level of independence in the home and community environment.      Pt's spiritual, cultural and educational needs considered and pt agreeable to plan of care and goals.     Anticipated barriers to physical therapy: Compliance/Transportation     Goals:   Short Term Goals (6 Weeks):  1. Pt will be compliant with HEP to supplement PT in restoring pain free function.  2. Pt will be able to ambulate 300ft sans assistive device in order to get around home safely  3. Pt will improve knee flexion range of motion to >90' in order to perform functional tasks at home  4. Pt will demonstrate full terminal knee extension in order to ambulate with proper mechanics.      Long Term Goals (12 Weeks):  1. Pt will improve FOTO score to </= 48% limited to decrease perceived limitation with  mobility.   2.Pt will improve knee flexion range of motion to >120' in order to perform functional tasks at home  3. Pt will be able to ambulate without an increase in pain greater than 2 pts on NPRS   4. Pt will be independent c final home exercise program in order to DC to home program.     PLAN     Knee range of motion and quad control     Reta Rockcuso, PTA

## 2023-05-12 NOTE — PROCEDURES
Colposcopy    Date/Time: 5/12/2023 9:45 AM  Performed by: Kelly Lujan MD  Authorized by: Kelly Lujan MD     Consent Done?:  Yes (Written)  Timeout:Immediately prior to procedure a time out was called to verify the correct patient, procedure, equipment, support staff and site/side marked as required  Assistants?: No      Colposcopy Site:  Cervix  Position:  Supine  Acrowhite Lesion: No    Atypical Vessels: No    Transformation Zone Adequate?: No    Biopsy?: No    ECC Performed?: Yes    LEEP Performed?: No    Estimated blood loss (cc):  1   Patient tolerated the procedure well with no immediate complications.   Post-operative instructions were provided for the patient.   Patient was discharged and will follow up if any complications occur

## 2023-05-16 DIAGNOSIS — F90.9 ATTENTION DEFICIT HYPERACTIVITY DISORDER (ADHD), UNSPECIFIED ADHD TYPE: ICD-10-CM

## 2023-05-16 DIAGNOSIS — F41.1 GENERALIZED ANXIETY DISORDER: Primary | ICD-10-CM

## 2023-05-16 DIAGNOSIS — Z79.899 CHRONIC PRESCRIPTION BENZODIAZEPINE USE: ICD-10-CM

## 2023-05-16 LAB
BACTERIAL VAGINOSIS DNA: POSITIVE
CANDIDA GLABRATA DNA: NEGATIVE
CANDIDA KRUSEI DNA: NEGATIVE
CANDIDA RRNA VAG QL PROBE: NEGATIVE
T VAGINALIS RRNA GENITAL QL PROBE: NEGATIVE

## 2023-05-16 RX ORDER — CLONAZEPAM 1 MG/1
1 TABLET ORAL 2 TIMES DAILY PRN
Qty: 44 TABLET | Refills: 0 | Status: SHIPPED | OUTPATIENT
Start: 2023-05-16 | End: 2023-06-07 | Stop reason: SDUPTHER

## 2023-05-16 RX ORDER — HYDROCODONE BITARTRATE AND ACETAMINOPHEN 5; 325 MG/1; MG/1
TABLET ORAL
COMMUNITY
Start: 2023-04-25 | End: 2023-07-17 | Stop reason: CLARIF

## 2023-05-16 RX ORDER — MELOXICAM 7.5 MG/1
7.5 TABLET ORAL
COMMUNITY
Start: 2023-04-25 | End: 2023-07-17 | Stop reason: CLARIF

## 2023-05-16 RX ORDER — FLUOXETINE HYDROCHLORIDE 40 MG/1
40 CAPSULE ORAL DAILY
Qty: 90 CAPSULE | Refills: 1 | Status: SHIPPED | OUTPATIENT
Start: 2023-05-16 | End: 2023-06-07 | Stop reason: SDUPTHER

## 2023-05-16 RX ORDER — GABAPENTIN 300 MG/1
1 CAPSULE ORAL 3 TIMES DAILY
COMMUNITY
Start: 2023-04-25 | End: 2023-07-17 | Stop reason: CLARIF

## 2023-05-16 RX ORDER — DEXTROAMPHETAMINE SACCHARATE, AMPHETAMINE ASPARTATE, DEXTROAMPHETAMINE SULFATE AND AMPHETAMINE SULFATE 3.75; 3.75; 3.75; 3.75 MG/1; MG/1; MG/1; MG/1
30 TABLET ORAL DAILY
Qty: 44 TABLET | Refills: 0 | Status: SHIPPED | OUTPATIENT
Start: 2023-05-16 | End: 2023-06-07

## 2023-05-16 RX ORDER — ONDANSETRON 4 MG/1
TABLET, FILM COATED ORAL
COMMUNITY
Start: 2023-04-25 | End: 2023-07-17 | Stop reason: CLARIF

## 2023-05-16 RX ORDER — DEXTROAMPHETAMINE SACCHARATE, AMPHETAMINE ASPARTATE, DEXTROAMPHETAMINE SULFATE AND AMPHETAMINE SULFATE 7.5; 7.5; 7.5; 7.5 MG/1; MG/1; MG/1; MG/1
TABLET ORAL
COMMUNITY
Start: 2023-04-11 | End: 2023-06-07

## 2023-05-17 ENCOUNTER — PATIENT MESSAGE (OUTPATIENT)
Dept: OBSTETRICS AND GYNECOLOGY | Facility: CLINIC | Age: 43
End: 2023-05-17
Payer: MEDICAID

## 2023-05-17 ENCOUNTER — PATIENT MESSAGE (OUTPATIENT)
Dept: REHABILITATION | Facility: HOSPITAL | Age: 43
End: 2023-05-17

## 2023-05-25 ENCOUNTER — CLINICAL SUPPORT (OUTPATIENT)
Dept: REHABILITATION | Facility: HOSPITAL | Age: 43
End: 2023-05-25
Payer: MEDICAID

## 2023-05-25 DIAGNOSIS — R26.9 ABNORMAL GAIT: Primary | ICD-10-CM

## 2023-05-25 DIAGNOSIS — M25.60 DECREASED RANGE OF MOTION: ICD-10-CM

## 2023-05-25 DIAGNOSIS — N76.0 BV (BACTERIAL VAGINOSIS): ICD-10-CM

## 2023-05-25 DIAGNOSIS — B96.89 BV (BACTERIAL VAGINOSIS): ICD-10-CM

## 2023-05-25 DIAGNOSIS — M25.561 ACUTE PAIN OF RIGHT KNEE: ICD-10-CM

## 2023-05-25 DIAGNOSIS — N87.1 CIN II (CERVICAL INTRAEPITHELIAL NEOPLASIA II): Primary | ICD-10-CM

## 2023-05-25 LAB
FINAL PATHOLOGIC DIAGNOSIS: NORMAL
Lab: NORMAL

## 2023-05-25 PROCEDURE — 97110 THERAPEUTIC EXERCISES: CPT | Mod: PN

## 2023-05-25 RX ORDER — METRONIDAZOLE 500 MG/1
500 TABLET ORAL 2 TIMES DAILY
Qty: 14 TABLET | Refills: 0 | Status: SHIPPED | OUTPATIENT
Start: 2023-05-25 | End: 2023-06-01

## 2023-05-25 NOTE — PROGRESS NOTES
[facility]   Physical Therapy Treatment Note     Name: Miriam MILLS [facility] Number: [id number]    Therapy Diagnosis:   Encounter Diagnoses   Name Primary?    Abnormal gait Yes    Acute pain of right knee     Decreased range of motion        Physician: Order, Paper    Visit Date: 5/25/2023    Physician Orders: PT Eval and Treat NWB range of motion lower extremity   Medical Diagnosis from Referral:   S72.401D (ICD-10-CM) - Closed fracture of lower end of right femur with routine healing   S72.491A (ICD-10-CM) - Closed comminuted intra-articular fracture of distal femur, right, initial encounter      Evaluation Date: 1/23/2023  Authorization Period Expiration: 12/31/2023  Plan of Care Expiration: 3/29/2023 to 5/29/2023  Visit # / Visits authorized: 23/40 + eval  FOTO#: 5/5      Time In: 1:45pm   Time Out: 2:45pm   Total Billable Time: 55 minutes (4TE)     Precautions: 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     RENZO 4/25/2023 - 5-130 knee ROM    SUBJECTIVE     Pt reports: She had to cancel last visit because she went to work and then had the worst pains he has had since surgery. She made an immediate appt with her surgeron who said everything look good and there was nothing wrong with the surgery. Since then the pain has come down a lot. She still feels like she still cannot bend her knee much.   She was compliant with home exercise program.  Response to previous treatment: more motion but feels the same  Functional change: no change    Pain: 5/10  Location: right knee      OBJECTIVE     5/25/2023:  Knee range of motion:   Pre tx- 0-0-90  Post tx 0-0-105    Gait: Pt demonstrates left circumduction during right stance phase c decrease stance time on right lower extremity.     Treatment     Miriam  "received the treatments listed below:      Miriam received the following manual therapy techniques: Joint mobilizations were applied to the: right knee for 17 minutes, including:  Patellar mobilization  Infrapatellar fat pad mobilization  Knee hinge for terminal knee extension  AP glide of tibial with inferior glide of patella  Passive range of motion knee flexion c gradual overpressure in prone  EOT Tibial Med/lat rotation  EOT muscle energy technique for knee flexion c patellar caudal glide      Miriam participated in neuromuscular re-education activities to improve: Proprioception and Posture for 5 minutes. The following activities were included:  Quad set towel under ankle x30 5"  Short arc quads 3x10 5" 3#      Miriam received therapeutic exercises to develop strength, endurance, ROM, and flexibility for 38 minutes including:  Heel prop: 7 min, large bolster, 4# above, 3# below   Supine SLR's: 3x10  Matrix Knee Extension, DL: 3x10, 10# increased effort from RLE  DL Gluteal Bridges: 3x10, 5" holds  Seated EOT knee flexion c strap x30  Prone rectus stretch c strap 6x15", 1/2 bolster  Prone Knee flexion: 6x15"  Clamshell 3x10 bilateral  green theraband     Not Performed Today:  Knee flexion LLLD 5# - 6min  Lateral monster walks green theraband 2 laps   Knee mobilization on step x20  Knee flexion mobilization on step [id number]   TKE purple sports cord 2x15 5"  Long sitting gastroc/hamstring stretch 10x15"   Sidelying hip abd 3x10   Long sitting ankle plantarflexion c blue theraband 2x20    Miriam participated in dynamic functional therapeutic activities to improve functional performance for 15 minutes, including:  Shuttle 75# 3x10 c knee flexion holds and TKA  Step ups x20  Upright bike lv 3 7 min    Not Performed Today:  Step ups 6" step 2x10  TRX squat for knee flexion 3x10 5"    Patient Education and Home Exercises     Home Exercises Provided and Patient Education Provided     Education provided:   - Pt educated " on trying to avoid picking at her scabs to allow wound to heal.     Written Home Exercises Provided: Patient instructed to cont prior HEP. Exercises were reviewed and Miriam was able to demonstrate them prior to the end of the session.  Miriam demonstrated good  understanding of the education provided. See EMR under Patient Instructions for exercises provided during therapy sessions    ASSESSMENT   Miriam presented to PT after over a week between visits. She continues to have a lack of terminal knee extension, knee flexion restrictions, abnormal gait, and swelling. Her pain has come down since last visit. Knee flexion range of motion improvements seen within treatment but continue to be limited compared to pre-surgical range. PT to continue to address range of motion and strength deficits in order to improve functional mobility.     Miriam Is progressing well towards her goals.   Pt prognosis is Fair.      Pt will continue to benefit from skilled outpatient physical therapy to address the deficits listed in the problem list box on initial evaluation, provide pt/family education and to maximize pt's level of independence in the home and community environment.      Pt's spiritual, cultural and educational needs considered and pt agreeable to plan of care and goals.     Anticipated barriers to physical therapy: Compliance/Transportation     Goals:   Short Term Goals (6 Weeks):  1. Pt will be compliant with HEP to supplement PT in restoring pain free function.  2. Pt will be able to ambulate 300ft sans assistive device in order to get around home safely  3. Pt will improve knee flexion range of motion to >90' in order to perform functional tasks at home  4. Pt will demonstrate full terminal knee extension in order to ambulate with proper mechanics.      Long Term Goals (12 Weeks):  1. Pt will improve FOTO score to </= 48% limited to decrease perceived limitation with mobility.   2.Pt will improve knee flexion range of  motion to >120' in order to perform functional tasks at home  3. Pt will be able to ambulate without an increase in pain greater than 2 pts on NPRS   4. Pt will be independent c final home exercise program in order to DC to home program.     PLAN     Knee range of motion and quad control     Sarai Mcdaniel, PT

## 2023-06-02 ENCOUNTER — CLINICAL SUPPORT (OUTPATIENT)
Dept: REHABILITATION | Facility: HOSPITAL | Age: 43
End: 2023-06-02
Attending: OTOLARYNGOLOGY
Payer: MEDICAID

## 2023-06-02 DIAGNOSIS — R26.9 ABNORMAL GAIT: Primary | ICD-10-CM

## 2023-06-02 DIAGNOSIS — M25.561 ACUTE PAIN OF RIGHT KNEE: ICD-10-CM

## 2023-06-02 DIAGNOSIS — M25.60 DECREASED RANGE OF MOTION: ICD-10-CM

## 2023-06-02 PROCEDURE — 97110 THERAPEUTIC EXERCISES: CPT | Mod: PN

## 2023-06-02 NOTE — PROGRESS NOTES
[facility]   Physical Therapy Treatment Note     Name: Miriam MILLS [facility] Number: [id number]    Therapy Diagnosis:   Encounter Diagnoses   Name Primary?    Abnormal gait Yes    Acute pain of right knee     Decreased range of motion          Physician: Order, Paper    Visit Date: 6/2/2023    Physician Orders: PT Eval and Treat NWB range of motion lower extremity   Medical Diagnosis from Referral:   S72.401D (ICD-10-CM) - Closed fracture of lower end of right femur with routine healing   S72.491A (ICD-10-CM) - Closed comminuted intra-articular fracture of distal femur, right, initial encounter      Evaluation Date: 1/23/2023  Authorization Period Expiration: 12/31/2023  Plan of Care Expiration: 3/29/2023 to 5/29/2023  Visit # / Visits authorized: 24/40 + eval  FOTO#: 5/5      Time In: 12:32pm   Time Out: 1:32pm   Total Billable Time: 60 minutes (4TE)     Precautions: 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     RENZO 4/25/2023 - 5-130 knee ROM    SUBJECTIVE     Pt reports: She fell last week. Knee got deidre swollen and she had some bruising on her knee. She thinks its better now. She missed her last appt because she could not get out of work.   She was compliant with home exercise program.  Response to previous treatment: more motion but feels the same  Functional change: no change    Pain: 5/10  Location: right knee      OBJECTIVE   6/2/2023:  Knee range of motion:   Pre tx- 0-0-103  Post tx 0-0-112    Gait: Pt demonstrates left circumduction during right stance phase c decrease stance time on right lower extremity.     Treatment     Miriam received the treatments listed below:      Miriam received the following manual therapy techniques: Joint mobilizations were applied to the: right knee for 10  "minutes, including:  Patellar mobilization  Infrapatellar fat pad mobilization  Knee hinge for terminal knee extension  AP glide of tibial with inferior glide of patella  Passive knee flexion c towel for gapping       Miriam participated in neuromuscular re-education activities to improve: Proprioception and Posture for 5 minutes. The following activities were included:  Quad set towel under ankle x30 5"  Long arc quads 3x12 GTB      Miriam received therapeutic exercises to develop strength, endurance, ROM, and flexibility for 23 minutes including:  Heel prop: 7 min, large bolster, 4# above, 3# below   DL Gluteal Bridges: 3x12, 5" holds 5#  Recumbent Bike Lv 2 6 min     Not Performed Today:  Seated EOT knee flexion c strap x30  Prone rectus stretch c strap 6x15", 1/2 bolster  Prone Knee flexion: 6x15"  Supine SLR's: 3x10  Matrix Knee Extension, DL: 3x10, 10# increased effort from RLE  Clamshell 3x10 bilateral  green theraband  Knee flexion LLLD 5# - 6min  Lateral monster walks green theraband 2 laps   Knee mobilization on step x20  Knee flexion mobilization on step [id number]   TKE purple sports cord 2x15 5"  Long sitting gastroc/hamstring stretch 10x15"   Sidelying hip abd 3x10   Long sitting ankle plantarflexion c blue theraband 2x20    Miriam participated in dynamic functional therapeutic activities to improve functional performance for 22 minutes, including:  Shuttle 75# 3x12 c knee flexion holds and TKA  Step ups x20 12" step   Sit to stand 10# 3x12    Upright bike lv 3 7 min  Not Performed Today:  Step ups 6" step 2x10  TRX squat for knee flexion 3x10 5"    Patient Education and Home Exercises     Home Exercises Provided and Patient Education Provided     Education provided:   - Pt educated on trying to avoid picking at her scabs to allow wound to heal.     Written Home Exercises Provided: Patient instructed to cont prior HEP. Exercises were reviewed and Miriam was able to demonstrate them prior to the end of the " session.  Miriam demonstrated good  understanding of the education provided. See EMR under Patient Instructions for exercises provided during therapy sessions    ASSESSMENT   Miriam presented to PT today after a fall at home. She does have swelling around the knee up into the thigh. Range of motion has improved compared to last visit but continues to be limited and at this time it may primarily be due to swelling. Pt was progressed to more functional activities and increasing resistance in order to prevent falls and improve functional mobility. She continues to demonstrate abnormal gait mechanics but they are improving.     Miriam Is progressing well towards her goals.   Pt prognosis is Fair.      Pt will continue to benefit from skilled outpatient physical therapy to address the deficits listed in the problem list box on initial evaluation, provide pt/family education and to maximize pt's level of independence in the home and community environment.      Pt's spiritual, cultural and educational needs considered and pt agreeable to plan of care and goals.     Anticipated barriers to physical therapy: Compliance/Transportation     Goals:   Short Term Goals (6 Weeks):  1. Pt will be compliant with HEP to supplement PT in restoring pain free function.  2. Pt will be able to ambulate 300ft sans assistive device in order to get around home safely  3. Pt will improve knee flexion range of motion to >90' in order to perform functional tasks at home  4. Pt will demonstrate full terminal knee extension in order to ambulate with proper mechanics.      Long Term Goals (12 Weeks):  1. Pt will improve FOTO score to </= 48% limited to decrease perceived limitation with mobility.   2.Pt will improve knee flexion range of motion to >120' in order to perform functional tasks at home  3. Pt will be able to ambulate without an increase in pain greater than 2 pts on NPRS   4. Pt will be independent c final home exercise program in order  to DC to home program.     PLAN     Knee range of motion and quad control     Sarai Mcdaniel, PT

## 2023-06-05 ENCOUNTER — CLINICAL SUPPORT (OUTPATIENT)
Dept: REHABILITATION | Facility: HOSPITAL | Age: 43
End: 2023-06-05
Attending: OTOLARYNGOLOGY
Payer: MEDICAID

## 2023-06-05 DIAGNOSIS — M25.561 ACUTE PAIN OF RIGHT KNEE: ICD-10-CM

## 2023-06-05 DIAGNOSIS — R26.9 ABNORMAL GAIT: Primary | ICD-10-CM

## 2023-06-05 DIAGNOSIS — M25.60 DECREASED RANGE OF MOTION: ICD-10-CM

## 2023-06-05 PROCEDURE — 97110 THERAPEUTIC EXERCISES: CPT | Mod: PN

## 2023-06-05 NOTE — PROGRESS NOTES
[facility]   Physical Therapy Treatment Note     Name: Miriam MILLS [facility] Number: [id number]    Therapy Diagnosis:   Encounter Diagnoses   Name Primary?    Abnormal gait Yes    Acute pain of right knee     Decreased range of motion          Physician: Order, Paper    Visit Date: 6/5/2023    Physician Orders: PT Eval and Treat NWB range of motion lower extremity   Medical Diagnosis from Referral:   S72.401D (ICD-10-CM) - Closed fracture of lower end of right femur with routine healing   S72.491A (ICD-10-CM) - Closed comminuted intra-articular fracture of distal femur, right, initial encounter      Evaluation Date: 1/23/2023  Authorization Period Expiration: 12/31/2023  Plan of Care Expiration: 3/29/2023 to 5/29/2023  Visit # / Visits authorized: 25/40 + eval  FOTO#: 5/5      Time In: 3:01pm   Time Out: 4:00pm   Total Billable Time: 59 minutes (4TE)     Precautions: 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     RENZO 4/25/2023 - 5-130 knee ROM    SUBJECTIVE     Pt reports: Went to the beach over the weekend. Felt like she was able to do more than she has been. Pain is not nearly as bad as it was.   She was compliant with home exercise program.  Response to previous treatment: more motion but feels the same  Functional change: no change    Pain: 2/10  Location: right knee      OBJECTIVE   6/5/2023:  Knee range of motion:   Pre tx- 0-0-103  Post tx 0-0-114    Gait: Pt demonstrates left circumduction during right stance phase c decrease stance time on right lower extremity.     Treatment     Miriam received the treatments listed below:      Miriam received the following manual therapy techniques: Joint mobilizations were applied to the: right knee for 10 minutes, including:  Patellar  "mobilization  Infrapatellar fat pad mobilization  Knee hinge for terminal knee extension  AP glide of tibial with inferior glide of patella  Passive knee flexion c towel for gapping       Miriam participated in neuromuscular re-education activities to improve: Proprioception and Posture for 5 minutes. The following activities were included:  Quad set towel under ankle x30 5"  Long arc quads 3x12 GTB      Miriam received therapeutic exercises to develop strength, endurance, ROM, and flexibility for 23 minutes including:  Heel prop: 7 min, large bolster, 4# above, 3# below   Heel slide 5min   DL Gluteal Bridges: 3x12, 5" holds 5#  Upright Bike Lv 5 6 min     Not Performed Today:  Seated EOT knee flexion c strap x30  Prone rectus stretch c strap 6x15", 1/2 bolster  Prone Knee flexion: 6x15"  Supine SLR's: 3x10  Matrix Knee Extension, DL: 3x10, 10# increased effort from RLE  Clamshell 3x10 bilateral  green theraband  Knee flexion LLLD 5# - 6min  Lateral monster walks green theraband 2 laps   Knee mobilization on step x20  Knee flexion mobilization on step [id number]   TKE purple sports cord 2x15 5"  Long sitting gastroc/hamstring stretch 10x15"   Sidelying hip abd 3x10   Long sitting ankle plantarflexion c blue theraband 2x20    Miriam participated in dynamic functional therapeutic activities to improve functional performance for 22 minutes, including:  Shuttle Single leg 50# 3x10  Shuttle 87.5# 3x12 c knee flexion holds and TKA  Step ups x20 12" step     Sit to stand 10# 3x12  Not Performed Today:  Step ups 6" step 2x10  TRX squat for knee flexion 3x10 5"    Patient Education and Home Exercises     Home Exercises Provided and Patient Education Provided     Education provided:   - Pt educated on trying to avoid picking at her scabs to allow wound to heal.     Written Home Exercises Provided: Patient instructed to cont prior HEP. Exercises were reviewed and Miriam was able to demonstrate them prior to the end of the " session.  Miriam demonstrated good  understanding of the education provided. See EMR under Patient Instructions for exercises provided during therapy sessions    ASSESSMENT   Miriam presents to PT today with improvement in knee pain and range of motion. She continues to have limitations in knee flexion compared to contralateral lower extremity. She demonstrated reduced pain and therefore increased strengthening was performed today. She does continue to demonstrate abnormal gait mechanics but the decreased stance time on the right lower extremity has reduced.     Miriam Is progressing well towards her goals.   Pt prognosis is Fair.      Pt will continue to benefit from skilled outpatient physical therapy to address the deficits listed in the problem list box on initial evaluation, provide pt/family education and to maximize pt's level of independence in the home and community environment.      Pt's spiritual, cultural and educational needs considered and pt agreeable to plan of care and goals.     Anticipated barriers to physical therapy: Compliance/Transportation     Goals:   Short Term Goals (6 Weeks):  1. Pt will be compliant with HEP to supplement PT in restoring pain free function.  2. Pt will be able to ambulate 300ft sans assistive device in order to get around home safely  3. Pt will improve knee flexion range of motion to >90' in order to perform functional tasks at home  4. Pt will demonstrate full terminal knee extension in order to ambulate with proper mechanics.      Long Term Goals (12 Weeks):  1. Pt will improve FOTO score to </= 48% limited to decrease perceived limitation with mobility.   2.Pt will improve knee flexion range of motion to >120' in order to perform functional tasks at home  3. Pt will be able to ambulate without an increase in pain greater than 2 pts on NPRS   4. Pt will be independent c final home exercise program in order to DC to home program.     PLAN     Knee range of motion and  quad control     Sarai Mcdaniel, PT

## 2023-06-07 ENCOUNTER — OFFICE VISIT (OUTPATIENT)
Dept: PSYCHIATRY | Facility: CLINIC | Age: 43
End: 2023-06-07
Payer: MEDICAID

## 2023-06-07 VITALS
WEIGHT: 197.31 LBS | BODY MASS INDEX: 32.83 KG/M2 | HEART RATE: 75 BPM | DIASTOLIC BLOOD PRESSURE: 76 MMHG | SYSTOLIC BLOOD PRESSURE: 124 MMHG

## 2023-06-07 DIAGNOSIS — F41.0 GENERALIZED ANXIETY DISORDER WITH PANIC ATTACKS: Primary | ICD-10-CM

## 2023-06-07 DIAGNOSIS — F33.41 RECURRENT MAJOR DEPRESSIVE DISORDER, IN PARTIAL REMISSION: ICD-10-CM

## 2023-06-07 DIAGNOSIS — G47.00 INSOMNIA, UNSPECIFIED TYPE: ICD-10-CM

## 2023-06-07 DIAGNOSIS — F41.1 GENERALIZED ANXIETY DISORDER WITH PANIC ATTACKS: Primary | ICD-10-CM

## 2023-06-07 DIAGNOSIS — F90.9 ATTENTION DEFICIT HYPERACTIVITY DISORDER (ADHD), UNSPECIFIED ADHD TYPE: ICD-10-CM

## 2023-06-07 PROCEDURE — 3078F PR MOST RECENT DIASTOLIC BLOOD PRESSURE < 80 MM HG: ICD-10-PCS | Mod: SA,HB,CPTII,

## 2023-06-07 PROCEDURE — 3078F DIAST BP <80 MM HG: CPT | Mod: SA,HB,CPTII,

## 2023-06-07 PROCEDURE — 3074F SYST BP LT 130 MM HG: CPT | Mod: SA,HB,CPTII,

## 2023-06-07 PROCEDURE — 99204 PR OFFICE/OUTPT VISIT, NEW, LEVL IV, 45-59 MIN: ICD-10-PCS | Mod: S$PBB,SA,HB,

## 2023-06-07 PROCEDURE — 3074F PR MOST RECENT SYSTOLIC BLOOD PRESSURE < 130 MM HG: ICD-10-PCS | Mod: SA,HB,CPTII,

## 2023-06-07 PROCEDURE — 3008F PR BODY MASS INDEX (BMI) DOCUMENTED: ICD-10-PCS | Mod: SA,HB,CPTII,

## 2023-06-07 PROCEDURE — 99204 OFFICE O/P NEW MOD 45 MIN: CPT | Mod: S$PBB,SA,HB,

## 2023-06-07 PROCEDURE — 99999 PR PBB SHADOW E&M-EST. PATIENT-LVL II: CPT | Mod: PBBFAC,SA,HB,

## 2023-06-07 PROCEDURE — 3008F BODY MASS INDEX DOCD: CPT | Mod: SA,HB,CPTII,

## 2023-06-07 PROCEDURE — 99999 PR PBB SHADOW E&M-EST. PATIENT-LVL II: ICD-10-PCS | Mod: PBBFAC,SA,HB,

## 2023-06-07 PROCEDURE — 99212 OFFICE O/P EST SF 10 MIN: CPT | Mod: PBBFAC

## 2023-06-07 RX ORDER — DEXTROAMPHETAMINE SACCHARATE, AMPHETAMINE ASPARTATE, DEXTROAMPHETAMINE SULFATE AND AMPHETAMINE SULFATE 7.5; 7.5; 7.5; 7.5 MG/1; MG/1; MG/1; MG/1
30 TABLET ORAL DAILY
Qty: 30 TABLET | Refills: 0 | Status: SHIPPED | OUTPATIENT
Start: 2023-06-07 | End: 2023-06-08 | Stop reason: SDUPTHER

## 2023-06-07 RX ORDER — ZOLPIDEM TARTRATE 5 MG/1
5 TABLET ORAL NIGHTLY
Qty: 30 TABLET | Refills: 0 | Status: SHIPPED | OUTPATIENT
Start: 2023-06-07 | End: 2023-07-10 | Stop reason: SDUPTHER

## 2023-06-07 RX ORDER — FLUOXETINE HYDROCHLORIDE 40 MG/1
40 CAPSULE ORAL DAILY
Qty: 30 CAPSULE | Refills: 2 | Status: SHIPPED | OUTPATIENT
Start: 2023-06-07 | End: 2023-07-10 | Stop reason: SDUPTHER

## 2023-06-07 RX ORDER — CLONAZEPAM 1 MG/1
1 TABLET ORAL 2 TIMES DAILY PRN
Qty: 30 TABLET | Refills: 0 | Status: SHIPPED | OUTPATIENT
Start: 2023-06-07 | End: 2023-07-10 | Stop reason: SDUPTHER

## 2023-06-07 RX ORDER — DEXTROAMPHETAMINE SACCHARATE, AMPHETAMINE ASPARTATE, DEXTROAMPHETAMINE SULFATE AND AMPHETAMINE SULFATE 3.75; 3.75; 3.75; 3.75 MG/1; MG/1; MG/1; MG/1
TABLET ORAL
Qty: 30 TABLET | Refills: 0 | Status: SHIPPED | OUTPATIENT
Start: 2023-06-07 | End: 2023-06-08 | Stop reason: SDUPTHER
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
## 2023-06-08 ENCOUNTER — PATIENT MESSAGE (OUTPATIENT)
Dept: PSYCHIATRY | Facility: CLINIC | Age: 43
End: 2023-06-08
Payer: MEDICAID

## 2023-06-08 DIAGNOSIS — F90.9 ATTENTION DEFICIT HYPERACTIVITY DISORDER (ADHD), UNSPECIFIED ADHD TYPE: ICD-10-CM

## 2023-06-08 RX ORDER — DEXTROAMPHETAMINE SACCHARATE, AMPHETAMINE ASPARTATE, DEXTROAMPHETAMINE SULFATE AND AMPHETAMINE SULFATE 3.75; 3.75; 3.75; 3.75 MG/1; MG/1; MG/1; MG/1
TABLET ORAL
Qty: 30 TABLET | Refills: 0 | Status: SHIPPED | OUTPATIENT
Start: 2023-06-08 | End: 2023-07-10 | Stop reason: SDUPTHER

## 2023-06-08 RX ORDER — DEXTROAMPHETAMINE SACCHARATE, AMPHETAMINE ASPARTATE, DEXTROAMPHETAMINE SULFATE AND AMPHETAMINE SULFATE 7.5; 7.5; 7.5; 7.5 MG/1; MG/1; MG/1; MG/1
30 TABLET ORAL DAILY
Qty: 30 TABLET | Refills: 0 | Status: SHIPPED | OUTPATIENT
Start: 2023-06-08 | End: 2023-07-10 | Stop reason: ALTCHOICE

## 2023-06-14 ENCOUNTER — CLINICAL SUPPORT (OUTPATIENT)
Dept: REHABILITATION | Facility: HOSPITAL | Age: 43
End: 2023-06-14
Attending: OTOLARYNGOLOGY
Payer: MEDICAID

## 2023-06-14 DIAGNOSIS — M25.60 DECREASED RANGE OF MOTION: ICD-10-CM

## 2023-06-14 DIAGNOSIS — M25.561 ACUTE PAIN OF RIGHT KNEE: ICD-10-CM

## 2023-06-14 DIAGNOSIS — R26.9 ABNORMAL GAIT: Primary | ICD-10-CM

## 2023-06-14 PROCEDURE — 97110 THERAPEUTIC EXERCISES: CPT | Mod: PN

## 2023-06-14 NOTE — PROGRESS NOTES
[facility]   Physical Therapy Treatment Note     Name: Miriam MILLS [facility] Number: [id number]    Therapy Diagnosis:   Encounter Diagnoses   Name Primary?    Abnormal gait Yes    Acute pain of right knee     Decreased range of motion            Physician: Order, Paper    Visit Date: 6/14/2023    Physician Orders: PT Eval and Treat NWB range of motion lower extremity   Medical Diagnosis from Referral:   S72.401D (ICD-10-CM) - Closed fracture of lower end of right femur with routine healing   S72.491A (ICD-10-CM) - Closed comminuted intra-articular fracture of distal femur, right, initial encounter      Evaluation Date: 1/23/2023  Authorization Period Expiration: 12/31/2023  Plan of Care Expiration: 3/29/2023 to 5/29/2023  Visit # / Visits authorized: 25/40 + eval  FOTO#: 5/5      Time In: 3:01pm   Time Out: 4:00pm   Total Billable Time: 59 minutes (4TE)     Precautions: 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     RENZO 4/25/2023 - 5-130 knee ROM    SUBJECTIVE     Pt reports: Missed her last appt cause she was at work and forgot. She wants to get more motion but doesn't know if that will happen.   She was compliant with home exercise program.  Response to previous treatment: more motion but feels the same  Functional change: no change    Pain: 2/10  Location: right knee      OBJECTIVE   6/15/2023:  Knee range of motion:   Pre tx- 0-0-103  Post tx 0-0-114    Gait: Pt demonstrates left circumduction during right stance phase c decrease stance time on right lower extremity.     Treatment     Miriam received the treatments listed below:      Miriam received the following manual therapy techniques: Joint mobilizations were applied to the: right knee for 5 minutes, including:  Patellar  "mobilization  Infrapatellar fat pad mobilization  Knee hinge for terminal knee extension  AP glide of tibial with inferior glide of patella  Passive knee flexion c towel for gapping       Miriam participated in neuromuscular re-education activities to improve: Proprioception and Posture for 4 minutes. The following activities were included:  Quad set towel under ankle x30 5"  Long arc quads 3x12 GTB      Miriam received therapeutic exercises to develop strength, endurance, ROM, and flexibility for 23 minutes including:    Upright Bike Lv 5 6 min   Single leg bridge 3x10 bilateral       Miriam participated in dynamic functional therapeutic activities to improve functional performance for 23 minutes, including:  Shuttle Single leg 50# 3x10  Shuttle 87.5# 3x12 c knee flexion holds and TKA  Squat taps 3x10   Step ups x20 12" step       Patient Education and Home Exercises     Home Exercises Provided and Patient Education Provided     Education provided:   - Pt educated on trying to avoid picking at her scabs to allow wound to heal.     Written Home Exercises Provided: Patient instructed to cont prior HEP. Exercises were reviewed and Miriam was able to demonstrate them prior to the end of the session.  Miriam demonstrated good  understanding of the education provided. See EMR under Patient Instructions for exercises provided during therapy sessions    ASSESSMENT   Miriam presents to PT today with improvement in knee range of motion. Swelling continues to reduce and suspect this is the primary limitation at this time. She was progressed to more functional tasks today which presented a challenge. She will continue to benefit from skilled PT to further advance strength and knee range of motion towards previous level of function.     Miriam Is progressing well towards her goals.   Pt prognosis is Fair.      Pt will continue to benefit from skilled outpatient physical therapy to address the deficits listed in the problem list " box on initial evaluation, provide pt/family education and to maximize pt's level of independence in the home and community environment.      Pt's spiritual, cultural and educational needs considered and pt agreeable to plan of care and goals.     Anticipated barriers to physical therapy: Compliance/Transportation     Goals:   Short Term Goals (6 Weeks):  1. Pt will be compliant with HEP to supplement PT in restoring pain free function.  2. Pt will be able to ambulate 300ft sans assistive device in order to get around home safely  3. Pt will improve knee flexion range of motion to >90' in order to perform functional tasks at home  4. Pt will demonstrate full terminal knee extension in order to ambulate with proper mechanics.      Long Term Goals (12 Weeks):  1. Pt will improve FOTO score to </= 48% limited to decrease perceived limitation with mobility.   2.Pt will improve knee flexion range of motion to >120' in order to perform functional tasks at home  3. Pt will be able to ambulate without an increase in pain greater than 2 pts on NPRS   4. Pt will be independent c final home exercise program in order to DC to home program.     PLAN     Knee range of motion and quad control     Sarai Mcdaniel, PT

## 2023-06-15 NOTE — PLAN OF CARE
[facility]  Physical Therapy Plan of Care Note     Name: Miriam MILLS [facility] Number: [id number]    Therapy Diagnosis:   Encounter Diagnoses   Name Primary?    Abnormal gait Yes    Acute pain of right knee     Decreased range of motion      Physician: Order, Paper    Visit Date: 6/14/2023    Physician Orders: PT Eval and Treat NWB range of motion lower extremity   Medical Diagnosis from Referral:   S72.401D (ICD-10-CM) - Closed fracture of lower end of right femur with routine healing   S72.491A (ICD-10-CM) - Closed comminuted intra-articular fracture of distal femur, right, initial encounter      Evaluation Date: 1/23/2023  Authorization Period Expiration: 12/31/2023  Plan of Care Expiration: 3/29/2023 to 5/29/2023  Visit # / Visits authorized: 25/40 + eval  FOTO#: 5/5        Precautions: 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      RENZO 4/25/2023 - 5-130 knee ROM      SUBJECTIVE     Pt reports: Missed her last appt cause she was at work and forgot. She wants to get more motion but doesn't know if that will happen.   She was compliant with home exercise program.  Response to previous treatment: more motion but feels the same  Functional change: no change     Pain: 2/10  Location: right knee      OBJECTIVE     Update: 6/15/2023:  Knee range of motion:   Pre tx- 0-0-103  Post tx 0-0-114     Gait: Pt demonstrates left circumduction during right stance phase c decrease stance time on right lower extremity.     ASSESSMENT     Update:   Miriam presents to PT today with improvement in knee range of motion. Swelling continues to reduce and suspect this is the primary limitation at this time. She was progressed to more functional tasks today which presented a challenge. She will continue to benefit from  skilled PT to further advance strength and knee range of motion towards previous level of function.      Miriam Is progressing well towards her goals.   Pt prognosis is Fair.      Pt will continue to benefit from skilled outpatient physical therapy to address the deficits listed in the problem list box on initial evaluation, provide pt/family education and to maximize pt's level of independence in the home and community environment.      Pt's spiritual, cultural and educational needs considered and pt agreeable to plan of care and goals.     Anticipated barriers to physical therapy: Compliance/Transportation     Goals:   Short Term Goals (6 Weeks):  1. Pt will be compliant with HEP to supplement PT in restoring pain free function.  2. Pt will be able to ambulate 300ft sans assistive device in order to get around home safely  3. Pt will improve knee flexion range of motion to >90' in order to perform functional tasks at home  4. Pt will demonstrate full terminal knee extension in order to ambulate with proper mechanics.      Long Term Goals (12 Weeks):  1. Pt will improve FOTO score to </= 48% limited to decrease perceived limitation with mobility.   2.Pt will improve knee flexion range of motion to >120' in order to perform functional tasks at home  3. Pt will be able to ambulate without an increase in pain greater than 2 pts on NPRS   4. Pt will be independent c final home exercise program in order to DC to home program.        Previous Short Term Goals Status:   see above  New Short Term Goals Status:   no new goals  Long Term Goal Status: continue per initial plan of care.  Reasons for Recertification of Therapy:   range of motion and strength deficits     GOALS  See above    PLAN     Updated Certification Period: 5/29/2023 to 7/21/2023   Recommended Treatment Plan: 2 times per week for 6 weeks:  Electrical Stimulation PRN, Gait Training, Manual Therapy, Moist Heat/ Ice, Neuromuscular Re-ed, Patient Education,  Therapeutic Activities, and Therapeutic Exercise  Other Recommendations: none    Sarai Mcdaniel, PT

## 2023-06-20 ENCOUNTER — CLINICAL SUPPORT (OUTPATIENT)
Dept: REHABILITATION | Facility: HOSPITAL | Age: 43
End: 2023-06-20
Attending: OTOLARYNGOLOGY
Payer: MEDICAID

## 2023-06-20 DIAGNOSIS — M25.561 ACUTE PAIN OF RIGHT KNEE: ICD-10-CM

## 2023-06-20 DIAGNOSIS — R26.9 ABNORMAL GAIT: Primary | ICD-10-CM

## 2023-06-20 DIAGNOSIS — M25.60 DECREASED RANGE OF MOTION: ICD-10-CM

## 2023-06-20 PROCEDURE — 97110 THERAPEUTIC EXERCISES: CPT | Mod: PN

## 2023-06-20 NOTE — PROGRESS NOTES
[facility]   Physical Therapy Treatment Note     Name: Miriam MILLS [facility] Number: [id number]    Therapy Diagnosis:   Encounter Diagnoses   Name Primary?    Abnormal gait Yes    Acute pain of right knee     Decreased range of motion        Physician: Order, Paper    Visit Date: 6/20/2023    Physician Orders: PT Eval and Treat NWB range of motion lower extremity   Medical Diagnosis from Referral:   S72.401D (ICD-10-CM) - Closed fracture of lower end of right femur with routine healing   S72.491A (ICD-10-CM) - Closed comminuted intra-articular fracture of distal femur, right, initial encounter      Evaluation Date: 1/23/2023  Authorization Period Expiration: 12/31/2023  Plan of Care Expiration: 5/29/2023 to 7/21/2023   Visit # / Visits authorized: 27/40 + eval  FOTO#: obtained today      Time In: 8:17am   Time Out: 9:15am   Total Billable Time: 58 minutes (4TE)     Precautions: 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     RENZO 4/25/2023 - 5-130 knee ROM    SUBJECTIVE     Pt reports: Was pretty sore after last visit. Remembers why she does not exercise.   She was compliant with home exercise program.  Response to previous treatment: sore  Functional change: no change    Pain: 2/10  Location: right knee      OBJECTIVE   6/15/2023:  Knee range of motion:   Pre tx- 0-0-103  Post tx 0-0-114    Gait: Pt demonstrates left circumduction during right stance phase c decrease stance time on right lower extremity.     Treatment     Miriam received the treatments listed below:      Miriam received the following manual therapy techniques: Joint mobilizations were applied to the: right knee for 5 minutes, including:  Patellar mobilization  Infrapatellar fat pad mobilization  Knee hinge for terminal knee  "extension  AP glide of tibial with inferior glide of patella  Passive knee flexion c towel for gapping       Miriam participated in neuromuscular re-education activities to improve: Proprioception and Posture for 5 minutes. The following activities were included:  Quad set towel under ankle x30 5"  Long arc quads 3x12 green theraband bilateral       Miriam received therapeutic exercises to develop strength, endurance, ROM, and flexibility for 23 minutes including:    Upright Bike Lv 5 8 min   Single leg bridge 3x10 bilateral       Miriam participated in dynamic functional therapeutic activities to improve functional performance for 25 minutes, including:  Shuttle Single leg 50# 3x10  Shuttle 100# 3x12 c knee flexion holds and TKA  Single leg shuttle 25# 3x10  Squat taps 3x10   Step ups x20 12" step       Patient Education and Home Exercises     Home Exercises Provided and Patient Education Provided     Education provided:   - Pt educated on trying to avoid picking at her scabs to allow wound to heal.     Written Home Exercises Provided: Patient instructed to cont prior HEP. Exercises were reviewed and Miriam was able to demonstrate them prior to the end of the session.  Miriam demonstrated good  understanding of the education provided. See EMR under Patient Instructions for exercises provided during therapy sessions    ASSESSMENT   Miriam presents to PT today with reports of DOMS. She had muscle soreness primarily in the quads. Minimal increased resistance today 2/2 muscle soreness level last visit. Pt is able to perform all exercises. She does continue to avoid equal wt shift during squatting activities and repeated cuing was required.     Miriam Is progressing well towards her goals.   Pt prognosis is Fair.      Pt will continue to benefit from skilled outpatient physical therapy to address the deficits listed in the problem list box on initial evaluation, provide pt/family education and to maximize pt's level of " independence in the home and community environment.      Pt's spiritual, cultural and educational needs considered and pt agreeable to plan of care and goals.     Anticipated barriers to physical therapy: Compliance/Transportation     Goals:   Short Term Goals (6 Weeks):  1. Pt will be compliant with HEP to supplement PT in restoring pain free function.  2. Pt will be able to ambulate 300ft sans assistive device in order to get around home safely  3. Pt will improve knee flexion range of motion to >90' in order to perform functional tasks at home  4. Pt will demonstrate full terminal knee extension in order to ambulate with proper mechanics.      Long Term Goals (12 Weeks):  1. Pt will improve FOTO score to </= 48% limited to decrease perceived limitation with mobility.   2.Pt will improve knee flexion range of motion to >120' in order to perform functional tasks at home  3. Pt will be able to ambulate without an increase in pain greater than 2 pts on NPRS   4. Pt will be independent c final home exercise program in order to DC to home program.     PLAN     Knee range of motion and quad control     Sarai Mcdaniel, PT

## 2023-06-26 ENCOUNTER — ANESTHESIA EVENT (OUTPATIENT)
Dept: SURGERY | Facility: HOSPITAL | Age: 43
End: 2023-06-26
Payer: MEDICAID

## 2023-06-28 ENCOUNTER — CLINICAL SUPPORT (OUTPATIENT)
Dept: REHABILITATION | Facility: HOSPITAL | Age: 43
End: 2023-06-28
Attending: OTOLARYNGOLOGY
Payer: MEDICAID

## 2023-06-28 DIAGNOSIS — M25.60 DECREASED RANGE OF MOTION: ICD-10-CM

## 2023-06-28 DIAGNOSIS — M25.561 ACUTE PAIN OF RIGHT KNEE: ICD-10-CM

## 2023-06-28 DIAGNOSIS — R26.9 ABNORMAL GAIT: Primary | ICD-10-CM

## 2023-06-28 PROCEDURE — 97110 THERAPEUTIC EXERCISES: CPT | Mod: PN

## 2023-06-28 NOTE — PROGRESS NOTES
[facility]   Physical Therapy Treatment Note     Name: Miriam MILLS [facility] Number: [id number]    Therapy Diagnosis:   Encounter Diagnoses   Name Primary?    Abnormal gait Yes    Acute pain of right knee     Decreased range of motion        Physician: Order, Paper    Visit Date: 6/28/2023    Physician Orders: PT Eval and Treat NWB range of motion lower extremity   Medical Diagnosis from Referral:   S72.401D (ICD-10-CM) - Closed fracture of lower end of right femur with routine healing   S72.491A (ICD-10-CM) - Closed comminuted intra-articular fracture of distal femur, right, initial encounter      Evaluation Date: 1/23/2023  Authorization Period Expiration: 12/31/2023  Plan of Care Expiration: 5/29/2023 to 7/21/2023   Visit # / Visits authorized: 28/40 + eval  FOTO#: obtained today      Time In: 1:46pm  Time Out: 2:45pm   Total Billable Time: 58 minutes (3TE tech assist)     Precautions: 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     RENZO 4/25/2023 - 5-130 knee ROM    SUBJECTIVE     Pt reports: Was sore again after last visit, she does not like what muscle soreness feels like.   She was compliant with home exercise program.  Response to previous treatment: sore  Functional change: no change    Pain: 0/10  Location: right knee      OBJECTIVE   6/15/2023:  Knee range of motion:   Pre tx- 0-0-103  Post tx 0-0-114    Gait: Pt demonstrates left circumduction during right stance phase c decrease stance time on right lower extremity.     Treatment     Miriam received the treatments listed below:      Miriam received the following manual therapy techniques: Joint mobilizations were applied to the: right knee for 0 minutes, including:  Patellar mobilization  Infrapatellar fat pad mobilization  Knee hinge  "for terminal knee extension  AP glide of tibial with inferior glide of patella  Passive knee flexion c towel for gapping       Miriam participated in neuromuscular re-education activities to improve: Proprioception and Posture for 0 minutes. The following activities were included:  Quad set towel under ankle x30 5"  Long arc quads 3x12 green theraband bilateral       Miriam received therapeutic exercises to develop strength, endurance, ROM, and flexibility for 28 minutes including:    Upright Bike Lv 5 8 min   Single leg bridge 3x12 bilateral  Matrix knee ext 15# Double leg 3x10   Single leg squat to foam roller x30      Miriam participated in dynamic functional therapeutic activities to improve functional performance for 28 minutes, including:  Shuttle Single leg 50# 3x10  Shuttle 112.5# 3x12 c knee flexion holds and TKA  Squat taps 3x10   Step ups x20 12" step       Patient Education and Home Exercises     Home Exercises Provided and Patient Education Provided     Education provided:   - Pt educated on trying to avoid picking at her scabs to allow wound to heal.     Written Home Exercises Provided: Patient instructed to cont prior HEP. Exercises were reviewed and Miriam was able to demonstrate them prior to the end of the session.  Miriam demonstrated good  understanding of the education provided. See EMR under Patient Instructions for exercises provided during therapy sessions    ASSESSMENT   Miriam presents to PT today with continued reports of DOMS. She was educated on continuing with exercises as this may ease some of the muscle soreness. She was not progressed in resistance today, but was challenged with new activities focused on knee quad control. Pt struggled with balance during some of these activities.     Miriam Is progressing well towards her goals.   Pt prognosis is Fair.      Pt will continue to benefit from skilled outpatient physical therapy to address the deficits listed in the problem list box on " initial evaluation, provide pt/family education and to maximize pt's level of independence in the home and community environment.      Pt's spiritual, cultural and educational needs considered and pt agreeable to plan of care and goals.     Anticipated barriers to physical therapy: Compliance/Transportation     Goals:   Short Term Goals (6 Weeks):  1. Pt will be compliant with HEP to supplement PT in restoring pain free function.  2. Pt will be able to ambulate 300ft sans assistive device in order to get around home safely  3. Pt will improve knee flexion range of motion to >90' in order to perform functional tasks at home  4. Pt will demonstrate full terminal knee extension in order to ambulate with proper mechanics.      Long Term Goals (12 Weeks):  1. Pt will improve FOTO score to </= 48% limited to decrease perceived limitation with mobility.   2.Pt will improve knee flexion range of motion to >120' in order to perform functional tasks at home  3. Pt will be able to ambulate without an increase in pain greater than 2 pts on NPRS   4. Pt will be independent c final home exercise program in order to DC to home program.     PLAN     Knee range of motion and quad control     Sarai Mcdaniel, PT

## 2023-06-30 ENCOUNTER — CLINICAL SUPPORT (OUTPATIENT)
Dept: REHABILITATION | Facility: HOSPITAL | Age: 43
End: 2023-06-30
Attending: OTOLARYNGOLOGY
Payer: MEDICAID

## 2023-06-30 DIAGNOSIS — M25.561 ACUTE PAIN OF RIGHT KNEE: ICD-10-CM

## 2023-06-30 DIAGNOSIS — R26.9 ABNORMAL GAIT: Primary | ICD-10-CM

## 2023-06-30 DIAGNOSIS — M25.60 DECREASED RANGE OF MOTION: ICD-10-CM

## 2023-06-30 PROCEDURE — 97110 THERAPEUTIC EXERCISES: CPT | Mod: PN

## 2023-06-30 NOTE — PROGRESS NOTES
[facility]   Physical Therapy Treatment Note     Name: Miriam MILLS [facility] Number: [id number]    Therapy Diagnosis:   Encounter Diagnoses   Name Primary?    Abnormal gait Yes    Acute pain of right knee     Decreased range of motion        Physician: Order, Paper    Visit Date: 6/30/2023    Physician Orders: PT Eval and Treat NWB range of motion lower extremity   Medical Diagnosis from Referral:   S72.401D (ICD-10-CM) - Closed fracture of lower end of right femur with routine healing   S72.491A (ICD-10-CM) - Closed comminuted intra-articular fracture of distal femur, right, initial encounter      Evaluation Date: 1/23/2023  Authorization Period Expiration: 12/31/2023  Plan of Care Expiration: 5/29/2023 to 7/21/2023   Visit # / Visits authorized: 29/40 + eval  FOTO#: obtained today      Time In: 12:00pm  Time Out: 12:45pm   Total Billable Time: 58 minutes (3TE tech assist)     Precautions: 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     RENZO 4/25/2023 - 5-130 knee ROM    SUBJECTIVE     Pt reports: Very overwhelmed today, has a lot going on at home. Not sure how much she can make it through today  She was compliant with home exercise program.  Response to previous treatment: sore  Functional change: no change    Pain: 0/10  Location: right knee      OBJECTIVE   6/15/2023:  Knee range of motion:   Pre tx- 0-0-103  Post tx 0-0-114    Gait: Pt demonstrates left circumduction during right stance phase c decrease stance time on right lower extremity.     Treatment     Miriam received the treatments listed below:      Miriam received the following manual therapy techniques: Joint mobilizations were applied to the: right knee for 0 minutes, including:  Patellar mobilization  Infrapatellar fat pad  "mobilization  Knee hinge for terminal knee extension  AP glide of tibial with inferior glide of patella  Passive knee flexion c towel for gapping       Miriam participated in neuromuscular re-education activities to improve: Proprioception and Posture for 0 minutes. The following activities were included:  Quad set towel under ankle x30 5"  Long arc quads 3x12 green theraband bilateral       Miriam received therapeutic exercises to develop strength, endurance, ROM, and flexibility for 17 minutes including:    Upright Bike Lv 5 8 min   Single leg bridge 3x12 bilateral  Matrix knee ext 15# Double leg 3x10   Single leg squat to foam roller x30      Miriam participated in dynamic functional therapeutic activities to improve functional performance for 28 minutes, including:  Shuttle Single leg 50# 3x10  Shuttle 112.5# 3x12 c knee flexion holds and TKA  Squat taps 3x10   Step ups x20 12" step       Patient Education and Home Exercises     Home Exercises Provided and Patient Education Provided     Education provided:   - Pt educated on trying to avoid picking at her scabs to allow wound to heal.     Written Home Exercises Provided: Patient instructed to cont prior HEP. Exercises were reviewed and Miriam was able to demonstrate them prior to the end of the session.  Miriam demonstrated good  understanding of the education provided. See EMR under Patient Instructions for exercises provided during therapy sessions    ASSESSMENT   Miriam presents to PT today with high anxiety. She was only able to make it through 45min of treatment before she felt like she needed to leave. She was not progressed in strength as she has had muscle soreness after each visit. No obvious signs of progression today. PT to continue to progress PRN. She continues to demonstrate abnormal gait mechanics with hip drop and circumduction.     Miiram Is progressing well towards her goals.   Pt prognosis is Fair.      Pt will continue to benefit from skilled " outpatient physical therapy to address the deficits listed in the problem list box on initial evaluation, provide pt/family education and to maximize pt's level of independence in the home and community environment.      Pt's spiritual, cultural and educational needs considered and pt agreeable to plan of care and goals.     Anticipated barriers to physical therapy: Compliance/Transportation     Goals:   Short Term Goals (6 Weeks):  1. Pt will be compliant with HEP to supplement PT in restoring pain free function.  2. Pt will be able to ambulate 300ft sans assistive device in order to get around home safely  3. Pt will improve knee flexion range of motion to >90' in order to perform functional tasks at home  4. Pt will demonstrate full terminal knee extension in order to ambulate with proper mechanics.      Long Term Goals (12 Weeks):  1. Pt will improve FOTO score to </= 48% limited to decrease perceived limitation with mobility.   2.Pt will improve knee flexion range of motion to >120' in order to perform functional tasks at home  3. Pt will be able to ambulate without an increase in pain greater than 2 pts on NPRS   4. Pt will be independent c final home exercise program in order to DC to home program.     PLAN     Knee range of motion and quad control     Sarai Mcdaniel, PT

## 2023-07-05 ENCOUNTER — PATIENT MESSAGE (OUTPATIENT)
Dept: REHABILITATION | Facility: HOSPITAL | Age: 43
End: 2023-07-05

## 2023-07-05 ENCOUNTER — CLINICAL SUPPORT (OUTPATIENT)
Dept: REHABILITATION | Facility: HOSPITAL | Age: 43
End: 2023-07-05
Attending: OTOLARYNGOLOGY
Payer: MEDICAID

## 2023-07-05 DIAGNOSIS — M25.561 ACUTE PAIN OF RIGHT KNEE: ICD-10-CM

## 2023-07-05 DIAGNOSIS — M25.60 DECREASED RANGE OF MOTION: ICD-10-CM

## 2023-07-05 DIAGNOSIS — R26.9 ABNORMAL GAIT: Primary | ICD-10-CM

## 2023-07-05 PROCEDURE — 97110 THERAPEUTIC EXERCISES: CPT | Mod: PN

## 2023-07-05 NOTE — PROGRESS NOTES
[facility]   Physical Therapy Treatment Note     Name: Miriam MILLS [facility] Number: [id number]    Therapy Diagnosis:   Encounter Diagnoses   Name Primary?    Abnormal gait Yes    Acute pain of right knee     Decreased range of motion        Physician: Order, Paper    Visit Date: 7/5/2023    Physician Orders: PT Eval and Treat NWB range of motion lower extremity   Medical Diagnosis from Referral:   S72.401D (ICD-10-CM) - Closed fracture of lower end of right femur with routine healing   S72.491A (ICD-10-CM) - Closed comminuted intra-articular fracture of distal femur, right, initial encounter      Evaluation Date: 1/23/2023  Authorization Period Expiration: 12/31/2023  Plan of Care Expiration: 5/29/2023 to 7/21/2023   Visit # / Visits authorized: 30/40 + eval  FOTO#: obtained today      Time In: 4:21pm  Time Out: 5:16pm   Total Billable Time: 55 minutes (ConductorTE tech assist)     Precautions: 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     RENZO 4/25/2023 - 5-130 knee ROM    SUBJECTIVE     Pt reports: Feels a ton better today than she did last visit. Wants to re-measure knee because it feels like its moving better.   She was compliant with home exercise program.  Response to previous treatment: sore  Functional change: no change    Pain: 0/10  Location: right knee      OBJECTIVE   7/6/2023:  Knee range of motion: 0-0-117    Gait: Improvement in gait mechanics, continues slight limp bu no longer circumducting.     Treatment     Miriam received the treatments listed below:      Miriam received the following manual therapy techniques: Joint mobilizations were applied to the: right knee for 0 minutes, including:  Patellar mobilization  Infrapatellar fat pad mobilization  Knee hinge for terminal knee  "extension  AP glide of tibial with inferior glide of patella  Passive knee flexion c towel for gapping       Miriam participated in neuromuscular re-education activities to improve: Proprioception and Posture for 0 minutes. The following activities were included:  Quad set towel under ankle x30 5"  Long arc quads 3x12 green theraband bilateral       Miriam received therapeutic exercises to develop strength, endurance, ROM, and flexibility for 23 minutes including:    recumbent Bike Lv 5 8 min   Single leg bridge 3x12 bilateral  Lateral monster walk Yellow theraloop 2 laps  Matrix knee ext 15# Double leg 3x10   Single leg squat to foam roller x30      Miriam participated in dynamic functional therapeutic activities to improve functional performance for 30 minutes, including:  Shuttle Single leg 50# 3x10  Shuttle 112.5# 3x12 c knee flexion holds and TKA  Squat taps 3x10   Step ups x20 12" step       Patient Education and Home Exercises     Home Exercises Provided and Patient Education Provided     Education provided:   - Pt educated on trying to avoid picking at her scabs to allow wound to heal.     Written Home Exercises Provided: Patient instructed to cont prior HEP. Exercises were reviewed and Miriam was able to demonstrate them prior to the end of the session.  Miriam demonstrated good  understanding of the education provided. See EMR under Patient Instructions for exercises provided during therapy sessions    ASSESSMENT   Miriam presents to PT today with reduced anxiety. She demonstrates improved knee flexion range of motion most likely because swelling is starting to subside. Gait mechanics have improved and pt ambulates with reduced circumduction. She was progressed today to more difficult strengthening activities. While she does fatigue, there is no increased knee pain.    Miriam Is progressing well towards her goals.   Pt prognosis is Fair.      Pt will continue to benefit from skilled outpatient physical " therapy to address the deficits listed in the problem list box on initial evaluation, provide pt/family education and to maximize pt's level of independence in the home and community environment.      Pt's spiritual, cultural and educational needs considered and pt agreeable to plan of care and goals.     Anticipated barriers to physical therapy: Compliance/Transportation     Goals:   Short Term Goals (6 Weeks):  1. Pt will be compliant with HEP to supplement PT in restoring pain free function.  2. Pt will be able to ambulate 300ft sans assistive device in order to get around home safely  3. Pt will improve knee flexion range of motion to >90' in order to perform functional tasks at home  4. Pt will demonstrate full terminal knee extension in order to ambulate with proper mechanics.      Long Term Goals (12 Weeks):  1. Pt will improve FOTO score to </= 48% limited to decrease perceived limitation with mobility.   2.Pt will improve knee flexion range of motion to >120' in order to perform functional tasks at home  3. Pt will be able to ambulate without an increase in pain greater than 2 pts on NPRS   4. Pt will be independent c final home exercise program in order to DC to home program.     PLAN     Knee range of motion and quad control     Sarai Mcdaniel, PT

## 2023-07-06 ENCOUNTER — PATIENT MESSAGE (OUTPATIENT)
Dept: PSYCHIATRY | Facility: CLINIC | Age: 43
End: 2023-07-06
Payer: MEDICAID

## 2023-07-06 DIAGNOSIS — F33.41 RECURRENT MAJOR DEPRESSIVE DISORDER, IN PARTIAL REMISSION: ICD-10-CM

## 2023-07-06 DIAGNOSIS — F41.0 GENERALIZED ANXIETY DISORDER WITH PANIC ATTACKS: ICD-10-CM

## 2023-07-06 DIAGNOSIS — G47.00 INSOMNIA, UNSPECIFIED TYPE: ICD-10-CM

## 2023-07-06 DIAGNOSIS — F41.1 GENERALIZED ANXIETY DISORDER WITH PANIC ATTACKS: ICD-10-CM

## 2023-07-06 DIAGNOSIS — F90.9 ATTENTION DEFICIT HYPERACTIVITY DISORDER (ADHD), UNSPECIFIED ADHD TYPE: ICD-10-CM

## 2023-07-10 RX ORDER — CLONAZEPAM 1 MG/1
1 TABLET ORAL 2 TIMES DAILY PRN
Qty: 30 TABLET | Refills: 0 | Status: SHIPPED | OUTPATIENT
Start: 2023-07-10 | End: 2023-08-29 | Stop reason: SDUPTHER

## 2023-07-10 RX ORDER — DEXTROAMPHETAMINE SACCHARATE, AMPHETAMINE ASPARTATE, DEXTROAMPHETAMINE SULFATE AND AMPHETAMINE SULFATE 3.75; 3.75; 3.75; 3.75 MG/1; MG/1; MG/1; MG/1
TABLET ORAL
Qty: 90 TABLET | Refills: 0 | Status: SHIPPED | OUTPATIENT
Start: 2023-07-10 | End: 2023-08-29 | Stop reason: SDUPTHER

## 2023-07-10 RX ORDER — FLUOXETINE HYDROCHLORIDE 40 MG/1
40 CAPSULE ORAL DAILY
Qty: 30 CAPSULE | Refills: 2 | Status: SHIPPED | OUTPATIENT
Start: 2023-07-10 | End: 2023-08-29 | Stop reason: SDUPTHER

## 2023-07-10 RX ORDER — ZOLPIDEM TARTRATE 5 MG/1
5 TABLET ORAL NIGHTLY
Qty: 30 TABLET | Refills: 0 | Status: SHIPPED | OUTPATIENT
Start: 2023-07-10 | End: 2023-08-29 | Stop reason: SDUPTHER

## 2023-07-12 ENCOUNTER — CLINICAL SUPPORT (OUTPATIENT)
Dept: REHABILITATION | Facility: HOSPITAL | Age: 43
End: 2023-07-12
Attending: OTOLARYNGOLOGY
Payer: MEDICAID

## 2023-07-12 DIAGNOSIS — M25.561 ACUTE PAIN OF RIGHT KNEE: ICD-10-CM

## 2023-07-12 DIAGNOSIS — M25.60 DECREASED RANGE OF MOTION: ICD-10-CM

## 2023-07-12 DIAGNOSIS — R26.9 ABNORMAL GAIT: Primary | ICD-10-CM

## 2023-07-12 PROCEDURE — 97110 THERAPEUTIC EXERCISES: CPT | Mod: PN

## 2023-07-12 NOTE — PROGRESS NOTES
[facility]   Physical Therapy Treatment Note     Name: Miriam MILLS [facility] Number: [id number]    Therapy Diagnosis:   Encounter Diagnoses   Name Primary?    Abnormal gait Yes    Acute pain of right knee     Decreased range of motion        Physician: Order, Paper    Visit Date: 7/12/2023    Physician Orders: PT Eval and Treat NWB range of motion lower extremity   Medical Diagnosis from Referral:   S72.401D (ICD-10-CM) - Closed fracture of lower end of right femur with routine healing   S72.491A (ICD-10-CM) - Closed comminuted intra-articular fracture of distal femur, right, initial encounter      Evaluation Date: 1/23/2023  Authorization Period Expiration: 12/31/2023  Plan of Care Expiration: 5/29/2023 to 7/21/2023   Visit # / Visits authorized: 31/40 + eval  FOTO#: obtained today      Time In: 4:19pm  Time Out: 5:14pm   Total Billable Time: 54 minutes ([employer] tech assist)     Precautions: 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     RENZO 4/25/2023 - 5-130 knee ROM    SUBJECTIVE     Pt reports: Doing fine, didn't know she missed an appt earlier this week.   She was compliant with home exercise program.  Response to previous treatment: sore  Functional change: no change    Pain: 0/10  Location: right knee      OBJECTIVE   7/12/2023:  Knee range of motion: 0-0-118    Gait: Improvement in gait mechanics, continues slight limp bu no longer circumducting.     Treatment     Miriam received the treatments listed below:      Miriam received the following manual therapy techniques: Joint mobilizations were applied to the: right knee for 0 minutes, including:  Patellar mobilization  Infrapatellar fat pad mobilization  Knee hinge for terminal knee extension  AP glide of tibial with inferior glide of  "patella  Passive knee flexion c towel for gapping       Miriam participated in neuromuscular re-education activities to improve: Proprioception and Posture for 0 minutes. The following activities were included:  Quad set towel under ankle x30 5"  Long arc quads 3x12 green theraband bilateral       Miriam received therapeutic exercises to develop strength, endurance, ROM, and flexibility for 30 minutes including:    recumbent Bike Lv 5 8 min   Single leg bridge 3x12 bilateral  Lateral monster walk Yellow theraloop 2 laps  Matrix knee ext 10# single leg 3x10   Matrix ham curl 45# 3x10    Single leg squat to foam roller x30      Miriam participated in dynamic functional therapeutic activities to improve functional performance for 24 minutes, including:  Shuttle Single leg 50# 3x10  Shuttle 112.5# 3x12 c knee flexion holds and TKA  Squat taps 3x10   Step ups x20 12" step       Patient Education and Home Exercises     Home Exercises Provided and Patient Education Provided     Education provided:   - Pt educated on trying to avoid picking at her scabs to allow wound to heal.     Written Home Exercises Provided: Patient instructed to cont prior HEP. Exercises were reviewed and Miriam was able to demonstrate them prior to the end of the session.  Miriam demonstrated good  understanding of the education provided. See EMR under Patient Instructions for exercises provided during therapy sessions    ASSESSMENT   Miriam presents to PT today with improvement in overall pain and function. She was progressed to more difficult exercises to continue to improve quad strength and knee control. Measurement of knee flexion yielded 118. Pt eager to get to 120. PT to continue to address strength deficits and improve gait mechanics.     Miriam Is progressing well towards her goals.   Pt prognosis is Fair.      Pt will continue to benefit from skilled outpatient physical therapy to address the deficits listed in the problem list box on " initial evaluation, provide pt/family education and to maximize pt's level of independence in the home and community environment.      Pt's spiritual, cultural and educational needs considered and pt agreeable to plan of care and goals.     Anticipated barriers to physical therapy: Compliance/Transportation     Goals:   Short Term Goals (6 Weeks):  1. Pt will be compliant with HEP to supplement PT in restoring pain free function.  2. Pt will be able to ambulate 300ft sans assistive device in order to get around home safely  3. Pt will improve knee flexion range of motion to >90' in order to perform functional tasks at home  4. Pt will demonstrate full terminal knee extension in order to ambulate with proper mechanics.      Long Term Goals (12 Weeks):  1. Pt will improve FOTO score to </= 48% limited to decrease perceived limitation with mobility.   2.Pt will improve knee flexion range of motion to >120' in order to perform functional tasks at home  3. Pt will be able to ambulate without an increase in pain greater than 2 pts on NPRS   4. Pt will be independent c final home exercise program in order to DC to home program.     PLAN     Knee range of motion and quad control     Sarai Mcdaniel, PT

## 2023-07-17 ENCOUNTER — HOSPITAL ENCOUNTER (OUTPATIENT)
Dept: PREADMISSION TESTING | Facility: HOSPITAL | Age: 43
Discharge: HOME | End: 2023-07-17
Attending: OBSTETRICS & GYNECOLOGY
Payer: MEDICAID

## 2023-07-17 ENCOUNTER — OFFICE VISIT (OUTPATIENT)
Dept: OBSTETRICS AND GYNECOLOGY | Facility: CLINIC | Age: 43
End: 2023-07-17
Payer: MEDICAID

## 2023-07-17 ENCOUNTER — CLINICAL SUPPORT (OUTPATIENT)
Dept: REHABILITATION | Facility: HOSPITAL | Age: 43
End: 2023-07-17
Attending: OTOLARYNGOLOGY
Payer: MEDICAID

## 2023-07-17 VITALS
SYSTOLIC BLOOD PRESSURE: 120 MMHG | BODY MASS INDEX: 31.88 KG/M2 | WEIGHT: 191.56 LBS | DIASTOLIC BLOOD PRESSURE: 76 MMHG

## 2023-07-17 DIAGNOSIS — Z01.818 PREOPERATIVE TESTING: Primary | ICD-10-CM

## 2023-07-17 DIAGNOSIS — N87.1 CIN II (CERVICAL INTRAEPITHELIAL NEOPLASIA II): Primary | ICD-10-CM

## 2023-07-17 DIAGNOSIS — R26.9 ABNORMAL GAIT: Primary | ICD-10-CM

## 2023-07-17 DIAGNOSIS — M25.561 ACUTE PAIN OF RIGHT KNEE: ICD-10-CM

## 2023-07-17 DIAGNOSIS — N87.1 CIN II (CERVICAL INTRAEPITHELIAL NEOPLASIA II): ICD-10-CM

## 2023-07-17 DIAGNOSIS — M25.60 DECREASED RANGE OF MOTION: ICD-10-CM

## 2023-07-17 LAB
ALBUMIN SERPL BCP-MCNC: 4 G/DL (ref 3.5–5.2)
ALP SERPL-CCNC: 124 U/L (ref 55–135)
ALT SERPL W/O P-5'-P-CCNC: 16 U/L (ref 10–44)
ANION GAP SERPL CALC-SCNC: 6 MMOL/L (ref 8–16)
AST SERPL-CCNC: 22 U/L (ref 10–40)
BASOPHILS # BLD AUTO: 0.08 K/UL (ref 0–0.2)
BASOPHILS NFR BLD: 1.3 % (ref 0–1.9)
BILIRUB SERPL-MCNC: 0.2 MG/DL (ref 0.1–1)
BUN SERPL-MCNC: 13 MG/DL (ref 6–20)
CALCIUM SERPL-MCNC: 9 MG/DL (ref 8.7–10.5)
CHLORIDE SERPL-SCNC: 105 MMOL/L (ref 95–110)
CO2 SERPL-SCNC: 28 MMOL/L (ref 23–29)
CREAT SERPL-MCNC: 0.8 MG/DL (ref 0.5–1.4)
DIFFERENTIAL METHOD: ABNORMAL
EOSINOPHIL # BLD AUTO: 0 K/UL (ref 0–0.5)
EOSINOPHIL NFR BLD: 0.6 % (ref 0–8)
ERYTHROCYTE [DISTWIDTH] IN BLOOD BY AUTOMATED COUNT: 16.2 % (ref 11.5–14.5)
EST. GFR  (NO RACE VARIABLE): >60 ML/MIN/1.73 M^2
GLUCOSE SERPL-MCNC: 90 MG/DL (ref 70–110)
HCT VFR BLD AUTO: 34.1 % (ref 37–48.5)
HGB BLD-MCNC: 10.6 G/DL (ref 12–16)
IMM GRANULOCYTES # BLD AUTO: 0.01 K/UL (ref 0–0.04)
IMM GRANULOCYTES NFR BLD AUTO: 0.2 % (ref 0–0.5)
LYMPHOCYTES # BLD AUTO: 1.7 K/UL (ref 1–4.8)
LYMPHOCYTES NFR BLD: 26.9 % (ref 18–48)
MCH RBC QN AUTO: 25.2 PG (ref 27–31)
MCHC RBC AUTO-ENTMCNC: 31.1 G/DL (ref 32–36)
MCV RBC AUTO: 81 FL (ref 82–98)
MONOCYTES # BLD AUTO: 0.5 K/UL (ref 0.3–1)
MONOCYTES NFR BLD: 7.6 % (ref 4–15)
NEUTROPHILS # BLD AUTO: 3.9 K/UL (ref 1.8–7.7)
NEUTROPHILS NFR BLD: 63.4 % (ref 38–73)
NRBC BLD-RTO: 0 /100 WBC
PLATELET # BLD AUTO: 121 K/UL (ref 150–450)
PMV BLD AUTO: ABNORMAL FL (ref 9.2–12.9)
POTASSIUM SERPL-SCNC: 4.8 MMOL/L (ref 3.5–5.1)
PROT SERPL-MCNC: 7.7 G/DL (ref 6–8.4)
RBC # BLD AUTO: 4.21 M/UL (ref 4–5.4)
SODIUM SERPL-SCNC: 139 MMOL/L (ref 136–145)
WBC # BLD AUTO: 6.21 K/UL (ref 3.9–12.7)

## 2023-07-17 PROCEDURE — 99213 OFFICE O/P EST LOW 20 MIN: CPT | Mod: PBBFAC | Performed by: OBSTETRICS & GYNECOLOGY

## 2023-07-17 PROCEDURE — 93010 ELECTROCARDIOGRAM REPORT: CPT | Mod: ,,, | Performed by: INTERNAL MEDICINE

## 2023-07-17 PROCEDURE — 93010 EKG 12-LEAD: ICD-10-PCS | Mod: ,,, | Performed by: INTERNAL MEDICINE

## 2023-07-17 PROCEDURE — 80053 COMPREHEN METABOLIC PANEL: CPT | Performed by: OBSTETRICS & GYNECOLOGY

## 2023-07-17 PROCEDURE — 99499 NO LOS: ICD-10-PCS | Mod: S$PBB,,, | Performed by: OBSTETRICS & GYNECOLOGY

## 2023-07-17 PROCEDURE — 99999 PR PBB SHADOW E&M-EST. PATIENT-LVL III: ICD-10-PCS | Mod: PBBFAC,,, | Performed by: OBSTETRICS & GYNECOLOGY

## 2023-07-17 PROCEDURE — 99499 UNLISTED E&M SERVICE: CPT | Mod: S$PBB,,, | Performed by: OBSTETRICS & GYNECOLOGY

## 2023-07-17 PROCEDURE — 93005 ELECTROCARDIOGRAM TRACING: CPT

## 2023-07-17 PROCEDURE — 99999 PR PBB SHADOW E&M-EST. PATIENT-LVL III: CPT | Mod: PBBFAC,,, | Performed by: OBSTETRICS & GYNECOLOGY

## 2023-07-17 PROCEDURE — 97110 THERAPEUTIC EXERCISES: CPT | Mod: PN

## 2023-07-17 PROCEDURE — 85025 COMPLETE CBC W/AUTO DIFF WBC: CPT | Performed by: OBSTETRICS & GYNECOLOGY

## 2023-07-17 RX ORDER — FAMOTIDINE 20 MG/1
20 TABLET, FILM COATED ORAL
Status: CANCELLED | OUTPATIENT
Start: 2023-07-17

## 2023-07-17 RX ORDER — SODIUM CHLORIDE, SODIUM LACTATE, POTASSIUM CHLORIDE, CALCIUM CHLORIDE 600; 310; 30; 20 MG/100ML; MG/100ML; MG/100ML; MG/100ML
INJECTION, SOLUTION INTRAVENOUS CONTINUOUS
Status: CANCELLED | OUTPATIENT
Start: 2023-07-17

## 2023-07-17 RX ORDER — MUPIROCIN 20 MG/G
OINTMENT TOPICAL
Status: CANCELLED | OUTPATIENT
Start: 2023-07-17

## 2023-07-17 NOTE — DISCHARGE INSTRUCTIONS
Before 7 AM, enter through the Emergency Entrance..   After 7 AM enter through the Main Entrance.      Your procedure  is scheduled for __7/25/2023________.    Call [phone] between 2pm and 5pm on __7/24/2023_____to find out your arrival time for the day of surgery.    You may have one visitor.  No children allowed.     You will be going to the Same Day Surgery Unit on the 2nd floor of the hospital.    Important instructions:  Do not eat anything after midnight.  You may have plain water, non carbonated.  You may also have Gatorade or Powerade after midnight.    Stop all fluids 2 hours before your surgery.    It is okay to brush your teeth.  Do not have gum, candy or mints.    SEE MEDICATION SHEET.   TAKE MEDICATIONS AS DIRECTED WITH SIPS OF WATER.    STOP taking Aspirin, Ibuprofen,  Advil, Motrin, Mobic(meloxicam), Aleve (naproxen), Fish oil, and Vitamin E for at least 7 days before your surgery.     You may take Tylenol if needed which is not a blood thinner.    Please shower the night before and the morning of your surgery.      Use Chlorhexidine soap as instructed by your pre op nurse.   Please place clean linens on your bed the night before surgery. Please wear fresh clean clothing after each shower.    No shaving of procedural area at least 4-5 days before surgery due to increased risk of skin irritation and/or possible infection.    Contact lenses and removable denture work may not be worn during your procedure.    You may wear deodorant only. If you are having breast surgery, do not wear deodorant on the operative side.    Do not wear powder, body lotion, perfume/cologne or make-up.    Do not wear any jewelry or have any metal on your body.    You will be asked to remove any dentures or partials for the procedure.    If you are going home on the same day of surgery, you must arrange for a family member or a friend to drive you home.  Public transportation is prohibited.  You will not be able to  drive home if you were given anesthesia or sedation.    Patients who want to have their Post-op prescriptions filled from our in-house [facility], bring a Credit/Debit Card or cash with you. A co-pay may be required.  The pharmacy closes at 5:30 pm.    Wear loose fitting clothes allowing for bandages.    Please leave money and valuables home.      You may bring your cell phone.    Call the doctor if fever or illness should occur before your surgery.    Call [phone] to contact us here if needed.                            CLOTHES ON DAY OF SURGERY    SHOULDER surgery:  you must have a very oversized shirt.  Very, Very large.  You will probably have a large sling on with your arm strapped to your chest.  You will not be able to put the arm of the operated shoulder into a sleeve.  You can put the arm of the un-operated shoulder into the sleeve, but the shirt will need to be draped over the operated shoulder.       ARM or HAND surgery:  make sure that your sleeves are large and loose enough to pass over large dressings or cast.      BREAST or UNDERARM surgery:  wear a loose, button down shirt so that you can dress without raising your arms over your head.    ABDOMINAL surgery:  wear loose, comfortable clothing.  Nothing tight around the abdomen.  NO JEANS    PENIS or SCROTAL surgery:  loose comfortable clothing.  Large sweat pants, pajama pants or a robe.  ABSOLUTELY NO JEANS      LEG or FOOT surgery:  wear large loose pants that are able to pass over any large dressings or casts.  You could also wear loose shorts or a skirt.

## 2023-07-17 NOTE — PROGRESS NOTES
[facility]   Physical Therapy Treatment Note     Name: Miriam MILLS [facility] Number: [id number]    Therapy Diagnosis:   Encounter Diagnoses   Name Primary?    Abnormal gait Yes    Acute pain of right knee     Decreased range of motion        Physician: Order, Paper    Visit Date: 7/17/2023    Physician Orders: PT Eval and Treat NWB range of motion lower extremity   Medical Diagnosis from Referral:   S72.401D (ICD-10-CM) - Closed fracture of lower end of right femur with routine healing   S72.491A (ICD-10-CM) - Closed comminuted intra-articular fracture of distal femur, right, initial encounter      Evaluation Date: 1/23/2023  Authorization Period Expiration: 12/31/2023  Plan of Care Expiration: 5/29/2023 to 7/21/2023   Visit # / Visits authorized: 32/40 + eval  FOTO#: obtained today      Time In: 4:15pm  Time Out: 5:14pm   Total Billable Time: 59 minutes ([employer] tech assist)     Precautions: 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     RENZO 4/25/2023 - 5-130 knee ROM    SUBJECTIVE     Pt reports: Feels good today,will be having surgery next week.   She was compliant with home exercise program.  Response to previous treatment: sore  Functional change: no change    Pain: 0/10  Location: right knee      OBJECTIVE   7/12/2023:  Knee range of motion: 0-0-118    Gait: Improvement in gait mechanics, continues slight limp bu no longer circumducting.     Treatment     Miriam received the treatments listed below:      Miriam received the following manual therapy techniques: Joint mobilizations were applied to the: right knee for 0 minutes, including:  Patellar mobilization  Infrapatellar fat pad mobilization  Knee hinge for terminal knee extension  AP glide of tibial with inferior glide of patella  Passive  "knee flexion c towel for gapping       Miriam participated in neuromuscular re-education activities to improve: Proprioception and Posture for 0 minutes. The following activities were included:  Quad set towel under ankle x30 5"  Long arc quads 3x12 green theraband bilateral       Miriam received therapeutic exercises to develop strength, endurance, ROM, and flexibility for 30 minutes including:    Knee flexion stretch prone 5x15 sec   recumbent Bike Lv 5 8 min   Single leg bridge 3x12 bilateral  Lateral monster walk Yellow theraloop 2 laps  Matrix knee ext 10# single leg 3x10   Matrix ham curl 45# 3x10    Single leg squat to foam roller x30      Miriam participated in dynamic functional therapeutic activities to improve functional performance for 24 minutes, including:  Shuttle Single leg 62.5# 3x10  Shuttle 125# 3x12 c knee flexion holds and TKA  Squat taps 3x10   Step ups 2x20 12" step       Patient Education and Home Exercises     Home Exercises Provided and Patient Education Provided     Education provided:   - Pt educated on trying to avoid picking at her scabs to allow wound to heal.     Written Home Exercises Provided: Patient instructed to cont prior HEP. Exercises were reviewed and Miriam was able to demonstrate them prior to the end of the session.  Miriam demonstrated good  understanding of the education provided. See EMR under Patient Instructions for exercises provided during therapy sessions    ASSESSMENT   Miriam presents to PT today with no complaints. She was slightly progressed in both resistance and reps in order to further progress strength. She will be having a procedure next week that will not allow her to perform exercises. She may need to return to PT post surgery in order to continue with progress.     Miriam Is progressing well towards her goals.   Pt prognosis is Fair.      Pt will continue to benefit from skilled outpatient physical therapy to address the deficits listed in the problem " list box on initial evaluation, provide pt/family education and to maximize pt's level of independence in the home and community environment.      Pt's spiritual, cultural and educational needs considered and pt agreeable to plan of care and goals.     Anticipated barriers to physical therapy: Compliance/Transportation     Goals:   Short Term Goals (6 Weeks):  1. Pt will be compliant with HEP to supplement PT in restoring pain free function.  2. Pt will be able to ambulate 300ft sans assistive device in order to get around home safely  3. Pt will improve knee flexion range of motion to >90' in order to perform functional tasks at home  4. Pt will demonstrate full terminal knee extension in order to ambulate with proper mechanics.      Long Term Goals (12 Weeks):  1. Pt will improve FOTO score to </= 48% limited to decrease perceived limitation with mobility.   2.Pt will improve knee flexion range of motion to >120' in order to perform functional tasks at home  3. Pt will be able to ambulate without an increase in pain greater than 2 pts on NPRS   4. Pt will be independent c final home exercise program in order to DC to home program.     PLAN     Knee range of motion and quad control     Sarai Mcdaniel, PT

## 2023-07-17 NOTE — H&P (VIEW-ONLY)
Subjective:      Patient ID: Miriam Mendez is a 42 y.o. female.    Chief Complaint:  No chief complaint on file.      History of Present Illness  HPI  Patient has history of persistent cervical dysplasia.  Recent colposcopy showed CARINA II on ECC.  We reviewed option for patient to proceed with LEEP or hysterectomy as cervical dysplasia has been persistent.  She would like to proceed with TLH.      GYN & OB History  No LMP recorded.   Date of Last Pap: 2023    OB History    Para Term  AB Living   3 3 2 1   2   SAB IAB Ectopic Multiple Live Births           2      # Outcome Date GA Lbr Kobe/2nd Weight Sex Delivery Anes PTL Lv   3 Term 07 39w0d  3.969 kg (8 lb 12 oz) M CS-LTranv Spinal N NOEMI   2  03 36w0d  2.807 kg (6 lb 3 oz) F Vag-Spont EPI Y NOEMI   1 Term              Past Medical History:  Past Medical History:   Diagnosis Date    ADD (attention deficit disorder with hyperactivity)     psych eval 3/10    Anxiety     Bronchitis     RAD /bronchitis episodes    Cellulitis     face    Depression     undergoing psychotherapy    Easy bruising 2014    Fibromyalgia     History of ITP 2014    Iron deficiency anemia 2014    Morbid obesity     improved with s/p gastric bypass    Pica 2014    Pseudotumor cerebri syndrome     Thrombocytopenia        Past Surgical History:  Past Surgical History:   Procedure Laterality Date     SECTION      CHOLECYSTECTOMY      GASTRIC BYPASS         Family History:  Family History   Problem Relation Age of Onset    Breast cancer Mother 57    Hypertension Mother     Cancer Mother 57        breast cancer    Cancer Father     Breast cancer Paternal Grandmother     Cancer Paternal Grandmother         breast    Cancer Paternal Grandfather         kidney    Colon cancer Neg Hx     Diabetes Neg Hx     Ovarian cancer Neg Hx     Stroke Neg Hx        Allergies:  Review of patient's allergies indicates:   Allergen Reactions     Sulfa (sulfonamide antibiotics)      Other reaction(s): Hives       Medications:  Current Outpatient Medications on File Prior to Visit   Medication Sig Dispense Refill    aspirin (ECOTRIN) 81 MG EC tablet Take 81 mg by mouth 2 (two) times daily.      clonazePAM (KLONOPIN) 1 MG tablet Take 1 tablet (1 mg total) by mouth 2 (two) times daily as needed for Anxiety. 30 tablet 0    dextroamphetamine-amphetamine (ADDERALL) 15 mg tablet Take two 15 mg (30 mg total) once every morning and one 15 mg (15 mg total) mid-day, for a total of 45 mg total daily 90 tablet 0    famotidine (PEPCID) 40 MG tablet Take 40 mg by mouth.      FLUoxetine 40 MG capsule Take 1 capsule (40 mg total) by mouth once daily. 30 capsule 2    fluticasone propionate (FLONASE) 50 mcg/actuation nasal spray by Each Nostril route.      gabapentin (NEURONTIN) 300 MG capsule Take 300 mg by mouth 3 (three) times daily.      gabapentin (NEURONTIN) 300 MG capsule Take 1 capsule by mouth 3 (three) times daily.      HYDROcodone-acetaminophen (NORCO) 5-325 mg per tablet TAKE 1 TABLET BY MOUTH EVERY 6 HOURS AS NEEDED FOR PAIN MAX DAILY AMOUNT 4 TABLETS      meloxicam (MOBIC) 7.5 MG tablet Take 7.5 mg by mouth.      methocarbamoL (ROBAXIN) 500 MG Tab Take 500 mg by mouth 4 (four) times daily.      omeprazole (PRILOSEC) 20 MG capsule Take 1 capsule (20 mg total) by mouth 2 (two) times a day. 60 capsule 11    ondansetron (ZOFRAN) 4 MG tablet TAKE 1 TABLET BY MOUTH EVERY 8 HOURS AS NEEDED FOR FOR NAUSEA      valACYclovir (VALTREX) 500 MG tablet Take 500 mg by mouth 2 (two) times daily.      zolpidem (AMBIEN) 5 MG Tab Take 1 tablet (5 mg total) by mouth every evening. 30 tablet 0    [DISCONTINUED] NUVARING 0.12-0.015 mg/24 hr vaginal ring PLACE 1 RING VAGINALLY EVERY 28 DAYS 1 each 5     Current Facility-Administered Medications on File Prior to Visit   Medication Dose Route Frequency Provider Last Rate Last Admin    onabotulinumtoxina injection 100 Units  100 Units  Intramuscular 1 time in Clinic/HOD Tc Dodson MD           Social History:  Social History     Tobacco Use    Smoking status: Never    Smokeless tobacco: Never   Substance Use Topics    Alcohol use: Yes    Drug use: No             Review of Systems  Review of Systems   Constitutional: Negative.    HENT: Negative.     Eyes: Negative.    Respiratory: Negative.     Cardiovascular: Negative.    Gastrointestinal: Negative.    Endocrine: Negative.    Genitourinary: Negative.    Musculoskeletal: Negative.    Integumentary:  Negative.   Neurological: Negative.    Hematological: Negative.    Psychiatric/Behavioral: Negative.     Breast: negative.         Objective:     Physical Exam:   Constitutional: She is oriented to person, place, and time. She appears well-developed.    HENT:   Head: Normocephalic and atraumatic.    Eyes: EOM are normal.     Cardiovascular:  Normal rate.             Pulmonary/Chest: Effort normal.        Abdominal: Soft. There is no abdominal tenderness.             Musculoskeletal: Normal range of motion.       Neurological: She is oriented to person, place, and time.    Skin: Skin is warm.    Psychiatric: She has a normal mood and affect.       Assessment:     1. CARINA II (cervical intraepithelial neoplasia II)               Plan:     1. CARINA II (cervical intraepithelial neoplasia II)  - Risks, benefits, and alternatives of TLH reviewed with patient.  She voiced understanding.  All questions answered.  Consents are signed and on the chart.       - Place in Outpatient; Standing  - Full code; Standing  - Vital signs; Standing  - Insert peripheral IV; Standing  - Santacruz to Gravity; Standing  - POCT glucose; Standing  - Notify physician if BS > 180 for hysterectomy patients; Standing  - Chlorhexidine (CHG) 2% Wipes; Standing  - Notify Physician/Vital Signs Parameters Urine output less than 0.5mL/kg/hr (with indwelling catheter) or 30 mL/hr (without indwelling catheter) or blood glucose greater than 200  mg/dL; Standing  - Notify physician; Standing  - Notify Physician - Potential Need of Opioid Reversal; Standing  - Diet NPO; Standing  - Diet NPO; Standing  - Place sequential compression device; Standing  - Chlorohexidine Gluconate Bath; Standing  - Pregnancy, urine rapid; Standing  - Type & Screen Pre Op; Standing

## 2023-07-21 ENCOUNTER — PATIENT MESSAGE (OUTPATIENT)
Dept: REHABILITATION | Facility: HOSPITAL | Age: 43
End: 2023-07-21

## 2023-07-22 ENCOUNTER — LAB VISIT (OUTPATIENT)
Dept: LAB | Facility: HOSPITAL | Age: 43
End: 2023-07-22
Attending: OBSTETRICS & GYNECOLOGY
Payer: MEDICAID

## 2023-07-22 DIAGNOSIS — Z01.818 PREOPERATIVE TESTING: ICD-10-CM

## 2023-07-22 LAB
ABO + RH BLD: NORMAL
B-HCG UR QL: NEGATIVE
BLD GP AB SCN CELLS X3 SERPL QL: NORMAL
SPECIMEN OUTDATE: NORMAL

## 2023-07-22 PROCEDURE — 86900 BLOOD TYPING SEROLOGIC ABO: CPT | Performed by: OBSTETRICS & GYNECOLOGY

## 2023-07-22 PROCEDURE — 36415 COLL VENOUS BLD VENIPUNCTURE: CPT | Performed by: OBSTETRICS & GYNECOLOGY

## 2023-07-22 PROCEDURE — 81025 URINE PREGNANCY TEST: CPT | Performed by: OBSTETRICS & GYNECOLOGY

## 2023-07-25 ENCOUNTER — HOSPITAL ENCOUNTER (OUTPATIENT)
Facility: HOSPITAL | Age: 43
Discharge: HOME | End: 2023-07-25
Attending: OBSTETRICS & GYNECOLOGY | Admitting: OBSTETRICS & GYNECOLOGY
Payer: MEDICAID

## 2023-07-25 ENCOUNTER — ANESTHESIA (OUTPATIENT)
Dept: SURGERY | Facility: HOSPITAL | Age: 43
End: 2023-07-25
Payer: MEDICAID

## 2023-07-25 VITALS
SYSTOLIC BLOOD PRESSURE: 108 MMHG | OXYGEN SATURATION: 96 % | DIASTOLIC BLOOD PRESSURE: 61 MMHG | TEMPERATURE: 98 F | RESPIRATION RATE: 18 BRPM | WEIGHT: 191.56 LBS | HEART RATE: 62 BPM | BODY MASS INDEX: 31.88 KG/M2

## 2023-07-25 DIAGNOSIS — Z90.710 S/P LAPAROSCOPIC HYSTERECTOMY: Primary | ICD-10-CM

## 2023-07-25 DIAGNOSIS — Z01.818 PREOPERATIVE TESTING: ICD-10-CM

## 2023-07-25 DIAGNOSIS — N87.1 CIN II (CERVICAL INTRAEPITHELIAL NEOPLASIA II): ICD-10-CM

## 2023-07-25 LAB — POCT GLUCOSE: 91 MG/DL (ref 70–110)

## 2023-07-25 PROCEDURE — 36000711: Performed by: OBSTETRICS & GYNECOLOGY

## 2023-07-25 PROCEDURE — 25000003 PHARM REV CODE 250: Performed by: ANESTHESIOLOGY

## 2023-07-25 PROCEDURE — 63600175 PHARM REV CODE 636 W HCPCS: Performed by: ANESTHESIOLOGY

## 2023-07-25 PROCEDURE — 63600175 PHARM REV CODE 636 W HCPCS: Performed by: NURSE ANESTHETIST, CERTIFIED REGISTERED

## 2023-07-25 PROCEDURE — C2628 CATHETER, OCCLUSION: HCPCS | Performed by: OBSTETRICS & GYNECOLOGY

## 2023-07-25 PROCEDURE — D9220A PRA ANESTHESIA: Mod: ANES,,, | Performed by: ANESTHESIOLOGY

## 2023-07-25 PROCEDURE — 63600175 PHARM REV CODE 636 W HCPCS: Performed by: OBSTETRICS & GYNECOLOGY

## 2023-07-25 PROCEDURE — 58571 TLH W/T/O 250 G OR LESS: CPT | Mod: 80,,, | Performed by: OBSTETRICS & GYNECOLOGY

## 2023-07-25 PROCEDURE — 25000003 PHARM REV CODE 250: Performed by: OBSTETRICS & GYNECOLOGY

## 2023-07-25 PROCEDURE — 71000016 HC POSTOP RECOV ADDL HR: Performed by: OBSTETRICS & GYNECOLOGY

## 2023-07-25 PROCEDURE — D9220A PRA ANESTHESIA: ICD-10-PCS | Mod: ANES,,, | Performed by: ANESTHESIOLOGY

## 2023-07-25 PROCEDURE — 71000039 HC RECOVERY, EACH ADD'L HOUR: Performed by: OBSTETRICS & GYNECOLOGY

## 2023-07-25 PROCEDURE — 88307 TISSUE EXAM BY PATHOLOGIST: CPT | Mod: 26,,, | Performed by: PATHOLOGY

## 2023-07-25 PROCEDURE — 37000008 HC ANESTHESIA 1ST 15 MINUTES: Performed by: OBSTETRICS & GYNECOLOGY

## 2023-07-25 PROCEDURE — D9220A PRA ANESTHESIA: Mod: CRNA,,, | Performed by: NURSE ANESTHETIST, CERTIFIED REGISTERED

## 2023-07-25 PROCEDURE — 25000003 PHARM REV CODE 250: Performed by: NURSE ANESTHETIST, CERTIFIED REGISTERED

## 2023-07-25 PROCEDURE — 88307 PR  SURG PATH,LEVEL V: ICD-10-PCS | Mod: 26,,, | Performed by: PATHOLOGY

## 2023-07-25 PROCEDURE — 36000710: Performed by: OBSTETRICS & GYNECOLOGY

## 2023-07-25 PROCEDURE — 71000015 HC POSTOP RECOV 1ST HR: Performed by: OBSTETRICS & GYNECOLOGY

## 2023-07-25 PROCEDURE — 58571 TLH W/T/O 250 G OR LESS: CPT | Mod: ,,, | Performed by: OBSTETRICS & GYNECOLOGY

## 2023-07-25 PROCEDURE — 58571 PR LAPAROSCOPY W TOT HYSTERECTUTERUS <=250 GRAM  W TUBE/OVARY: ICD-10-PCS | Mod: ,,, | Performed by: OBSTETRICS & GYNECOLOGY

## 2023-07-25 PROCEDURE — D9220A PRA ANESTHESIA: ICD-10-PCS | Mod: CRNA,,, | Performed by: NURSE ANESTHETIST, CERTIFIED REGISTERED

## 2023-07-25 PROCEDURE — 27201423 OPTIME MED/SURG SUP & DEVICES STERILE SUPPLY: Performed by: OBSTETRICS & GYNECOLOGY

## 2023-07-25 PROCEDURE — 37000009 HC ANESTHESIA EA ADD 15 MINS: Performed by: OBSTETRICS & GYNECOLOGY

## 2023-07-25 PROCEDURE — 71000033 HC RECOVERY, INTIAL HOUR: Performed by: OBSTETRICS & GYNECOLOGY

## 2023-07-25 PROCEDURE — 88307 TISSUE EXAM BY PATHOLOGIST: CPT | Performed by: PATHOLOGY

## 2023-07-25 PROCEDURE — 58571 PR LAPAROSCOPY W TOT HYSTERECTUTERUS <=250 GRAM  W TUBE/OVARY: ICD-10-PCS | Mod: 80,,, | Performed by: OBSTETRICS & GYNECOLOGY

## 2023-07-25 PROCEDURE — 82962 GLUCOSE BLOOD TEST: CPT | Performed by: OBSTETRICS & GYNECOLOGY

## 2023-07-25 RX ORDER — MIDAZOLAM HYDROCHLORIDE 1 MG/ML
INJECTION, SOLUTION INTRAMUSCULAR; INTRAVENOUS
Status: DISCONTINUED | OUTPATIENT
Start: 2023-07-25 | End: 2023-07-25

## 2023-07-25 RX ORDER — DEXAMETHASONE SODIUM PHOSPHATE 4 MG/ML
INJECTION, SOLUTION INTRA-ARTICULAR; INTRALESIONAL; INTRAMUSCULAR; INTRAVENOUS; SOFT TISSUE
Status: DISCONTINUED | OUTPATIENT
Start: 2023-07-25 | End: 2023-07-25

## 2023-07-25 RX ORDER — PROPOFOL 10 MG/ML
VIAL (ML) INTRAVENOUS
Status: DISCONTINUED | OUTPATIENT
Start: 2023-07-25 | End: 2023-07-25

## 2023-07-25 RX ORDER — DIPHENHYDRAMINE HYDROCHLORIDE 50 MG/ML
25 INJECTION INTRAMUSCULAR; INTRAVENOUS EVERY 4 HOURS PRN
Status: DISCONTINUED | OUTPATIENT
Start: 2023-07-25 | End: 2023-07-25 | Stop reason: HOSPADM

## 2023-07-25 RX ORDER — IBUPROFEN 600 MG/1
600 TABLET ORAL EVERY 6 HOURS PRN
Status: DISCONTINUED | OUTPATIENT
Start: 2023-07-25 | End: 2023-07-25 | Stop reason: HOSPADM

## 2023-07-25 RX ORDER — ROCURONIUM BROMIDE 10 MG/ML
INJECTION, SOLUTION INTRAVENOUS
Status: DISCONTINUED | OUTPATIENT
Start: 2023-07-25 | End: 2023-07-25

## 2023-07-25 RX ORDER — IBUPROFEN 800 MG/1
800 TABLET ORAL EVERY 8 HOURS PRN
Qty: 60 TABLET | Refills: 2 | Status: SHIPPED | OUTPATIENT
Start: 2023-07-25

## 2023-07-25 RX ORDER — FAMOTIDINE 20 MG/1
20 TABLET, FILM COATED ORAL
Status: COMPLETED | OUTPATIENT
Start: 2023-07-25 | End: 2023-07-25

## 2023-07-25 RX ORDER — DOCUSATE SODIUM 100 MG/1
100 CAPSULE, LIQUID FILLED ORAL 2 TIMES DAILY
Status: DISCONTINUED | OUTPATIENT
Start: 2023-07-25 | End: 2023-07-25 | Stop reason: HOSPADM

## 2023-07-25 RX ORDER — OXYCODONE HYDROCHLORIDE 5 MG/1
5 TABLET ORAL EVERY 4 HOURS PRN
Status: DISCONTINUED | OUTPATIENT
Start: 2023-07-25 | End: 2023-07-25 | Stop reason: HOSPADM

## 2023-07-25 RX ORDER — ACETAMINOPHEN 500 MG
1000 TABLET ORAL EVERY 6 HOURS PRN
Qty: 100 TABLET | Refills: 2 | Status: SHIPPED | OUTPATIENT
Start: 2023-07-25 | End: 2024-07-24

## 2023-07-25 RX ORDER — PROCHLORPERAZINE EDISYLATE 5 MG/ML
5 INJECTION INTRAMUSCULAR; INTRAVENOUS EVERY 6 HOURS PRN
Status: DISCONTINUED | OUTPATIENT
Start: 2023-07-25 | End: 2023-07-25 | Stop reason: HOSPADM

## 2023-07-25 RX ORDER — DOCUSATE SODIUM 100 MG/1
100 CAPSULE, LIQUID FILLED ORAL 2 TIMES DAILY
Qty: 60 CAPSULE | Refills: 1 | Status: SHIPPED | OUTPATIENT
Start: 2023-07-25 | End: 2024-01-12

## 2023-07-25 RX ORDER — ONDANSETRON 4 MG/1
8 TABLET, ORALLY DISINTEGRATING ORAL EVERY 8 HOURS PRN
Status: DISCONTINUED | OUTPATIENT
Start: 2023-07-25 | End: 2023-07-25 | Stop reason: HOSPADM

## 2023-07-25 RX ORDER — SODIUM CHLORIDE, SODIUM LACTATE, POTASSIUM CHLORIDE, CALCIUM CHLORIDE 600; 310; 30; 20 MG/100ML; MG/100ML; MG/100ML; MG/100ML
INJECTION, SOLUTION INTRAVENOUS CONTINUOUS
Status: DISCONTINUED | OUTPATIENT
Start: 2023-07-25 | End: 2023-07-25 | Stop reason: HOSPADM

## 2023-07-25 RX ORDER — ACETAMINOPHEN 500 MG
1000 TABLET ORAL
Status: COMPLETED | OUTPATIENT
Start: 2023-07-25 | End: 2023-07-25

## 2023-07-25 RX ORDER — LIDOCAINE HYDROCHLORIDE 20 MG/ML
INJECTION INTRAVENOUS
Status: DISCONTINUED | OUTPATIENT
Start: 2023-07-25 | End: 2023-07-25

## 2023-07-25 RX ORDER — OXYCODONE HYDROCHLORIDE 5 MG/1
10 TABLET ORAL EVERY 4 HOURS PRN
Status: DISCONTINUED | OUTPATIENT
Start: 2023-07-25 | End: 2023-07-25 | Stop reason: HOSPADM

## 2023-07-25 RX ORDER — HYDROMORPHONE HYDROCHLORIDE 2 MG/ML
1 INJECTION, SOLUTION INTRAMUSCULAR; INTRAVENOUS; SUBCUTANEOUS EVERY 6 HOURS PRN
Status: DISCONTINUED | OUTPATIENT
Start: 2023-07-25 | End: 2023-07-25 | Stop reason: HOSPADM

## 2023-07-25 RX ORDER — FENTANYL CITRATE 50 UG/ML
INJECTION, SOLUTION INTRAMUSCULAR; INTRAVENOUS
Status: DISCONTINUED | OUTPATIENT
Start: 2023-07-25 | End: 2023-07-25

## 2023-07-25 RX ORDER — CEFAZOLIN SODIUM 2 G/50ML
2 SOLUTION INTRAVENOUS
Status: COMPLETED | OUTPATIENT
Start: 2023-07-25 | End: 2023-07-25

## 2023-07-25 RX ORDER — HYDROMORPHONE HYDROCHLORIDE 2 MG/ML
0.2 INJECTION, SOLUTION INTRAMUSCULAR; INTRAVENOUS; SUBCUTANEOUS EVERY 5 MIN PRN
Status: DISCONTINUED | OUTPATIENT
Start: 2023-07-25 | End: 2023-07-25 | Stop reason: HOSPADM

## 2023-07-25 RX ORDER — DIPHENHYDRAMINE HCL 25 MG
25 CAPSULE ORAL EVERY 4 HOURS PRN
Status: DISCONTINUED | OUTPATIENT
Start: 2023-07-25 | End: 2023-07-25 | Stop reason: HOSPADM

## 2023-07-25 RX ORDER — OXYCODONE HYDROCHLORIDE 5 MG/1
5 TABLET ORAL EVERY 4 HOURS PRN
Qty: 20 TABLET | Refills: 0 | Status: SHIPPED | OUTPATIENT
Start: 2023-07-25 | End: 2023-09-01

## 2023-07-25 RX ORDER — BUPIVACAINE HYDROCHLORIDE 2.5 MG/ML
INJECTION, SOLUTION EPIDURAL; INFILTRATION; INTRACAUDAL
Status: DISCONTINUED | OUTPATIENT
Start: 2023-07-25 | End: 2023-07-25 | Stop reason: HOSPADM

## 2023-07-25 RX ORDER — PHENYLEPHRINE HYDROCHLORIDE 10 MG/ML
INJECTION INTRAVENOUS
Status: DISCONTINUED | OUTPATIENT
Start: 2023-07-25 | End: 2023-07-25

## 2023-07-25 RX ORDER — MUPIROCIN 20 MG/G
OINTMENT TOPICAL
Status: DISCONTINUED | OUTPATIENT
Start: 2023-07-25 | End: 2023-07-25 | Stop reason: HOSPADM

## 2023-07-25 RX ORDER — SODIUM CHLORIDE 0.9 % (FLUSH) 0.9 %
10 SYRINGE (ML) INJECTION
Status: DISCONTINUED | OUTPATIENT
Start: 2023-07-25 | End: 2023-07-25 | Stop reason: HOSPADM

## 2023-07-25 RX ORDER — LIDOCAINE HYDROCHLORIDE 10 MG/ML
1 INJECTION, SOLUTION EPIDURAL; INFILTRATION; INTRACAUDAL; PERINEURAL ONCE
Status: DISCONTINUED | OUTPATIENT
Start: 2023-07-25 | End: 2023-07-25 | Stop reason: HOSPADM

## 2023-07-25 RX ORDER — SCOLOPAMINE TRANSDERMAL SYSTEM 1 MG/1
PATCH, EXTENDED RELEASE TRANSDERMAL
Status: DISCONTINUED | OUTPATIENT
Start: 2023-07-25 | End: 2023-07-25

## 2023-07-25 RX ORDER — ONDANSETRON 2 MG/ML
INJECTION INTRAMUSCULAR; INTRAVENOUS
Status: DISCONTINUED | OUTPATIENT
Start: 2023-07-25 | End: 2023-07-25

## 2023-07-25 RX ADMIN — SODIUM CHLORIDE: 0.9 INJECTION, SOLUTION INTRAVENOUS at 08:07

## 2023-07-25 RX ADMIN — ROCURONIUM BROMIDE 50 MG: 10 INJECTION, SOLUTION INTRAVENOUS at 07:07

## 2023-07-25 RX ADMIN — ONDANSETRON 4 MG: 2 INJECTION, SOLUTION INTRAMUSCULAR; INTRAVENOUS at 08:07

## 2023-07-25 RX ADMIN — SODIUM CHLORIDE, POTASSIUM CHLORIDE, SODIUM LACTATE AND CALCIUM CHLORIDE: 600; 310; 30; 20 INJECTION, SOLUTION INTRAVENOUS at 09:07

## 2023-07-25 RX ADMIN — CEFAZOLIN SODIUM 2 G: 2 SOLUTION INTRAVENOUS at 07:07

## 2023-07-25 RX ADMIN — OXYCODONE HYDROCHLORIDE 10 MG: 5 TABLET ORAL at 01:07

## 2023-07-25 RX ADMIN — MUPIROCIN: 20 OINTMENT TOPICAL at 06:07

## 2023-07-25 RX ADMIN — SODIUM CHLORIDE, SODIUM LACTATE, POTASSIUM CHLORIDE, AND CALCIUM CHLORIDE: .6; .31; .03; .02 INJECTION, SOLUTION INTRAVENOUS at 06:07

## 2023-07-25 RX ADMIN — HYDROMORPHONE HYDROCHLORIDE 0.2 MG: 2 INJECTION INTRAMUSCULAR; INTRAVENOUS; SUBCUTANEOUS at 09:07

## 2023-07-25 RX ADMIN — PHENYLEPHRINE HYDROCHLORIDE 100 MCG: 10 INJECTION INTRAVENOUS at 08:07

## 2023-07-25 RX ADMIN — LIDOCAINE HYDROCHLORIDE 100 MG: 20 INJECTION, SOLUTION INTRAVENOUS at 07:07

## 2023-07-25 RX ADMIN — PHENYLEPHRINE HYDROCHLORIDE 200 MCG: 10 INJECTION INTRAVENOUS at 07:07

## 2023-07-25 RX ADMIN — PROCHLORPERAZINE EDISYLATE 5 MG: 5 INJECTION, SOLUTION INTRAMUSCULAR; INTRAVENOUS at 09:07

## 2023-07-25 RX ADMIN — SUGAMMADEX 200 MG: 100 INJECTION, SOLUTION INTRAVENOUS at 08:07

## 2023-07-25 RX ADMIN — FAMOTIDINE 20 MG: 20 TABLET, FILM COATED ORAL at 06:07

## 2023-07-25 RX ADMIN — FENTANYL CITRATE 100 MCG: 0.05 INJECTION, SOLUTION INTRAMUSCULAR; INTRAVENOUS at 07:07

## 2023-07-25 RX ADMIN — PHENYLEPHRINE HYDROCHLORIDE 100 MCG: 10 INJECTION INTRAVENOUS at 07:07

## 2023-07-25 RX ADMIN — SCOPALAMINE 1 PATCH: 1 PATCH, EXTENDED RELEASE TRANSDERMAL at 06:07

## 2023-07-25 RX ADMIN — MIDAZOLAM HYDROCHLORIDE 2 MG: 1 INJECTION, SOLUTION INTRAMUSCULAR; INTRAVENOUS at 07:07

## 2023-07-25 RX ADMIN — DEXAMETHASONE SODIUM PHOSPHATE 4 MG: 4 INJECTION, SOLUTION INTRAMUSCULAR; INTRAVENOUS at 07:07

## 2023-07-25 RX ADMIN — ACETAMINOPHEN 1000 MG: 500 TABLET ORAL at 06:07

## 2023-07-25 RX ADMIN — PROPOFOL 200 MG: 10 INJECTION, EMULSION INTRAVENOUS at 07:07

## 2023-07-25 RX ADMIN — GLYCOPYRROLATE 0.2 MG: 0.2 INJECTION, SOLUTION INTRAMUSCULAR; INTRAVITREAL at 07:07

## 2023-07-25 NOTE — ANESTHESIA POSTPROCEDURE EVALUATION
Anesthesia Post Evaluation    Patient: Miriam Mendez    Procedure(s) Performed: Procedure(s) (LRB):  HYSTERECTOMY, TOTAL, LAPAROSCOPIC (N/A)    Final Anesthesia Type: general      Patient location during evaluation: [facility]  Patient participation: Yes- Able to Participate  Level of consciousness: awake and alert  Post-procedure vital signs: reviewed and stable  Pain management: adequate  Airway patency: patent    PONV status at discharge: No PONV  Anesthetic complications: no      Cardiovascular status: blood pressure returned to baseline and hemodynamically stable  Respiratory status: room air, unassisted and spontaneous ventilation  Hydration status: euvolemic  Follow-up not needed.          Vitals Value Taken Time   BP 87/52 07/25/23 1054   Temp 36.4 °C (97.6 °F) 07/25/23 1054   Pulse 56 07/25/23 1054   Resp 16 07/25/23 1054   SpO2 99 % 07/25/23 1054         Event Time   Out of Recovery 10:52:08         Pain/Lynn Score: Pain Rating Prior to Med Admin: 7 (7/25/2023  9:13 AM)  Pain Rating Post Med Admin: 4 (7/25/2023  9:55 AM)  Lynn Score: 9 (7/25/2023 10:54 AM)         No

## 2023-07-25 NOTE — PROGRESS NOTES
Released from PACU per Dr Lai. BP stable; denies pain. Voided x 1 per bed pan. Nausea resolved.   See chart for full assessment. Hand off report to Henrique BEST RN.

## 2023-07-25 NOTE — TRANSFER OF CARE
Anesthesia Transfer of Care Note    Patient: Miriam Mendez    Procedure(s) Performed: Procedure(s) (LRB):  HYSTERECTOMY, TOTAL, LAPAROSCOPIC (N/A)    Patient location: PACU    Anesthesia Type: general    Transport from OR: Transported from OR on room air with adequate spontaneous ventilation    Post pain: adequate analgesia    Post assessment: no apparent anesthetic complications and tolerated procedure well    Post vital signs: stable    Level of consciousness: sedated and responds to stimulation    Nausea/Vomiting: no nausea/vomiting    Complications: none    Transfer of care protocol was followed      Last vitals:   Visit Vitals  BP (!) 103/57 (BP Location: Right arm)   Pulse 74   Temp 37 °C (98.6 °F) (Temporal)   Resp 17   Wt 86.9 kg (191 lb 9 oz)   LMP 07/01/2023   SpO2 100%   Breastfeeding No   BMI 31.88 kg/m²

## 2023-07-25 NOTE — DISCHARGE INSTRUCTIONS
BATHING:                   You may shower after your dressing is removed, but no tub baths, hot tubs, saunas or swimming until you see the doctor.    Dermabond / Prineotape    or a material like it was used on your incision.   It is like a liquid glue.   Do not peel or try to remove it it will start to fall off in 7-10 days on its' own.   It is OK to shower, pat dry, do not apply any creams or lotions.    No tub baths, swimming pools, hot tubs or submersion of the incision until your surgeon says it's ok.        ACTIVITY LEVEL: If you have received sedation or an anesthetic, you may feel sleepy for   several hours. Rest until you are more awake. Gradually resume your normal activities  No heavy lifting or straining, nothing over 10 lbs., like a gallon of milk.  Pelvic rest- no sex, tampons or douching until follow up or instructed by doctor.  DIET:  You may resume your home diet. If nausea is present, increase your diet gradually with fluids and bland foods.    Medications:  Pain medication should be taken only if needed and as directed. If antibiotics are prescribed, the medication should be taken until completed. You will be given an updated list of you medications.  No driving, alcoholic beverages or signing legal documents for next 24 hours or while taking pain medication    CALL THE DOCTOR:   For any obvious bleeding (some dried blood over the incision is normal).     Redness, swelling, foul smell around incision or fever over 101.  Shortness of breath, Coughing up Bloody Sputum or Pains or Swelling in your calves.  Persistent pain or nausea not relieved by medication.  If vaginal bleeding is in excess of a normal period.  Problems urinating    If any unusual problems or difficulties occur contact your doctor. If you cannot contact your doctor but feel your signs and symptoms warrant a physicians attention return to the emergency room.            Fall Prevention  Millions of people fall every year and  injure themselves. You may have had anesthesia or sedation which may increase your risk of falling. You may have health issues that put you at an increased risk of falling.     Here are ways to reduce your risk of falling.    Make your home safe by keeping walkways clear of objects you may trip over.  Use non-slip pads under rugs. Do not use area rugs or small throw rugs.  Use non-slip mats in bathtubs and showers.  Install handrails and lights on staircases.  Do not walk in poorly lit areas.  Do not stand on chairs or wobbly ladders.  Use caution when reaching overhead or looking upward. This position can cause a loss of balance.  Be sure your shoes fit properly, have non-slip bottoms and are in good condition.   Wear shoes both inside and out. Avoid going barefoot or wearing slippers.  Be cautious when going up and down stairs, curbs, and when walking on uneven sidewalks.  If your balance is poor, consider using a cane or walker.  If your fall was related to alcohol use, stop or limit alcohol intake.   If your fall was related to use of sleeping medicines, talk to your doctor about this. You may need to reduce your dosage at bedtime if you awaken during the night to go to the bathroom.    To reduce the need for nighttime bathroom trips:  Avoid drinking fluids for several hours before going to bed  Empty your bladder before going to bed  Men can keep a urinal at the bedside  Stay as active as you can. Balance, flexibility, strength, and endurance all come from exercise. They all play a role in preventing falls. Ask your healthcare provider which types of activity are right for you.  Get your vision checked on a regular basis.  If you have pets, know where they are before you stand up or walk so you don't trip over them.  Use night lights.

## 2023-07-25 NOTE — ANESTHESIA PROCEDURE NOTES
Intubation    Date/Time: 7/25/2023 7:18 AM  Performed by: Nereida Gaitan CRNA  Authorized by: Cesar Kuhn MD     Intubation:     Induction:  Intravenous    Intubated:  Postinduction    Mask Ventilation:  Easy mask    Attempts:  1    Attempted By:  CRNA    Method of Intubation:  Video laryngoscopy    Blade:  Shipley 3    Laryngeal View Grade: Grade I - full view of cords      Difficult Airway Encountered?: No      Complications:  None    Airway Device:  Oral endotracheal tube    Airway Device Size:  7.0    Style/Cuff Inflation:  Cuffed (inflated to minimal occlusive pressure)    Inflation Amount (mL):  4    Tube secured:  21    Secured at:  The lips    Placement Verified By:  Capnometry and Revisualization with laryngoscopy    Complicating Factors:  Obesity    Findings Post-Intubation:  BS equal bilateral and atraumatic/condition of teeth unchanged

## 2023-07-25 NOTE — OP NOTE
[facility]  OBGYN  Operative Note    SUMMARY     Date of Procedure: 7/25/2023     Procedure: Procedure(s) (LRB):  HYSTERECTOMY, TOTAL, LAPAROSCOPIC (N/A)       Surgeon(s) and Role:     * Kelly Lujan MD - Primary     * Fabiana-Mac Jorgensen Mai, MD - Assisting        Pre-Operative Diagnosis: CARINA II (cervical intraepithelial neoplasia II) [N87.1]    Post-Operative Diagnosis: Post-Op Diagnosis Codes:     * CARINA II (cervical intraepithelial neoplasia II) [N87.1]    Anesthesia: General    Technical Procedures Used:   The patient was taken to the operating room and placed first in the supine position. Calf high pneumatic compression boots were placed for DVT prophylaxis. She received 2 gms of Cefazolin IV as antibiotic prophylaxis. She then underwent uneventful general endotracheal anesthesia. She was placed in the semi-dorsolithotomy position using padded Juanjose stirrups. She was then prepped and draped in the usual sterile fashion. A surgical time-out was performed.     A Santacruz catheter was placed in the usual fashion. The cervix was visualized and grasped with a tenaculum for retraction.  Using 0 Vicryl suture, 2 stay sutures were placed at 3 o'clock and 9 o'clock on the cervix.  The uterus was sounded to 10 cm.  The LOLA uterine manipulator with a KOH cup was then inserted without difficulty.    Gloves were changed and attention was turned to the abdomen.     All trocar sites were pretreated with 0.25% plain Marcaine. A 10 mm incision was made in the base of the umbilicus and grasped with towel clips and the Veress needle was passed through the base of the incision into the abdomen with an opening pressure of 5 mmHG. The abdomen was insufflated to a pressure of 15, the Veress needle was removed, and a 11 mm trocar was placed under optical guidance. There was no evidence of injury below the level of insertion. The upper abdomen was inspected and was noted to be normal. The pelvis was inspected with the below  findings. A 5 mm right lateral and 5 mm left lateral trocar, each lateral to their respective epigastric vessels in the lower quadrants were placed under direct visualization.     Both fallopian tubes were identified and were normal.  Both ovaries were normal.  Cul-de-sac was normal.  The uterus was normal.       The procedure was performed with a Ligasure and Harmonic Hook.    Attention was turned to the right-hand side.  Using the Ligasure, the right fallopian tube was  from the underlying ovary by transecting the mesosalpinx.   The right utero-ovarian ligament was cauterized and transected to the level of the round ligament.  The round ligament was cauterized and transected.  The anterior and posterior leaves of the broad ligament were dissected open. The bladder was dissected down below the level of the KOH cup anteriorly.     The uterine vessels were then skeletonized, cauterized, transected and lateralized.       The same procedures were repeated on the left hand side. The harmonic hook was then used to make an anterior colpotomy. Using the KOH cup as a guide, a circumferential incision was made around the cervico-uterine junction to free the specimen. The specimen was then delivered through the vagina and intact.       The vagina was then closed laparoscopically using the Endostitch and a V-lock suture in a running fashion.       Irrigation was performed and there was noted to be excellent hemostasis on low pressure.     A limited diagnostic cystoscopy was then performed. The Santacruz was removed and a 30 degree cystoscope was placed. 200 cc of sterile saline were instilled into the bladder. The dome of the bladder was intact-there was no suture material visible. Both ureteral orifices were visualized and strong jets of urine were noted bilaterally. The cystoscope was then removed and the bladder emptied of saline.      Gloves were changed and attention was then turned the abdomen to close the port  incisions.  The abdomen was desufflated and ports were removed.  The fascia of the umbilical port incision was closed with 0-Vicryl suture.  The skin of the umbilical port incision was closed with 4-0 Monocryl.  Dermabond was used to close the remaining port incisions.     The patient tolerated the procedure well.      Sponge, lap, and needle count was correct     The patient was taken to the recover room in stable condition.         Description of the Findings of the Procedure:   - Normal uterus, fallopian tubes, and ovaries  - No suture material in the bladder  - strong jets of urine from both ureteral orifices were noted bilaterally       Complications: No    Estimated Blood Loss (EBL): 20 mL           Implants: * No implants in log *    Specimens:   Specimen (24h ago, onward)      None                    Condition: Good    Disposition: PACU - hemodynamically stable.    Attestation: I was present and scrubbed for the entire procedure.

## 2023-07-25 NOTE — BRIEF OP NOTE
[facility] - Surgery  OBGYN  Brief Operative Note    SUMMARY     Date of Procedure: 7/25/2023     Procedure: Procedure(s) (LRB):  HYSTERECTOMY, TOTAL, LAPAROSCOPIC (N/A)       Surgeon(s) and Role:     * Kelly Lujan MD - Primary     * Fabiana-Mac Jorgensen Mai, MD - Assisting        Pre-Operative Diagnosis: CARINA II (cervical intraepithelial neoplasia II) [N87.1]    Post-Operative Diagnosis: Post-Op Diagnosis Codes:     * CARINA II (cervical intraepithelial neoplasia II) [N87.1]    Anesthesia: General    Description of the Findings of the Procedure:   - Normal uterus, fallopian tubes, and ovaries  - No suture material in the bladder  - strong jets of urine from both ureteral orifices were noted bilaterally       Complications: No    Estimated Blood Loss (EBL): 20 mL           Implants: * No implants in log *    Specimens:   Specimen (24h ago, onward)      None                    Condition: Good    Disposition: PACU - hemodynamically stable.    Attestation: I was present and scrubbed for the entire procedure.

## 2023-07-25 NOTE — ANESTHESIA PREPROCEDURE EVALUATION
07/25/2023  Miriam Mendez is a 42 y.o., female.      Pre-op Assessment    I have reviewed the Patient Summary Reports.     I have reviewed the Nursing Notes. I have reviewed the NPO Status.   I have reviewed the Medications.     Review of Systems  Anesthesia Hx:  Denies Family Hx of Anesthesia complications.   Denies Personal Hx of Anesthesia complications.   Social:  Non-Smoker, No Alcohol Use    Hematology/Oncology:     Oncology Normal     EENT/Dental:EENT/Dental Normal   Cardiovascular:  Cardiovascular Normal     Neurological:   R Facial palsy   Endocrine:  Obesity / BMI > 30  Psych:   anxiety          Physical Exam  General: Well nourished, Cooperative, Alert and Oriented    Airway:  Mallampati: II / I  Mouth Opening: Normal  TM Distance: Normal  Neck ROM: Normal ROM    Dental:  Intact        Anesthesia Plan  Type of Anesthesia, risks & benefits discussed:    Anesthesia Type: Gen ETT  Intra-op Monitoring Plan: Standard ASA Monitors  Post Op Pain Control Plan: multimodal analgesia  Induction:  IV  Airway Plan: Video, Post-Induction  Informed Consent: Informed consent signed with the Patient and all parties understand the risks and agree with anesthesia plan.  All questions answered. Patient consented to blood products? Yes  ASA Score: 1    Ready For Surgery From Anesthesia Perspective.     .

## 2023-07-27 LAB
FINAL PATHOLOGIC DIAGNOSIS: NORMAL
GROSS: NORMAL
Lab: NORMAL

## 2023-08-01 ENCOUNTER — PATIENT MESSAGE (OUTPATIENT)
Dept: HEMATOLOGY/ONCOLOGY | Facility: CLINIC | Age: 43
End: 2023-08-01
Payer: MEDICAID

## 2023-08-08 ENCOUNTER — TELEPHONE (OUTPATIENT)
Dept: OBSTETRICS AND GYNECOLOGY | Facility: CLINIC | Age: 43
End: 2023-08-08
Payer: MEDICAID

## 2023-08-08 NOTE — TELEPHONE ENCOUNTER
----- Message from Maite Corrigan sent at 8/8/2023  9:13 AM CDT -----  Regarding: Requesting advice  .Type:  Needs Medical Advice/Symptom-based Call    Who Called:     Symptoms (please be specific): has hysterectomy 2 weeks ago having stomach cramps and bleeding.     How long has patient had these symptoms: yesterday     Would the patient rather a call back or a response via [facility]? Call     Best Call Back Number: .[phone]      Additional Information:

## 2023-08-08 NOTE — TELEPHONE ENCOUNTER
Pt call was returned. Pt stated that she was having some cramping and spotting post-op. Pt denied heavy lifting and straining but increased activity level. Pt stated that she is still gassy. Pt was advised to continue to walk and take gas-x to help with the gas and to OTC pain meds for discomforts. Pt verbalized understanding.

## 2023-08-13 ENCOUNTER — HOSPITAL ENCOUNTER (EMERGENCY)
Facility: HOSPITAL | Age: 43
Discharge: HOME | End: 2023-08-13
Attending: EMERGENCY MEDICINE
Payer: MEDICAID

## 2023-08-13 VITALS
HEART RATE: 62 BPM | SYSTOLIC BLOOD PRESSURE: 115 MMHG | TEMPERATURE: 98 F | OXYGEN SATURATION: 100 % | BODY MASS INDEX: 29.99 KG/M2 | HEIGHT: 65 IN | WEIGHT: 180 LBS | RESPIRATION RATE: 18 BRPM | DIASTOLIC BLOOD PRESSURE: 65 MMHG

## 2023-08-13 DIAGNOSIS — Z90.710 STATUS POST LAPAROSCOPIC HYSTERECTOMY: ICD-10-CM

## 2023-08-13 DIAGNOSIS — D69.6 THROMBOCYTOPENIA: ICD-10-CM

## 2023-08-13 DIAGNOSIS — N93.9 VAGINAL BLEEDING: Primary | ICD-10-CM

## 2023-08-13 LAB
ALBUMIN SERPL BCP-MCNC: 3.4 G/DL (ref 3.5–5.2)
ALP SERPL-CCNC: 108 U/L (ref 55–135)
ALT SERPL W/O P-5'-P-CCNC: 18 U/L (ref 10–44)
ANION GAP SERPL CALC-SCNC: 6 MMOL/L (ref 8–16)
AST SERPL-CCNC: 20 U/L (ref 10–40)
BASOPHILS # BLD AUTO: 0.11 K/UL (ref 0–0.2)
BASOPHILS NFR BLD: 2.1 % (ref 0–1.9)
BILIRUB SERPL-MCNC: 0.2 MG/DL (ref 0.1–1)
BILIRUB UR QL STRIP: NEGATIVE
BUN SERPL-MCNC: 13 MG/DL (ref 6–20)
CALCIUM SERPL-MCNC: 8.1 MG/DL (ref 8.7–10.5)
CHLORIDE SERPL-SCNC: 103 MMOL/L (ref 95–110)
CLARITY UR: CLEAR
CO2 SERPL-SCNC: 28 MMOL/L (ref 23–29)
COLOR UR: YELLOW
CREAT SERPL-MCNC: 0.8 MG/DL (ref 0.5–1.4)
DIFFERENTIAL METHOD: ABNORMAL
EOSINOPHIL # BLD AUTO: 0.2 K/UL (ref 0–0.5)
EOSINOPHIL NFR BLD: 4.1 % (ref 0–8)
ERYTHROCYTE [DISTWIDTH] IN BLOOD BY AUTOMATED COUNT: 15.6 % (ref 11.5–14.5)
EST. GFR  (NO RACE VARIABLE): >60 ML/MIN/1.73 M^2
GLUCOSE SERPL-MCNC: 85 MG/DL (ref 70–110)
GLUCOSE UR QL STRIP: NEGATIVE
HCT VFR BLD AUTO: 31.3 % (ref 37–48.5)
HGB BLD-MCNC: 9.7 G/DL (ref 12–16)
HGB UR QL STRIP: ABNORMAL
IMM GRANULOCYTES # BLD AUTO: 0.01 K/UL (ref 0–0.04)
IMM GRANULOCYTES NFR BLD AUTO: 0.2 % (ref 0–0.5)
INR PPP: 1 (ref 0.8–1.2)
KETONES UR QL STRIP: NEGATIVE
LEUKOCYTE ESTERASE UR QL STRIP: NEGATIVE
LIPASE SERPL-CCNC: 13 U/L (ref 4–60)
LYMPHOCYTES # BLD AUTO: 2.3 K/UL (ref 1–4.8)
LYMPHOCYTES NFR BLD: 43.2 % (ref 18–48)
MAGNESIUM SERPL-MCNC: 1.8 MG/DL (ref 1.6–2.6)
MCH RBC QN AUTO: 25.5 PG (ref 27–31)
MCHC RBC AUTO-ENTMCNC: 31 G/DL (ref 32–36)
MCV RBC AUTO: 82 FL (ref 82–98)
MICROSCOPIC COMMENT: ABNORMAL
MONOCYTES # BLD AUTO: 0.4 K/UL (ref 0.3–1)
MONOCYTES NFR BLD: 7.1 % (ref 4–15)
NEUTROPHILS # BLD AUTO: 2.3 K/UL (ref 1.8–7.7)
NEUTROPHILS NFR BLD: 43.3 % (ref 38–73)
NITRITE UR QL STRIP: NEGATIVE
NRBC BLD-RTO: 0 /100 WBC
PH UR STRIP: 6 [PH] (ref 5–8)
PLATELET # BLD AUTO: 81 K/UL (ref 150–450)
PMV BLD AUTO: ABNORMAL FL (ref 9.2–12.9)
POTASSIUM SERPL-SCNC: 4 MMOL/L (ref 3.5–5.1)
PROT SERPL-MCNC: 6.7 G/DL (ref 6–8.4)
PROT UR QL STRIP: NEGATIVE
PROTHROMBIN TIME: 10.2 SEC (ref 9–12.5)
RBC # BLD AUTO: 3.8 M/UL (ref 4–5.4)
RBC #/AREA URNS HPF: >100 /HPF (ref 0–4)
SODIUM SERPL-SCNC: 137 MMOL/L (ref 136–145)
SP GR UR STRIP: 1.02 (ref 1–1.03)
URN SPEC COLLECT METH UR: ABNORMAL
UROBILINOGEN UR STRIP-ACNC: NEGATIVE EU/DL
WBC # BLD AUTO: 5.33 K/UL (ref 3.9–12.7)

## 2023-08-13 PROCEDURE — 83690 ASSAY OF LIPASE: CPT | Performed by: PHYSICIAN ASSISTANT

## 2023-08-13 PROCEDURE — 81000 URINALYSIS NONAUTO W/SCOPE: CPT | Performed by: PHYSICIAN ASSISTANT

## 2023-08-13 PROCEDURE — 85025 COMPLETE CBC W/AUTO DIFF WBC: CPT | Performed by: PHYSICIAN ASSISTANT

## 2023-08-13 PROCEDURE — 85610 PROTHROMBIN TIME: CPT | Performed by: PHYSICIAN ASSISTANT

## 2023-08-13 PROCEDURE — 80053 COMPREHEN METABOLIC PANEL: CPT | Performed by: PHYSICIAN ASSISTANT

## 2023-08-13 PROCEDURE — 25500020 PHARM REV CODE 255: Performed by: EMERGENCY MEDICINE

## 2023-08-13 PROCEDURE — 99285 EMERGENCY DEPT VISIT HI MDM: CPT | Mod: 25

## 2023-08-13 PROCEDURE — 83735 ASSAY OF MAGNESIUM: CPT | Performed by: PHYSICIAN ASSISTANT

## 2023-08-13 RX ADMIN — IOHEXOL 80 ML: 350 INJECTION, SOLUTION INTRAVENOUS at 05:08

## 2023-08-13 NOTE — ED TRIAGE NOTES
Miriam Mendez is a 42 y.o female to ED c/o left occipital HA x3 days.  Vaginal bleeding and lower abdominal pain x2hrs pta. Reports soaking 4 pads/hr. She had a hysterectomy 3 wks ago. 3/10 ibuprofen taken at 2300.

## 2023-08-13 NOTE — ED PROVIDER NOTES
Encounter Date: 8/13/2023       History     Chief Complaint   Patient presents with    Vaginal Bleeding     Patient arrives with vaginal bleeding, ongoing for the past 2 hours. Hysterectomy three weeks ago. Patient refusing to finish being triaged, as she is bleeding and does not want to sit.     41yo F presents to ED with chief complaint acute onset vaginal bleeding while sleeping.    Patient began with vaginal bleeding while sleeping at 2:00 a.m..  She states shortly thereafter began with lower abdominal pain/pelvic discomfort.  She states she has saturated approx 4 pads since onset of vaginal bleeding. She admits to feeling a bit lightheaded.  No syncope or near-syncope.    Pt underwent lap hysterectomy on 7/25 with .  States she has been doing well postoperatively.  No increased pain to her wounds or dehiscence.  No tenderness to her wounds.  She denies fever, chills, myalgias.  She denies any new or increasing pain to her lower abdomen prior to onset of vaginal bleeding.  Normal appetite and intake.  No reported nausea vomiting.  Normal BMs.  No urinary complaints.  No flank pain.    She states a proximally one-week ago she had some mild pink vaginal discharge versus spotting, none since that time.  No sexual intercourse.    PMH:  Dysarthria  Facial palsy  MARYLU  Pica  ETOH abuse  ADHD  CARINA II  Hx menorrhagia w/irregular cycle  Hx c diff  Hx MRSA  ITP  MARLIN  CPA (cerebellopontine angle) tumor  Hx gastric bypass  Fibromyalgia  Impairment of balance  Abnormal gait        Review of patient's allergies indicates:   Allergen Reactions    Sulfa (sulfonamide antibiotics)      Other reaction(s): Hives     Past Medical History:   Diagnosis Date    ADD (attention deficit disorder with hyperactivity)     psych eval 3/10    Anxiety     Bronchitis     RAD /bronchitis episodes    Cellulitis     face    Depression     undergoing psychotherapy    Easy bruising 6/23/2014    Fibromyalgia     History of ITP 6/23/2014     Iron deficiency anemia 2014    Morbid obesity     improved with s/p gastric bypass    Pica 2014    Pseudotumor cerebri syndrome     Thrombocytopenia      Past Surgical History:   Procedure Laterality Date     SECTION  2007    CHOLECYSTECTOMY  2008    CRANIOTOMY  2022    FRACTURE SURGERY      GASTRIC BYPASS  2009    LAPAROSCOPIC TOTAL HYSTERECTOMY N/A 2023    Procedure: HYSTERECTOMY, TOTAL, LAPAROSCOPIC;  Surgeon: Kelly Lujan MD;  Location: [facility] OR;  Service: OB/GYN;  Laterality: N/A;  Bilateral salpingectomy     Family History   Problem Relation Age of Onset    Breast cancer Mother 57    Hypertension Mother     Cancer Mother 57        breast cancer    Cancer Father     Breast cancer Paternal Grandmother     Cancer Paternal Grandmother         breast    Cancer Paternal Grandfather         kidney    Colon cancer Neg Hx     Diabetes Neg Hx     Ovarian cancer Neg Hx     Stroke Neg Hx      Social History     Tobacco Use    Smoking status: Never    Smokeless tobacco: Never   Substance Use Topics    Alcohol use: Yes     Comment: occassionally    Drug use: No     Review of Systems   Constitutional:  Negative for fever.   Gastrointestinal:  Positive for abdominal pain. Negative for constipation, diarrhea, nausea and vomiting.   Genitourinary:  Positive for pelvic pain and vaginal bleeding. Negative for dysuria.   Skin:  Negative for color change and wound.   Neurological:  Positive for light-headedness. Negative for syncope.       Physical Exam     Initial Vitals [23 0348]   BP Pulse Resp Temp SpO2   137/82 88 18 97.6 °F (36.4 °C) 100 %      MAP       --         Physical Exam    Nursing note and vitals reviewed.  Constitutional: She appears well-developed and well-nourished. She is not diaphoretic. No distress.   HENT:   Head: Normocephalic and atraumatic.   Neck: Neck supple.   Normal range of motion.  Pulmonary/Chest: No respiratory distress.   Abdominal:   Abdomen overall soft,  normal bowel sounds ×4. TTP entirety of lower abdomen. No rebound or guarding.  No palpable mass or distention.  No flank tenderness. Healing surgical trochar incision (inferior to umbilicus, LLQ, RLQ), no erythema, no dehiscence, no ttp.    Genitourinary:    Genitourinary Comments: No vesicular lesions or open sores to external genitalia. Moderate amount thin, dark blood with clots within dependent vagina. Able to visualize suture line, prolene suture visualized, granulation tissue to superior suture line, no large dehiscence.     Musculoskeletal:      Cervical back: Normal range of motion and neck supple.     Neurological:   Facial asymmetry (baseline)   Skin: Skin is warm. Capillary refill takes less than 2 seconds.   Psychiatric: Thought content normal.   Mildly anxious         ED Course   Procedures  Labs Reviewed   CBC W/ AUTO DIFFERENTIAL - Abnormal; Notable for the following components:       Result Value    RBC 3.80 (*)     Hemoglobin 9.7 (*)     Hematocrit 31.3 (*)     MCH 25.5 (*)     MCHC 31.0 (*)     RDW 15.6 (*)     Platelets 81 (*)     Basophil % 2.1 (*)     All other components within normal limits   COMPREHENSIVE METABOLIC PANEL - Abnormal; Notable for the following components:    Calcium 8.1 (*)     Albumin 3.4 (*)     Anion Gap 6 (*)     All other components within normal limits   URINALYSIS, REFLEX TO URINE CULTURE - Abnormal; Notable for the following components:    Occult Blood UA 3+ (*)     All other components within normal limits    Narrative:     Specimen Source->Urine   URINALYSIS MICROSCOPIC - Abnormal; Notable for the following components:    RBC, UA >100 (*)     All other components within normal limits    Narrative:     Specimen Source->Urine   LIPASE   MAGNESIUM   PROTIME-INR          Imaging Results              CT Abdomen Pelvis With Contrast (Final result)  Result time 08/13/23 07:12:13      Final result by Bhargav Rowan MD (08/13/23 07:12:13)                   Impression:       1. No definite acute findings identified in the abdomen or pelvis to account for the patient's reported symptoms.  2. Small volume simple appearing free fluid dependently in the pelvis.  Mild inflammatory changes are noted in the pelvic region about the vaginal cuff, overall felt not unexpected given reported history of approximately 3 weeks status post hysterectomy.  No definite drainable fluid collection or abscess is appreciated.  3. Additional details, as provided in the body of the report.      Electronically signed by: Bhargav Rowan  Date:    08/13/2023  Time:    07:12               Narrative:    EXAMINATION:  CT ABDOMEN PELVIS WITH CONTRAST    CLINICAL HISTORY:  Abdominal pain, post-op;s/p hysterectomy, acute onset vaginal bleeding;    TECHNIQUE:  Low dose axial images, sagittal and coronal reformations were obtained from the lung bases to the pubic symphysis following the IV administration of 80 mL of Omnipaque 350    COMPARISON:  CT of the abdomen pelvis performed 04/26/2009.    FINDINGS:  Lower chest: Unremarkable.    Liver: Unremarkable.    Gallbladder and bile ducts: Gallbladder not seen, presumed surgically absent.  No concerning biliary ductal dilatation is appreciated    Pancreas: Unremarkable.    Spleen: Unremarkable.    Adrenals: Unremarkable.    Kidneys: Unremarkable.    Lymph nodes: No abdominal or pelvic lymphadenopathy.    Bowel and mesentery: No definite evidence of acute bowel obstruction.  Postop sequela in keeping with gastric bypass.  Moderate to large volume colonic stool.Appendix not clearly identified.  Noting this, no definite focal pericecal inflammation seen.    Abdominal aorta: Unremarkable.    Inferior vena cava: Unremarkable.    Free fluid or free air: Small volume simple appearing free fluid dependently in the pelvis.  Mild inflammatory changes are noted in the pelvic region about the vaginal cuff, overall felt not unexpected given reported history of approximately 3 weeks  status post hysterectomy.  No definite drainable fluid collection or abscess is appreciated.    Pelvis: As above.    Body wall: Anticipated postop sequela in the anterior abdominal wall.    Bones: No definite acute finding.  Partially imaged postop sequela of the proximal right femur.                                       Medications   iohexoL (OMNIPAQUE 350) injection 80 mL (80 mLs Intravenous Given 8/13/23 0534)     Medical Decision Making:   Differential Diagnosis:   UTI, post operative hematoma, wound dehiscence, vaginal cuff dehiscence, peritonitis, post operative pain, STI  Clinical Tests:   Lab Tests: Ordered and Reviewed  Radiological Study: Ordered and Reviewed  ED Management:  Pelvic with visible suture line within vagina, no large dehiscence. Vaginal bleeding has improved on reevaluation. Will await CT read and discuss with on-call OBGYN for further recs. Baseline anemia. Baseline thrombocytopenia. Normal vitals. Discussed with Dr.Van Amato, if reassuring CT read, given improvement in bleeding, no large dehiscence on exam, will have her d/c with prompt f/u with  in office this week.     And signed out to me from Deon Cleveland pending CT read, reassessment, disposition 6:30am.  CT read does not show anything acute.  Vitals reassuring.  Repeat exam reassuring.  Bleeding improved. Patient already has an appointment on Wednesday but she will try to get a quicker follow-up appointment. I discussed with the patient/family the diagnosis, treatment plan, indications for return to the emergency department, and for expected follow-up. The patient/family verbalized an understanding. The patient/family is asked if there are any questions or concerns. We discuss the case, until all issues are addressed to the patient/family's satisfaction. Patient/family understands and is agreeable to the plan.   Baldev Sow    DISCLAIMER: This note was prepared with [employer] voice recognition transcription software. Hayley  syntax, mangled pronouns, and other bizarre constructions may be attributed to that software system.               ED Course as of 08/13/23 0720   Sun Aug 13, 2023   0523 Hemoglobin(!): 9.7  Anemia appears baseline.  Thrombocytopenia, just a bit below most recent. []   0545 RBC, UA(!): >100  Suspected vaginal contaminant []      ED Course User Index  [] Deon Cleveland, TESSIE                 Clinical Impression:   Final diagnoses:  [N93.9] Vaginal bleeding (Primary)  [Z90.710] Status post laparoscopic hysterectomy  [D69.6] Thrombocytopenia        ED Disposition Condition    Discharge Stable          ED Prescriptions    None       Follow-up Information       Follow up With Specialties Details Why Contact Info    Kelly Lujan MD Obstetrics and Gynecology Schedule an appointment as soon as possible for a visit on 8/14/2023 For reevaluation [address]  [address]  [facility] [address]  [phone]               Baldev Sow MD  08/13/23 0720

## 2023-08-13 NOTE — DISCHARGE INSTRUCTIONS
Follow-up with  early this week for re-evaluation.    Immediately return to this ED if you begin with heavy bleeding, if you feel lightheaded or feel as if you are going to pass out, if you experience worsening or severe abdominal pain, if you begin with fever, if you begin with nausea vomiting, if any other problems occur.

## 2023-08-14 ENCOUNTER — TELEPHONE (OUTPATIENT)
Dept: OBSTETRICS AND GYNECOLOGY | Facility: CLINIC | Age: 43
End: 2023-08-14
Payer: MEDICAID

## 2023-08-14 NOTE — TELEPHONE ENCOUNTER
Pt confirmed that she was seen in ED over the weekend and that she also missed her scheduled post-op appt. I don't have override access so I confirmed with the pt that I will call her back with a specific date and time that she can be seen.

## 2023-08-14 NOTE — TELEPHONE ENCOUNTER
Returned call to patient.  Only having light spotting now.  Scheduled for post op this Wed at 1:45 pm.  Patient reassured.  All questions answered.

## 2023-08-14 NOTE — TELEPHONE ENCOUNTER
----- Message from Farhana Harrell sent at 8/14/2023  9:52 AM CDT -----  Regarding: self [phone]  Type:  Sooner Appointment Request    Patient is requesting a sooner appointment.  Patient declined first available appointment listed as well as another facility and provider .  Patient will not accept being placed on the waitlist and is requesting a message be sent to doctor.    Name of Caller: self    When is the first available appointment? N/A    Symptoms: bleeding    Would the patient rather a call back or a response via [facility]?call back    Best Call Back Number [phone]

## 2023-08-16 ENCOUNTER — OFFICE VISIT (OUTPATIENT)
Dept: OBSTETRICS AND GYNECOLOGY | Facility: CLINIC | Age: 43
End: 2023-08-16
Payer: MEDICAID

## 2023-08-16 VITALS
WEIGHT: 199.31 LBS | BODY MASS INDEX: 33.16 KG/M2 | DIASTOLIC BLOOD PRESSURE: 84 MMHG | SYSTOLIC BLOOD PRESSURE: 124 MMHG

## 2023-08-16 DIAGNOSIS — Z90.710 S/P LAPAROSCOPIC HYSTERECTOMY: Primary | ICD-10-CM

## 2023-08-16 PROCEDURE — 3008F PR BODY MASS INDEX (BMI) DOCUMENTED: ICD-10-PCS | Mod: CPTII,,, | Performed by: OBSTETRICS & GYNECOLOGY

## 2023-08-16 PROCEDURE — 3079F PR MOST RECENT DIASTOLIC BLOOD PRESSURE 80-89 MM HG: ICD-10-PCS | Mod: CPTII,,, | Performed by: OBSTETRICS & GYNECOLOGY

## 2023-08-16 PROCEDURE — 99024 POSTOP FOLLOW-UP VISIT: CPT | Mod: ,,, | Performed by: OBSTETRICS & GYNECOLOGY

## 2023-08-16 PROCEDURE — 1159F MED LIST DOCD IN RCRD: CPT | Mod: CPTII,,, | Performed by: OBSTETRICS & GYNECOLOGY

## 2023-08-16 PROCEDURE — 99999 PR PBB SHADOW E&M-EST. PATIENT-LVL III: ICD-10-PCS | Mod: PBBFAC,,, | Performed by: OBSTETRICS & GYNECOLOGY

## 2023-08-16 PROCEDURE — 1159F PR MEDICATION LIST DOCUMENTED IN MEDICAL RECORD: ICD-10-PCS | Mod: CPTII,,, | Performed by: OBSTETRICS & GYNECOLOGY

## 2023-08-16 PROCEDURE — 3074F SYST BP LT 130 MM HG: CPT | Mod: CPTII,,, | Performed by: OBSTETRICS & GYNECOLOGY

## 2023-08-16 PROCEDURE — 3079F DIAST BP 80-89 MM HG: CPT | Mod: CPTII,,, | Performed by: OBSTETRICS & GYNECOLOGY

## 2023-08-16 PROCEDURE — 3074F PR MOST RECENT SYSTOLIC BLOOD PRESSURE < 130 MM HG: ICD-10-PCS | Mod: CPTII,,, | Performed by: OBSTETRICS & GYNECOLOGY

## 2023-08-16 PROCEDURE — 99999 PR PBB SHADOW E&M-EST. PATIENT-LVL III: CPT | Mod: PBBFAC,,, | Performed by: OBSTETRICS & GYNECOLOGY

## 2023-08-16 PROCEDURE — 99024 PR POST-OP FOLLOW-UP VISIT: ICD-10-PCS | Mod: ,,, | Performed by: OBSTETRICS & GYNECOLOGY

## 2023-08-16 PROCEDURE — 99213 OFFICE O/P EST LOW 20 MIN: CPT | Mod: PBBFAC | Performed by: OBSTETRICS & GYNECOLOGY

## 2023-08-16 PROCEDURE — 3008F BODY MASS INDEX DOCD: CPT | Mod: CPTII,,, | Performed by: OBSTETRICS & GYNECOLOGY

## 2023-08-16 NOTE — PROGRESS NOTES
Subjective:      Patient ID: Miriam Mendez is a 42 y.o. female.    Chief Complaint:  Post-op Evaluation      History of Present Illness  HPI  Postoperative Follow-up  Patient presents to the clinic 3 weeks status post total laparoscopic hysterectomy for CARINA II. Eating a regular diet without difficulty. Bowel movements are normal. The patient is not having any pain.    Last week had some heavy vaginal bleeding.  But that has now resolved.    GYN & OB History  Patient's last menstrual period was 2023.   Date of Last Pap: 2023    OB History    Para Term  AB Living   3 3 2 1   2   SAB IAB Ectopic Multiple Live Births           2      # Outcome Date GA Lbr Kobe/2nd Weight Sex Delivery Anes PTL Lv   3 Term 07 39w0d  3.969 kg (8 lb 12 oz) M CS-LTranv Spinal N NOEMI   2  03 36w0d  2.807 kg (6 lb 3 oz) F Vag-Spont EPI Y NOEMI   1 Term                Review of Systems  Review of Systems   Constitutional: Negative.    HENT: Negative.     Eyes: Negative.    Respiratory: Negative.     Cardiovascular: Negative.    Gastrointestinal: Negative.    Endocrine: Negative.    Genitourinary: Negative.    Musculoskeletal: Negative.    Integumentary:  Negative.   Neurological: Negative.    Hematological: Negative.    Psychiatric/Behavioral: Negative.     Breast: negative.           Objective:     Physical Exam:   Constitutional: She is oriented to person, place, and time. She appears well-developed.    HENT:   Head: Normocephalic and atraumatic.    Eyes: EOM are normal.     Cardiovascular:  Normal rate.             Pulmonary/Chest: Effort normal.        Abdominal: Soft. There is no abdominal tenderness.     Genitourinary:    Vagina, right adnexa and left adnexa normal.      Pelvic exam was performed with patient supine.   The external female genitalia was normal.   No external genitalia lesions identified,Genitalia hair distrobution normal .   Labial bartholins normal.There is no rash,  tenderness, lesion or injury on the right labia. There is no rash, tenderness, lesion or injury on the left labia. Right adnexum displays no tenderness and no fullness. Left adnexum displays no tenderness and no fullness. Vagina exhibits no lesion (vaginal cuff is healing well). Vaginal cuff normal.  No erythema,  no vaginal discharge, bleeding or unspecified prolapse of vaginal walls in the vagina. Vagina was moist.Cervix is absent.Uterus is absent. Normal urethral meatus.Urethral Meatus exhibits: urethral lesion and prolapsedUrethra findings: no urethral mass and no tendernessBladder findings: no bladder distention and no bladder tenderness          Musculoskeletal: Normal range of motion.       Neurological: She is oriented to person, place, and time.    Skin: Skin is warm.    Psychiatric: She has a normal mood and affect.         Assessment:     1. S/P laparoscopic hysterectomy               Plan:     - Continue pelvic rest.  - follow up in 4 wks.

## 2023-08-17 ENCOUNTER — PATIENT MESSAGE (OUTPATIENT)
Dept: PSYCHIATRY | Facility: CLINIC | Age: 43
End: 2023-08-17
Payer: MEDICAID

## 2023-08-19 DIAGNOSIS — F90.9 ATTENTION DEFICIT HYPERACTIVITY DISORDER (ADHD), UNSPECIFIED ADHD TYPE: ICD-10-CM

## 2023-08-19 DIAGNOSIS — K21.9 GASTROESOPHAGEAL REFLUX DISEASE WITHOUT ESOPHAGITIS: ICD-10-CM

## 2023-08-19 DIAGNOSIS — Z98.84 HISTORY OF GASTRIC BYPASS: ICD-10-CM

## 2023-08-19 DIAGNOSIS — F33.41 RECURRENT MAJOR DEPRESSIVE DISORDER, IN PARTIAL REMISSION: ICD-10-CM

## 2023-08-19 DIAGNOSIS — F41.0 GENERALIZED ANXIETY DISORDER WITH PANIC ATTACKS: ICD-10-CM

## 2023-08-19 DIAGNOSIS — F41.1 GENERALIZED ANXIETY DISORDER WITH PANIC ATTACKS: ICD-10-CM

## 2023-08-19 DIAGNOSIS — G47.00 INSOMNIA, UNSPECIFIED TYPE: ICD-10-CM

## 2023-08-19 RX ORDER — DEXTROAMPHETAMINE SACCHARATE, AMPHETAMINE ASPARTATE, DEXTROAMPHETAMINE SULFATE AND AMPHETAMINE SULFATE 3.75; 3.75; 3.75; 3.75 MG/1; MG/1; MG/1; MG/1
TABLET ORAL
Qty: 90 TABLET | Refills: 0 | Status: CANCELLED | OUTPATIENT
Start: 2023-08-19

## 2023-08-19 RX ORDER — CLONAZEPAM 1 MG/1
1 TABLET ORAL 2 TIMES DAILY PRN
Qty: 30 TABLET | Refills: 0 | Status: CANCELLED | OUTPATIENT
Start: 2023-08-19 | End: 2023-09-10

## 2023-08-19 RX ORDER — ZOLPIDEM TARTRATE 5 MG/1
5 TABLET ORAL NIGHTLY
Qty: 30 TABLET | Refills: 0 | Status: CANCELLED | OUTPATIENT
Start: 2023-08-19 | End: 2024-02-17

## 2023-08-19 RX ORDER — FLUOXETINE HYDROCHLORIDE 40 MG/1
40 CAPSULE ORAL DAILY
Qty: 30 CAPSULE | Refills: 2 | Status: CANCELLED | OUTPATIENT
Start: 2023-08-19

## 2023-08-19 NOTE — TELEPHONE ENCOUNTER
No care due was identified.  [facility] Embedded Care Due Messages. Reference number: [id number].   8/19/2023 10:12:34 AM CDT

## 2023-08-24 ENCOUNTER — PATIENT MESSAGE (OUTPATIENT)
Dept: PSYCHIATRY | Facility: CLINIC | Age: 43
End: 2023-08-24
Payer: MEDICAID

## 2023-08-28 RX ORDER — OMEPRAZOLE 20 MG/1
20 CAPSULE, DELAYED RELEASE ORAL 2 TIMES DAILY
Qty: 90 CAPSULE | Refills: 4 | Status: SHIPPED | OUTPATIENT
Start: 2023-08-28

## 2023-08-29 DIAGNOSIS — F90.9 ATTENTION DEFICIT HYPERACTIVITY DISORDER (ADHD), UNSPECIFIED ADHD TYPE: ICD-10-CM

## 2023-08-29 DIAGNOSIS — G47.00 INSOMNIA, UNSPECIFIED TYPE: ICD-10-CM

## 2023-08-29 DIAGNOSIS — F41.1 GENERALIZED ANXIETY DISORDER WITH PANIC ATTACKS: ICD-10-CM

## 2023-08-29 DIAGNOSIS — F41.0 GENERALIZED ANXIETY DISORDER WITH PANIC ATTACKS: ICD-10-CM

## 2023-08-29 DIAGNOSIS — F33.41 RECURRENT MAJOR DEPRESSIVE DISORDER, IN PARTIAL REMISSION: ICD-10-CM

## 2023-08-29 DIAGNOSIS — G47.00 INSOMNIA, UNSPECIFIED TYPE: Primary | ICD-10-CM

## 2023-08-29 RX ORDER — ZOLPIDEM TARTRATE 5 MG/1
5 TABLET ORAL NIGHTLY
Qty: 30 TABLET | Refills: 0 | Status: SHIPPED | OUTPATIENT
Start: 2023-08-29 | End: 2023-10-02 | Stop reason: SDUPTHER

## 2023-08-29 RX ORDER — CLONAZEPAM 1 MG/1
1 TABLET ORAL 2 TIMES DAILY PRN
Qty: 30 TABLET | Refills: 0 | Status: SHIPPED | OUTPATIENT
Start: 2023-08-29 | End: 2023-10-02 | Stop reason: SDUPTHER

## 2023-08-29 RX ORDER — FLUOXETINE HYDROCHLORIDE 40 MG/1
40 CAPSULE ORAL DAILY
Qty: 30 CAPSULE | Refills: 2 | Status: SHIPPED | OUTPATIENT
Start: 2023-08-29 | End: 2024-01-12 | Stop reason: SDUPTHER

## 2023-08-29 RX ORDER — DEXTROAMPHETAMINE SACCHARATE, AMPHETAMINE ASPARTATE, DEXTROAMPHETAMINE SULFATE AND AMPHETAMINE SULFATE 3.75; 3.75; 3.75; 3.75 MG/1; MG/1; MG/1; MG/1
TABLET ORAL
Qty: 90 TABLET | Refills: 0 | Status: SHIPPED | OUTPATIENT
Start: 2023-08-29 | End: 2023-09-01 | Stop reason: SDUPTHER

## 2023-08-30 ENCOUNTER — TELEPHONE (OUTPATIENT)
Dept: PULMONOLOGY | Facility: CLINIC | Age: 43
End: 2023-08-30
Payer: MEDICAID

## 2023-09-01 ENCOUNTER — OFFICE VISIT (OUTPATIENT)
Dept: PSYCHIATRY | Facility: CLINIC | Age: 43
End: 2023-09-01
Payer: MEDICAID

## 2023-09-01 DIAGNOSIS — G47.00 INSOMNIA, UNSPECIFIED TYPE: ICD-10-CM

## 2023-09-01 DIAGNOSIS — F41.1 GENERALIZED ANXIETY DISORDER WITH PANIC ATTACKS: Primary | ICD-10-CM

## 2023-09-01 DIAGNOSIS — F41.0 GENERALIZED ANXIETY DISORDER WITH PANIC ATTACKS: Primary | ICD-10-CM

## 2023-09-01 DIAGNOSIS — F90.9 ATTENTION DEFICIT HYPERACTIVITY DISORDER (ADHD), UNSPECIFIED ADHD TYPE: ICD-10-CM

## 2023-09-01 DIAGNOSIS — F33.41 RECURRENT MAJOR DEPRESSIVE DISORDER, IN PARTIAL REMISSION: ICD-10-CM

## 2023-09-01 PROCEDURE — 99214 PR OFFICE/OUTPT VISIT, EST, LEVL IV, 30-39 MIN: ICD-10-PCS | Mod: SA,HB,95,

## 2023-09-01 PROCEDURE — 99214 OFFICE O/P EST MOD 30 MIN: CPT | Mod: SA,HB,95,

## 2023-09-01 RX ORDER — DEXTROAMPHETAMINE SACCHARATE, AMPHETAMINE ASPARTATE, DEXTROAMPHETAMINE SULFATE AND AMPHETAMINE SULFATE 3.75; 3.75; 3.75; 3.75 MG/1; MG/1; MG/1; MG/1
TABLET ORAL
Qty: 90 TABLET | Refills: 0 | Status: SHIPPED | OUTPATIENT
Start: 2023-09-01 | End: 2023-10-02 | Stop reason: SDUPTHER

## 2023-09-01 NOTE — PROGRESS NOTES
Outpatient Psychiatry Follow-Up Visit (PA)    09/01/2023    Clinical Status of Patient:  Outpatient (Ambulatory)    Chief Complaint:  Miriam Mendez is a 42 y.o. female who presents today for follow-up of depression, anxiety, and attention problems.  Met with patient.     The patient location is: [place], [place] at home  The chief complaint leading to consultation is: anxiety, depression    Visit type: audiovisual    Face to Face time with patient: 22 minutes  31 minutes of total time spent on the encounter, which includes face to face time and non-face to face time preparing to see the patient (eg, review of tests), Obtaining and/or reviewing separately obtained history, Documenting clinical information in the electronic or other health record, Independently interpreting results (not separately reported) and communicating results to the patient/family/caregiver, or Care coordination (not separately reported).     Each patient to whom he or she provides medical services by telemedicine is:  (1) informed of the relationship between the physician and patient and the respective role of any other health care provider with respect to management of the patient; and (2) notified that he or she may decline to receive medical services by telemedicine and may withdraw from such care at any time.    Current Psych Medications:   Prozac 40 mg PO daily   Klonopin 1 mg PO BID PRN   Ambien 5 mg PO nightly  Adderall 30 mg once in the morning, 15 mg mid-day (45 mg total per day)    Interval History and Content of Current Session:  Patient seen and chart reviewed. Last seen on 6/7/2023    Patient has a psychiatric history of: depression, anxiety and attention problems    Pt reports for follow up today stating that she has been having to take Klonopin at least once a day, states that she feels the attack coming on so she takes it before it starts. Patient states that she has not been sleeping well on the decreased dose (recommended dose)  "of Ambien. Patient states that she has been on 10 mg for over 9 years. Patient is able to sleep on Ambien 10 mg without any problems.    Mood: overall mood "has been ok"    Anxiety: "controlled", but still having times that she has to take Klonopin, states that triggers cause the anxiety attacks    Depression: "not really", states that she has been out of work for 5 weeks, starts work in another week     Pt appears happy and sitting in bed    Denies adverse effects from medication    Sleep: Sleep has not been good on 5 mg of Ambien    Appetite: patient state that she overeats    Denies SI/HI/AVH.     Pt reports taking medications as prescribed and behaving appropriately during interview today.    Review of Systems   PSYCHIATRIC: Pertinant items are noted in the narrative.  CONSTITUTIONAL: Positive for weight gain.   MUSCULOSKELETAL: No pain or stiffness of the joints.  NEUROLOGIC: No weakness, sensory changes, seizures, confusion, memory loss, tremor or other abnormal movements.  ENDOCRINE: No polydipsia or polyuria.  INTEGUMENTARY: No rashes or lacerations.  EYES: No exophthalmos, jaundice or blindness.  ENT: No dizziness, tinnitus or hearing loss.  RESPIRATORY: No shortness of breath.  CARDIOVASCULAR: No tachycardia or chest pain.  GASTROINTESTINAL: No nausea, vomiting, pain, constipation or diarrhea.  GENITOURINARY: No frequency, dysuria or sexual dysfunction.  HEMATOLOGIC/LYMPHATIC: No excessive bleeding, prolonged or excessive bleeding after dental extraction/injury.    Past Medication Trials:  Lexapro, Celexa, Wellbutrin, Zoloft, Cymbalta, Buspar    Past Medical, Family and Social History: The patient's past medical, family and social history have been reviewed and updated as appropriate within the electronic medical record - see encounter notes.    Compliance: yes    Side effects: None    Risk Parameters:  Patient reports no suicidal ideation  Patient reports no homicidal ideation  Patient reports no " self-injurious behavior  Patient reports no violent behavior    Exam (detailed: at least 9 elements; comprehensive: all 15 elements)   Constitutional  Vitals:  Most recent vital signs, dated less than 90 days prior to this appointment, were reviewed.   There were no vitals filed for this visit.     General:  unremarkable, age appropriate, casually dressed, lying in bed     Musculoskeletal  Muscle Strength/Tone:  not examined   Gait & Station:  Unable to assess     Psychiatric  Speech:  no latency; no press   Mood & Affect:  steady, happy  congruent and appropriate   Thought Process:  normal and logical   Associations:  intact   Thought Content:  normal, no suicidality, no homicidality, delusions, or paranoia   Insight:  has awareness of illness   Judgement: behavior is adequate to circumstances   Orientation:  person, place, situation, time/date, day of week   Memory: intact for content of interview   Language: grossly intact   Attention Span & Concentration:  able to focus   Fund of Knowledge:  intact and appropriate to age and level of education     Assessment and Diagnosis   Status/Progress: Based on the examination today, the patient's problem(s) is/are well controlled.  New problems have not been presented today.   Co-morbidities are not complicating management of the primary condition.  There are no active rule-out diagnoses for this patient at this time.     General Impression: Miriam Mendez is a 43 yo female who presents to the clinic for follow up.       ICD-10-CM ICD-9-CM    1. Generalized anxiety disorder with panic attacks  F41.1 300.02     F41.0 300.01       2. Recurrent major depressive disorder, in partial remission  F33.41 296.35       3. Attention deficit hyperactivity disorder (ADHD), unspecified ADHD type  F90.9 314.01 dextroamphetamine-amphetamine (ADDERALL) 15 mg tablet      4. Insomnia, unspecified type  G47.00 780.52           Intervention/Counseling/Treatment Plan   Medication Management:  Continue current medications.  Continue Prozac 40 mg PO daily   Continue Klonopin 1 mg PO BID PRN   Informed pt of the risks of continuous Benzodiazepine use including tolerance, dependence and withdrawals that may be life threatening upon abrupt cessation. Also advised not to take Benzodiazepines with Opiates or other sedatives and also not to drive or operate heavy machinery while using Benzodiazepines.  Increase Ambien 10 mg PO nightly - next visit  Patient educated on the importance of not taking Klonopin with Ambien  Continue Adderall 30 mg once in the morning, 15 mg mid-day (45 mg total per day)   checked, no discrepancies  Discussed risks of stimulants include addiction, tolerance, and possible withdrawal associated with stimulant use. Possible side effects including heart palpitations, restlessness, increased anxiety, decreased appetite, insomnia, and dry mouth were also discussed with the patient. Patient advised not to mix the medication with alcohol.   Discussed diagnosis, risk and benefits of proposed treatment above vs alternative treatment vs no treatment, and potential side effects of these treatments, and the inherent unpredictability of individual responses to these treatments. The patient expresses understanding and gives informed consent to pursue treatment at this time, believing that the potential benefits outweigh the potential risks. Patient has no other questions. Risks/adverse effects at this time include but are not limited to: GI side effects, sexual dysfunction, activation vs sedation, triggering of suicidal ideation, and serotonin syndrome.   Patient voices understanding and agreement with this plan  Provided crisis numbers  Encouraged patient to keep future appointments  Instruct patient to call or message with questions  In the event of an emergency, including suicidal ideation, patient was advised to go to the emergency room      Return to Clinic: 3 months    Total time: 31 minutes  (which included pts differential diagnosis and prognosis for psychiatric conditions, risks, benefits of treatments, instructions and adherence to treatment plan, risk reduction, reviewing current psychiatric medication regimen, medical problems and social stressors. In addtion to possible discussion with other healthcare provider/s)    Lauren Marin PA-C

## 2023-09-06 ENCOUNTER — TELEPHONE (OUTPATIENT)
Dept: SLEEP MEDICINE | Facility: HOSPITAL | Age: 43
End: 2023-09-06
Payer: MEDICAID

## 2023-09-07 ENCOUNTER — OFFICE VISIT (OUTPATIENT)
Dept: OBSTETRICS AND GYNECOLOGY | Facility: CLINIC | Age: 43
End: 2023-09-07
Payer: MEDICAID

## 2023-09-07 VITALS
SYSTOLIC BLOOD PRESSURE: 114 MMHG | WEIGHT: 199.31 LBS | BODY MASS INDEX: 33.16 KG/M2 | DIASTOLIC BLOOD PRESSURE: 80 MMHG

## 2023-09-07 DIAGNOSIS — Z90.710 S/P LAPAROSCOPIC HYSTERECTOMY: Primary | ICD-10-CM

## 2023-09-07 PROCEDURE — 99213 OFFICE O/P EST LOW 20 MIN: CPT | Mod: PBBFAC | Performed by: OBSTETRICS & GYNECOLOGY

## 2023-09-07 PROCEDURE — 3079F PR MOST RECENT DIASTOLIC BLOOD PRESSURE 80-89 MM HG: ICD-10-PCS | Mod: CPTII,,, | Performed by: OBSTETRICS & GYNECOLOGY

## 2023-09-07 PROCEDURE — 99999 PR PBB SHADOW E&M-EST. PATIENT-LVL III: CPT | Mod: PBBFAC,,, | Performed by: OBSTETRICS & GYNECOLOGY

## 2023-09-07 PROCEDURE — 99024 PR POST-OP FOLLOW-UP VISIT: ICD-10-PCS | Mod: ,,, | Performed by: OBSTETRICS & GYNECOLOGY

## 2023-09-07 PROCEDURE — 3079F DIAST BP 80-89 MM HG: CPT | Mod: CPTII,,, | Performed by: OBSTETRICS & GYNECOLOGY

## 2023-09-07 PROCEDURE — 3074F SYST BP LT 130 MM HG: CPT | Mod: CPTII,,, | Performed by: OBSTETRICS & GYNECOLOGY

## 2023-09-07 PROCEDURE — 3008F BODY MASS INDEX DOCD: CPT | Mod: CPTII,,, | Performed by: OBSTETRICS & GYNECOLOGY

## 2023-09-07 PROCEDURE — 99024 POSTOP FOLLOW-UP VISIT: CPT | Mod: ,,, | Performed by: OBSTETRICS & GYNECOLOGY

## 2023-09-07 PROCEDURE — 1159F MED LIST DOCD IN RCRD: CPT | Mod: CPTII,,, | Performed by: OBSTETRICS & GYNECOLOGY

## 2023-09-07 PROCEDURE — 3074F PR MOST RECENT SYSTOLIC BLOOD PRESSURE < 130 MM HG: ICD-10-PCS | Mod: CPTII,,, | Performed by: OBSTETRICS & GYNECOLOGY

## 2023-09-07 PROCEDURE — 1159F PR MEDICATION LIST DOCUMENTED IN MEDICAL RECORD: ICD-10-PCS | Mod: CPTII,,, | Performed by: OBSTETRICS & GYNECOLOGY

## 2023-09-07 PROCEDURE — 3008F PR BODY MASS INDEX (BMI) DOCUMENTED: ICD-10-PCS | Mod: CPTII,,, | Performed by: OBSTETRICS & GYNECOLOGY

## 2023-09-07 PROCEDURE — 99999 PR PBB SHADOW E&M-EST. PATIENT-LVL III: ICD-10-PCS | Mod: PBBFAC,,, | Performed by: OBSTETRICS & GYNECOLOGY

## 2023-09-07 NOTE — PROGRESS NOTES
Subjective:      Patient ID: Miriam Mendez is a 42 y.o. female.    Chief Complaint:  No chief complaint on file.      History of Present Illness  HPI  Postoperative Follow-up  Patient presents to the clinic 6 weeks status post total laparoscopic hysterectomy for CARINA II. Eating a regular diet without difficulty. Bowel movements are normal. The patient is not having any pain.    No vaginal bleeding.    GYN & OB History  Patient's last menstrual period was 2023.   Date of Last Pap: 2023    OB History    Para Term  AB Living   3 3 2 1   2   SAB IAB Ectopic Multiple Live Births           2      # Outcome Date GA Lbr Kobe/2nd Weight Sex Delivery Anes PTL Lv   3 Term 07 39w0d  3.969 kg (8 lb 12 oz) M CS-LTranv Spinal N NOEMI   2  03 36w0d  2.807 kg (6 lb 3 oz) F Vag-Spont EPI Y NOEMI   1 Term                Review of Systems  Review of Systems   Constitutional: Negative.    HENT: Negative.     Eyes: Negative.    Respiratory: Negative.     Cardiovascular: Negative.    Gastrointestinal: Negative.    Endocrine: Negative.    Genitourinary: Negative.    Musculoskeletal: Negative.    Integumentary:  Negative.   Neurological: Negative.    Hematological: Negative.    Psychiatric/Behavioral: Negative.     Breast: negative.           Objective:     Physical Exam:   Constitutional: She is oriented to person, place, and time. She appears well-developed.    HENT:   Head: Normocephalic and atraumatic.    Eyes: EOM are normal.     Cardiovascular:  Normal rate.             Pulmonary/Chest: Effort normal.        Abdominal: Soft. There is no abdominal tenderness.     Genitourinary:    Vagina, right adnexa and left adnexa normal.      Pelvic exam was performed with patient supine.   The external female genitalia was normal.   No external genitalia lesions identified,Genitalia hair distrobution normal .   Labial bartholins normal.There is no rash, tenderness, lesion or injury on the right labia.  There is no rash, tenderness, lesion or injury on the left labia. Right adnexum displays no tenderness and no fullness. Left adnexum displays no tenderness and no fullness. Vagina exhibits no lesion (vaginal cuff is well healed). Vaginal cuff normal.  No erythema,  no vaginal discharge, bleeding or unspecified prolapse of vaginal walls in the vagina. Vagina was moist.Cervix is absent.Uterus is absent. Normal urethral meatus.Urethral Meatus exhibits: urethral lesion and prolapsedUrethra findings: no urethral mass and no tendernessBladder findings: no bladder distention and no bladder tenderness          Musculoskeletal: Normal range of motion.       Neurological: She is oriented to person, place, and time.    Skin: Skin is warm.    Psychiatric: She has a normal mood and affect.         Assessment:     1. S/P laparoscopic hysterectomy               Plan:     - well recovered.  - follow up prn or in one year for annual exam and pap.

## 2023-09-27 ENCOUNTER — TELEPHONE (OUTPATIENT)
Dept: SLEEP MEDICINE | Facility: HOSPITAL | Age: 43
End: 2023-09-27
Payer: MEDICAID

## 2023-10-02 DIAGNOSIS — F41.1 GENERALIZED ANXIETY DISORDER WITH PANIC ATTACKS: ICD-10-CM

## 2023-10-02 DIAGNOSIS — F41.0 GENERALIZED ANXIETY DISORDER WITH PANIC ATTACKS: ICD-10-CM

## 2023-10-02 DIAGNOSIS — F90.9 ATTENTION DEFICIT HYPERACTIVITY DISORDER (ADHD), UNSPECIFIED ADHD TYPE: ICD-10-CM

## 2023-10-02 DIAGNOSIS — G47.00 INSOMNIA, UNSPECIFIED TYPE: ICD-10-CM

## 2023-10-02 RX ORDER — CLONAZEPAM 1 MG/1
1 TABLET ORAL 2 TIMES DAILY PRN
Qty: 30 TABLET | Refills: 0 | Status: SHIPPED | OUTPATIENT
Start: 2023-10-02 | End: 2023-11-01 | Stop reason: SDUPTHER

## 2023-10-02 RX ORDER — DEXTROAMPHETAMINE SACCHARATE, AMPHETAMINE ASPARTATE, DEXTROAMPHETAMINE SULFATE AND AMPHETAMINE SULFATE 3.75; 3.75; 3.75; 3.75 MG/1; MG/1; MG/1; MG/1
TABLET ORAL
Qty: 90 TABLET | Refills: 0 | Status: SHIPPED | OUTPATIENT
Start: 2023-10-02 | End: 2023-10-03 | Stop reason: SDUPTHER

## 2023-10-02 RX ORDER — ZOLPIDEM TARTRATE 5 MG/1
5 TABLET ORAL NIGHTLY
Qty: 30 TABLET | Refills: 0 | Status: SHIPPED | OUTPATIENT
Start: 2023-10-02 | End: 2023-11-01 | Stop reason: SDUPTHER

## 2023-10-03 DIAGNOSIS — F90.9 ATTENTION DEFICIT HYPERACTIVITY DISORDER (ADHD), UNSPECIFIED ADHD TYPE: ICD-10-CM

## 2023-10-03 RX ORDER — DEXTROAMPHETAMINE SACCHARATE, AMPHETAMINE ASPARTATE, DEXTROAMPHETAMINE SULFATE AND AMPHETAMINE SULFATE 3.75; 3.75; 3.75; 3.75 MG/1; MG/1; MG/1; MG/1
TABLET ORAL
Qty: 90 TABLET | Refills: 0 | Status: SHIPPED | OUTPATIENT
Start: 2023-10-03 | End: 2023-11-01 | Stop reason: SDUPTHER

## 2023-10-11 ENCOUNTER — PATIENT MESSAGE (OUTPATIENT)
Dept: HEMATOLOGY/ONCOLOGY | Facility: CLINIC | Age: 43
End: 2023-10-11
Payer: MEDICAID

## 2023-11-01 DIAGNOSIS — G47.00 INSOMNIA, UNSPECIFIED TYPE: ICD-10-CM

## 2023-11-01 DIAGNOSIS — F41.1 GENERALIZED ANXIETY DISORDER WITH PANIC ATTACKS: ICD-10-CM

## 2023-11-01 DIAGNOSIS — F41.0 GENERALIZED ANXIETY DISORDER WITH PANIC ATTACKS: ICD-10-CM

## 2023-11-01 DIAGNOSIS — F90.9 ATTENTION DEFICIT HYPERACTIVITY DISORDER (ADHD), UNSPECIFIED ADHD TYPE: ICD-10-CM

## 2023-11-01 RX ORDER — ZOLPIDEM TARTRATE 5 MG/1
5 TABLET ORAL NIGHTLY
Qty: 30 TABLET | Refills: 0 | Status: SHIPPED | OUTPATIENT
Start: 2023-11-01 | End: 2023-11-29 | Stop reason: SDUPTHER

## 2023-11-01 RX ORDER — CLONAZEPAM 1 MG/1
1 TABLET ORAL 2 TIMES DAILY PRN
Qty: 30 TABLET | Refills: 0 | Status: SHIPPED | OUTPATIENT
Start: 2023-11-01 | End: 2023-11-29 | Stop reason: SDUPTHER

## 2023-11-01 RX ORDER — DEXTROAMPHETAMINE SACCHARATE, AMPHETAMINE ASPARTATE, DEXTROAMPHETAMINE SULFATE AND AMPHETAMINE SULFATE 3.75; 3.75; 3.75; 3.75 MG/1; MG/1; MG/1; MG/1
TABLET ORAL
Qty: 90 TABLET | Refills: 0 | Status: SHIPPED | OUTPATIENT
Start: 2023-11-01 | End: 2023-11-01 | Stop reason: SDUPTHER

## 2023-11-01 RX ORDER — DEXTROAMPHETAMINE SACCHARATE, AMPHETAMINE ASPARTATE, DEXTROAMPHETAMINE SULFATE AND AMPHETAMINE SULFATE 3.75; 3.75; 3.75; 3.75 MG/1; MG/1; MG/1; MG/1
TABLET ORAL
Qty: 90 TABLET | Refills: 0 | Status: SHIPPED | OUTPATIENT
Start: 2023-11-01 | End: 2023-11-29 | Stop reason: SDUPTHER

## 2023-11-02 ENCOUNTER — TELEPHONE (OUTPATIENT)
Dept: PSYCHIATRY | Facility: CLINIC | Age: 43
End: 2023-11-02
Payer: MEDICAID

## 2023-11-29 DIAGNOSIS — F90.9 ATTENTION DEFICIT HYPERACTIVITY DISORDER (ADHD), UNSPECIFIED ADHD TYPE: ICD-10-CM

## 2023-11-29 DIAGNOSIS — F41.0 GENERALIZED ANXIETY DISORDER WITH PANIC ATTACKS: ICD-10-CM

## 2023-11-29 DIAGNOSIS — G47.00 INSOMNIA, UNSPECIFIED TYPE: ICD-10-CM

## 2023-11-29 DIAGNOSIS — F41.1 GENERALIZED ANXIETY DISORDER WITH PANIC ATTACKS: ICD-10-CM

## 2023-11-29 RX ORDER — CLONAZEPAM 1 MG/1
1 TABLET ORAL 2 TIMES DAILY PRN
Qty: 30 TABLET | Refills: 0 | Status: SHIPPED | OUTPATIENT
Start: 2023-11-29 | End: 2024-01-02 | Stop reason: SDUPTHER

## 2023-11-29 RX ORDER — ZOLPIDEM TARTRATE 5 MG/1
5 TABLET ORAL NIGHTLY
Qty: 30 TABLET | Refills: 0 | Status: SHIPPED | OUTPATIENT
Start: 2023-11-29 | End: 2024-01-02 | Stop reason: SDUPTHER

## 2023-11-29 RX ORDER — DEXTROAMPHETAMINE SACCHARATE, AMPHETAMINE ASPARTATE, DEXTROAMPHETAMINE SULFATE AND AMPHETAMINE SULFATE 3.75; 3.75; 3.75; 3.75 MG/1; MG/1; MG/1; MG/1
TABLET ORAL
Qty: 90 TABLET | Refills: 0 | Status: SHIPPED | OUTPATIENT
Start: 2023-11-29 | End: 2024-01-02 | Stop reason: SDUPTHER

## 2023-12-01 ENCOUNTER — TELEPHONE (OUTPATIENT)
Dept: SLEEP MEDICINE | Facility: HOSPITAL | Age: 43
End: 2023-12-01
Payer: MEDICAID

## 2023-12-01 ENCOUNTER — HOSPITAL ENCOUNTER (OUTPATIENT)
Dept: SLEEP MEDICINE | Facility: HOSPITAL | Age: 43
Discharge: HOME | End: 2023-12-01
Payer: MEDICAID

## 2023-12-01 DIAGNOSIS — G47.00 INSOMNIA, UNSPECIFIED TYPE: ICD-10-CM

## 2023-12-01 PROCEDURE — 95810 POLYSOM 6/> YRS 4/> PARAM: CPT

## 2023-12-02 PROBLEM — G47.00 INSOMNIA: Status: ACTIVE | Noted: 2023-12-02

## 2023-12-02 NOTE — PROGRESS NOTES
A PSG study was performed on Miriam Mendez. The entire procedure was explained, including Bio calibration procedure, patient was informed that there may be a need to enter the room during the night to fix lead or make adjustments to the equipment. Questions were answered prior to start of study. She was given instructions on how to call out for help including how to use the nurse call unit in the bathroom. Patient was given [employer] phone number to call if patient needed tech for anything, in case tech was out of tech room.  Patient education was performed. This includes the possible use of CPAP machine and different CPAP masks. She was given the after visit summary.   The lowest oxygen saturation observed during sleep was 84%   She did not meet criteria for a split night study due to insufficient amount of events during the 1st part of the study   The EKG appeared to be NSR  Technical difficulties during the study: Broken abdominal belt   The patient had a late scheduled appointment. The patient watched her Ipad after lights out and during the study. The patient took prilosec and ambien before the study and she was alert to drive in the morning.  Soft to moderate snoring was observed.

## 2023-12-11 PROCEDURE — 95810 PR POLYSOMNOGRAPHY, 4 OR MORE: ICD-10-PCS | Mod: 26,,, | Performed by: INTERNAL MEDICINE

## 2023-12-11 PROCEDURE — 95810 POLYSOM 6/> YRS 4/> PARAM: CPT | Mod: 26,,, | Performed by: INTERNAL MEDICINE

## 2023-12-12 ENCOUNTER — PATIENT MESSAGE (OUTPATIENT)
Dept: PSYCHIATRY | Facility: CLINIC | Age: 43
End: 2023-12-12
Payer: MEDICAID

## 2023-12-12 DIAGNOSIS — G47.00 INSOMNIA, UNSPECIFIED TYPE: Primary | ICD-10-CM

## 2023-12-12 NOTE — PROCEDURES
"Dear Provider,     You have ordered sleep LAB services to perform the sleep study for Miriam Mendez.  The sleep study that you ordered is complete.      Please find Sleep Study result in "Chart Review" under the "Media tab."      As the ordering provider, you are responsible for reviewing the results and implementing a treatment plan with your patient.    If you need a Sleep Medicine provider to explain the sleep study findings and arrange treatment for the patient, please refer patient for consultation to our Sleep Clinic via [facility] with Ambulatory Consult Sleep.    To do that please place an order for an  "Ambulatory Consult Sleep" - it will go to our clinic work queue for our Medical Assistant to contact the patient for an appointment.     For any questions, please contact our clinic staff at [phone] to talk to clinical staff.    "

## 2023-12-28 ENCOUNTER — TELEPHONE (OUTPATIENT)
Dept: PSYCHIATRY | Facility: CLINIC | Age: 43
End: 2023-12-28
Payer: MEDICAID

## 2023-12-29 NOTE — PROGRESS NOTES
[facility]   Physical Therapy Treatment Note     Name: Miriam MILLS [facility] Number: [id number]    Therapy Diagnosis:   Encounter Diagnoses   Name Primary?    Abnormal gait Yes    Acute pain of right knee     Decreased range of motion        Physician: Order, Paper    Visit Date: 3/1/2023    Physician Orders: PT Eval and Treat NWB range of motion lower extremity   Medical Diagnosis from Referral:   S72.401D (ICD-10-CM) - Closed fracture of lower end of right femur with routine healing   S72.491A (ICD-10-CM) - Closed comminuted intra-articular fracture of distal femur, right, initial encounter      Evaluation Date: 1/23/2023  Authorization Period Expiration: 12/31/2023  Plan of Care Expiration: 1/23/2023 to 3/24/2023  Visit # / Visits authorized: 4/ 40 + eval  FOTO#:5/5     Time In: 1:46pm  Time Out: 2:45pm  Total Billable Time: 58minutes (2TE mediaid 1:1 )      Precautions: 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   SUBJECTIVE     Pt reports: Pt reports to PT that she had another surgery. She reports they told her the fracture was worse and they had to go in and replace the screws and put in a martin. She asked them for an operative note but they said she didn't need it. She reports they told her she was WBAT.   She was compliant with home exercise program.  Response to previous treatment: had surgery since last tx   Functional change: reduced capacity 2/2 new surgery    Pain: 6/10  Location: right knee      OBJECTIVE     Objective Measures updated at progress report unless specified.     3/1/2023:    Right knee range of motion: 0-5-55    L/E Strength w/ Resort GemsET Muscle He Dynamometer Right Left Pain/Dysfunction with Movement   (approx 4 sec hold w/ max contraction)   Hip Flexion 5.3 kg  11.3 kg    "  Quadriceps 3.6 kg  27.7 kg     Hamstrings 8.1 kg  18.9 kg         Treatment     Miriam received the treatments listed below:      Miriam received the following manual therapy techniques: Joint mobilizations were applied to the: right knee for 25 minutes, including:  Patellar mobilization  Infrapatellar fat pad mobilization  Knee hinge for terminal knee extension  Measurements taken above    EOT tibial posterior glides   EOT tibiofemoral distraction   EOT muscle energy technique for knee flexion     Miriam participated in neuromuscular re-education activities to improve: Proprioception and Posture for 15 minutes. The following activities were included:  Quad sets 2x30 5"  Short arc quads 3x10     Miriam received therapeutic exercises to develop strength, endurance, ROM, and flexibility for 10 minutes including:  Heel prop 5 min   Heel slides 5 min   Sidelying hip abd 3x10   Clamshell 3x10 bilateral   Long sitting ankle plantarflexion c blue theraband 2x20    Patient Education and Home Exercises     Home Exercises Provided and Patient Education Provided     Education provided:   - Pt educated on POC  - Pt educated on anatomy and physiology of current conditions as it relates to signs and symptoms  - Pt educated on HEP - pt provided c blue theraband for gastroc strengthening     Written Home Exercises Provided: yes. Exercises were reviewed and Miriam was able to demonstrate them prior to the end of the session.  Miriam demonstrated good  understanding of the education provided. See EMR under Patient Instructions for exercises provided during therapy sessions    ASSESSMENT     Miriam presents to PT today after not coming to her last 3. She reports having a second surgery where they put a martin in her leg. Per chart review, IMN perform in femur. Op note difficult to ascertain and PT requested more information from surgeon as far as Wbing status. Pt currently demonstrates reduced range of motion and strength compared to " prior to surgery. Unable to visual incision 2/2 bandage and pt choice of clothing. Pt encouraged to wear shorts for PT to ensure no signs of infection. PT to continue to address range of motion and strength deficits.     Miriam Is progressing well towards her goals.   Pt prognosis is Fair.     Pt will continue to benefit from skilled outpatient physical therapy to address the deficits listed in the problem list box on initial evaluation, provide pt/family education and to maximize pt's level of independence in the home and community environment.     Pt's spiritual, cultural and educational needs considered and pt agreeable to plan of care and goals.     Anticipated barriers to physical therapy: Compliance/Transportation    Goals:   Short Term Goals (6 Weeks):  1. Pt will be compliant with HEP to supplement PT in restoring pain free function.  2. Pt will be able to ambulate 300ft sans assistive device in order to get around home safely  3. Pt will improve knee flexion range of motion to >90' in order to perform functional tasks at home  4. Pt will demonstrate full terminal knee extension in order to ambulate with proper mechanics.      Long Term Goals (12 Weeks):  1. Pt will improve FOTO score to </= 48% limited to decrease perceived limitation with mobility.   2.Pt will improve knee flexion range of motion to >120' in order to perform functional tasks at home  3. Pt will be able to ambulate without an increase in pain greater than 2 pts on NPRS   4. Pt will be independent c final home exercise program in order to DC to home program.     PLAN     Continue with plan of care as indicated above    Sarai Mcdaniel, PT, DPT, OCS           Appearance: laying in bed, wearing hospital attire  Behavior: uncooperative, suspicious appearing  Speech: largely talking to self incoherently, loud at times  Mood: unable to assess  Affect: irritable  Thought process: severely disorganized  Thought content: can make vaguely violent/threatening statements, today, "GET OUT" repeatedly, not observed harming self or others.   Perceptions: observed talking to self  Insight: very poor  Judgement: very poor, refusing vitals intermittently, refusing PO medications, very poor ADLs  Cognition: unable to assess Appearance: laying in bed, wearing hospital attire  Behavior: uncooperative  Speech: no speech today  Mood: unable to assess  Affect: irritable  Thought process: severely disorganized when speaks  Thought content: can make vaguely violent/threatening statements, none today, not observed harming self or others.   Perceptions: observed talking to self  Insight: very poor  Judgement: very poor, refusing vitals intermittently, refusing PO medications, very poor ADLs  Cognition: unable to assess

## 2024-01-02 ENCOUNTER — TELEPHONE (OUTPATIENT)
Dept: PSYCHIATRY | Facility: CLINIC | Age: 44
End: 2024-01-02
Payer: MEDICAID

## 2024-01-02 DIAGNOSIS — F41.1 GENERALIZED ANXIETY DISORDER WITH PANIC ATTACKS: ICD-10-CM

## 2024-01-02 DIAGNOSIS — G47.00 INSOMNIA, UNSPECIFIED TYPE: ICD-10-CM

## 2024-01-02 DIAGNOSIS — F41.0 GENERALIZED ANXIETY DISORDER WITH PANIC ATTACKS: ICD-10-CM

## 2024-01-02 DIAGNOSIS — F90.9 ATTENTION DEFICIT HYPERACTIVITY DISORDER (ADHD), UNSPECIFIED ADHD TYPE: ICD-10-CM

## 2024-01-02 RX ORDER — ZOLPIDEM TARTRATE 5 MG/1
5 TABLET ORAL NIGHTLY
Qty: 30 TABLET | Refills: 0 | Status: SHIPPED | OUTPATIENT
Start: 2024-01-02 | End: 2024-01-12 | Stop reason: DRUGHIGH

## 2024-01-02 RX ORDER — DEXTROAMPHETAMINE SACCHARATE, AMPHETAMINE ASPARTATE, DEXTROAMPHETAMINE SULFATE AND AMPHETAMINE SULFATE 3.75; 3.75; 3.75; 3.75 MG/1; MG/1; MG/1; MG/1
TABLET ORAL
Qty: 90 TABLET | Refills: 0 | Status: SHIPPED | OUTPATIENT
Start: 2024-01-02 | End: 2024-01-12 | Stop reason: SDUPTHER

## 2024-01-02 RX ORDER — CLONAZEPAM 1 MG/1
1 TABLET ORAL 2 TIMES DAILY PRN
Qty: 30 TABLET | Refills: 0 | Status: SHIPPED | OUTPATIENT
Start: 2024-01-02 | End: 2024-01-29 | Stop reason: SDUPTHER

## 2024-01-03 ENCOUNTER — PATIENT MESSAGE (OUTPATIENT)
Dept: OTOLARYNGOLOGY | Facility: CLINIC | Age: 44
End: 2024-01-03
Payer: MEDICAID

## 2024-01-05 ENCOUNTER — PATIENT MESSAGE (OUTPATIENT)
Dept: OTOLARYNGOLOGY | Facility: CLINIC | Age: 44
End: 2024-01-05
Payer: MEDICAID

## 2024-01-12 ENCOUNTER — OFFICE VISIT (OUTPATIENT)
Dept: PSYCHIATRY | Facility: CLINIC | Age: 44
End: 2024-01-12
Payer: MEDICAID

## 2024-01-12 DIAGNOSIS — F33.41 RECURRENT MAJOR DEPRESSIVE DISORDER, IN PARTIAL REMISSION: Primary | ICD-10-CM

## 2024-01-12 DIAGNOSIS — F41.1 GENERALIZED ANXIETY DISORDER WITH PANIC ATTACKS: ICD-10-CM

## 2024-01-12 DIAGNOSIS — F90.9 ATTENTION DEFICIT HYPERACTIVITY DISORDER (ADHD), UNSPECIFIED ADHD TYPE: ICD-10-CM

## 2024-01-12 DIAGNOSIS — F41.0 GENERALIZED ANXIETY DISORDER WITH PANIC ATTACKS: ICD-10-CM

## 2024-01-12 DIAGNOSIS — G47.00 INSOMNIA, UNSPECIFIED TYPE: ICD-10-CM

## 2024-01-12 PROCEDURE — 99214 OFFICE O/P EST MOD 30 MIN: CPT | Mod: SA,HB,95,

## 2024-01-12 RX ORDER — FLUOXETINE HYDROCHLORIDE 40 MG/1
CAPSULE ORAL
Qty: 90 CAPSULE | Refills: 0 | Status: SHIPPED | OUTPATIENT
Start: 2024-01-12 | End: 2024-02-01 | Stop reason: SDUPTHER

## 2024-01-12 RX ORDER — ZOLPIDEM TARTRATE 10 MG/1
10 TABLET ORAL NIGHTLY PRN
Qty: 30 TABLET | Refills: 0 | Status: SHIPPED | OUTPATIENT
Start: 2024-01-12 | End: 2024-02-01 | Stop reason: SDUPTHER

## 2024-01-12 RX ORDER — FLUOXETINE HYDROCHLORIDE 20 MG/1
CAPSULE ORAL
Qty: 90 CAPSULE | Refills: 0 | Status: SHIPPED | OUTPATIENT
Start: 2024-01-12 | End: 2024-02-01 | Stop reason: SDUPTHER

## 2024-01-12 RX ORDER — DEXTROAMPHETAMINE SACCHARATE, AMPHETAMINE ASPARTATE, DEXTROAMPHETAMINE SULFATE AND AMPHETAMINE SULFATE 3.75; 3.75; 3.75; 3.75 MG/1; MG/1; MG/1; MG/1
TABLET ORAL
Qty: 90 TABLET | Refills: 0 | Status: SHIPPED | OUTPATIENT
Start: 2024-01-12 | End: 2024-02-01 | Stop reason: SDUPTHER

## 2024-01-12 NOTE — PROGRESS NOTES
Outpatient Psychiatry Follow-Up Visit (PA)    01/12/2024    Clinical Status of Patient:  Outpatient (Ambulatory)    Chief Complaint:  Miriam Mendez is a 43 y.o. female who presents today for follow-up of depression, anxiety, and attention problems.  Met with patient.     The patient location is: [place], [place] at work in parked car  The chief complaint leading to consultation is: anxiety, depression    Visit type: audiovisual    Face to Face time with patient: 17 minutes  20 minutes of total time spent on the encounter, which includes face to face time and non-face to face time preparing to see the patient (eg, review of tests), Obtaining and/or reviewing separately obtained history, Documenting clinical information in the electronic or other health record, Independently interpreting results (not separately reported) and communicating results to the patient/family/caregiver, or Care coordination (not separately reported).     Each patient to whom he or she provides medical services by telemedicine is:  (1) informed of the relationship between the physician and patient and the respective role of any other health care provider with respect to management of the patient; and (2) notified that he or she may decline to receive medical services by telemedicine and may withdraw from such care at any time.    Current Psych Medications:   Prozac 40 mg PO daily   Klonopin 1 mg PO BID PRN   Ambien 5 mg PO nightly  Adderall 30 mg once in the morning, 15 mg mid-day (45 mg total per day)    Interval History and Content of Current Session:  Patient seen and chart reviewed. Last seen on 9/1/2023    Patient has a psychiatric history of: depression, anxiety and attention problems    Pt reports for follow up today stating that she has been having to take Klonopin at least once a day, states that she feels the attack coming on so she takes it before it starts. Patient states that she has not been sleeping well on the decreased dose  "(recommended dose) of Ambien. Patient states that she has been on 10 mg for over 9 years. Patient is able to sleep on Ambien 10 mg without any problems. Patient states that work has been difficult just because she wants to do everything perfect.     Mood: overall mood "good, I've been just over-thinking things"    Anxiety: "controlled", but still having times that she has to take Klonopin, states that triggers cause the anxiety attacks    Depression: "not really"    Pt appears happy and sitting in car    Denies adverse effects from medication    Sleep: Sleep has not been good on 5 mg of Ambien, falls asleep but wont stay asleep    Appetite: patient state that she has appetite more under control    Denies SI/HI/AVH.     Pt reports taking medications as prescribed and behaving appropriately during interview today.    Review of Systems   PSYCHIATRIC: Pertinant items are noted in the narrative.  CONSTITUTIONAL: Positive for weight gain.   MUSCULOSKELETAL: No pain or stiffness of the joints.  NEUROLOGIC: No weakness, sensory changes, seizures, confusion, memory loss, tremor or other abnormal movements.  ENDOCRINE: No polydipsia or polyuria.  INTEGUMENTARY: No rashes or lacerations.  EYES: No exophthalmos, jaundice or blindness.  ENT: No dizziness, tinnitus or hearing loss.  RESPIRATORY: No shortness of breath.  CARDIOVASCULAR: No tachycardia or chest pain.  GASTROINTESTINAL: No nausea, vomiting, pain, constipation or diarrhea.  GENITOURINARY: No frequency, dysuria or sexual dysfunction.  HEMATOLOGIC/LYMPHATIC: No excessive bleeding, prolonged or excessive bleeding after dental extraction/injury.    Past Medication Trials:  Lexapro, Celexa, Wellbutrin, Zoloft, Cymbalta, Buspar    Past Medical, Family and Social History: The patient's past medical, family and social history have been reviewed and updated as appropriate within the electronic medical record - see encounter notes.    Compliance: yes    Side effects: " None    Risk Parameters:  Patient reports no suicidal ideation  Patient reports no homicidal ideation  Patient reports no self-injurious behavior  Patient reports no violent behavior    Exam (detailed: at least 9 elements; comprehensive: all 15 elements)   Constitutional  Vitals:  Most recent vital signs, dated less than 90 days prior to this appointment, were reviewed.   There were no vitals filed for this visit.     General:  unremarkable, age appropriate, casually dressed, lying in bed     Musculoskeletal  Muscle Strength/Tone:  not examined   Gait & Station:  Unable to assess     Psychiatric  Speech:  no latency; no press   Mood & Affect:  steady, happy  congruent and appropriate   Thought Process:  normal and logical   Associations:  intact   Thought Content:  normal, no suicidality, no homicidality, delusions, or paranoia   Insight:  has awareness of illness   Judgement: behavior is adequate to circumstances   Orientation:  person, place, situation, time/date, day of week   Memory: intact for content of interview   Language: grossly intact   Attention Span & Concentration:  able to focus   Fund of Knowledge:  intact and appropriate to age and level of education     Assessment and Diagnosis   Status/Progress: Based on the examination today, the patient's problem(s) is/are well controlled.  New problems have not been presented today.   Co-morbidities are not complicating management of the primary condition.  There are no active rule-out diagnoses for this patient at this time.     General Impression: Miriam Mendez is a 43 yo female who presents to the clinic for follow up.       ICD-10-CM ICD-9-CM    1. Recurrent major depressive disorder, in partial remission  F33.41 296.35 FLUoxetine 40 MG capsule      FLUoxetine 20 MG capsule      2. Attention deficit hyperactivity disorder (ADHD), unspecified ADHD type  F90.9 314.01 dextroamphetamine-amphetamine (ADDERALL) 15 mg tablet      3. Generalized anxiety disorder  with panic attacks  F41.1 300.02 FLUoxetine 40 MG capsule    F41.0 300.01 FLUoxetine 20 MG capsule      4. Insomnia, unspecified type  G47.00 780.52 zolpidem (AMBIEN) 10 mg Tab            Intervention/Counseling/Treatment Plan   Medication Management: Continue current medications.  Increase Prozac 60 mg PO daily   Continue Klonopin 1 mg PO BID PRN   Informed pt of the risks of continuous Benzodiazepine use including tolerance, dependence and withdrawals that may be life threatening upon abrupt cessation. Also advised not to take Benzodiazepines with Opiates or other sedatives and also not to drive or operate heavy machinery while using Benzodiazepines.  Increase Ambien 10 mg PO nightly   Patient educated on the importance of not taking Klonopin with Ambien  Continue Adderall 30 mg once in the morning, 15 mg mid-day (45 mg total per day)   checked, no discrepancies  Discussed risks of stimulants include addiction, tolerance, and possible withdrawal associated with stimulant use. Possible side effects including heart palpitations, restlessness, increased anxiety, decreased appetite, insomnia, and dry mouth were also discussed with the patient. Patient advised not to mix the medication with alcohol.   Discussed diagnosis, risk and benefits of proposed treatment above vs alternative treatment vs no treatment, and potential side effects of these treatments, and the inherent unpredictability of individual responses to these treatments. The patient expresses understanding and gives informed consent to pursue treatment at this time, believing that the potential benefits outweigh the potential risks. Patient has no other questions. Risks/adverse effects at this time include but are not limited to: GI side effects, sexual dysfunction, activation vs sedation, triggering of suicidal ideation, and serotonin syndrome.   Patient voices understanding and agreement with this plan  Provided crisis numbers  Encouraged patient to  keep future appointments  Instruct patient to call or message with questions  In the event of an emergency, including suicidal ideation, patient was advised to go to the emergency room      Return to Clinic: 6 weeks, or 4 -5 weeks    Total time: 20 minutes (which included pts differential diagnosis and prognosis for psychiatric conditions, risks, benefits of treatments, instructions and adherence to treatment plan, risk reduction, reviewing current psychiatric medication regimen, medical problems and social stressors. In addtion to possible discussion with other healthcare provider/s)    Lauern Marin PA-C

## 2024-01-29 ENCOUNTER — PATIENT MESSAGE (OUTPATIENT)
Dept: PSYCHIATRY | Facility: CLINIC | Age: 44
End: 2024-01-29
Payer: MEDICAID

## 2024-01-29 DIAGNOSIS — F90.9 ATTENTION DEFICIT HYPERACTIVITY DISORDER (ADHD), UNSPECIFIED ADHD TYPE: ICD-10-CM

## 2024-01-29 DIAGNOSIS — F41.0 GENERALIZED ANXIETY DISORDER WITH PANIC ATTACKS: ICD-10-CM

## 2024-01-29 DIAGNOSIS — F41.1 GENERALIZED ANXIETY DISORDER WITH PANIC ATTACKS: ICD-10-CM

## 2024-01-29 RX ORDER — DEXTROAMPHETAMINE SACCHARATE, AMPHETAMINE ASPARTATE, DEXTROAMPHETAMINE SULFATE AND AMPHETAMINE SULFATE 3.75; 3.75; 3.75; 3.75 MG/1; MG/1; MG/1; MG/1
TABLET ORAL
Qty: 90 TABLET | Refills: 0 | OUTPATIENT
Start: 2024-01-29

## 2024-01-29 RX ORDER — CLONAZEPAM 1 MG/1
1 TABLET ORAL 2 TIMES DAILY PRN
Qty: 30 TABLET | Refills: 0 | Status: SHIPPED | OUTPATIENT
Start: 2024-01-29 | End: 2024-02-28 | Stop reason: SDUPTHER

## 2024-01-29 NOTE — TELEPHONE ENCOUNTER
Incoming call pt demanded I leave a message for Tania to fill her medication today , when requesting pt make F/U appt pt became very upset stated she does not have to make F/U that Tania needs to call in her medication today. Pt hung the phn up upset    Disp Refills Start End    dextroamphetamine-amphetamine (ADDERALL) 15 mg tablet 90 tablet 0 1/12/2024 --                    clonazePAM (KLONOPIN) 1 MG tablet

## 2024-02-01 DIAGNOSIS — F41.0 GENERALIZED ANXIETY DISORDER WITH PANIC ATTACKS: ICD-10-CM

## 2024-02-01 DIAGNOSIS — F33.41 RECURRENT MAJOR DEPRESSIVE DISORDER, IN PARTIAL REMISSION: ICD-10-CM

## 2024-02-01 DIAGNOSIS — G47.00 INSOMNIA, UNSPECIFIED TYPE: ICD-10-CM

## 2024-02-01 DIAGNOSIS — F90.9 ATTENTION DEFICIT HYPERACTIVITY DISORDER (ADHD), UNSPECIFIED ADHD TYPE: ICD-10-CM

## 2024-02-01 DIAGNOSIS — F41.1 GENERALIZED ANXIETY DISORDER WITH PANIC ATTACKS: ICD-10-CM

## 2024-02-01 RX ORDER — DEXTROAMPHETAMINE SACCHARATE, AMPHETAMINE ASPARTATE, DEXTROAMPHETAMINE SULFATE AND AMPHETAMINE SULFATE 3.75; 3.75; 3.75; 3.75 MG/1; MG/1; MG/1; MG/1
TABLET ORAL
Qty: 90 TABLET | Refills: 0 | Status: SHIPPED | OUTPATIENT
Start: 2024-02-01 | End: 2024-02-28 | Stop reason: SDUPTHER

## 2024-02-01 RX ORDER — FLUOXETINE HYDROCHLORIDE 40 MG/1
CAPSULE ORAL
Qty: 90 CAPSULE | Refills: 0 | Status: SHIPPED | OUTPATIENT
Start: 2024-02-01 | End: 2024-02-28 | Stop reason: SDUPTHER

## 2024-02-01 RX ORDER — FLUOXETINE HYDROCHLORIDE 20 MG/1
CAPSULE ORAL
Qty: 90 CAPSULE | Refills: 0 | Status: SHIPPED | OUTPATIENT
Start: 2024-02-01 | End: 2024-02-28 | Stop reason: SDUPTHER

## 2024-02-01 RX ORDER — ZOLPIDEM TARTRATE 10 MG/1
10 TABLET ORAL NIGHTLY PRN
Qty: 30 TABLET | Refills: 0 | Status: SHIPPED | OUTPATIENT
Start: 2024-02-01 | End: 2024-02-28 | Stop reason: SDUPTHER

## 2024-02-28 DIAGNOSIS — F41.0 GENERALIZED ANXIETY DISORDER WITH PANIC ATTACKS: ICD-10-CM

## 2024-02-28 DIAGNOSIS — G47.00 INSOMNIA, UNSPECIFIED TYPE: ICD-10-CM

## 2024-02-28 DIAGNOSIS — F33.41 RECURRENT MAJOR DEPRESSIVE DISORDER, IN PARTIAL REMISSION: ICD-10-CM

## 2024-02-28 DIAGNOSIS — F90.9 ATTENTION DEFICIT HYPERACTIVITY DISORDER (ADHD), UNSPECIFIED ADHD TYPE: ICD-10-CM

## 2024-02-28 DIAGNOSIS — F41.1 GENERALIZED ANXIETY DISORDER WITH PANIC ATTACKS: ICD-10-CM

## 2024-02-29 RX ORDER — ZOLPIDEM TARTRATE 10 MG/1
10 TABLET ORAL NIGHTLY PRN
Qty: 30 TABLET | Refills: 0 | Status: SHIPPED | OUTPATIENT
Start: 2024-02-29 | End: 2024-03-06 | Stop reason: SDUPTHER

## 2024-02-29 RX ORDER — FLUOXETINE HYDROCHLORIDE 40 MG/1
CAPSULE ORAL
Qty: 90 CAPSULE | Refills: 0 | Status: SHIPPED | OUTPATIENT
Start: 2024-02-29

## 2024-02-29 RX ORDER — DEXTROAMPHETAMINE SACCHARATE, AMPHETAMINE ASPARTATE, DEXTROAMPHETAMINE SULFATE AND AMPHETAMINE SULFATE 3.75; 3.75; 3.75; 3.75 MG/1; MG/1; MG/1; MG/1
TABLET ORAL
Qty: 90 TABLET | Refills: 0 | Status: SHIPPED | OUTPATIENT
Start: 2024-02-29 | End: 2024-03-06 | Stop reason: SDUPTHER

## 2024-02-29 RX ORDER — CLONAZEPAM 1 MG/1
1 TABLET ORAL 2 TIMES DAILY PRN
Qty: 30 TABLET | Refills: 0 | Status: SHIPPED | OUTPATIENT
Start: 2024-02-29 | End: 2024-03-06 | Stop reason: SDUPTHER

## 2024-02-29 RX ORDER — FLUOXETINE HYDROCHLORIDE 20 MG/1
CAPSULE ORAL
Qty: 90 CAPSULE | Refills: 0 | Status: SHIPPED | OUTPATIENT
Start: 2024-02-29 | End: 2024-04-22

## 2024-03-06 ENCOUNTER — OFFICE VISIT (OUTPATIENT)
Dept: PSYCHIATRY | Facility: CLINIC | Age: 44
End: 2024-03-06
Payer: MEDICAID

## 2024-03-06 DIAGNOSIS — F33.41 RECURRENT MAJOR DEPRESSIVE DISORDER, IN PARTIAL REMISSION: ICD-10-CM

## 2024-03-06 DIAGNOSIS — F41.0 GENERALIZED ANXIETY DISORDER WITH PANIC ATTACKS: Primary | ICD-10-CM

## 2024-03-06 DIAGNOSIS — G47.00 INSOMNIA, UNSPECIFIED TYPE: ICD-10-CM

## 2024-03-06 DIAGNOSIS — F41.1 GENERALIZED ANXIETY DISORDER WITH PANIC ATTACKS: Primary | ICD-10-CM

## 2024-03-06 DIAGNOSIS — F90.9 ATTENTION DEFICIT HYPERACTIVITY DISORDER (ADHD), UNSPECIFIED ADHD TYPE: ICD-10-CM

## 2024-03-06 PROCEDURE — 99214 OFFICE O/P EST MOD 30 MIN: CPT | Mod: SA,HB,95,

## 2024-03-06 RX ORDER — DEXTROAMPHETAMINE SACCHARATE, AMPHETAMINE ASPARTATE, DEXTROAMPHETAMINE SULFATE AND AMPHETAMINE SULFATE 3.75; 3.75; 3.75; 3.75 MG/1; MG/1; MG/1; MG/1
TABLET ORAL
Qty: 90 TABLET | Refills: 0 | Status: SHIPPED | OUTPATIENT
Start: 2024-05-23

## 2024-03-06 RX ORDER — CLONAZEPAM 1 MG/1
1 TABLET ORAL 2 TIMES DAILY PRN
Qty: 30 TABLET | Refills: 0 | Status: SHIPPED | OUTPATIENT
Start: 2024-03-28 | End: 2024-04-25

## 2024-03-06 RX ORDER — DEXTROAMPHETAMINE SACCHARATE, AMPHETAMINE ASPARTATE, DEXTROAMPHETAMINE SULFATE AND AMPHETAMINE SULFATE 3.75; 3.75; 3.75; 3.75 MG/1; MG/1; MG/1; MG/1
TABLET ORAL
Qty: 90 TABLET | Refills: 0 | Status: SHIPPED | OUTPATIENT
Start: 2024-03-28

## 2024-03-06 RX ORDER — ZOLPIDEM TARTRATE 10 MG/1
10 TABLET ORAL NIGHTLY PRN
Qty: 30 TABLET | Refills: 0 | Status: SHIPPED | OUTPATIENT
Start: 2024-03-28 | End: 2024-04-25

## 2024-03-06 RX ORDER — DEXTROAMPHETAMINE SACCHARATE, AMPHETAMINE ASPARTATE, DEXTROAMPHETAMINE SULFATE AND AMPHETAMINE SULFATE 3.75; 3.75; 3.75; 3.75 MG/1; MG/1; MG/1; MG/1
TABLET ORAL
Qty: 90 TABLET | Refills: 0 | Status: SHIPPED | OUTPATIENT
Start: 2024-04-25

## 2024-03-06 NOTE — PROGRESS NOTES
"Outpatient Psychiatry Follow-Up Visit (PA)    03/06/2024    Clinical Status of Patient:  Outpatient (Ambulatory)    Chief Complaint:  Miriam Mendez is a 43 y.o. female who presents today for follow-up of depression, anxiety, and attention problems.  Met with patient.     The patient location is: [place], [place] at home  The chief complaint leading to consultation is: anxiety, depression    Visit type: audiovisual    Face to Face time with patient: 14 minutes  20 minutes of total time spent on the encounter, which includes face to face time and non-face to face time preparing to see the patient (eg, review of tests), Obtaining and/or reviewing separately obtained history, Documenting clinical information in the electronic or other health record, Independently interpreting results (not separately reported) and communicating results to the patient/family/caregiver, or Care coordination (not separately reported).     Each patient to whom he or she provides medical services by telemedicine is:  (1) informed of the relationship between the physician and patient and the respective role of any other health care provider with respect to management of the patient; and (2) notified that he or she may decline to receive medical services by telemedicine and may withdraw from such care at any time.    Current Psych Medications:   Prozac 60 mg PO daily   Klonopin 1 mg PO BID PRN   Ambien 10 mg PO nightly  Adderall 30 mg once in the morning, 15 mg mid-day (45 mg total per day)    Interval History and Content of Current Session:  Patient seen and chart reviewed. Last seen on 1/12/2024    Patient has a psychiatric history of: depression, anxiety and attention problems    Pt reports for follow up today stating that the increase in Prozac has been "good". She has still been having to take Klonopin at least once a day, states that she feels the attack coming on so she takes it before it starts. Patient states that she sometimes sleep is " "good and sometimes its horrible, depending on anxiety for the day. Patient states that she has been on 10 mg for over 9 years. Patient states that work has been a lot better.    Mood: overall mood "a lot better"    Anxiety: "controlled", but still having times that she has to take Klonopin, states that triggers cause the anxiety attacks    Depression: "not really any depression"    Pt appears happy and calm    Denies adverse effects from medication    Sleep: Sleep fluctuates sometimes good sometimes bad due to how day was    Appetite: patient state that she has appetite more under control    Denies SI/HI/AVH.     Pt reports taking medications as prescribed and behaving appropriately during interview today.    Review of Systems   PSYCHIATRIC: Pertinant items are noted in the narrative.  CONSTITUTIONAL: Positive for weight gain.   MUSCULOSKELETAL: No pain or stiffness of the joints.  NEUROLOGIC: No weakness, sensory changes, seizures, confusion, memory loss, tremor or other abnormal movements.  ENDOCRINE: No polydipsia or polyuria.  INTEGUMENTARY: No rashes or lacerations.  EYES: No exophthalmos, jaundice or blindness.  ENT: No dizziness, tinnitus or hearing loss.  RESPIRATORY: No shortness of breath.  CARDIOVASCULAR: No tachycardia or chest pain.  GASTROINTESTINAL: No nausea, vomiting, pain, constipation or diarrhea.  GENITOURINARY: No frequency, dysuria or sexual dysfunction.  HEMATOLOGIC/LYMPHATIC: No excessive bleeding, prolonged or excessive bleeding after dental extraction/injury.    Past Medication Trials:  Lexapro, Celexa, Wellbutrin, Zoloft, Cymbalta, Buspar    Past Medical, Family and Social History: The patient's past medical, family and social history have been reviewed and updated as appropriate within the electronic medical record - see encounter notes.    Compliance: yes    Side effects: None    Risk Parameters:  Patient reports no suicidal ideation  Patient reports no homicidal ideation  Patient " reports no self-injurious behavior  Patient reports no violent behavior    Exam (detailed: at least 9 elements; comprehensive: all 15 elements)   Constitutional  Vitals:  Most recent vital signs, dated less than 90 days prior to this appointment, were reviewed.   There were no vitals filed for this visit.     General:  unremarkable, age appropriate, casually dressed, lying in bed     Musculoskeletal  Muscle Strength/Tone:  not examined   Gait & Station:  Unable to assess     Psychiatric  Speech:  no latency; no press   Mood & Affect:  steady, happy  congruent and appropriate   Thought Process:  normal and logical   Associations:  intact   Thought Content:  normal, no suicidality, no homicidality, delusions, or paranoia   Insight:  has awareness of illness   Judgement: behavior is adequate to circumstances   Orientation:  person, place, situation, time/date, day of week   Memory: intact for content of interview   Language: grossly intact   Attention Span & Concentration:  able to focus   Fund of Knowledge:  intact and appropriate to age and level of education     Assessment and Diagnosis   Status/Progress: Based on the examination today, the patient's problem(s) is/are well controlled.  New problems have not been presented today.   Co-morbidities are not complicating management of the primary condition.  There are no active rule-out diagnoses for this patient at this time.     General Impression: Miriam Mendez is a 41 yo female who presents to the clinic for follow up. Doing well on medication regimen overall. Will continue, see dosing below.       ICD-10-CM ICD-9-CM    1. Generalized anxiety disorder with panic attacks  F41.1 300.02 clonazePAM (KLONOPIN) 1 MG tablet    F41.0 300.01       2. Recurrent major depressive disorder, in partial remission  F33.41 296.35       3. Attention deficit hyperactivity disorder (ADHD), unspecified ADHD type  F90.9 314.01 dextroamphetamine-amphetamine (ADDERALL) 15 mg tablet       dextroamphetamine-amphetamine (ADDERALL) 15 mg tablet      dextroamphetamine-amphetamine (ADDERALL) 15 mg tablet      4. Insomnia, unspecified type  G47.00 780.52 zolpidem (AMBIEN) 10 mg Tab          Intervention/Counseling/Treatment Plan   Medication Management: Continue current medications.  Next follow up appointment will have to be in person - patient aware and agrees  Continue Prozac 60 mg PO daily   Continue Klonopin 1 mg PO BID PRN   Informed pt of the risks of continuous Benzodiazepine use including tolerance, dependence and withdrawals that may be life threatening upon abrupt cessation. Also advised not to take Benzodiazepines with Opiates or other sedatives and also not to drive or operate heavy machinery while using Benzodiazepines.  Continue Ambien 10 mg PO nightly   Patient educated on the importance of not taking Klonopin with Ambien  Continue Adderall 30 mg once in the morning, 15 mg mid-day (45 mg total per day)   checked, no discrepancies  Discussed risks of stimulants include addiction, tolerance, and possible withdrawal associated with stimulant use. Possible side effects including heart palpitations, restlessness, increased anxiety, decreased appetite, insomnia, and dry mouth were also discussed with the patient. Patient advised not to mix the medication with alcohol.   Discussed diagnosis, risk and benefits of proposed treatment above vs alternative treatment vs no treatment, and potential side effects of these treatments, and the inherent unpredictability of individual responses to these treatments. The patient expresses understanding and gives informed consent to pursue treatment at this time, believing that the potential benefits outweigh the potential risks. Patient has no other questions. Risks/adverse effects at this time include but are not limited to: GI side effects, sexual dysfunction, activation vs sedation, triggering of suicidal ideation, and serotonin syndrome.   Patient voices  understanding and agreement with this plan  Provided crisis numbers  Encouraged patient to keep future appointments  Instruct patient to call or message with questions  In the event of an emergency, including suicidal ideation, patient was advised to go to the emergency room      Return to Clinic: 3 months    Total time: 20 minutes (which included pts differential diagnosis and prognosis for psychiatric conditions, risks, benefits of treatments, instructions and adherence to treatment plan, risk reduction, reviewing current psychiatric medication regimen, medical problems and social stressors. In addtion to possible discussion with other healthcare provider/s)    Lauren Marin PA-C

## 2024-04-22 DIAGNOSIS — F41.0 GENERALIZED ANXIETY DISORDER WITH PANIC ATTACKS: ICD-10-CM

## 2024-04-22 DIAGNOSIS — F41.1 GENERALIZED ANXIETY DISORDER WITH PANIC ATTACKS: ICD-10-CM

## 2024-04-22 DIAGNOSIS — F33.41 RECURRENT MAJOR DEPRESSIVE DISORDER, IN PARTIAL REMISSION: ICD-10-CM

## 2024-04-22 RX ORDER — FLUOXETINE HYDROCHLORIDE 20 MG/1
CAPSULE ORAL
Qty: 90 CAPSULE | Refills: 0 | Status: SHIPPED | OUTPATIENT
Start: 2024-04-22

## 2024-04-24 DIAGNOSIS — Z12.31 OTHER SCREENING MAMMOGRAM: ICD-10-CM

## 2024-04-25 DIAGNOSIS — G47.00 INSOMNIA, UNSPECIFIED TYPE: ICD-10-CM

## 2024-04-25 DIAGNOSIS — F41.1 GENERALIZED ANXIETY DISORDER WITH PANIC ATTACKS: ICD-10-CM

## 2024-04-25 DIAGNOSIS — F41.0 GENERALIZED ANXIETY DISORDER WITH PANIC ATTACKS: ICD-10-CM

## 2024-04-25 RX ORDER — ZOLPIDEM TARTRATE 10 MG/1
10 TABLET ORAL NIGHTLY PRN
Qty: 30 TABLET | Refills: 0 | Status: SHIPPED | OUTPATIENT
Start: 2024-04-25

## 2024-04-25 RX ORDER — CLONAZEPAM 1 MG/1
1 TABLET ORAL 2 TIMES DAILY
Qty: 30 TABLET | Refills: 0 | Status: SHIPPED | OUTPATIENT
Start: 2024-04-25

## 2024-06-24 DIAGNOSIS — F41.0 GENERALIZED ANXIETY DISORDER WITH PANIC ATTACKS: ICD-10-CM

## 2024-06-24 DIAGNOSIS — F33.41 RECURRENT MAJOR DEPRESSIVE DISORDER, IN PARTIAL REMISSION: ICD-10-CM

## 2024-06-24 DIAGNOSIS — F90.9 ATTENTION DEFICIT HYPERACTIVITY DISORDER (ADHD), UNSPECIFIED ADHD TYPE: ICD-10-CM

## 2024-06-24 DIAGNOSIS — F41.1 GENERALIZED ANXIETY DISORDER WITH PANIC ATTACKS: ICD-10-CM

## 2024-06-24 RX ORDER — FLUOXETINE HYDROCHLORIDE 20 MG/1
CAPSULE ORAL
Qty: 90 CAPSULE | Refills: 0 | Status: SHIPPED | OUTPATIENT
Start: 2024-06-24

## 2024-06-24 RX ORDER — DEXTROAMPHETAMINE SACCHARATE, AMPHETAMINE ASPARTATE, DEXTROAMPHETAMINE SULFATE AND AMPHETAMINE SULFATE 3.75; 3.75; 3.75; 3.75 MG/1; MG/1; MG/1; MG/1
TABLET ORAL
Qty: 90 TABLET | Refills: 0 | OUTPATIENT
Start: 2024-06-24

## 2024-06-24 RX ORDER — DEXTROAMPHETAMINE SACCHARATE, AMPHETAMINE ASPARTATE, DEXTROAMPHETAMINE SULFATE AND AMPHETAMINE SULFATE 3.75; 3.75; 3.75; 3.75 MG/1; MG/1; MG/1; MG/1
TABLET ORAL
Qty: 90 TABLET | Refills: 0 | Status: SHIPPED | OUTPATIENT
Start: 2024-06-24

## 2024-07-22 ENCOUNTER — PATIENT MESSAGE (OUTPATIENT)
Dept: PSYCHIATRY | Facility: CLINIC | Age: 44
End: 2024-07-22
Payer: MEDICAID

## 2024-07-22 DIAGNOSIS — F90.9 ATTENTION DEFICIT HYPERACTIVITY DISORDER (ADHD), UNSPECIFIED ADHD TYPE: ICD-10-CM

## 2024-07-22 DIAGNOSIS — F33.41 RECURRENT MAJOR DEPRESSIVE DISORDER, IN PARTIAL REMISSION: ICD-10-CM

## 2024-07-22 DIAGNOSIS — F41.1 GENERALIZED ANXIETY DISORDER WITH PANIC ATTACKS: ICD-10-CM

## 2024-07-22 DIAGNOSIS — F41.0 GENERALIZED ANXIETY DISORDER WITH PANIC ATTACKS: ICD-10-CM

## 2024-07-22 DIAGNOSIS — G47.00 INSOMNIA, UNSPECIFIED TYPE: ICD-10-CM

## 2024-07-22 RX ORDER — ZOLPIDEM TARTRATE 10 MG/1
10 TABLET ORAL NIGHTLY PRN
Qty: 30 TABLET | Refills: 0 | Status: CANCELLED | OUTPATIENT
Start: 2024-07-22

## 2024-07-22 RX ORDER — CLONAZEPAM 1 MG/1
1 TABLET ORAL 2 TIMES DAILY
Qty: 30 TABLET | Refills: 0 | Status: CANCELLED | OUTPATIENT
Start: 2024-07-22

## 2024-07-22 RX ORDER — DEXTROAMPHETAMINE SACCHARATE, AMPHETAMINE ASPARTATE, DEXTROAMPHETAMINE SULFATE AND AMPHETAMINE SULFATE 3.75; 3.75; 3.75; 3.75 MG/1; MG/1; MG/1; MG/1
TABLET ORAL
Qty: 90 TABLET | Refills: 0 | Status: CANCELLED | OUTPATIENT
Start: 2024-07-22

## 2024-07-22 RX ORDER — FLUOXETINE HYDROCHLORIDE 40 MG/1
CAPSULE ORAL
Qty: 90 CAPSULE | Refills: 0 | Status: CANCELLED | OUTPATIENT
Start: 2024-07-22

## 2024-07-23 DIAGNOSIS — G47.00 INSOMNIA, UNSPECIFIED TYPE: ICD-10-CM

## 2024-07-23 DIAGNOSIS — F41.0 GENERALIZED ANXIETY DISORDER WITH PANIC ATTACKS: ICD-10-CM

## 2024-07-23 DIAGNOSIS — F33.41 RECURRENT MAJOR DEPRESSIVE DISORDER, IN PARTIAL REMISSION: ICD-10-CM

## 2024-07-23 DIAGNOSIS — F90.9 ATTENTION DEFICIT HYPERACTIVITY DISORDER (ADHD), UNSPECIFIED ADHD TYPE: ICD-10-CM

## 2024-07-23 DIAGNOSIS — F41.1 GENERALIZED ANXIETY DISORDER WITH PANIC ATTACKS: ICD-10-CM

## 2024-07-23 RX ORDER — ZOLPIDEM TARTRATE 10 MG/1
10 TABLET ORAL NIGHTLY PRN
Qty: 30 TABLET | Refills: 0 | Status: SHIPPED | OUTPATIENT
Start: 2024-07-23

## 2024-07-23 RX ORDER — CLONAZEPAM 1 MG/1
1 TABLET ORAL 2 TIMES DAILY
Qty: 30 TABLET | Refills: 0 | Status: SHIPPED | OUTPATIENT
Start: 2024-07-23

## 2024-07-23 RX ORDER — DEXTROAMPHETAMINE SACCHARATE, AMPHETAMINE ASPARTATE, DEXTROAMPHETAMINE SULFATE AND AMPHETAMINE SULFATE 3.75; 3.75; 3.75; 3.75 MG/1; MG/1; MG/1; MG/1
TABLET ORAL
Qty: 90 TABLET | Refills: 0 | Status: SHIPPED | OUTPATIENT
Start: 2024-07-23

## 2024-07-23 RX ORDER — FLUOXETINE HYDROCHLORIDE 20 MG/1
CAPSULE ORAL
Qty: 90 CAPSULE | Refills: 0 | Status: SHIPPED | OUTPATIENT
Start: 2024-07-23

## 2024-09-25 DIAGNOSIS — F33.41 RECURRENT MAJOR DEPRESSIVE DISORDER, IN PARTIAL REMISSION: ICD-10-CM

## 2024-09-25 DIAGNOSIS — G47.00 INSOMNIA, UNSPECIFIED TYPE: ICD-10-CM

## 2024-09-25 DIAGNOSIS — F41.0 GENERALIZED ANXIETY DISORDER WITH PANIC ATTACKS: ICD-10-CM

## 2024-09-25 DIAGNOSIS — F90.9 ATTENTION DEFICIT HYPERACTIVITY DISORDER (ADHD), UNSPECIFIED ADHD TYPE: ICD-10-CM

## 2024-09-25 DIAGNOSIS — K21.9 GASTROESOPHAGEAL REFLUX DISEASE WITHOUT ESOPHAGITIS: ICD-10-CM

## 2024-09-25 DIAGNOSIS — F41.1 GENERALIZED ANXIETY DISORDER WITH PANIC ATTACKS: ICD-10-CM

## 2024-09-25 DIAGNOSIS — Z98.84 HISTORY OF GASTRIC BYPASS: ICD-10-CM

## 2024-09-25 RX ORDER — FLUOXETINE HYDROCHLORIDE 20 MG/1
CAPSULE ORAL
Qty: 90 CAPSULE | Refills: 0 | Status: SHIPPED | OUTPATIENT
Start: 2024-09-25

## 2024-09-25 RX ORDER — CLONAZEPAM 1 MG/1
1 TABLET ORAL 2 TIMES DAILY
Qty: 30 TABLET | Refills: 0 | Status: SHIPPED | OUTPATIENT
Start: 2024-09-25

## 2024-09-25 RX ORDER — FLUOXETINE HYDROCHLORIDE 40 MG/1
CAPSULE ORAL
Qty: 90 CAPSULE | Refills: 0 | Status: SHIPPED | OUTPATIENT
Start: 2024-09-25

## 2024-09-25 RX ORDER — DEXTROAMPHETAMINE SACCHARATE, AMPHETAMINE ASPARTATE, DEXTROAMPHETAMINE SULFATE AND AMPHETAMINE SULFATE 3.75; 3.75; 3.75; 3.75 MG/1; MG/1; MG/1; MG/1
TABLET ORAL
Qty: 21 TABLET | Refills: 0 | Status: SHIPPED | OUTPATIENT
Start: 2024-09-25

## 2024-09-25 RX ORDER — ZOLPIDEM TARTRATE 10 MG/1
10 TABLET ORAL NIGHTLY PRN
Qty: 30 TABLET | Refills: 0 | Status: SHIPPED | OUTPATIENT
Start: 2024-09-25

## 2024-09-25 RX ORDER — DEXTROAMPHETAMINE SACCHARATE, AMPHETAMINE ASPARTATE, DEXTROAMPHETAMINE SULFATE AND AMPHETAMINE SULFATE 3.75; 3.75; 3.75; 3.75 MG/1; MG/1; MG/1; MG/1
TABLET ORAL
Qty: 90 TABLET | Refills: 0 | OUTPATIENT
Start: 2024-09-25

## 2024-09-25 RX ORDER — OMEPRAZOLE 20 MG/1
20 CAPSULE, DELAYED RELEASE ORAL 2 TIMES DAILY
Qty: 90 CAPSULE | Refills: 4 | OUTPATIENT
Start: 2024-09-25

## 2024-09-25 NOTE — TELEPHONE ENCOUNTER
Care Due:                  Date            Visit Type   Department     Provider  --------------------------------------------------------------------------------                                NP -                              PRIMARY      [facility]  Last Visit: 04-      CARE ([facility])   CRYSTAL Hawley  Next Visit: None Scheduled  None         None Found                                                            Last  Test          Frequency    Reason                     Performed    Due Date  --------------------------------------------------------------------------------    Office Visit  15 months..  omeprazole...............  04- 07-    [facility] Embedded Care Due Messages. Reference number: [id number].   9/25/2024 11:43:19 AM CDT

## 2024-10-01 ENCOUNTER — TELEPHONE (OUTPATIENT)
Dept: FAMILY MEDICINE | Facility: CLINIC | Age: 44
End: 2024-10-01
Payer: COMMERCIAL

## 2024-10-01 NOTE — TELEPHONE ENCOUNTER
----- Message from Tara sent at 10/1/2024 12:32 PM CDT -----  Contact: nayana  Type:  Sooner Apoointment Request    Caller is requesting a sooner appointment.  Caller declined first available appointment listed below.  Caller will not accept being placed on the waitlist and is requesting a message be sent to doctor.  Name of Caller:nayana  When is the first available appointment?none populating  Symptoms:est care  Would the patient rather a call back or a response via [facility]? call  Best Call Back Number:[phone]   Additional Information: pt wants a new pcp, she has moved to [place].

## 2024-10-02 ENCOUNTER — OFFICE VISIT (OUTPATIENT)
Dept: PSYCHIATRY | Facility: CLINIC | Age: 44
End: 2024-10-02
Payer: COMMERCIAL

## 2024-10-02 DIAGNOSIS — F90.9 ATTENTION DEFICIT HYPERACTIVITY DISORDER (ADHD), UNSPECIFIED ADHD TYPE: Primary | ICD-10-CM

## 2024-10-02 DIAGNOSIS — F33.41 RECURRENT MAJOR DEPRESSIVE DISORDER, IN PARTIAL REMISSION: ICD-10-CM

## 2024-10-02 DIAGNOSIS — F41.0 GENERALIZED ANXIETY DISORDER WITH PANIC ATTACKS: ICD-10-CM

## 2024-10-02 DIAGNOSIS — G47.00 INSOMNIA, UNSPECIFIED TYPE: ICD-10-CM

## 2024-10-02 DIAGNOSIS — F41.1 GENERALIZED ANXIETY DISORDER WITH PANIC ATTACKS: ICD-10-CM

## 2024-10-02 PROCEDURE — 99214 OFFICE O/P EST MOD 30 MIN: CPT | Mod: S$GLB,,,

## 2024-10-02 PROCEDURE — 99999 PR PBB SHADOW E&M-EST. PATIENT-LVL I: CPT | Mod: PBBFAC,,,

## 2024-10-02 RX ORDER — DEXTROAMPHETAMINE SACCHARATE, AMPHETAMINE ASPARTATE, DEXTROAMPHETAMINE SULFATE AND AMPHETAMINE SULFATE 3.75; 3.75; 3.75; 3.75 MG/1; MG/1; MG/1; MG/1
TABLET ORAL
Qty: 90 TABLET | Refills: 0 | Status: SHIPPED | OUTPATIENT
Start: 2024-11-27

## 2024-10-02 RX ORDER — DEXTROAMPHETAMINE SACCHARATE, AMPHETAMINE ASPARTATE, DEXTROAMPHETAMINE SULFATE AND AMPHETAMINE SULFATE 3.75; 3.75; 3.75; 3.75 MG/1; MG/1; MG/1; MG/1
TABLET ORAL
Qty: 90 TABLET | Refills: 0 | Status: SHIPPED | OUTPATIENT
Start: 2024-10-02

## 2024-10-02 RX ORDER — DEXTROAMPHETAMINE SACCHARATE, AMPHETAMINE ASPARTATE, DEXTROAMPHETAMINE SULFATE AND AMPHETAMINE SULFATE 3.75; 3.75; 3.75; 3.75 MG/1; MG/1; MG/1; MG/1
TABLET ORAL
Qty: 90 TABLET | Refills: 0 | Status: SHIPPED | OUTPATIENT
Start: 2024-10-30

## 2024-10-02 NOTE — PROGRESS NOTES
"Outpatient Psychiatry Follow-Up Visit (PA)    10/02/2024    Clinical Status of Patient:  Outpatient (Ambulatory)    Chief Complaint:  Miriam Mendez is a 43 y.o. female who presents today for follow-up of depression, anxiety, and attention problems.  Met with patient.     Current Psych Medications:   Prozac 60 mg PO daily   Klonopin 1 mg PO BID PRN   Ambien 10 mg PO nightly  Adderall 30 mg once in the morning, 15 mg mid-day (45 mg total per day)    Interval History and Content of Current Session:  Patient seen and chart reviewed. Last seen on 3/6/2024    Patient has a psychiatric history of: depression, anxiety and attention problems    Pt reports for follow up today stating that everything has been really good. She reports going back to work at [employer] around the corner from her house. She has new job at [employer] in June. She reports buying a house with her boyfriend in [place], [place] on April 15th.     Mood: overall mood "I've been good"    Anxiety: "very mild" she reports being back working having a routine and making decent working money, she didn't realize how stressed she was because she wasn't making any money    Depression: "a little situational depression when I was trying to find a job"    Pt appears happy and calm    Denies adverse effects from medication    Sleep: mild sleep apnea per Sleep Study interpretation, states that she feels that she sleep better, but still waking up a lot in the middle of the night, reports her bed scores sleep every night, "my scores are all over the place". Staying asleep longer than before.    Appetite: feels that she has been overeating more, states that this is a cycle for her    Denies SI/HI/AVH.     Pt reports taking medications as prescribed and behaving appropriately during interview today.    Review of Systems   PSYCHIATRIC: Pertinant items are noted in the narrative.  CONSTITUTIONAL: No weight gain or loss.   MUSCULOSKELETAL: No pain or stiffness of " the joints.  NEUROLOGIC: No weakness, sensory changes, seizures, confusion, memory loss, tremor or other abnormal movements.  ENDOCRINE: No polydipsia or polyuria.  INTEGUMENTARY: No rashes or lacerations.  EYES: No exophthalmos, jaundice or blindness.  ENT: No dizziness, tinnitus or hearing loss.  RESPIRATORY: No shortness of breath.  CARDIOVASCULAR: No tachycardia or chest pain.  GASTROINTESTINAL: No nausea, vomiting, pain, constipation or diarrhea.  GENITOURINARY: No frequency, dysuria or sexual dysfunction.  HEMATOLOGIC/LYMPHATIC: No excessive bleeding, prolonged or excessive bleeding after dental extraction/injury.    Past Medication Trials:  Lexapro, Celexa, Wellbutrin, Zoloft, Cymbalta, Buspar    Past Medical, Family and Social History: The patient's past medical, family and social history have been reviewed and updated as appropriate within the electronic medical record - see encounter notes.    Compliance: yes    Side effects: None    Risk Parameters:  Patient reports no suicidal ideation  Patient reports no homicidal ideation  Patient reports no self-injurious behavior  Patient reports no violent behavior    Exam (detailed: at least 9 elements; comprehensive: all 15 elements)   Constitutional  Vitals:  Most recent vital signs, dated greater than 90 days prior to this appointment, were reviewed.   There were no vitals filed for this visit.     General:  unremarkable, age appropriate, casually dressed, lying in bed     Musculoskeletal  Muscle Strength/Tone:  no spasicity, no rigidity, no cogwheeling, no flaccidity   Gait & Station:  non-ataxic, slow     Psychiatric  Speech:  no latency; no press   Mood & Affect:  steady, happy  congruent and appropriate   Thought Process:  normal and logical   Associations:  intact   Thought Content:  normal, no suicidality, no homicidality, delusions, or paranoia   Insight:  has awareness of illness   Judgement: behavior is adequate to circumstances   Orientation:  person,  place, situation, time/date, day of week   Memory: intact for content of interview   Language: grossly intact   Attention Span & Concentration:  able to focus   Fund of Knowledge:  intact and appropriate to age and level of education     Assessment and Diagnosis   Status/Progress: Based on the examination today, the patient's problem(s) is/are well controlled.  New problems have not been presented today.   Co-morbidities are not complicating management of the primary condition.  There are no active rule-out diagnoses for this patient at this time.     General Impression: Miriam Mendez is a 44 yo female who presents to the clinic for follow up. Doing well on medication regimen overall. Will continue, see dosing below.       ICD-10-CM ICD-9-CM    1. Attention deficit hyperactivity disorder (ADHD), unspecified ADHD type  F90.9 314.01 dextroamphetamine-amphetamine (ADDERALL) 15 mg tablet      dextroamphetamine-amphetamine (ADDERALL) 15 mg tablet      dextroamphetamine-amphetamine (ADDERALL) 15 mg tablet      2. Generalized anxiety disorder with panic attacks  F41.1 300.02     F41.0 300.01       3. Recurrent major depressive disorder, in partial remission  F33.41 296.35       4. Insomnia, unspecified type  G47.00 780.52             Intervention/Counseling/Treatment Plan   Medication Management: Continue current medications.  Continue Prozac 60 mg PO daily   Continue Klonopin 1 mg PO BID PRN   Informed pt of the risks of continuous Benzodiazepine use including tolerance, dependence and withdrawals that may be life threatening upon abrupt cessation. Also advised not to take Benzodiazepines with Opiates or other sedatives and also not to drive or operate heavy machinery while using Benzodiazepines.  Continue Ambien 10 mg PO nightly   Patient educated on the importance of not taking Klonopin with Ambien  Continue Adderall 30 mg once in the morning, 15 mg mid-day (45 mg total per day)   checked, no  discrepancies  Discussed risks of stimulants include addiction, tolerance, and possible withdrawal associated with stimulant use. Possible side effects including heart palpitations, restlessness, increased anxiety, decreased appetite, insomnia, and dry mouth were also discussed with the patient. Patient advised not to mix the medication with alcohol.   Discussed diagnosis, risk and benefits of proposed treatment above vs alternative treatment vs no treatment, and potential side effects of these treatments, and the inherent unpredictability of individual responses to these treatments. The patient expresses understanding and gives informed consent to pursue treatment at this time, believing that the potential benefits outweigh the potential risks. Patient has no other questions. Risks/adverse effects at this time include but are not limited to: GI side effects, sexual dysfunction, activation vs sedation, triggering of suicidal ideation, and serotonin syndrome.   Patient voices understanding and agreement with this plan  Provided crisis numbers  Encouraged patient to keep future appointments  Instruct patient to call or message with questions  In the event of an emergency, including suicidal ideation, patient was advised to go to the emergency room      Return to Clinic: 3 months    Total time: 30 minutes (which included pts differential diagnosis and prognosis for psychiatric conditions, risks, benefits of treatments, instructions and adherence to treatment plan, risk reduction, reviewing current psychiatric medication regimen, medical problems and social stressors. In addtion to possible discussion with other healthcare provider/s)    Lauren Marin PA-C

## 2024-10-03 ENCOUNTER — OFFICE VISIT (OUTPATIENT)
Dept: FAMILY MEDICINE | Facility: CLINIC | Age: 44
End: 2024-10-03
Payer: COMMERCIAL

## 2024-10-03 VITALS
DIASTOLIC BLOOD PRESSURE: 69 MMHG | BODY MASS INDEX: 33.82 KG/M2 | WEIGHT: 203 LBS | TEMPERATURE: 98 F | SYSTOLIC BLOOD PRESSURE: 99 MMHG | HEIGHT: 65 IN | RESPIRATION RATE: 16 BRPM | HEART RATE: 104 BPM

## 2024-10-03 DIAGNOSIS — Z23 NEED FOR VACCINATION: ICD-10-CM

## 2024-10-03 DIAGNOSIS — Z87.81 H/O FRACTURE OF FEMUR: ICD-10-CM

## 2024-10-03 DIAGNOSIS — Z80.3 FAMILY HISTORY OF BREAST CANCER: ICD-10-CM

## 2024-10-03 DIAGNOSIS — F41.1 GENERALIZED ANXIETY DISORDER: Chronic | ICD-10-CM

## 2024-10-03 DIAGNOSIS — G51.0 FACIAL PALSY: ICD-10-CM

## 2024-10-03 DIAGNOSIS — Z12.11 COLON CANCER SCREENING: ICD-10-CM

## 2024-10-03 DIAGNOSIS — Z13.220 ENCOUNTER FOR SCREENING FOR LIPID DISORDER: ICD-10-CM

## 2024-10-03 DIAGNOSIS — N87.1 CIN II (CERVICAL INTRAEPITHELIAL NEOPLASIA II): ICD-10-CM

## 2024-10-03 DIAGNOSIS — G47.00 INSOMNIA, UNSPECIFIED TYPE: ICD-10-CM

## 2024-10-03 DIAGNOSIS — D48.9 NEOPLASM OF UNCERTAIN BEHAVIOR: ICD-10-CM

## 2024-10-03 DIAGNOSIS — D33.3 CPA (CEREBELLOPONTINE ANGLE) TUMOR: Primary | ICD-10-CM

## 2024-10-03 DIAGNOSIS — Z85.89 H/O SQUAMOUS CELL CARCINOMA: ICD-10-CM

## 2024-10-03 DIAGNOSIS — Z98.84 HISTORY OF GASTRIC BYPASS: ICD-10-CM

## 2024-10-03 DIAGNOSIS — F90.9 ATTENTION DEFICIT HYPERACTIVITY DISORDER (ADHD), UNSPECIFIED ADHD TYPE: ICD-10-CM

## 2024-10-03 DIAGNOSIS — Z12.31 BREAST CANCER SCREENING BY MAMMOGRAM: ICD-10-CM

## 2024-10-03 DIAGNOSIS — K21.9 GASTROESOPHAGEAL REFLUX DISEASE, UNSPECIFIED WHETHER ESOPHAGITIS PRESENT: ICD-10-CM

## 2024-10-03 DIAGNOSIS — Z79.899 ENCOUNTER FOR LONG-TERM CURRENT USE OF MEDICATION: ICD-10-CM

## 2024-10-03 PROBLEM — N92.1 MENORRHAGIA WITH IRREGULAR CYCLE: Status: RESOLVED | Noted: 2022-04-18 | Resolved: 2024-10-03

## 2024-10-03 PROBLEM — Z91.81 HISTORY OF FALLING: Status: ACTIVE | Noted: 2023-01-01

## 2024-10-03 PROBLEM — S72.91XG: Status: RESOLVED | Noted: 2023-02-15 | Resolved: 2024-10-03

## 2024-10-03 PROBLEM — S72.491A: Status: RESOLVED | Noted: 2022-12-25 | Resolved: 2024-10-03

## 2024-10-03 PROBLEM — T14.90XA TRAUMA: Status: RESOLVED | Noted: 2022-12-25 | Resolved: 2024-10-03

## 2024-10-03 PROBLEM — N87.9 CERVICAL ATYPIA: Status: RESOLVED | Noted: 2022-02-04 | Resolved: 2024-10-03

## 2024-10-03 PROBLEM — M25.661: Status: RESOLVED | Noted: 2023-04-19 | Resolved: 2024-10-03

## 2024-10-03 PROBLEM — Z76.89 ENCOUNTER TO ESTABLISH CARE WITH NEW DOCTOR: Status: RESOLVED | Noted: 2022-07-19 | Resolved: 2024-10-03

## 2024-10-03 PROBLEM — Z76.0 ENCOUNTER FOR MEDICATION REFILL: Status: RESOLVED | Noted: 2022-07-19 | Resolved: 2024-10-03

## 2024-10-03 PROCEDURE — 99999 PR PBB SHADOW E&M-EST. PATIENT-LVL V: CPT | Mod: PBBFAC,,, | Performed by: DIETITIAN, REGISTERED

## 2024-10-03 RX ORDER — DUPILUMAB 300 MG/2ML
INJECTION, SOLUTION SUBCUTANEOUS
COMMUNITY
Start: 2024-05-07

## 2024-10-03 RX ORDER — BIMATOPROST 3 UG/ML
SOLUTION TOPICAL NIGHTLY
COMMUNITY
Start: 2024-04-08 | End: 2024-10-03

## 2024-10-03 RX ORDER — FAMOTIDINE 40 MG/1
40 TABLET, FILM COATED ORAL DAILY
Qty: 30 TABLET | Refills: 11 | Status: SHIPPED | OUTPATIENT
Start: 2024-10-03 | End: 2025-10-03
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
## 2024-10-03 NOTE — ASSESSMENT & PLAN NOTE
S/P Vestibular Schwannoma surgery  Improved since initially  Does not need eyedrops any longer, has residual slight right sided facial droop

## 2024-10-03 NOTE — ASSESSMENT & PLAN NOTE
H/O vestibular schwannoma s/p surgical removal  - wants to establish care with Neuro-oncology for monitoring

## 2024-10-17 ENCOUNTER — TELEPHONE (OUTPATIENT)
Dept: FAMILY MEDICINE | Facility: CLINIC | Age: 44
End: 2024-10-17
Payer: COMMERCIAL

## 2024-10-17 DIAGNOSIS — Z12.39 OTHER SCREENING BREAST EXAMINATION: Primary | ICD-10-CM

## 2024-10-17 NOTE — TELEPHONE ENCOUNTER
----- Message from Tracy Hairston sent at 10/17/2024  2:48 PM CDT -----  Please change  us breast complete to a US BREAST SCREENING BILATERAL  Z12.31(screening mammogram)  is a code that we are unable to use for a breast ultrasound, code that can be used is  Z12.39 (screening other than mammogram)    Once order/codes are updated, we will give Ms. Robles a call back to get scheduled.    Thanks,  Tracy Hairston  [facility]

## 2024-10-30 DIAGNOSIS — F41.1 GENERALIZED ANXIETY DISORDER WITH PANIC ATTACKS: ICD-10-CM

## 2024-10-30 DIAGNOSIS — G47.00 INSOMNIA, UNSPECIFIED TYPE: ICD-10-CM

## 2024-10-30 DIAGNOSIS — F41.0 GENERALIZED ANXIETY DISORDER WITH PANIC ATTACKS: ICD-10-CM

## 2024-10-31 RX ORDER — ZOLPIDEM TARTRATE 10 MG/1
TABLET ORAL
Qty: 30 TABLET | Refills: 0 | Status: SHIPPED | OUTPATIENT
Start: 2024-10-31

## 2024-10-31 RX ORDER — CLONAZEPAM 1 MG/1
1 TABLET ORAL 2 TIMES DAILY PRN
Qty: 30 TABLET | Refills: 0 | Status: SHIPPED | OUTPATIENT
Start: 2024-10-31

## 2024-12-02 DIAGNOSIS — F41.0 GENERALIZED ANXIETY DISORDER WITH PANIC ATTACKS: ICD-10-CM

## 2024-12-02 DIAGNOSIS — G47.00 INSOMNIA, UNSPECIFIED TYPE: ICD-10-CM

## 2024-12-02 DIAGNOSIS — F41.1 GENERALIZED ANXIETY DISORDER WITH PANIC ATTACKS: ICD-10-CM

## 2024-12-03 RX ORDER — CLONAZEPAM 1 MG/1
1 TABLET ORAL 2 TIMES DAILY
Qty: 60 TABLET | Refills: 0 | Status: SHIPPED | OUTPATIENT
Start: 2024-12-03

## 2024-12-03 RX ORDER — ZOLPIDEM TARTRATE 10 MG/1
TABLET ORAL
Qty: 30 TABLET | Refills: 0 | Status: SHIPPED | OUTPATIENT
Start: 2024-12-03

## 2024-12-31 DIAGNOSIS — F41.1 GENERALIZED ANXIETY DISORDER WITH PANIC ATTACKS: ICD-10-CM

## 2024-12-31 DIAGNOSIS — F41.0 GENERALIZED ANXIETY DISORDER WITH PANIC ATTACKS: ICD-10-CM

## 2024-12-31 DIAGNOSIS — F33.41 RECURRENT MAJOR DEPRESSIVE DISORDER, IN PARTIAL REMISSION: ICD-10-CM

## 2024-12-31 DIAGNOSIS — F90.9 ATTENTION DEFICIT HYPERACTIVITY DISORDER (ADHD), UNSPECIFIED ADHD TYPE: ICD-10-CM

## 2024-12-31 DIAGNOSIS — G47.00 INSOMNIA, UNSPECIFIED TYPE: ICD-10-CM

## 2024-12-31 RX ORDER — CLONAZEPAM 1 MG/1
1 TABLET ORAL 2 TIMES DAILY
Qty: 60 TABLET | Refills: 0 | Status: SHIPPED | OUTPATIENT
Start: 2024-12-31

## 2024-12-31 RX ORDER — DEXTROAMPHETAMINE SACCHARATE, AMPHETAMINE ASPARTATE, DEXTROAMPHETAMINE SULFATE AND AMPHETAMINE SULFATE 3.75; 3.75; 3.75; 3.75 MG/1; MG/1; MG/1; MG/1
TABLET ORAL
Qty: 90 TABLET | Refills: 0 | OUTPATIENT
Start: 2024-12-31

## 2024-12-31 RX ORDER — FLUOXETINE HYDROCHLORIDE 40 MG/1
CAPSULE ORAL
Qty: 90 CAPSULE | Refills: 0 | Status: SHIPPED | OUTPATIENT
Start: 2024-12-31

## 2024-12-31 RX ORDER — DEXTROAMPHETAMINE SACCHARATE, AMPHETAMINE ASPARTATE, DEXTROAMPHETAMINE SULFATE AND AMPHETAMINE SULFATE 3.75; 3.75; 3.75; 3.75 MG/1; MG/1; MG/1; MG/1
TABLET ORAL
Qty: 90 TABLET | Refills: 0 | Status: SHIPPED | OUTPATIENT
Start: 2024-12-31

## 2024-12-31 RX ORDER — ZOLPIDEM TARTRATE 10 MG/1
10 TABLET ORAL NIGHTLY PRN
Qty: 30 TABLET | Refills: 0 | Status: SHIPPED | OUTPATIENT
Start: 2024-12-31

## 2025-01-29 DIAGNOSIS — F90.9 ATTENTION DEFICIT HYPERACTIVITY DISORDER (ADHD), UNSPECIFIED ADHD TYPE: ICD-10-CM

## 2025-01-29 DIAGNOSIS — F41.0 GENERALIZED ANXIETY DISORDER WITH PANIC ATTACKS: ICD-10-CM

## 2025-01-29 DIAGNOSIS — F41.1 GENERALIZED ANXIETY DISORDER WITH PANIC ATTACKS: ICD-10-CM

## 2025-01-29 DIAGNOSIS — G47.00 INSOMNIA, UNSPECIFIED TYPE: ICD-10-CM

## 2025-01-29 RX ORDER — CLONAZEPAM 1 MG/1
1 TABLET ORAL 2 TIMES DAILY
Qty: 60 TABLET | Refills: 0 | Status: SHIPPED | OUTPATIENT
Start: 2025-01-29

## 2025-01-29 RX ORDER — ZOLPIDEM TARTRATE 10 MG/1
10 TABLET ORAL NIGHTLY PRN
Qty: 30 TABLET | Refills: 0 | Status: SHIPPED | OUTPATIENT
Start: 2025-01-29

## 2025-01-29 RX ORDER — DEXTROAMPHETAMINE SACCHARATE, AMPHETAMINE ASPARTATE, DEXTROAMPHETAMINE SULFATE AND AMPHETAMINE SULFATE 3.75; 3.75; 3.75; 3.75 MG/1; MG/1; MG/1; MG/1
TABLET ORAL
Qty: 90 TABLET | Refills: 0 | Status: SHIPPED | OUTPATIENT
Start: 2025-01-29

## 2025-02-18 ENCOUNTER — OFFICE VISIT (OUTPATIENT)
Dept: PSYCHIATRY | Facility: CLINIC | Age: 45
End: 2025-02-18
Payer: COMMERCIAL

## 2025-02-18 DIAGNOSIS — F41.0 GENERALIZED ANXIETY DISORDER WITH PANIC ATTACKS: ICD-10-CM

## 2025-02-18 DIAGNOSIS — F33.41 RECURRENT MAJOR DEPRESSIVE DISORDER, IN PARTIAL REMISSION: ICD-10-CM

## 2025-02-18 DIAGNOSIS — F90.9 ATTENTION DEFICIT HYPERACTIVITY DISORDER (ADHD), UNSPECIFIED ADHD TYPE: ICD-10-CM

## 2025-02-18 DIAGNOSIS — G47.00 INSOMNIA, UNSPECIFIED TYPE: ICD-10-CM

## 2025-02-18 DIAGNOSIS — F41.1 GENERALIZED ANXIETY DISORDER WITH PANIC ATTACKS: ICD-10-CM

## 2025-02-18 RX ORDER — FLUOXETINE HYDROCHLORIDE 40 MG/1
40 CAPSULE ORAL DAILY
Qty: 90 CAPSULE | Refills: 0 | Status: SHIPPED | OUTPATIENT
Start: 2025-02-18

## 2025-02-18 RX ORDER — ZOLPIDEM TARTRATE 10 MG/1
10 TABLET ORAL NIGHTLY PRN
Qty: 30 TABLET | Refills: 0 | Status: SHIPPED | OUTPATIENT
Start: 2025-02-18

## 2025-02-18 RX ORDER — DEXTROAMPHETAMINE SACCHARATE, AMPHETAMINE ASPARTATE, DEXTROAMPHETAMINE SULFATE AND AMPHETAMINE SULFATE 3.75; 3.75; 3.75; 3.75 MG/1; MG/1; MG/1; MG/1
TABLET ORAL
Qty: 90 TABLET | Refills: 0 | Status: SHIPPED | OUTPATIENT
Start: 2025-04-15

## 2025-02-18 RX ORDER — CLONAZEPAM 1 MG/1
1 TABLET ORAL 2 TIMES DAILY
Qty: 60 TABLET | Refills: 0 | Status: SHIPPED | OUTPATIENT
Start: 2025-02-18

## 2025-02-18 RX ORDER — DEXTROAMPHETAMINE SACCHARATE, AMPHETAMINE ASPARTATE, DEXTROAMPHETAMINE SULFATE AND AMPHETAMINE SULFATE 3.75; 3.75; 3.75; 3.75 MG/1; MG/1; MG/1; MG/1
TABLET ORAL
Qty: 90 TABLET | Refills: 0 | Status: SHIPPED | OUTPATIENT
Start: 2025-03-18

## 2025-02-18 RX ORDER — DEXTROAMPHETAMINE SACCHARATE, AMPHETAMINE ASPARTATE, DEXTROAMPHETAMINE SULFATE AND AMPHETAMINE SULFATE 3.75; 3.75; 3.75; 3.75 MG/1; MG/1; MG/1; MG/1
TABLET ORAL
Qty: 90 TABLET | Refills: 0 | Status: SHIPPED | OUTPATIENT
Start: 2025-02-18

## 2025-02-18 NOTE — PROGRESS NOTES
"Outpatient Psychiatry Follow-Up Visit ([place])    02/18/2025    Clinical Status of Patient:  Outpatient (Ambulatory)    Chief Complaint:  Miriam Mendez is a 44 y.o. female who presents today for follow-up of depression, anxiety, and attention problems.  Met with patient.     The patient location is: [place], [place]  The chief complaint leading to consultation is: depression, anxiety, and ADHD    Visit type: audiovisual    Face to Face time with patient: 8 minutes  10 minutes of total time spent on the encounter, which includes face to face time and non-face to face time preparing to see the patient (eg, review of tests), Obtaining and/or reviewing separately obtained history, Documenting clinical information in the electronic or other health record, Independently interpreting results (not separately reported) and communicating results to the patient/family/caregiver, or Care coordination (not separately reported).     Each patient to whom he or she provides medical services by telemedicine is:  (1) informed of the relationship between the physician and patient and the respective role of any other health care provider with respect to management of the patient; and (2) notified that he or she may decline to receive medical services by telemedicine and may withdraw from such care at any time.      Current Psych Medications:   Prozac 40 mg PO daily   Klonopin 1 mg PO BID PRN   Ambien 10 mg PO nightly  Adderall 30 mg once in the morning, 15 mg mid-day (45 mg total per day)    Interval History and Content of Current Session:  Patient seen and chart reviewed. Last seen on 10/2/2024    Patient has a psychiatric history of: depression, anxiety and attention problems    Pt reports for follow up today stating that she has been really good. She reports the combination of everything has been working well. She states that she likes her job a lot, has been busy with it.     Mood: overall mood "really good"    Anxiety: "its a lot " "better now I would say" she states that she still has her times where she gets anxiety episodes, but can move past them a lot quicker    Depression: "it's pretty much the same kind of thing, I have my moments, and then it's just not as difficult to move past it" "it's always there but it seems to be a bit farther away"    Pt appears happy and calm    Denies adverse effects from medication    Sleep: sleep has been "okay" "I feel like my new bed definitely helps"    Appetite: Appetite is "okay", "I eat too much sometimes, it's random" "my eating habits are very very odd"    Denies SI/HI/AVH.     Pt reports taking medications as prescribed and behaving appropriately during interview today.    Review of Systems   PSYCHIATRIC: Pertinant items are noted in the narrative.  CONSTITUTIONAL: No weight gain or loss.   MUSCULOSKELETAL: No pain or stiffness of the joints.  NEUROLOGIC: No weakness, sensory changes, seizures, confusion, memory loss, tremor or other abnormal movements.  ENDOCRINE: No polydipsia or polyuria.  INTEGUMENTARY: No rashes or lacerations.  EYES: No exophthalmos, jaundice or blindness.  ENT: No dizziness, tinnitus or hearing loss.  RESPIRATORY: No shortness of breath.  CARDIOVASCULAR: No tachycardia or chest pain.  GASTROINTESTINAL: No nausea, vomiting, pain, constipation or diarrhea.  GENITOURINARY: No frequency, dysuria or sexual dysfunction.  HEMATOLOGIC/LYMPHATIC: No excessive bleeding, prolonged or excessive bleeding after dental extraction/injury.    Past Medication Trials:  Lexapro, Celexa, Wellbutrin, Zoloft, Cymbalta, Buspar    Past Medical, Family and Social History: The patient's past medical, family and social history have been reviewed and updated as appropriate within the electronic medical record - see encounter notes.    Compliance: yes    Side effects: None    Risk Parameters:  Patient reports no suicidal ideation  Patient reports no homicidal ideation  Patient reports no self-injurious " behavior  Patient reports no violent behavior    Exam (detailed: at least 9 elements; comprehensive: all 15 elements)   Constitutional  Vitals:  Most recent vital signs, dated greater than 90 days prior to this appointment, were reviewed.   There were no vitals filed for this visit.     General:  unremarkable, age appropriate, casually dressed, lying in bed     Musculoskeletal  Muscle Strength/Tone:  no spasicity, no rigidity, no cogwheeling, no flaccidity   Gait & Station:  non-ataxic, slow     Psychiatric  Speech:  no latency; no press   Mood & Affect:  steady, happy  congruent and appropriate   Thought Process:  normal and logical   Associations:  intact   Thought Content:  normal, no suicidality, no homicidality, delusions, or paranoia   Insight:  has awareness of illness   Judgement: behavior is adequate to circumstances   Orientation:  person, place, situation, time/date, day of week   Memory: intact for content of interview   Language: grossly intact   Attention Span & Concentration:  able to focus   Fund of Knowledge:  intact and appropriate to age and level of education     Assessment and Diagnosis   Status/Progress: Based on the examination today, the patient's problem(s) is/are well controlled.  New problems have not been presented today.   Co-morbidities are not complicating management of the primary condition.  There are no active rule-out diagnoses for this patient at this time.     General Impression: Miriam Mendez is a 42 yo female who presents to the clinic for follow up. Doing well on medication regimen overall. Will continue, see dosing below.       ICD-10-CM ICD-9-CM    1. Attention deficit hyperactivity disorder (ADHD), unspecified ADHD type  F90.9 314.01 dextroamphetamine-amphetamine (ADDERALL) 15 mg tablet      dextroamphetamine-amphetamine (ADDERALL) 15 mg tablet      dextroamphetamine-amphetamine (ADDERALL) 15 mg tablet      2. Insomnia, unspecified type  G47.00 780.52 zolpidem (AMBIEN) 10  mg Tab      3. Generalized anxiety disorder with panic attacks  F41.1 300.02 clonazePAM (KLONOPIN) 1 MG tablet    F41.0 300.01 FLUoxetine 40 MG capsule      4. Recurrent major depressive disorder, in partial remission  F33.41 296.35 FLUoxetine 40 MG capsule          Intervention/Counseling/Treatment Plan   Medication Management: Continue current medications.  Continue Prozac 40 mg PO daily   Continue Klonopin 1 mg PO BID PRN   Informed pt of the risks of continuous Benzodiazepine use including tolerance, dependence and withdrawals that may be life threatening upon abrupt cessation. Also advised not to take Benzodiazepines with Opiates or other sedatives and also not to drive or operate heavy machinery while using Benzodiazepines.  Continue Ambien 10 mg PO nightly   Patient educated on the importance of not taking Klonopin with Ambien  Continue Adderall 30 mg once in the morning, 15 mg mid-day (45 mg total per day)   checked, no discrepancies  Discussed risks of stimulants include addiction, tolerance, and possible withdrawal associated with stimulant use. Possible side effects including heart palpitations, restlessness, increased anxiety, decreased appetite, insomnia, and dry mouth were also discussed with the patient. Patient advised not to mix the medication with alcohol.   Discussed diagnosis, risk and benefits of proposed treatment above vs alternative treatment vs no treatment, and potential side effects of these treatments, and the inherent unpredictability of individual responses to these treatments. The patient expresses understanding and gives informed consent to pursue treatment at this time, believing that the potential benefits outweigh the potential risks. Patient has no other questions. Risks/adverse effects at this time include but are not limited to: GI side effects, sexual dysfunction, activation vs sedation, triggering of suicidal ideation, and serotonin syndrome.   Patient voices understanding  and agreement with this plan  Provided crisis numbers  Encouraged patient to keep future appointments  Instruct patient to call or message with questions  In the event of an emergency, including suicidal ideation, patient was advised to go to the emergency room      Return to Clinic: 3 months    Total time: 10 minutes (which included pts differential diagnosis and prognosis for psychiatric conditions, risks, benefits of treatments, instructions and adherence to treatment plan, risk reduction, reviewing current psychiatric medication regimen, medical problems and social stressors. In addtion to possible discussion with other healthcare provider/s)    Lauren Marin PA-C

## 2025-06-05 DIAGNOSIS — G47.00 INSOMNIA, UNSPECIFIED TYPE: ICD-10-CM

## 2025-06-05 DIAGNOSIS — F90.9 ATTENTION DEFICIT HYPERACTIVITY DISORDER (ADHD), UNSPECIFIED ADHD TYPE: ICD-10-CM

## 2025-06-05 DIAGNOSIS — F41.0 GENERALIZED ANXIETY DISORDER WITH PANIC ATTACKS: ICD-10-CM

## 2025-06-05 DIAGNOSIS — F41.1 GENERALIZED ANXIETY DISORDER WITH PANIC ATTACKS: ICD-10-CM

## 2025-06-05 RX ORDER — ZOLPIDEM TARTRATE 10 MG/1
10 TABLET ORAL NIGHTLY PRN
Qty: 30 TABLET | Refills: 0 | Status: SHIPPED | OUTPATIENT
Start: 2025-06-05

## 2025-06-05 RX ORDER — DEXTROAMPHETAMINE SACCHARATE, AMPHETAMINE ASPARTATE, DEXTROAMPHETAMINE SULFATE AND AMPHETAMINE SULFATE 3.75; 3.75; 3.75; 3.75 MG/1; MG/1; MG/1; MG/1
TABLET ORAL
Qty: 36 TABLET | Refills: 0 | Status: SHIPPED | OUTPATIENT
Start: 2025-06-05

## 2025-06-05 RX ORDER — CLONAZEPAM 1 MG/1
1 TABLET ORAL 2 TIMES DAILY
Qty: 60 TABLET | Refills: 0 | Status: SHIPPED | OUTPATIENT
Start: 2025-06-05

## 2025-06-17 ENCOUNTER — OFFICE VISIT (OUTPATIENT)
Dept: PSYCHIATRY | Facility: CLINIC | Age: 45
End: 2025-06-17
Payer: COMMERCIAL

## 2025-06-17 DIAGNOSIS — G47.00 INSOMNIA, UNSPECIFIED TYPE: ICD-10-CM

## 2025-06-17 DIAGNOSIS — F33.41 RECURRENT MAJOR DEPRESSIVE DISORDER, IN PARTIAL REMISSION: ICD-10-CM

## 2025-06-17 DIAGNOSIS — F41.1 GENERALIZED ANXIETY DISORDER WITH PANIC ATTACKS: ICD-10-CM

## 2025-06-17 DIAGNOSIS — F41.0 GENERALIZED ANXIETY DISORDER WITH PANIC ATTACKS: ICD-10-CM

## 2025-06-17 DIAGNOSIS — F90.9 ATTENTION DEFICIT HYPERACTIVITY DISORDER (ADHD), UNSPECIFIED ADHD TYPE: Primary | ICD-10-CM

## 2025-06-17 PROCEDURE — 98006 SYNCH AUDIO-VIDEO EST MOD 30: CPT | Mod: 95,,,

## 2025-06-17 RX ORDER — FLUOXETINE HYDROCHLORIDE 40 MG/1
40 CAPSULE ORAL DAILY
Qty: 90 CAPSULE | Refills: 0 | Status: SHIPPED | OUTPATIENT
Start: 2025-06-17

## 2025-06-17 RX ORDER — DEXTROAMPHETAMINE SACCHARATE, AMPHETAMINE ASPARTATE, DEXTROAMPHETAMINE SULFATE AND AMPHETAMINE SULFATE 7.5; 7.5; 7.5; 7.5 MG/1; MG/1; MG/1; MG/1
30 TABLET ORAL 2 TIMES DAILY
Qty: 60 TABLET | Refills: 0 | Status: SHIPPED | OUTPATIENT
Start: 2025-07-15

## 2025-06-17 RX ORDER — DEXTROAMPHETAMINE SACCHARATE, AMPHETAMINE ASPARTATE, DEXTROAMPHETAMINE SULFATE AND AMPHETAMINE SULFATE 7.5; 7.5; 7.5; 7.5 MG/1; MG/1; MG/1; MG/1
30 TABLET ORAL 2 TIMES DAILY
Qty: 60 TABLET | Refills: 0 | Status: SHIPPED | OUTPATIENT
Start: 2025-06-17

## 2025-06-17 RX ORDER — DEXTROAMPHETAMINE SACCHARATE, AMPHETAMINE ASPARTATE, DEXTROAMPHETAMINE SULFATE AND AMPHETAMINE SULFATE 7.5; 7.5; 7.5; 7.5 MG/1; MG/1; MG/1; MG/1
30 TABLET ORAL 2 TIMES DAILY
Qty: 60 TABLET | Refills: 0 | Status: SHIPPED | OUTPATIENT
Start: 2025-08-12

## 2025-06-17 NOTE — PROGRESS NOTES
Outpatient Psychiatry Follow-Up Visit (PA)    06/17/2025    Clinical Status of Patient:  Outpatient (Ambulatory)    Chief Complaint:  Miriam Mendez is a 44 y.o. female who presents today for follow-up of depression, anxiety, and attention problems.  Met with patient.     The patient location is: [place], [place] at [facility]  The chief complaint leading to consultation is: depression, anxiety, and ADHD    Visit type: audiovisual    Face to Face time with patient: 10 minutes  15 minutes of total time spent on the encounter, which includes face to face time and non-face to face time preparing to see the patient (eg, review of tests), Obtaining and/or reviewing separately obtained history, Documenting clinical information in the electronic or other health record, Independently interpreting results (not separately reported) and communicating results to the patient/family/caregiver, or Care coordination (not separately reported).     Each patient to whom he or she provides medical services by telemedicine is:  (1) informed of the relationship between the physician and patient and the respective role of any other health care provider with respect to management of the patient; and (2) notified that he or she may decline to receive medical services by telemedicine and may withdraw from such care at any time.      Current Psych Medications:   Prozac 40 mg PO daily   Klonopin 1 mg PO BID PRN   Ambien 10 mg PO nightly  Adderall 30 mg once in the morning, 15 mg mid-day (45 mg total per day)    Interval History and Content of Current Session:  Patient seen and chart reviewed. Last seen on 2/18/2025    Patient has a psychiatric history of: depression, anxiety and attention problems    Pt reports for follow up today stating that she has been really good. She reports the combination of everything has been working well. She states that she likes her job a lot, has been busy with it. Was trying to take Klonopin and  "Ambien less for about 2 months, but started back about a month ago.     Mood: overall mood "pretty good"    Anxiety: "a little more anxiety" lots of work at her job that is overwhelming for her    Pt appears happy and calm    Denies adverse effects from medication    Sleep: sleep has been better, but was not good without Ambien    Appetite: Appetite is "okay", "I eat too much sometimes, it's random" "my eating habits are very very odd"    Denies SI/HI/AVH.     Pt reports taking medications as prescribed and behaving appropriately during interview today.    Review of Systems   PSYCHIATRIC: Pertinant items are noted in the narrative.  CONSTITUTIONAL: No weight gain or loss.   MUSCULOSKELETAL: No pain or stiffness of the joints.  NEUROLOGIC: No weakness, sensory changes, seizures, confusion, memory loss, tremor or other abnormal movements.  ENDOCRINE: No polydipsia or polyuria.  INTEGUMENTARY: No rashes or lacerations.  EYES: No exophthalmos, jaundice or blindness.  ENT: No dizziness, tinnitus or hearing loss.  RESPIRATORY: No shortness of breath.  CARDIOVASCULAR: No tachycardia or chest pain.  GASTROINTESTINAL: No nausea, vomiting, pain, constipation or diarrhea.  GENITOURINARY: No frequency, dysuria or sexual dysfunction.  HEMATOLOGIC/LYMPHATIC: No excessive bleeding, prolonged or excessive bleeding after dental extraction/injury.    Past Medication Trials:  Lexapro, Celexa, Wellbutrin, Zoloft, Cymbalta, Buspar    Past Medical, Family and Social History: The patient's past medical, family and social history have been reviewed and updated as appropriate within the electronic medical record - see encounter notes.    Compliance: yes    Side effects: None    Risk Parameters:  Patient reports no suicidal ideation  Patient reports no homicidal ideation  Patient reports no self-injurious behavior  Patient reports no violent behavior    Exam (detailed: at least 9 elements; comprehensive: all 15 elements) "   Constitutional  Vitals:  Most recent vital signs, dated greater than 90 days prior to this appointment, were reviewed.   There were no vitals filed for this visit.     General:  unremarkable, age appropriate, casually dressed, lying in bed     Musculoskeletal  Muscle Strength/Tone:  no spasicity, no rigidity, no cogwheeling, no flaccidity   Gait & Station:  non-ataxic, slow     Psychiatric  Speech:  no latency; no press   Mood & Affect:  steady, happy  congruent and appropriate   Thought Process:  normal and logical   Associations:  intact   Thought Content:  normal, no suicidality, no homicidality, delusions, or paranoia   Insight:  has awareness of illness   Judgement: behavior is adequate to circumstances   Orientation:  person, place, situation, time/date, day of week   Memory: intact for content of interview   Language: grossly intact   Attention Span & Concentration:  able to focus   Fund of Knowledge:  intact and appropriate to age and level of education     Assessment and Diagnosis   Status/Progress: Based on the examination today, the patient's problem(s) is/are well controlled.  New problems have not been presented today.   Co-morbidities are not complicating management of the primary condition.  There are no active rule-out diagnoses for this patient at this time.     General Impression: Miriam Mendez is a 45 yo female who presents for follow up. Doing well on medication regimen overall, but reports Adderall not working well for her at this dose. Will increase Adderall 30 mg PO BID.      ICD-10-CM ICD-9-CM    1. Attention deficit hyperactivity disorder (ADHD), unspecified ADHD type  F90.9 314.01 dextroamphetamine-amphetamine 30 mg Tab      dextroamphetamine-amphetamine 30 mg Tab      dextroamphetamine-amphetamine 30 mg Tab      2. Generalized anxiety disorder with panic attacks  F41.1 300.02 FLUoxetine 40 MG capsule    F41.0 300.01       3. Recurrent major depressive disorder, in partial remission   F33.41 296.35 FLUoxetine 40 MG capsule      4. Insomnia, unspecified type  G47.00 780.52             Intervention/Counseling/Treatment Plan   Medication Management: Continue current medications.  Continue Prozac 40 mg PO daily   Continue Klonopin 1 mg PO BID PRN   Informed pt of the risks of continuous Benzodiazepine use including tolerance, dependence and withdrawals that may be life threatening upon abrupt cessation. Also advised not to take Benzodiazepines with Opiates or other sedatives and also not to drive or operate heavy machinery while using Benzodiazepines.  Continue Ambien 10 mg PO nightly   Patient educated on the importance of not taking Klonopin with Ambien  Increase Adderall 30 mg PO BID   checked, no discrepancies  Discussed risks of stimulants include addiction, tolerance, and possible withdrawal associated with stimulant use. Possible side effects including heart palpitations, restlessness, increased anxiety, decreased appetite, insomnia, and dry mouth were also discussed with the patient. Patient advised not to mix the medication with alcohol.   Discussed diagnosis, risk and benefits of proposed treatment above vs alternative treatment vs no treatment, and potential side effects of these treatments, and the inherent unpredictability of individual responses to these treatments. The patient expresses understanding and gives informed consent to pursue treatment at this time, believing that the potential benefits outweigh the potential risks. Patient has no other questions. Risks/adverse effects at this time include but are not limited to: GI side effects, sexual dysfunction, activation vs sedation, triggering of suicidal ideation, and serotonin syndrome.   Patient voices understanding and agreement with this plan  Provided crisis numbers  Encouraged patient to keep future appointments  Instruct patient to call or message with questions  In the event of an emergency, including suicidal ideation,  patient was advised to go to the emergency room      Return to Clinic: 3 months    Total time: 15 minutes (which included pts differential diagnosis and prognosis for psychiatric conditions, risks, benefits of treatments, instructions and adherence to treatment plan, risk reduction, reviewing current psychiatric medication regimen, medical problems and social stressors. In addtion to possible discussion with other healthcare provider/s)    Lauren Marin PA-C

## (undated) DEVICE — APPLICATOR CHLORAPREP ORN 26ML

## (undated) DEVICE — SUT MONOCRYL 4.0 PS2 CP496G

## (undated) DEVICE — ELECTRODE REM PLYHSV RETURN 9

## (undated) DEVICE — KIT ANTIFOG

## (undated) DEVICE — BOWL STERILE LARGE 32OZ

## (undated) DEVICE — GOWN IMPERVIOUS BLUE XXL

## (undated) DEVICE — PACK LAPAROSCOPY/PELVISCOPY II

## (undated) DEVICE — SET CYSTO IRRIGATION UNIV SPIK

## (undated) DEVICE — TROCAR KII FIOS 5MM X 100MM

## (undated) DEVICE — TRAY FOLEY 16FR INFECTION CONT

## (undated) DEVICE — SEE MEDLINE ITEM 157181

## (undated) DEVICE — SOL NS 1000CC

## (undated) DEVICE — ADHESIVE DERMABOND MINI HV

## (undated) DEVICE — CANISTER SUCTION 2 LTR

## (undated) DEVICE — PAD PINK TRENDELENBURG POS XL

## (undated) DEVICE — OCCLUDER COLPO-PNEUMO STERILE

## (undated) DEVICE — SOL NACL IRR 1000ML BTL

## (undated) DEVICE — PAD PREP 50/CA

## (undated) DEVICE — SUPPORT ULNA NERVE PROTECTOR

## (undated) DEVICE — COVER OVERHEAD SURG LT BLUE

## (undated) DEVICE — SEE MEDLINE ITEM 157110

## (undated) DEVICE — UNDERGLOVES BIOGEL PI SZ 6 LF

## (undated) DEVICE — SEE MEDLINE ITEM 154981

## (undated) DEVICE — DISSECTOR CURVED 5DCD

## (undated) DEVICE — NDL HYPO REG 25G X 1 1/2

## (undated) DEVICE — SEE MEDLINE ITEM 157150

## (undated) DEVICE — NDL INSUF ULTRA VERESS 120MM

## (undated) DEVICE — SEE MEDLINE ITEM 146292

## (undated) DEVICE — DRAPE STERI LONG

## (undated) DEVICE — HOOK DISSECTING 5MM

## (undated) DEVICE — GLOVE SURGICAL LATEX SZ 6

## (undated) DEVICE — UNDERGLOVE BIOGEL PI SZ 6.5 LF

## (undated) DEVICE — SYR 10CC LUER LOCK

## (undated) DEVICE — TOWEL OR DISP STRL BLUE 4/PK

## (undated) DEVICE — SOL CLEARIFY VISUALIZATION LAP

## (undated) DEVICE — JELLY LUBRICANT STERILE 4 OZ

## (undated) DEVICE — SYR 50CC LL

## (undated) DEVICE — SUT VICRYL PLUS 0 CT1 36IN

## (undated) DEVICE — BLADE SURG CARBON STEEL SZ11

## (undated) DEVICE — TROCAR KII FIOS 11MM X 100MM

## (undated) DEVICE — PAD SANITARY OB STERILE

## (undated) DEVICE — IRRIGATOR ENDOSCOPY DISP.

## (undated) DEVICE — UNDERGLOVES BIOGEL PI SZ 7 LF

## (undated) DEVICE — Device

## (undated) DEVICE — ADAPTER DISP HAND SWITCH